# Patient Record
Sex: MALE | Race: WHITE | NOT HISPANIC OR LATINO | Employment: FULL TIME | URBAN - METROPOLITAN AREA
[De-identification: names, ages, dates, MRNs, and addresses within clinical notes are randomized per-mention and may not be internally consistent; named-entity substitution may affect disease eponyms.]

---

## 2018-04-03 ENCOUNTER — HOSPITAL ENCOUNTER (EMERGENCY)
Facility: CLINIC | Age: 63
Discharge: HOME | End: 2018-04-03
Attending: EMERGENCY MEDICINE | Admitting: EMERGENCY MEDICINE
Payer: COMMERCIAL

## 2018-04-03 VITALS
DIASTOLIC BLOOD PRESSURE: 87 MMHG | TEMPERATURE: 97.4 F | RESPIRATION RATE: 16 BRPM | HEART RATE: 78 BPM | OXYGEN SATURATION: 98 % | SYSTOLIC BLOOD PRESSURE: 136 MMHG

## 2018-04-03 DIAGNOSIS — R55 VASOVAGAL SYNCOPE: ICD-10-CM

## 2018-04-03 LAB
ANION GAP SERPL CALCULATED.3IONS-SCNC: 7 MMOL/L (ref 3–14)
BASOPHILS # BLD AUTO: 0 10E9/L (ref 0–0.2)
BASOPHILS NFR BLD AUTO: 0.5 %
BUN SERPL-MCNC: 20 MG/DL (ref 7–30)
CALCIUM SERPL-MCNC: 8.9 MG/DL (ref 8.5–10.1)
CHLORIDE SERPL-SCNC: 106 MMOL/L (ref 94–109)
CO2 SERPL-SCNC: 27 MMOL/L (ref 20–32)
CREAT SERPL-MCNC: 1.17 MG/DL (ref 0.66–1.25)
DIFFERENTIAL METHOD BLD: NORMAL
EOSINOPHIL # BLD AUTO: 0.2 10E9/L (ref 0–0.7)
EOSINOPHIL NFR BLD AUTO: 3.6 %
ERYTHROCYTE [DISTWIDTH] IN BLOOD BY AUTOMATED COUNT: 13 % (ref 10–15)
GFR SERPL CREATININE-BSD FRML MDRD: 63 ML/MIN/1.7M2
GLUCOSE SERPL-MCNC: 100 MG/DL (ref 70–99)
HCT VFR BLD AUTO: 44.2 % (ref 40–53)
HGB BLD-MCNC: 14.8 G/DL (ref 13.3–17.7)
IMM GRANULOCYTES # BLD: 0 10E9/L (ref 0–0.4)
IMM GRANULOCYTES NFR BLD: 0.2 %
LYMPHOCYTES # BLD AUTO: 1.2 10E9/L (ref 0.8–5.3)
LYMPHOCYTES NFR BLD AUTO: 29.4 %
MAGNESIUM SERPL-MCNC: 2.2 MG/DL (ref 1.6–2.3)
MCH RBC QN AUTO: 30.8 PG (ref 26.5–33)
MCHC RBC AUTO-ENTMCNC: 33.5 G/DL (ref 31.5–36.5)
MCV RBC AUTO: 92 FL (ref 78–100)
MONOCYTES # BLD AUTO: 0.4 10E9/L (ref 0–1.3)
MONOCYTES NFR BLD AUTO: 9.6 %
NEUTROPHILS # BLD AUTO: 2.4 10E9/L (ref 1.6–8.3)
NEUTROPHILS NFR BLD AUTO: 56.7 %
NRBC # BLD AUTO: 0 10*3/UL
NRBC BLD AUTO-RTO: 0 /100
PLATELET # BLD AUTO: 219 10E9/L (ref 150–450)
POTASSIUM SERPL-SCNC: 3.4 MMOL/L (ref 3.4–5.3)
RBC # BLD AUTO: 4.81 10E12/L (ref 4.4–5.9)
SODIUM SERPL-SCNC: 140 MMOL/L (ref 133–144)
WBC # BLD AUTO: 4.2 10E9/L (ref 4–11)

## 2018-04-03 PROCEDURE — 96361 HYDRATE IV INFUSION ADD-ON: CPT

## 2018-04-03 PROCEDURE — 80048 BASIC METABOLIC PNL TOTAL CA: CPT | Performed by: EMERGENCY MEDICINE

## 2018-04-03 PROCEDURE — 85025 COMPLETE CBC W/AUTO DIFF WBC: CPT | Performed by: EMERGENCY MEDICINE

## 2018-04-03 PROCEDURE — 83735 ASSAY OF MAGNESIUM: CPT | Performed by: EMERGENCY MEDICINE

## 2018-04-03 PROCEDURE — 99284 EMERGENCY DEPT VISIT MOD MDM: CPT | Mod: 25

## 2018-04-03 PROCEDURE — 96374 THER/PROPH/DIAG INJ IV PUSH: CPT

## 2018-04-03 PROCEDURE — 25000128 H RX IP 250 OP 636: Performed by: EMERGENCY MEDICINE

## 2018-04-03 RX ORDER — ONDANSETRON 2 MG/ML
4 INJECTION INTRAMUSCULAR; INTRAVENOUS ONCE
Status: COMPLETED | OUTPATIENT
Start: 2018-04-03 | End: 2018-04-03

## 2018-04-03 RX ORDER — LIDOCAINE 40 MG/G
CREAM TOPICAL
Status: DISCONTINUED | OUTPATIENT
Start: 2018-04-03 | End: 2018-04-03 | Stop reason: HOSPADM

## 2018-04-03 RX ADMIN — SODIUM CHLORIDE 1000 ML: 9 INJECTION, SOLUTION INTRAVENOUS at 15:55

## 2018-04-03 RX ADMIN — ONDANSETRON 4 MG: 2 INJECTION INTRAMUSCULAR; INTRAVENOUS at 15:56

## 2018-04-03 ASSESSMENT — ENCOUNTER SYMPTOMS
SEIZURES: 0
LIGHT-HEADEDNESS: 1
PALPITATIONS: 0
DYSURIA: 0
HEMATURIA: 0
SHORTNESS OF BREATH: 0
TREMORS: 0
ABDOMINAL PAIN: 0
FREQUENCY: 0

## 2018-04-03 NOTE — ED AVS SNAPSHOT
[facility]    [address]    [facility] [address]    Phone:  [phone]    Fax:  [phone]                                       Derik Hamm   MRN: [id number]    Department:  [facility]   Date of Visit:  4/3/2018           After Visit Summary Signature Page     I have received my discharge instructions, and my questions have been answered. I have discussed any challenges I see with this plan with the nurse or doctor.    ..........................................................................................................................................  Patient/Patient Representative Signature      ..........................................................................................................................................  Patient Representative Print Name and Relationship to Patient    ..................................................               ................................................  Date                                            Time    ..........................................................................................................................................  Reviewed by Signature/Title    ...................................................              ..............................................  Date                                                            Time

## 2018-04-03 NOTE — ED AVS SNAPSHOT
[facility]    [address]    [facility] [address]    Phone:  [phone]    Fax:  [phone]                                       Derik Hamm   MRN: [id number]    Department:  [facility]   Date of Visit:  4/3/2018           Patient Information     Date Of Birth          1955        Your diagnoses for this visit were:     Vasovagal syncope        You were seen by Rohini Chapa MD.      Follow-up Information     Follow up with Clinic, [facility].    Why:  within 3 days for reassessment as needed    Contact information:    [address]  [address]  [facility] [address] [phone]          Follow up with [facility].    Specialty:  EMERGENCY MEDICINE    Why:  As needed, If symptoms worsen    Contact information:    [address] [facility] [id number] [phone]        Discharge Instructions         Avoid energy drinks/supplements. Get plenty of sleep. Eat regular, healthy meals and stay hydrated.    Fainting: Vagal Reaction  Fainting (syncope) is a temporary loss of consciousness that is associated with a loss of postural tone. It s also called passing out. It occurs when blood flow to the brain is less than normal. Your healthcare provider believes that your fainting was because of a vagal reaction. This condition is not a sign of serious disease.  A vagal reaction is a response in your body that causes your pulse to slow down or the blood vessels to expand. This causes your blood pressure to fall. And this sends less blood to your brain if you are standing or sitting. That results in dizziness, near-fainting, or fainting. Lying down usually stops the reaction within 60 seconds.  This response can occur during sudden fear, severe pain, emotional stress, overexertion, overheating, hunger, nausea or vomiting, prolonged standing, or standing up  after sitting or lying for a long time.  Home care  Follow these guidelines when caring for yourself at home:    Rest today. Go back to your normal activities as soon as you are feeling back to normal.    Stay hydrated and avoid skipping meals.    If you feel lightheaded or dizzy, lie down right away. Or sit with your head lowered between your knees.  Follow-up care  Follow up with your healthcare provider, or as advised.  When to seek medical advice  Call your healthcare provider right away if any of these occur:    Another fainting spell that s not explained by the common causes listed above    Pain in your chest, arm, neck, jaw, back, or abdomen    Shortness of breath    Severe headache or seizure    Your heart beats very rapidly, very slowly, or irregularly (palpitations)  Date Last Reviewed: 12/1/2016 2000-2017 [employer]. [address] [place] [address]. All rights reserved. This information is not intended as a substitute for professional medical care. Always follow your healthcare professional's instructions.          24 Hour Appointment Hotline       To make an appointment at any [facility], call [phone] ([phone]). If you don't have a family doctor or clinic, we will help you find one. [place] clinics are conveniently located to serve the needs of you and your family.             Review of your medicines      Notice     You have not been prescribed any medications.            Procedures and tests performed during your visit     Basic metabolic panel    CBC with platelets differential    Cardiac Continuous Monitoring    EKG 12 lead    Magnesium    Peripheral IV catheter    Pulse oximetry nursing      Orders Needing Specimen Collection     None      Pending Results     Date and Time Order Name Status Description    4/3/2018 1523 EKG 12 lead Preliminary             Pending Culture Results     No orders found from 4/1/2018 to 4/4/2018.            Pending Results  Instructions     If you had any lab results that were not finalized at the time of your Discharge, you can call the ED Lab Result RN at [phone]. You will be contacted by this team for any positive Lab results or changes in treatment. The nurses are available 7 days a week from 10A to 6:30P.  You can leave a message 24 hours per day and they will return your call.        Test Results From Your Hospital Stay        4/3/2018  4:08 PM      Component Results     Component Value Ref Range & Units Status    WBC 4.2 4.0 - 11.0 10e9/L Final    RBC Count 4.81 4.4 - 5.9 10e12/L Final    Hemoglobin 14.8 13.3 - 17.7 g/dL Final    Hematocrit 44.2 40.0 - 53.0 % Final    MCV 92 78 - 100 fl Final    MCH 30.8 26.5 - 33.0 pg Final    MCHC 33.5 31.5 - 36.5 g/dL Final    RDW 13.0 10.0 - 15.0 % Final    Platelet Count 219 150 - 450 10e9/L Final    Diff Method Automated Method  Final    % Neutrophils 56.7 % Final    % Lymphocytes 29.4 % Final    % Monocytes 9.6 % Final    % Eosinophils 3.6 % Final    % Basophils 0.5 % Final    % Immature Granulocytes 0.2 % Final    Nucleated RBCs 0 0 /100 Final    Absolute Neutrophil 2.4 1.6 - 8.3 10e9/L Final    Absolute Lymphocytes 1.2 0.8 - 5.3 10e9/L Final    Absolute Monocytes 0.4 0.0 - 1.3 10e9/L Final    Absolute Eosinophils 0.2 0.0 - 0.7 10e9/L Final    Absolute Basophils 0.0 0.0 - 0.2 10e9/L Final    Abs Immature Granulocytes 0.0 0 - 0.4 10e9/L Final    Absolute Nucleated RBC 0.0  Final         4/3/2018  4:23 PM      Component Results     Component Value Ref Range & Units Status    Sodium 140 133 - 144 mmol/L Final    Potassium 3.4 3.4 - 5.3 mmol/L Final    Chloride 106 94 - 109 mmol/L Final    Carbon Dioxide 27 20 - 32 mmol/L Final    Anion Gap 7 3 - 14 mmol/L Final    Glucose 100 (H) 70 - 99 mg/dL Final    Urea Nitrogen 20 7 - 30 mg/dL Final    Creatinine 1.17 0.66 - 1.25 mg/dL Final    GFR Estimate 63 >60 mL/min/1.7m2 Final    Non  GFR Calc    GFR Estimate If Black 77 >60  mL/min/1.7m2 Final     GFR Calc    Calcium 8.9 8.5 - 10.1 mg/dL Final         4/3/2018  4:23 PM      Component Results     Component Value Ref Range & Units Status    Magnesium 2.2 1.6 - 2.3 mg/dL Final                Clinical Quality Measure: Blood Pressure Screening     Your blood pressure was checked while you were in the emergency department today. The last reading we obtained was  BP: 121/86 . Please read the guidelines below about what these numbers mean and what you should do about them.  If your systolic blood pressure (the top number) is less than 120 and your diastolic blood pressure (the bottom number) is less than 80, then your blood pressure is normal. There is nothing more that you need to do about it.  If your systolic blood pressure (the top number) is 120-139 or your diastolic blood pressure (the bottom number) is 80-89, your blood pressure may be higher than it should be. You should have your blood pressure rechecked within a year by a primary care provider.  If your systolic blood pressure (the top number) is 140 or greater or your diastolic blood pressure (the bottom number) is 90 or greater, you may have high blood pressure. High blood pressure is treatable, but if left untreated over time it can put you at risk for heart attack, stroke, or kidney failure. You should have your blood pressure rechecked by a primary care provider within the next 4 weeks.  If your provider in the emergency department today gave you specific instructions to follow-up with your doctor or provider even sooner than that, you should follow that instruction and not wait for up to 4 weeks for your follow-up visit.        Thank you for choosing [facility]       Thank you for choosing [facility] for your care. Our goal is always to provide you with excellent care. Hearing back from our patients is one way we can continue to improve our services. Please take a few minutes to complete the written survey that you  "may receive in the mail after you visit with us. Thank you!        [employer] Information     [employer] lets you send messages to your doctor, view your test results, renew your prescriptions, schedule appointments and more. To sign up, go to [email or web address] . Click on \"Log in\" on the left side of the screen, which will take you to the Welcome page. Then click on \"Sign up Now\" on the right side of the page.     You will be asked to enter the access code listed below, as well as some personal information. Please follow the directions to create your username and password.     Your access code is: [email or web address]  Expires: 2018  5:17 PM     Your access code will  in 90 days. If you need help or a new code, please call your [facility] clinic or [phone].        Care EveryWhere ID     This is your Care EveryWhere ID. This could be used by other organizations to access your [facility] medical records  [id number]        Equal Access to Services     HERVE [facility] : Hadii aad ku hadasho Soeleuterioali, waaxda luqadaha, qaybta kaalmada adeegyaever, gato bartlett . So [facility] [phone].    ATENCIÓN: Si habla español, tiene a pink disposición servicios gratuitos de asistencia lingüística. Llame al [phone].    We comply with applicable federal civil rights laws and [place] laws. We do not discriminate on the basis of race, color, national origin, age, disability, sex, sexual orientation, or gender identity.            After Visit Summary       This is your record. Keep this with you and show to your community pharmacist(s) and doctor(s) at your next visit.                  "

## 2018-04-03 NOTE — ED NOTES
Bed: ED12  Expected date: 4/3/18  Expected time: 3:13 PM  Means of arrival: Ambulance  Comments:  BV2. Syncope. 62M

## 2018-04-03 NOTE — ED NOTES
"Pt presents to ED via EMS following a syncopal episode. Pt took friend to [facility] today and pt had a witnessed syncopal episode. Pt states he was feeling \"queezy\" watching them take out his friend's stitches then he just passed out. Pt states he has not eaten much today and has had a 5 hour energy drink and a can of pepsi max. BG 96 per EMS. Pt orthostatic per EMS. /53 laying then 94/50 when standing. Pt was incontinent at the time but no seizure-like activity was noted. EMS gave about 200mL fluid PTA. ABCs intact. A&Ox3. VSS.     "

## 2018-04-03 NOTE — ED PROVIDER NOTES
History     Chief Complaint:  Loss of Consciousness    HPI   Derik Hamm is an otherwise healthy 62 year old male who presents with a syncopal episode. The patient reports that he was at the ortho clinic with his roommate who was getting stitches out. At that time he began feeling lightheaded and then had an episode of syncope with incontinence of urine. He simply slumped in a chair and notes no trauma or falling on the floor during this episode. He denies chest pain, shortness of breath, palpitations, abdominal pain, urinary symptoms, shaking, or seizure like activity. He endorses having eaten less than normal today along with 5 Hour Energy and Mountain Dew today, which is typical for him. He also states that he did not get much sleep last night.  Of note, the patient reports a history of this once when having a cast removed as a child.     Allergies:  No known drug allergies      Medications:    The patient is currently on no regular medications.       Past Medical History:    The patient does not have any past pertinent medical history.      Past Surgical History:    History reviewed. No pertinent surgical history.     Family History:    History review. No contributing family history.     Social History:  PCP: No primary care provider on file.   Patient presents alone.   Marital Status:  Single      Review of Systems   Respiratory: Negative for shortness of breath.    Cardiovascular: Negative for chest pain and palpitations.   Gastrointestinal: Negative for abdominal pain.   Genitourinary: Negative for dysuria, frequency, hematuria and urgency.        Positive for incontinence of urine   Neurological: Positive for syncope and light-headedness. Negative for tremors and seizures.   All other systems reviewed and are negative.    Physical Exam     Patient Vitals for the past 24 hrs:   BP Temp Temp src Pulse Heart Rate Resp SpO2   04/03/18 1545 121/86 - - - 64 - 95 %   04/03/18 1530 124/83 - - - 75 - 98 %    04/03/18 1526 127/76 97.4  F (36.3  C) Oral 78 78 16 95 %      Physical Exam  General: Adult male sitting upright in bed  Eyes: PERRL, Conjunctive within normal limits  ENT: Moist mucous membranes, oropharynx clear.   CV: Normal S1S2, no murmur, rub or gallop. Regular rate and rhythm  Resp: Clear to auscultation bilaterally, no wheezes, rales or rhonchi. Normal respiratory effort.  GI: Abdomen is soft, nontender and nondistended. No palpable masses. No rebound or guarding.  MSK: No edema. Nontender. Normal active range of motion.  Skin: Warm and dry. No rashes or lesions or ecchymoses on visible skin.  Neuro: Alert and oriented. Responds appropriately to all questions and commands. No focal findings appreciated. Normal muscle tone.  Psych: Normal mood and affect. Pleasant.     Emergency Department Course   ECG (15:23):  Rate 73 bpm. NY interval 182. QT/QTc 378/416.   Normal sinus rhythm. Normal ECG  Interpreted at 1524 by Rohini Chapa MD.        Laboratory:  CBC: WNL (WBC 4.2, HGB 14.8, )   BMP: Glucose 100 (H), o/w WNL (Creatinine 1.17)  Magnesium 2.2    Interventions:  1555: NS 1L IV Bolus   1556: Zofran 4mg IV     Emergency Department Course:  Past medical records, nursing notes, and vitals reviewed.  1527: I performed an exam of the patient and obtained history, as documented above.  IV inserted and blood drawn.  Patient reassessed. Is feeling comfortable. Taking po. Ambulatory with normal gait.   1710: I rechecked the patient. Findings and plan explained to the Patient. Patient discharged home with instructions regarding supportive care, medications, and reasons to return. The importance of close follow-up was reviewed.      Impression & Plan      Medical Decision Making:  This patient presents with a history and clinical exam consistent with syncope. The differential for syncope is concerning for cardiac arrythmia, ACS, aortic stenosis, HOCM, PE, orthostatic hypotension, drugs, situational,  "carotid hypersensitivity, seizure, TIA, stroke.  There is no family history of sudden death, no chest pain, no seizure-like activity or post-ictal period, no murmur, and no signs of orthostasis in the ED, no focal neurologic symptoms, and no complaints of concerning headache.  The patient did not have prolonged LOC, sleepiness, repeated emesis, poor orientation, or significant irritability.  By history this sounds most consistent with vasovagal syncope.  I have noted that \"red flags\" include: repeated LOC,  headaches that get worse, increased drowsiness, strange behavior, repetitive speech, seizures, repeated vomiting, growing confusion, increased irritability, slurred speech, weakness or numbness, and loss of responsiveness. Recommended to rest, avoid energy drink or supplements, return with recurrence.     Diagnosis:    ICD-10-CM    1. Vasovagal syncope R55        Disposition:  Discharged to home    Zina Hilario  4/3/2018   [facility] EMERGENCY DEPARTMENT  I, Zina Hilario, am serving as a scribe at 3:27 PM on 4/3/2018 to document services personally performed by Rohini Chapa MD based on my observations and the provider's statements to me.        Rohini Chapa MD  04/04/18 6743    "

## 2018-04-03 NOTE — ED NOTES
Patient ambulated very well. Very steady and no complaints of dizziness or feelings of being lightheaded.

## 2018-04-03 NOTE — DISCHARGE INSTRUCTIONS
Avoid energy drinks/supplements. Get plenty of sleep. Eat regular, healthy meals and stay hydrated.    Fainting: Vagal Reaction  Fainting (syncope) is a temporary loss of consciousness that is associated with a loss of postural tone. It s also called passing out. It occurs when blood flow to the brain is less than normal. Your healthcare provider believes that your fainting was because of a vagal reaction. This condition is not a sign of serious disease.  A vagal reaction is a response in your body that causes your pulse to slow down or the blood vessels to expand. This causes your blood pressure to fall. And this sends less blood to your brain if you are standing or sitting. That results in dizziness, near-fainting, or fainting. Lying down usually stops the reaction within 60 seconds.  This response can occur during sudden fear, severe pain, emotional stress, overexertion, overheating, hunger, nausea or vomiting, prolonged standing, or standing up after sitting or lying for a long time.  Home care  Follow these guidelines when caring for yourself at home:    Rest today. Go back to your normal activities as soon as you are feeling back to normal.    Stay hydrated and avoid skipping meals.    If you feel lightheaded or dizzy, lie down right away. Or sit with your head lowered between your knees.  Follow-up care  Follow up with your healthcare provider, or as advised.  When to seek medical advice  Call your healthcare provider right away if any of these occur:    Another fainting spell that s not explained by the common causes listed above    Pain in your chest, arm, neck, jaw, back, or abdomen    Shortness of breath    Severe headache or seizure    Your heart beats very rapidly, very slowly, or irregularly (palpitations)  Date Last Reviewed: 12/1/2016 2000-2017 [employer]. [address] [place] [address], [place] [address]. All rights reserved. This information is not intended as a substitute for  professional medical care. Always follow your healthcare professional's instructions.

## 2018-04-04 LAB — INTERPRETATION ECG - MUSE: NORMAL

## 2018-11-04 ENCOUNTER — ANESTHESIA (OUTPATIENT)
Dept: SURGERY | Facility: CLINIC | Age: 63
End: 2018-11-04
Payer: COMMERCIAL

## 2018-11-04 ENCOUNTER — ANESTHESIA EVENT (OUTPATIENT)
Dept: SURGERY | Facility: CLINIC | Age: 63
End: 2018-11-04
Payer: COMMERCIAL

## 2018-11-04 ENCOUNTER — HOSPITAL ENCOUNTER (INPATIENT)
Facility: CLINIC | Age: 63
LOS: 5 days | Discharge: HOME | End: 2018-11-10
Attending: NURSE PRACTITIONER | Admitting: SURGERY
Payer: COMMERCIAL

## 2018-11-04 ENCOUNTER — APPOINTMENT (OUTPATIENT)
Dept: CT IMAGING | Facility: CLINIC | Age: 63
End: 2018-11-04
Attending: NURSE PRACTITIONER
Payer: COMMERCIAL

## 2018-11-04 DIAGNOSIS — K35.32 APPENDICITIS WITH PERFORATION: Primary | ICD-10-CM

## 2018-11-04 DIAGNOSIS — R10.13 DYSPEPSIA: ICD-10-CM

## 2018-11-04 DIAGNOSIS — I48.0 PAF (PAROXYSMAL ATRIAL FIBRILLATION) (H): ICD-10-CM

## 2018-11-04 DIAGNOSIS — K35.80 ACUTE APPENDICITIS, UNSPECIFIED ACUTE APPENDICITIS TYPE: ICD-10-CM

## 2018-11-04 LAB
ALBUMIN SERPL-MCNC: 4.1 G/DL (ref 3.4–5)
ALP SERPL-CCNC: 68 U/L (ref 40–150)
ALT SERPL W P-5'-P-CCNC: 31 U/L (ref 0–70)
ANION GAP SERPL CALCULATED.3IONS-SCNC: 7 MMOL/L (ref 3–14)
AST SERPL W P-5'-P-CCNC: 16 U/L (ref 0–45)
BILIRUB SERPL-MCNC: 1.3 MG/DL (ref 0.2–1.3)
BUN SERPL-MCNC: 24 MG/DL (ref 7–30)
CALCIUM SERPL-MCNC: 9.2 MG/DL (ref 8.5–10.1)
CHLORIDE SERPL-SCNC: 103 MMOL/L (ref 94–109)
CO2 SERPL-SCNC: 27 MMOL/L (ref 20–32)
CREAT SERPL-MCNC: 1.05 MG/DL (ref 0.66–1.25)
ERYTHROCYTE [DISTWIDTH] IN BLOOD BY AUTOMATED COUNT: 13.2 % (ref 10–15)
GFR SERPL CREATININE-BSD FRML MDRD: 72 ML/MIN/1.7M2
GLUCOSE SERPL-MCNC: 149 MG/DL (ref 70–99)
HCT VFR BLD AUTO: 47.7 % (ref 40–53)
HGB BLD-MCNC: 16.1 G/DL (ref 13.3–17.7)
LIPASE SERPL-CCNC: 69 U/L (ref 73–393)
MCH RBC QN AUTO: 30.6 PG (ref 26.5–33)
MCHC RBC AUTO-ENTMCNC: 33.8 G/DL (ref 31.5–36.5)
MCV RBC AUTO: 91 FL (ref 78–100)
PLATELET # BLD AUTO: 212 10E9/L (ref 150–450)
POTASSIUM SERPL-SCNC: 3.5 MMOL/L (ref 3.4–5.3)
PROT SERPL-MCNC: 7.6 G/DL (ref 6.8–8.8)
RBC # BLD AUTO: 5.27 10E12/L (ref 4.4–5.9)
SODIUM SERPL-SCNC: 137 MMOL/L (ref 133–144)
WBC # BLD AUTO: 10.7 10E9/L (ref 4–11)

## 2018-11-04 PROCEDURE — 96365 THER/PROPH/DIAG IV INF INIT: CPT | Mod: 59

## 2018-11-04 PROCEDURE — 27210794 ZZH OR GENERAL SUPPLY STERILE: Performed by: SURGERY

## 2018-11-04 PROCEDURE — 85027 COMPLETE CBC AUTOMATED: CPT | Performed by: NURSE PRACTITIONER

## 2018-11-04 PROCEDURE — 36000056 ZZH SURGERY LEVEL 3 1ST 30 MIN: Performed by: SURGERY

## 2018-11-04 PROCEDURE — 88304 TISSUE EXAM BY PATHOLOGIST: CPT | Mod: 26 | Performed by: SURGERY

## 2018-11-04 PROCEDURE — 80053 COMPREHEN METABOLIC PANEL: CPT | Performed by: NURSE PRACTITIONER

## 2018-11-04 PROCEDURE — 37000008 ZZH ANESTHESIA TECHNICAL FEE, 1ST 30 MIN: Performed by: SURGERY

## 2018-11-04 PROCEDURE — 25000128 H RX IP 250 OP 636: Performed by: NURSE PRACTITIONER

## 2018-11-04 PROCEDURE — 96361 HYDRATE IV INFUSION ADD-ON: CPT

## 2018-11-04 PROCEDURE — 99222 1ST HOSP IP/OBS MODERATE 55: CPT | Mod: 57 | Performed by: SURGERY

## 2018-11-04 PROCEDURE — 88304 TISSUE EXAM BY PATHOLOGIST: CPT | Performed by: SURGERY

## 2018-11-04 PROCEDURE — 36000058 ZZH SURGERY LEVEL 3 EA 15 ADDTL MIN: Performed by: SURGERY

## 2018-11-04 PROCEDURE — 40000306 ZZH STATISTIC PRE PROC ASSESS II: Performed by: SURGERY

## 2018-11-04 PROCEDURE — 37000009 ZZH ANESTHESIA TECHNICAL FEE, EACH ADDTL 15 MIN: Performed by: SURGERY

## 2018-11-04 PROCEDURE — 99285 EMERGENCY DEPT VISIT HI MDM: CPT | Mod: 25

## 2018-11-04 PROCEDURE — 25000128 H RX IP 250 OP 636: Performed by: ANESTHESIOLOGY

## 2018-11-04 PROCEDURE — 71000012 ZZH RECOVERY PHASE 1 LEVEL 1 FIRST HR: Performed by: SURGERY

## 2018-11-04 PROCEDURE — 96375 TX/PRO/DX INJ NEW DRUG ADDON: CPT

## 2018-11-04 PROCEDURE — 44970 LAPAROSCOPY APPENDECTOMY: CPT | Performed by: SURGERY

## 2018-11-04 PROCEDURE — 25000125 ZZHC RX 250: Performed by: NURSE ANESTHETIST, CERTIFIED REGISTERED

## 2018-11-04 PROCEDURE — 25000128 H RX IP 250 OP 636: Performed by: NURSE ANESTHETIST, CERTIFIED REGISTERED

## 2018-11-04 PROCEDURE — 25000566 ZZH SEVOFLURANE, EA 15 MIN: Performed by: SURGERY

## 2018-11-04 PROCEDURE — 74177 CT ABD & PELVIS W/CONTRAST: CPT

## 2018-11-04 PROCEDURE — 81003 URINALYSIS AUTO W/O SCOPE: CPT | Performed by: NURSE PRACTITIONER

## 2018-11-04 PROCEDURE — 83690 ASSAY OF LIPASE: CPT | Performed by: NURSE PRACTITIONER

## 2018-11-04 RX ORDER — GLYCOPYRROLATE 0.2 MG/ML
INJECTION, SOLUTION INTRAMUSCULAR; INTRAVENOUS PRN
Status: DISCONTINUED | OUTPATIENT
Start: 2018-11-04 | End: 2018-11-05

## 2018-11-04 RX ORDER — ONDANSETRON 2 MG/ML
INJECTION INTRAMUSCULAR; INTRAVENOUS PRN
Status: DISCONTINUED | OUTPATIENT
Start: 2018-11-04 | End: 2018-11-05

## 2018-11-04 RX ORDER — ONDANSETRON 2 MG/ML
4 INJECTION INTRAMUSCULAR; INTRAVENOUS ONCE
Status: COMPLETED | OUTPATIENT
Start: 2018-11-04 | End: 2018-11-04

## 2018-11-04 RX ORDER — KETOROLAC TROMETHAMINE 30 MG/ML
INJECTION, SOLUTION INTRAMUSCULAR; INTRAVENOUS PRN
Status: DISCONTINUED | OUTPATIENT
Start: 2018-11-04 | End: 2018-11-05

## 2018-11-04 RX ORDER — LIDOCAINE 40 MG/G
CREAM TOPICAL
Status: DISCONTINUED | OUTPATIENT
Start: 2018-11-04 | End: 2018-11-05 | Stop reason: HOSPADM

## 2018-11-04 RX ORDER — DEXAMETHASONE SODIUM PHOSPHATE 4 MG/ML
INJECTION, SOLUTION INTRA-ARTICULAR; INTRALESIONAL; INTRAMUSCULAR; INTRAVENOUS; SOFT TISSUE PRN
Status: DISCONTINUED | OUTPATIENT
Start: 2018-11-04 | End: 2018-11-05

## 2018-11-04 RX ORDER — LIDOCAINE HYDROCHLORIDE 10 MG/ML
INJECTION, SOLUTION INFILTRATION; PERINEURAL PRN
Status: DISCONTINUED | OUTPATIENT
Start: 2018-11-04 | End: 2018-11-05

## 2018-11-04 RX ORDER — FENTANYL CITRATE 50 UG/ML
INJECTION, SOLUTION INTRAMUSCULAR; INTRAVENOUS PRN
Status: DISCONTINUED | OUTPATIENT
Start: 2018-11-04 | End: 2018-11-05

## 2018-11-04 RX ORDER — SODIUM CHLORIDE, SODIUM LACTATE, POTASSIUM CHLORIDE, CALCIUM CHLORIDE 600; 310; 30; 20 MG/100ML; MG/100ML; MG/100ML; MG/100ML
INJECTION, SOLUTION INTRAVENOUS CONTINUOUS
Status: DISCONTINUED | OUTPATIENT
Start: 2018-11-04 | End: 2018-11-05 | Stop reason: HOSPADM

## 2018-11-04 RX ORDER — PROPOFOL 10 MG/ML
INJECTION, EMULSION INTRAVENOUS PRN
Status: DISCONTINUED | OUTPATIENT
Start: 2018-11-04 | End: 2018-11-05

## 2018-11-04 RX ORDER — IOPAMIDOL 755 MG/ML
500 INJECTION, SOLUTION INTRAVASCULAR ONCE
Status: COMPLETED | OUTPATIENT
Start: 2018-11-04 | End: 2018-11-04

## 2018-11-04 RX ADMIN — PROPOFOL 200 MG: 10 INJECTION, EMULSION INTRAVENOUS at 23:31

## 2018-11-04 RX ADMIN — TAZOBACTAM SODIUM AND PIPERACILLIN SODIUM 4.5 G: 500; 4 INJECTION, SOLUTION INTRAVENOUS at 22:32

## 2018-11-04 RX ADMIN — KETOROLAC TROMETHAMINE 30 MG: 30 INJECTION, SOLUTION INTRAMUSCULAR at 23:31

## 2018-11-04 RX ADMIN — SODIUM CHLORIDE, POTASSIUM CHLORIDE, SODIUM LACTATE AND CALCIUM CHLORIDE: 600; 310; 30; 20 INJECTION, SOLUTION INTRAVENOUS at 23:22

## 2018-11-04 RX ADMIN — IOPAMIDOL 100 ML: 755 INJECTION, SOLUTION INTRAVENOUS at 22:01

## 2018-11-04 RX ADMIN — LIDOCAINE HYDROCHLORIDE 50 MG: 10 INJECTION, SOLUTION INFILTRATION; PERINEURAL at 23:31

## 2018-11-04 RX ADMIN — HYDROMORPHONE HYDROCHLORIDE 1 MG: 1 INJECTION, SOLUTION INTRAMUSCULAR; INTRAVENOUS; SUBCUTANEOUS at 22:31

## 2018-11-04 RX ADMIN — SODIUM CHLORIDE 66 ML: 9 INJECTION, SOLUTION INTRAVENOUS at 22:01

## 2018-11-04 RX ADMIN — SODIUM CHLORIDE 1000 ML: 9 INJECTION, SOLUTION INTRAVENOUS at 21:22

## 2018-11-04 RX ADMIN — MIDAZOLAM 2 MG: 1 INJECTION INTRAMUSCULAR; INTRAVENOUS at 23:22

## 2018-11-04 RX ADMIN — FENTANYL CITRATE 100 MCG: 50 INJECTION, SOLUTION INTRAMUSCULAR; INTRAVENOUS at 23:31

## 2018-11-04 RX ADMIN — ONDANSETRON 4 MG: 2 INJECTION INTRAMUSCULAR; INTRAVENOUS at 22:32

## 2018-11-04 RX ADMIN — ROCURONIUM BROMIDE 5 MG: 10 INJECTION INTRAVENOUS at 23:56

## 2018-11-04 RX ADMIN — ROCURONIUM BROMIDE 50 MG: 10 INJECTION INTRAVENOUS at 23:31

## 2018-11-04 RX ADMIN — ONDANSETRON 4 MG: 2 INJECTION INTRAMUSCULAR; INTRAVENOUS at 23:31

## 2018-11-04 RX ADMIN — DEXAMETHASONE SODIUM PHOSPHATE 8 MG: 4 INJECTION, SOLUTION INTRA-ARTICULAR; INTRALESIONAL; INTRAMUSCULAR; INTRAVENOUS; SOFT TISSUE at 23:31

## 2018-11-04 RX ADMIN — GLYCOPYRROLATE 0.2 MG: 0.2 INJECTION, SOLUTION INTRAMUSCULAR; INTRAVENOUS at 23:31

## 2018-11-04 ASSESSMENT — ENCOUNTER SYMPTOMS
DYSURIA: 0
DIARRHEA: 0
SHORTNESS OF BREATH: 0
FREQUENCY: 0
CHILLS: 0
FEVER: 0
VOMITING: 1
COUGH: 0
ABDOMINAL PAIN: 1
DIFFICULTY URINATING: 1
HEMATURIA: 0

## 2018-11-04 NOTE — IP AVS SNAPSHOT
MRN:[id number]                      After Visit Summary   11/4/2018    Derik Hamm    MRN: [id number]           Thank you!     Thank you for choosing [facility] for your care. Our goal is always to provide you with excellent care. Hearing back from our patients is one way we can continue to improve our services. Please take a few minutes to complete the written survey that you may receive in the mail after you visit. If you would like to speak to someone directly about your visit please contact Patient Relations at [phone]. Thank you!          Patient Information     Date Of Birth          1955        Designated Caregiver       Most Recent Value    Caregiver    Will someone help with your care after discharge? no      About your hospital stay     You were admitted on:  November 5, 2018 You last received care in the:  [place] [address] [facility]    You were discharged on:  November 10, 2018       Who to Call     For medical emergencies, please call 911.  For non-urgent questions about your medical care, please call your primary care provider or clinic, [phone]  For questions related to your surgery, please call your surgery clinic        Attending Provider     Provider Specialty    Antonio Weeks APRN [facility]    Mackenzie Dale MD Surgery       Primary Care Provider Office Phone # Fax #    [facility] [phone]      Follow-up Appointments     Follow-up and recommended labs and tests        [facility], within 7 days to evaluate medication change, for hospital follow- up and BP.  No follow up labs or test are needed.                  Further instructions from your care team       HOME CARE FOLLOWING APPENDECTOMY  MAURA Rehman N. Guttormson, D. Maurer, R. O Donnell, J. Shaheen    INCISIONAL CARE:  Replace the bandage over your incision (or incisions)  until all drainage stops, or if more comfortable to have in place.  If present, leave the steri-strips (white paper tapes) in place for 14 days after surgery.  If you have staples in your incision at the time of discharge, they will be removed at your follow-up appointment.  If Dermabond (a type of skin glue) is present, leave in place until it wears/flakes off.     BATHING:  Avoid baths for 1 week after surgery.  Showers are okay.  You may wash your hair at any time.  Gently pat your incision dry after bathing.    ACTIVITY:  Light Activity -- you may immediately be up and about as tolerated.  Driving -- you may drive when comfortable and off narcotic pain medications.  Light Work -- resume when comfortable off pain medications.  (If you can drive, you probably can work.)  Strenuous Work/Activity -- limit lifting to 20 pounds for 2 weeks.  Progressively increase with time.  Active Sports (running, biking, etc.) -- cautiously resume after 2 weeks.    DISCOMFORT:  Use pain medications as prescribed by your surgeon.  Take the pain medication with some food, when possible, to minimize side effects.  Intermittent use of ice packs at the incision sites may help during the first 48 hours.  Expect gradual improvement.    DIET:  No restrictions.  Drink plenty of fluids.  While taking pain medications, increase dietary fiber or add a fiber supplementation like Metamucil or Citrucel to help prevent constipation - a possible side effect of pain medications.    NAUSEA:  If nauseated from the anesthetic/pain meds; rest in bed, get up cautiously with assistance, and drink clear liquids (juice, tea, broth).    RETURN APPOINTMENT:  Schedule a follow-up visit 2-3 weeks post-op.  Office Phone:  [phone]     CONTACT US IF THE FOLLOWING DEVELOPS:   1. A fever that is above 101     2. If there is a large amount of drainage, bleeding, or swelling.   3. Severe pain that is not relieved by your prescription.   4. Drainage that is thick,  cloudy, yellow, green or white.   5. Any other questions not answered by  Frequently Asked Questions  sheet.      FREQUENTLY ASKED QUESTIONS:    Q:  How should my incision look?    A:  Normally your incision will appear slightly swollen with light redness directly along the incision itself as it heals.  It may feel like a bump or ridge as the healing/scarring happens, and over time (3-4 months) this bump or ridge feeling should slowly go away.  In general, clear or pink watery drainage can be normal at first as your incision heals, but should decrease over time.    Q:  How do I know if my incision is infected?  A:  Look at your incision for signs of infection, like redness around the incision spreading to surrounding skin, or drainage of cloudy or foul-smelling drainage.  If you feel warm, check your temperature to see if you are running a fever.    **If any of these things occur, please notify the nurse at our office.  We may need you to come into the office for an incision check.      Q:  How do I take care of my incision?  A:  If you have a dressing in place - Starting the day after surgery, replace the dressing 1-2 times a day until there is no further drainage from the incision.  At that time, a dressing is no longer needed.  Try to minimize tape on the skin if irritation is occurring at the tape sites.  If you have significant irritation from tape on the skin, please call the office to discuss other method of dressing your incision.    Small pieces of tape called  steri-strips  may be present directly overlying your incision; these may be removed 10 days after surgery unless otherwise specified by your surgeon.  If these tapes start to loosen at the ends, you may trim them back until they fall off or are removed.    A:  If you had  Dermabond  tissue glue used as a dressing (this causes your incision to look shiny with a clear covering over it) - This type of dressing wears off with time and does not require  more dressings over the top unless it is draining around the glue as it wears off.  Do not apply ointments or lotions over the incisions until the glue has completely worn off.    Q:  There is a piece of tape or a sticky  lead  still on my skin.  Can I remove this?  A:  Sometimes the sticky  leads  used for monitoring during surgery or for evaluation in the emergency department are not all removed while you are in the hospital.  These sometimes have a tab or metal dot on them.  You can easily remove these on your own, like taking off a band-aid.  If there is a gel substance under the  lead , simply wipe/clean it off with a washcloth or paper towel.      Q:  What can I do to minimize constipation (very hard stools, or lack of stools)?  A:  Stay well hydrated.  Increase your dietary fiber intake or take a fiber supplement -with plenty of water.  Walk around frequently.  You may consider an over-the-counter stool-softener.  Your Pharmacist can assist you with choosing one that is stocked at your pharmacy.  Constipation is also one of the most common side effects of pain medication.  If you are using pain medication, be pro-active and try to PREVENT problems with constipation by taking the steps above BEFORE constipation becomes a problem.    Q:  What do I do if I need more pain medications?  A:  Call the office to receive refills.  Be aware that certain pain meds cannot be called into a pharmacy and actually require a paper prescription.  A change may be made in your pain med as you progress thru your recovery period or if you have side effects to certain meds.    --Pain meds are NOT refilled after 5pm on weekdays, and NOT AT ALL on the weekends, so please look ahead to prevent problems.      Q:  Why am I having a hard time sleeping now that I am at home?  A:  Many medications you receive while you are in the hospital can impact your sleep for a number of days after your surgery/hospitalization.  Decreased level of  activity and naps during the day may also make sleeping at night difficult.  Try to minimize day-time naps, and get up frequently during the day to walk around your home during your recovery time.  Sleep aides may be of some help, but are not recommended for long-term use.      Q:  I am having some back discomfort.  What should I do?  A:  This may be related to certain positioning that was required for your surgery, extended periods of time in bed, or other changes in your overall activity level.  You may try ice, heat, acetaminophen, or ibuprofen to treat this temporarily.  Note that many pain medications have acetaminophen in them and would state this on the prescription bottle.  Be sure not to exceed the maximum of 4000mg per day of acetaminophen.     **If the pain you are having does not resolve, is severe, or is a flare of back pain you have had on other occasions prior to surgery, please contact your primary physician for further recommendations or for an appointment to be examined at their office.    Q:  Why am I having headaches?  A:  Headaches can be caused by many things:  caffeine withdrawal, use of pain meds, dehydration, high blood pressure, lack of sleep, over-activity/exhaustion, flare-up of usual migraine headaches.  If you feel this is related to muscle tension (a band-like feeling around the head, or a pressure at the low-back of the head) you may try ice or heat to this area.  You may need to drink more fluids (try electrolyte drink like Gatorade), rest, or take your usual migraine medications.   **If your headaches do not resolve, worsen, are accompanied by other symptoms, or if your blood pressure is high, please call your primary physician for recommendation and/or examination.    Q:  I am unable to urinate.  What do I do?  A:  A small percentage of people can have difficulty urinating initially after surgery.  This includes being able to urinate only a very small amount at a time and feeling  "discomfort or pressure in the very low abdomen.  This is called  urinary retention , and is actually an urgent situation.  Proceed to your nearest Emergency department for evaluation (not an Urgent Care Center).  Sometimes the bladder does not work correctly after certain medications you receive during surgery, or related to certain procedures.  You may need to have a catheter placed until your bladder recovers.  When planning to go to an Emergency department, it may help to call the ER to let them know you are coming in for this problem after a surgery.  This may help you get in quicker to be evaluated.  **If you have symptoms of a urinary tract infection, please contact your primary physician for the proper evaluation and treatment.          If you have other questions, please call the office Monday thru Friday between 8am and 5pm to discuss with the nurse or physician assistant.  [phone]    There is a surgeon ON CALL on weekday evenings and over the weekend in case of urgent need only, and may be contacted at the same number.    If you are having an emergency, call 911 or proceed to your nearest emergency department.            Pending Results     No orders found from 11/2/2018 to 11/5/2018.            Statement of Approval     Ordered          11/10/18 1044  I have reviewed and agree with all the recommendations and orders detailed in this document.  EFFECTIVE NOW     Approved and electronically signed by:  Lillian Alegria PA-C             Admission Information     Date & Time Provider Department Dept. Phone    11/4/2018 Mackenzie Dale MD [place] [address] [facility] [phone]      Your Vitals Were     Blood Pressure Pulse Temperature Respirations Height Weight    159/95 83 98.1  F (36.7  C) (Oral) 18 1.88 m (6' 2\") 102.4 kg (225 lb 12.8 oz)    Pulse Oximetry BMI (Body Mass Index)                96% 28.99 kg/m2          MyChart Information     [employer]ise lets you send messages to " "your doctor, view your test results, renew your prescriptions, schedule appointments and more. To sign up, go to [email or web address] . Click on \"Log in\" on the left side of the screen, which will take you to the Welcome page. Then click on \"Sign up Now\" on the right side of the page.     You will be asked to enter the access code listed below, as well as some personal information. Please follow the directions to create your username and password.     Your access code is: [email or web address]  Expires: 2019  1:33 PM     Your access code will  in 90 days. If you need help or a new code, please call your [place] clinic or [phone].        Care EveryWhere ID     This is your Care EveryWhere ID. This could be used by other organizations to access your [place] medical records  [id number]        Equal Access to Services     BYRON HYMAN : Hadii tracey Zeng, wacecily lusurinder, qaybta kaalmada debi, gato bartlett . So [place] [phone].    ATENCIÓN: Si habla español, tiene a pink disposición servicios gratuitos de asistencia lingüística. Guillermo al [phone].    We comply with applicable federal civil rights laws and [place] laws. We do not discriminate on the basis of race, color, national origin, age, disability, sex, sexual orientation, or gender identity.               Review of your medicines      START taking        Dose / Directions    amoxicillin-clavulanate 875-125 MG per tablet   Commonly known as:  AUGMENTIN        Dose:  1 tablet   Take 1 tablet by mouth 2 times daily for 7 days   Quantity:  14 tablet   Refills:  0       ciprofloxacin 500 MG tablet   Commonly known as:  CIPRO        Dose:  500 mg   Take 1 tablet (500 mg) by mouth 2 times daily for 7 days   Quantity:  14 tablet   Refills:  0       famotidine 20 MG tablet   Commonly known as:  PEPCID   Used for:  Dyspepsia        Dose:  20 mg   Take 1 tablet (20 mg) by mouth 2 times daily   Quantity:  60 " tablet   Refills:  0       metoprolol tartrate 50 MG tablet   Commonly known as:  LOPRESSOR   Used for:  PAF (paroxysmal atrial fibrillation) (H)        Dose:  50 mg   Take 1 tablet (50 mg) by mouth 2 times daily   Quantity:  60 tablet   Refills:  0       oxyCODONE IR 5 MG tablet   Commonly known as:  ROXICODONE        Dose:  5-10 mg   Take 1-2 tablets (5-10 mg) by mouth every 3 hours as needed for moderate to severe pain   Quantity:  6 tablet   Refills:  0         CONTINUE these medicines which have NOT CHANGED        Dose / Directions    acetaminophen 325 MG tablet   Commonly known as:  TYLENOL        Dose:  325-650 mg   Take 325-650 mg by mouth daily as needed for mild pain   Refills:  0       ALEVE 220 MG tablet   Generic drug:  naproxen sodium        Dose:  220 mg   Take 220 mg by mouth daily as needed for moderate pain   Refills:  0       multivitamin, therapeutic Tabs tablet        Dose:  1 tablet   Take 1 tablet by mouth daily   Refills:  0         STOP taking     5-HYDROXYTRYPTOPHAN PO           CLA PO           ibuprofen 200 MG tablet   Commonly known as:  ADVIL/MOTRIN           TURMERIC PO           UNABLE TO FIND                Where to get your medicines      These medications were sent to [facility] [address] [address] [address]     Phone:  [phone]     amoxicillin-clavulanate 875-125 MG per tablet    ciprofloxacin 500 MG tablet    famotidine 20 MG tablet    metoprolol tartrate 50 MG tablet         Some of these will need a paper prescription and others can be bought over the counter. Ask your nurse if you have questions.     Bring a paper prescription for each of these medications     oxyCODONE IR 5 MG tablet                Protect others around you: Learn how to safely use, store and throw away your medicines at www.disposemymeds.org.        ANTIBIOTIC INSTRUCTION     You've Been Prescribed an Antibiotic - Now What?  Your  healthcare team thinks that you or your loved one might have an infection. Some infections can be treated with antibiotics, which are powerful, life-saving drugs. Like all medications, antibiotics have side effects and should only be used when necessary. There are some important things you should know about your antibiotic treatment.      Your healthcare team may run tests before you start taking an antibiotic.    Your team may take samples (e.g., from your blood, urine or other areas) to run tests to look for bacteria. These test can be important to determine if you need an antibiotic at all and, if you do, which antibiotic will work best.      Within a few days, your healthcare team might change or even stop your antibiotic.    Your team may start you on an antibiotic while they are working to find out what is making you sick.    Your team might change your antibiotic because test results show that a different antibiotic would be better to treat your infection.    In some cases, once your team has more information, they learn that you do not need an antibiotic at all. They may find out that you don't have an infection, or that the antibiotic you're taking won't work against your infection. For example, an infection caused by a virus can't be treated with antibiotics. Staying on an antibiotic when you don't need it is more likely to be harmful than helpful.      You may experience side effects from your antibiotic.    Like all medications, antibiotics have side effects. Some of these can be serious.    Let you healthcare team know if you have any known allergies when you are admitted to the hospital.    One significant side effect of nearly all antibiotics is the risk of severe and sometimes deadly diarrhea caused by Clostridium difficile (C. Difficile). This occurs when a person takes antibiotics because some good germs are destroyed. Antibiotic use allows C. diificile to take over, putting patients at high risk  for this serious infection.    As a patient or caregiver, it is important to understand your or your loved one's antibiotic treatment. It is especially important for caregivers to speak up when patients can't speak for themselves. Here are some important questions to ask your healthcare team.    What infection is this antibiotic treating and how do you know I have that infection?    What side effects might occur from this antibiotic?    How long will I need to take this antibiotic?    Is it safe to take this antibiotic with other medications or supplements (e.g., vitamins) that I am taking?     Are there any special directions I need to know about taking this antibiotic? For example, should I take it with food?    How will I be monitored to know whether my infection is responding to the antibiotic?    What tests may help to make sure the right antibiotic is prescribed for me?      Information provided by:  www.cdc.gov/getsmart  U.S. Department of Health and Human Services  Centers for disease Control and Prevention  National Center for Emerging and Zoonotic Infectious Diseases  Division of Healthcare Quality Promotion        Information about OPIOIDS     PRESCRIPTION OPIOIDS: WHAT YOU NEED TO KNOW   We gave you an opioid (narcotic) pain medicine. It is important to manage your pain, but opioids are not always the best choice. You should first try all the other options your care team gave you. Take this medicine for as short a time (and as few doses) as possible.    Some activities can increase your pain, such as bandage changes or therapy sessions. It may help to take your pain medicine 30 to 60 minutes before these activities. Reduce your stress by getting enough sleep, working on hobbies you enjoy and practicing relaxation or meditation. Talk to your care team about ways to manage your pain beyond prescription opioids.    These medicines have risks:    DO NOT drive when on new or higher doses of pain medicine.  These medicines can affect your alertness and reaction times, and you could be arrested for driving under the influence (DUI). If you need to use opioids long-term, talk to your care team about driving.    DO NOT operate heavy machinery    DO NOT do any other dangerous activities while taking these medicines.    DO NOT drink any alcohol while taking these medicines.     If the opioid prescribed includes acetaminophen, DO NOT take with any other medicines that contain acetaminophen. Read all labels carefully. Look for the word  acetaminophen  or  Tylenol.  Ask your pharmacist if you have questions or are unsure.    You can get addicted to pain medicines, especially if you have a history of addiction (chemical, alcohol or substance dependence). Talk to your care team about ways to reduce this risk.    All opioids tend to cause constipation. Drink plenty of water and eat foods that have a lot of fiber, such as fruits, vegetables, prune juice, apple juice and high-fiber cereal. Take a laxative (Miralax, milk of magnesia, Colace, Senna) if you don t move your bowels at least every other day. Other side effects include upset stomach, sleepiness, dizziness, throwing up, tolerance (needing more of the medicine to have the same effect), physical dependence and slowed breathing.    Store your pills in a secure place, locked if possible. We will not replace any lost or stolen medicine. If you don t finish your medicine, please throw away (dispose) as directed by your pharmacist. The [place] Pollution Control Agency has more information about safe disposal: https://www.pca.[facility].mn.us/living-green/managing-unwanted-medications             Medication List: This is a list of all your medications and when to take them. Check marks below indicate your daily home schedule. Keep this list as a reference.      Medications           Morning Afternoon Evening Bedtime As Needed    acetaminophen 325 MG tablet   Commonly known as:   TYLENOL   Take 325-650 mg by mouth daily as needed for mild pain   Last time this was given:  650 mg on 11/10/2018 12:54 PM                                   ALEVE 220 MG tablet   Take 220 mg by mouth daily as needed for moderate pain   Generic drug:  naproxen sodium                                   amoxicillin-clavulanate 875-125 MG per tablet   Commonly known as:  AUGMENTIN   Take 1 tablet by mouth 2 times daily for 7 days                                      ciprofloxacin 500 MG tablet   Commonly known as:  CIPRO   Take 1 tablet (500 mg) by mouth 2 times daily for 7 days                                      famotidine 20 MG tablet   Commonly known as:  PEPCID   Take 1 tablet (20 mg) by mouth 2 times daily   Last time this was given:  20 mg on 11/10/2018 10:58 AM                                      metoprolol tartrate 50 MG tablet   Commonly known as:  LOPRESSOR   Take 1 tablet (50 mg) by mouth 2 times daily   Last time this was given:  50 mg on 11/10/2018 10:58 AM                                      multivitamin, therapeutic Tabs tablet   Take 1 tablet by mouth daily                                   oxyCODONE IR 5 MG tablet   Commonly known as:  ROXICODONE   Take 1-2 tablets (5-10 mg) by mouth every 3 hours as needed for moderate to severe pain

## 2018-11-04 NOTE — IP AVS SNAPSHOT
[place] [address] [facility]    [address]    [facility] [address]    Phone:  [phone]    Fax:  [phone]                                       After Visit Summary   11/4/2018    Derik Hamm    MRN: [id number]           After Visit Summary Signature Page     I have received my discharge instructions, and my questions have been answered. I have discussed any challenges I see with this plan with the nurse or doctor.    ..........................................................................................................................................  Patient/Patient Representative Signature      ..........................................................................................................................................  Patient Representative Print Name and Relationship to Patient    ..................................................               ................................................  Date                                   Time    ..........................................................................................................................................  Reviewed by Signature/Title    ...................................................              ..............................................  Date                                               Time          22EPIC Rev 08/18

## 2018-11-04 NOTE — LETTER
November 10, 2018    RE: Derik Hamm  :  1955      This is to certify that Derik Hamm has been under my care for surgery.        Patient is able to return to work without restrictions as of 2018.  Comments: patient may work half-time starting 11/15/2018, with 20 pound lift limit until 2018.        Sincerely,              Lillian Alegria PA-C

## 2018-11-05 PROBLEM — K35.32 APPENDICITIS WITH PERFORATION: Status: ACTIVE | Noted: 2018-11-05

## 2018-11-05 LAB
ANION GAP SERPL CALCULATED.3IONS-SCNC: 7 MMOL/L (ref 3–14)
BUN SERPL-MCNC: 21 MG/DL (ref 7–30)
CALCIUM SERPL-MCNC: 8.1 MG/DL (ref 8.5–10.1)
CHLORIDE SERPL-SCNC: 105 MMOL/L (ref 94–109)
CO2 SERPL-SCNC: 27 MMOL/L (ref 20–32)
CREAT SERPL-MCNC: 1.24 MG/DL (ref 0.66–1.25)
GFR SERPL CREATININE-BSD FRML MDRD: 59 ML/MIN/1.7M2
GLUCOSE SERPL-MCNC: 94 MG/DL (ref 70–99)
GRAM STN SPEC: ABNORMAL
HGB BLD-MCNC: 14 G/DL (ref 13.3–17.7)
MAGNESIUM SERPL-MCNC: 1.9 MG/DL (ref 1.6–2.3)
POTASSIUM SERPL-SCNC: 3.8 MMOL/L (ref 3.4–5.3)
SODIUM SERPL-SCNC: 139 MMOL/L (ref 133–144)
SPECIMEN SOURCE: ABNORMAL
TSH SERPL DL<=0.005 MIU/L-ACNC: 1.1 MU/L (ref 0.4–4)

## 2018-11-05 PROCEDURE — 87205 SMEAR GRAM STAIN: CPT | Performed by: SURGERY

## 2018-11-05 PROCEDURE — 87077 CULTURE AEROBIC IDENTIFY: CPT | Performed by: SURGERY

## 2018-11-05 PROCEDURE — 25000132 ZZH RX MED GY IP 250 OP 250 PS 637: Performed by: PHYSICIAN ASSISTANT

## 2018-11-05 PROCEDURE — 83735 ASSAY OF MAGNESIUM: CPT | Performed by: PHYSICIAN ASSISTANT

## 2018-11-05 PROCEDURE — 87186 SC STD MICRODIL/AGAR DIL: CPT | Performed by: SURGERY

## 2018-11-05 PROCEDURE — 12000007 ZZH R&B INTERMEDIATE

## 2018-11-05 PROCEDURE — 36415 COLL VENOUS BLD VENIPUNCTURE: CPT | Performed by: PHYSICIAN ASSISTANT

## 2018-11-05 PROCEDURE — 0D9W4ZZ DRAINAGE OF PERITONEUM, PERCUTANEOUS ENDOSCOPIC APPROACH: ICD-10-PCS | Performed by: SURGERY

## 2018-11-05 PROCEDURE — 87070 CULTURE OTHR SPECIMN AEROBIC: CPT | Performed by: SURGERY

## 2018-11-05 PROCEDURE — 84443 ASSAY THYROID STIM HORMONE: CPT | Performed by: PHYSICIAN ASSISTANT

## 2018-11-05 PROCEDURE — 12000013 ZZH R&B PEDS

## 2018-11-05 PROCEDURE — 87076 CULTURE ANAEROBE IDENT EACH: CPT | Performed by: SURGERY

## 2018-11-05 PROCEDURE — 40000275 ZZH STATISTIC RCP TIME EA 10 MIN

## 2018-11-05 PROCEDURE — 25800025 ZZH RX 258: Performed by: SURGERY

## 2018-11-05 PROCEDURE — 99207 ZZC CONSULT E&M CHANGED TO INITIAL LEVEL: CPT | Performed by: INTERNAL MEDICINE

## 2018-11-05 PROCEDURE — 87015 SPECIMEN INFECT AGNT CONCNTJ: CPT | Performed by: SURGERY

## 2018-11-05 PROCEDURE — 25000132 ZZH RX MED GY IP 250 OP 250 PS 637: Performed by: SURGERY

## 2018-11-05 PROCEDURE — 84443 ASSAY THYROID STIM HORMONE: CPT | Performed by: INTERNAL MEDICINE

## 2018-11-05 PROCEDURE — 25000128 H RX IP 250 OP 636: Performed by: NURSE ANESTHETIST, CERTIFIED REGISTERED

## 2018-11-05 PROCEDURE — 80048 BASIC METABOLIC PNL TOTAL CA: CPT | Performed by: PHYSICIAN ASSISTANT

## 2018-11-05 PROCEDURE — 0DTJ4ZZ RESECTION OF APPENDIX, PERCUTANEOUS ENDOSCOPIC APPROACH: ICD-10-PCS | Performed by: SURGERY

## 2018-11-05 PROCEDURE — 85018 HEMOGLOBIN: CPT | Performed by: PHYSICIAN ASSISTANT

## 2018-11-05 PROCEDURE — 25000125 ZZHC RX 250: Performed by: NURSE ANESTHETIST, CERTIFIED REGISTERED

## 2018-11-05 PROCEDURE — 99223 1ST HOSP IP/OBS HIGH 75: CPT | Performed by: INTERNAL MEDICINE

## 2018-11-05 PROCEDURE — 25000128 H RX IP 250 OP 636: Performed by: SURGERY

## 2018-11-05 PROCEDURE — 93005 ELECTROCARDIOGRAM TRACING: CPT

## 2018-11-05 PROCEDURE — 87075 CULTR BACTERIA EXCEPT BLOOD: CPT | Performed by: SURGERY

## 2018-11-05 PROCEDURE — 25000128 H RX IP 250 OP 636: Performed by: PHYSICIAN ASSISTANT

## 2018-11-05 RX ORDER — FENTANYL CITRATE 50 UG/ML
25-50 INJECTION, SOLUTION INTRAMUSCULAR; INTRAVENOUS
Status: DISCONTINUED | OUTPATIENT
Start: 2018-11-05 | End: 2018-11-05 | Stop reason: HOSPADM

## 2018-11-05 RX ORDER — ONDANSETRON 2 MG/ML
4 INJECTION INTRAMUSCULAR; INTRAVENOUS EVERY 6 HOURS PRN
Status: DISCONTINUED | OUTPATIENT
Start: 2018-11-05 | End: 2018-11-10 | Stop reason: HOSPADM

## 2018-11-05 RX ORDER — ACETAMINOPHEN 325 MG/1
650 TABLET ORAL EVERY 4 HOURS PRN
Status: DISCONTINUED | OUTPATIENT
Start: 2018-11-08 | End: 2018-11-10 | Stop reason: HOSPADM

## 2018-11-05 RX ORDER — HYDROMORPHONE HYDROCHLORIDE 1 MG/ML
.3-.5 INJECTION, SOLUTION INTRAMUSCULAR; INTRAVENOUS; SUBCUTANEOUS
Status: DISCONTINUED | OUTPATIENT
Start: 2018-11-05 | End: 2018-11-10 | Stop reason: HOSPADM

## 2018-11-05 RX ORDER — DIPHENHYDRAMINE HYDROCHLORIDE 50 MG/ML
25 INJECTION INTRAMUSCULAR; INTRAVENOUS EVERY 6 HOURS PRN
Status: DISCONTINUED | OUTPATIENT
Start: 2018-11-05 | End: 2018-11-10 | Stop reason: HOSPADM

## 2018-11-05 RX ORDER — NALOXONE HYDROCHLORIDE 0.4 MG/ML
.1-.4 INJECTION, SOLUTION INTRAMUSCULAR; INTRAVENOUS; SUBCUTANEOUS
Status: DISCONTINUED | OUTPATIENT
Start: 2018-11-05 | End: 2018-11-10 | Stop reason: HOSPADM

## 2018-11-05 RX ORDER — MEPERIDINE HYDROCHLORIDE 50 MG/ML
12.5 INJECTION INTRAMUSCULAR; INTRAVENOUS; SUBCUTANEOUS EVERY 5 MIN PRN
Status: DISCONTINUED | OUTPATIENT
Start: 2018-11-05 | End: 2018-11-05 | Stop reason: HOSPADM

## 2018-11-05 RX ORDER — KETOROLAC TROMETHAMINE 30 MG/ML
30 INJECTION, SOLUTION INTRAMUSCULAR; INTRAVENOUS EVERY 6 HOURS
Status: DISPENSED | OUTPATIENT
Start: 2018-11-05 | End: 2018-11-05

## 2018-11-05 RX ORDER — ACETAMINOPHEN 325 MG/1
975 TABLET ORAL EVERY 8 HOURS
Status: DISPENSED | OUTPATIENT
Start: 2018-11-05 | End: 2018-11-08

## 2018-11-05 RX ORDER — DIAZEPAM 10 MG/2ML
2.5 INJECTION, SOLUTION INTRAMUSCULAR; INTRAVENOUS
Status: DISCONTINUED | OUTPATIENT
Start: 2018-11-05 | End: 2018-11-05 | Stop reason: HOSPADM

## 2018-11-05 RX ORDER — BUPIVACAINE HYDROCHLORIDE 2.5 MG/ML
INJECTION, SOLUTION EPIDURAL; INFILTRATION; INTRACAUDAL PRN
Status: DISCONTINUED | OUTPATIENT
Start: 2018-11-05 | End: 2018-11-05 | Stop reason: HOSPADM

## 2018-11-05 RX ORDER — PROCHLORPERAZINE MALEATE 5 MG
10 TABLET ORAL EVERY 6 HOURS PRN
Status: DISCONTINUED | OUTPATIENT
Start: 2018-11-05 | End: 2018-11-10 | Stop reason: HOSPADM

## 2018-11-05 RX ORDER — ONDANSETRON 4 MG/1
4 TABLET, ORALLY DISINTEGRATING ORAL EVERY 30 MIN PRN
Status: DISCONTINUED | OUTPATIENT
Start: 2018-11-05 | End: 2018-11-05 | Stop reason: HOSPADM

## 2018-11-05 RX ORDER — SODIUM CHLORIDE, SODIUM LACTATE, POTASSIUM CHLORIDE, CALCIUM CHLORIDE 600; 310; 30; 20 MG/100ML; MG/100ML; MG/100ML; MG/100ML
INJECTION, SOLUTION INTRAVENOUS CONTINUOUS
Status: DISCONTINUED | OUTPATIENT
Start: 2018-11-05 | End: 2018-11-05 | Stop reason: HOSPADM

## 2018-11-05 RX ORDER — ALUMINA, MAGNESIA, AND SIMETHICONE 2400; 2400; 240 MG/30ML; MG/30ML; MG/30ML
30 SUSPENSION ORAL EVERY 4 HOURS PRN
Status: DISCONTINUED | OUTPATIENT
Start: 2018-11-05 | End: 2018-11-10 | Stop reason: HOSPADM

## 2018-11-05 RX ORDER — DIMENHYDRINATE 50 MG/ML
25 INJECTION, SOLUTION INTRAMUSCULAR; INTRAVENOUS
Status: DISCONTINUED | OUTPATIENT
Start: 2018-11-05 | End: 2018-11-05 | Stop reason: HOSPADM

## 2018-11-05 RX ORDER — LABETALOL HYDROCHLORIDE 5 MG/ML
10 INJECTION, SOLUTION INTRAVENOUS
Status: DISCONTINUED | OUTPATIENT
Start: 2018-11-05 | End: 2018-11-05 | Stop reason: HOSPADM

## 2018-11-05 RX ORDER — SODIUM CHLORIDE 9 MG/ML
INJECTION, SOLUTION INTRAVENOUS CONTINUOUS
Status: DISCONTINUED | OUTPATIENT
Start: 2018-11-05 | End: 2018-11-10 | Stop reason: HOSPADM

## 2018-11-05 RX ORDER — NALOXONE HYDROCHLORIDE 0.4 MG/ML
.1-.4 INJECTION, SOLUTION INTRAMUSCULAR; INTRAVENOUS; SUBCUTANEOUS
Status: ACTIVE | OUTPATIENT
Start: 2018-11-05 | End: 2018-11-06

## 2018-11-05 RX ORDER — ESMOLOL HYDROCHLORIDE 10 MG/ML
INJECTION INTRAVENOUS PRN
Status: DISCONTINUED | OUTPATIENT
Start: 2018-11-05 | End: 2018-11-05

## 2018-11-05 RX ORDER — IBUPROFEN 200 MG
200-400 TABLET ORAL DAILY PRN
Status: ON HOLD | COMMUNITY
End: 2018-11-10

## 2018-11-05 RX ORDER — ALBUTEROL SULFATE 0.83 MG/ML
2.5 SOLUTION RESPIRATORY (INHALATION) EVERY 4 HOURS PRN
Status: DISCONTINUED | OUTPATIENT
Start: 2018-11-05 | End: 2018-11-05 | Stop reason: HOSPADM

## 2018-11-05 RX ORDER — MULTIVITAMIN,THERAPEUTIC
1 TABLET ORAL
COMMUNITY

## 2018-11-05 RX ORDER — METOPROLOL TARTRATE 25 MG/1
25 TABLET, FILM COATED ORAL 2 TIMES DAILY
Status: DISCONTINUED | OUTPATIENT
Start: 2018-11-06 | End: 2018-11-07

## 2018-11-05 RX ORDER — DIPHENHYDRAMINE HCL 25 MG
25 CAPSULE ORAL EVERY 6 HOURS PRN
Status: DISCONTINUED | OUTPATIENT
Start: 2018-11-05 | End: 2018-11-10 | Stop reason: HOSPADM

## 2018-11-05 RX ORDER — ACETAMINOPHEN 325 MG/1
325-650 TABLET ORAL DAILY PRN
COMMUNITY
End: 2021-11-08

## 2018-11-05 RX ORDER — ONDANSETRON 2 MG/ML
4 INJECTION INTRAMUSCULAR; INTRAVENOUS EVERY 30 MIN PRN
Status: DISCONTINUED | OUTPATIENT
Start: 2018-11-05 | End: 2018-11-05 | Stop reason: HOSPADM

## 2018-11-05 RX ORDER — HYDROMORPHONE HYDROCHLORIDE 1 MG/ML
.3-.5 INJECTION, SOLUTION INTRAMUSCULAR; INTRAVENOUS; SUBCUTANEOUS EVERY 5 MIN PRN
Status: DISCONTINUED | OUTPATIENT
Start: 2018-11-05 | End: 2018-11-05 | Stop reason: HOSPADM

## 2018-11-05 RX ORDER — NEOSTIGMINE METHYLSULFATE 1 MG/ML
VIAL (ML) INJECTION PRN
Status: DISCONTINUED | OUTPATIENT
Start: 2018-11-05 | End: 2018-11-05

## 2018-11-05 RX ORDER — LIDOCAINE 40 MG/G
CREAM TOPICAL
Status: DISCONTINUED | OUTPATIENT
Start: 2018-11-05 | End: 2018-11-10 | Stop reason: HOSPADM

## 2018-11-05 RX ORDER — ONDANSETRON 4 MG/1
4 TABLET, ORALLY DISINTEGRATING ORAL EVERY 6 HOURS PRN
Status: DISCONTINUED | OUTPATIENT
Start: 2018-11-05 | End: 2018-11-10 | Stop reason: HOSPADM

## 2018-11-05 RX ORDER — OXYCODONE HYDROCHLORIDE 5 MG/1
5-10 TABLET ORAL
Status: DISCONTINUED | OUTPATIENT
Start: 2018-11-05 | End: 2018-11-10 | Stop reason: HOSPADM

## 2018-11-05 RX ORDER — NAPROXEN SODIUM 220 MG
220-440 TABLET ORAL DAILY PRN
Status: ON HOLD | COMMUNITY
End: 2021-10-01

## 2018-11-05 RX ADMIN — GLYCOPYRROLATE 0.6 MG: 0.2 INJECTION, SOLUTION INTRAMUSCULAR; INTRAVENOUS at 00:24

## 2018-11-05 RX ADMIN — KETOROLAC TROMETHAMINE 30 MG: 30 INJECTION, SOLUTION INTRAMUSCULAR at 05:06

## 2018-11-05 RX ADMIN — TAZOBACTAM SODIUM AND PIPERACILLIN SODIUM 3.38 G: 375; 3 INJECTION, SOLUTION INTRAVENOUS at 22:40

## 2018-11-05 RX ADMIN — SODIUM CHLORIDE: 9 INJECTION, SOLUTION INTRAVENOUS at 12:10

## 2018-11-05 RX ADMIN — HYDROMORPHONE HYDROCHLORIDE 1 MG: 1 INJECTION, SOLUTION INTRAMUSCULAR; INTRAVENOUS; SUBCUTANEOUS at 00:00

## 2018-11-05 RX ADMIN — ESMOLOL HYDROCHLORIDE 10 MG: 10 INJECTION, SOLUTION INTRAVENOUS at 00:07

## 2018-11-05 RX ADMIN — ACETAMINOPHEN 975 MG: 325 TABLET, FILM COATED ORAL at 18:49

## 2018-11-05 RX ADMIN — TAZOBACTAM SODIUM AND PIPERACILLIN SODIUM 3.38 G: 375; 3 INJECTION, SOLUTION INTRAVENOUS at 05:06

## 2018-11-05 RX ADMIN — TAZOBACTAM SODIUM AND PIPERACILLIN SODIUM 3.38 G: 375; 3 INJECTION, SOLUTION INTRAVENOUS at 16:58

## 2018-11-05 RX ADMIN — TAZOBACTAM SODIUM AND PIPERACILLIN SODIUM 3.38 G: 375; 3 INJECTION, SOLUTION INTRAVENOUS at 10:33

## 2018-11-05 RX ADMIN — KETOROLAC TROMETHAMINE 30 MG: 30 INJECTION, SOLUTION INTRAMUSCULAR at 10:33

## 2018-11-05 RX ADMIN — SODIUM CHLORIDE: 9 INJECTION, SOLUTION INTRAVENOUS at 02:00

## 2018-11-05 RX ADMIN — Medication 5 MG: at 00:24

## 2018-11-05 RX ADMIN — ACETAMINOPHEN 975 MG: 325 TABLET, FILM COATED ORAL at 10:33

## 2018-11-05 RX ADMIN — ESMOLOL HYDROCHLORIDE 20 MG: 10 INJECTION, SOLUTION INTRAVENOUS at 00:14

## 2018-11-05 RX ADMIN — KETOROLAC TROMETHAMINE 30 MG: 30 INJECTION, SOLUTION INTRAMUSCULAR at 16:55

## 2018-11-05 RX ADMIN — SODIUM CHLORIDE 1000 ML: 9 INJECTION, SOLUTION INTRAVENOUS at 21:40

## 2018-11-05 RX ADMIN — SODIUM CHLORIDE: 9 INJECTION, SOLUTION INTRAVENOUS at 22:40

## 2018-11-05 RX ADMIN — Medication 12.5 MG: at 21:50

## 2018-11-05 NOTE — PHARMACY-ADMISSION MEDICATION HISTORY
Admission medication history interview status for this patient is complete. See [facility] admission navigator for allergy information, prior to admission medications and immunization status.     Medication history interview source(s):Patient  Medication history resources (including written lists, pill bottles, clinic record):None  Primary pharmacy:None    Changes made to PTA medication list:  Added: all  Deleted: none  Changed: none    Actions taken by pharmacist (provider contacted, etc):None     Additional medication history information:None    Medication reconciliation/reorder completed by provider prior to medication history? No    Do you take OTC medications (eg tylenol, ibuprofen, fish oil, eye/ear drops, etc)? Y(Y/N)    For patients on insulin therapy: N (Y/N)    Time spent in this activity: 5 min    Prior to Admission medications    Medication Sig Last Dose Taking? Auth Provider   5-HYDROXYTRYPTOPHAN PO Take 1 tablet by mouth daily 11/4/2018 at Unknown time Yes Unknown, Entered By History   acetaminophen (TYLENOL) 325 MG tablet Take 325-650 mg by mouth daily as needed for mild pain  at prn Yes Unknown, Entered By History   ibuprofen (ADVIL/MOTRIN) 200 MG tablet Take 200-400 mg by mouth daily as needed for mild pain  at prn Yes Unknown, Entered By History   Linoleic Acid-Sunflower Oil (CLA PO) 1 daily 11/4/2018 at Unknown time Yes Unknown, Entered By History   multivitamin, therapeutic (THERA-VIT) TABS tablet Take 1 tablet by mouth daily 11/4/2018 at Unknown time Yes Unknown, Entered By History   naproxen sodium (ALEVE) 220 MG tablet Take 220 mg by mouth daily as needed for moderate pain  at prn Yes Unknown, Entered By History   TURMERIC PO Take 1 tablet by mouth daily 11/4/2018 at Unknown time Yes Unknown, Entered By History   UNABLE TO FIND MEDICATION NAME: Thyroid Energy supplement one tablet daily 11/4/2018 at Unknown time Yes Unknown, Entered By History

## 2018-11-05 NOTE — ANESTHESIA PREPROCEDURE EVALUATION
PAC NOTE:       ANESTHESIA PRE EVALUATION:  Anesthesia Evaluation     . Pt has had prior anesthetic. Type: General    No history of anesthetic complications          ROS/MED HX    ENT/Pulmonary:  - neg pulmonary ROS     Neurologic:  - neg neurologic ROS     Cardiovascular:  - neg cardiovascular ROS       METS/Exercise Tolerance:     Hematologic:  - neg hematologic  ROS       Musculoskeletal:  - neg musculoskeletal ROS       GI/Hepatic:     (+) appendicitis,       Renal/Genitourinary:  - ROS Renal section negative       Endo:  - neg endo ROS       Psychiatric:  - neg psychiatric ROS       Infectious Disease:  - neg infectious disease ROS       Malignancy:      - no malignancy   Other:    - neg other ROS                 Physical Exam  Normal systems: cardiovascular, pulmonary and dental    Airway   Mallampati: II  TM distance: >3 FB  Neck ROM: full    Dental     Cardiovascular   Rhythm and rate: regular and normal      Pulmonary    breath sounds clear to auscultation    Other findings: Lab Test        11/04/18 04/03/18                       2116          1550          WBC          10.7         4.2           HGB          16.1         14.8          MCV          91           92            PLT          212          219            Lab Test        11/04/18 04/03/18                       2116          1550          NA           137          140           POTASSIUM    3.5          3.4           CHLORIDE     103          106           CO2          27           27            BUN          24           20            CR           1.05         1.17          ANIONGAP     7            7             HENRY          9.2          8.9           GLC          149*         100*                   ECG 4/18  Sinus rhythm  Normal ECG  No previous ECGs available         Anesthesia Plan      History & Physical Review  History and physical reviewed and following examination; no interval change.    ASA Status:  2 emergent.    NPO Status:  > 4  hours    Plan for General and ETT with Intravenous induction. Maintenance will be Balanced.    PONV prophylaxis:  Ondansetron (or other 5HT-3) and Dexamethasone or Solumedrol       Postoperative Care  Postoperative pain management:  IV analgesics, Oral pain medications and Multi-modal analgesia.      Consents  Anesthetic plan, risks, benefits and alternatives discussed with:  Patient.  Use of blood products discussed: No .   .                            .

## 2018-11-05 NOTE — ED TRIAGE NOTES
"Patient presents to ED due to lower abd pain and urinary retention. States onset of pain developed last night, reported to have frequent visits to bathroom and having \"dribbles of urine\"    Denies burning sensations    Now c/o nausea   "

## 2018-11-05 NOTE — ANESTHESIA POSTPROCEDURE EVALUATION
Patient: Derik Hamm    Procedure(s):  LAPAROSCOPIC APPENDECTOMY    Diagnosis:appendicitis  Diagnosis Additional Information: Acute appendicitis with rupture  Dr. Dale    Anesthesia Type:  General    Note:  Anesthesia Post Evaluation    Patient location during evaluation: PACU  Patient participation: Able to fully participate in evaluation  Level of consciousness: awake  Pain management: adequate  Airway patency: patent  Cardiovascular status: acceptable  Respiratory status: acceptable  Hydration status: acceptable  PONV: none             Last vitals:  Vitals:    11/04/18 2230 11/04/18 2237 11/04/18 2306   BP: 117/79  144/90   Resp:   18   Temp:  98  F (36.7  C) 98.6  F (37  C)   SpO2: 99%  99%         Electronically Signed By: Thor Shaw MD  November 5, 2018  12:55 AM

## 2018-11-05 NOTE — OP NOTE
General Surgery Operative Note      Pre-operative diagnosis: Acute appendicitis   Post-operative diagnosis: Acute appendicitis with perforation and diffuse peritoneal feculent contamination   Procedure: Laparoscopic appendectomy,  Abdominal washout,  Peritoneal drain placement.   Surgeon: Mackenzie Dale MD   Assistant(s): NONE   Anesthesia: General    Estimated blood loss:  Drain: 5 cc  15 fr round GARCIA     Specimens:   ID Type Source Tests Collected by Time Destination   1 :  Fluid Peritoneum ANAEROBIC BACTERIAL CULTURE Mackenzie Dale MD 11/5/2018 12:06 AM    2 :  Fluid Peritoneum FLUID CULTURE AEROBIC BACTERIAL Mackenzie Dale MD 11/5/2018 12:08 AM    3 :  Fluid Peritoneum FLUID CULTURE AEROBIC BACTERIAL Mackenzie Dale MD 11/5/2018 12:09 AM    A :  Tissue Appendix SURGICAL PATHOLOGY EXAM Mackenzie Dale MD 11/4/2018 11:47 PM         Mackenzie Dale MD    INDICATIONS: Two days of lower abdominal pain, nausea, vomiting, fever and chills.  CT evidence of acute appendicitis with fecaliths and possible rupture.    DESCRIPTION OF PROCEDURE:  The patient was placed on the table in supine position.  General anesthetic was administered.  The abdomen was prepped and draped in standard sterile fashion.  A transverse incision was made above the umbilicus with a blade.  The incision was carried down to the fascia.  The fascia was incised in the midline with a blade.  The peritoneum was entered with a [place] clamp.  Interrupted 0 Vicryl sutures were placed at the extremes of this fascial incision.  Two 5 mm trocars were placed in the infraumbilical midline, 1 just above the pubic symphysis and 1 [facility] between the pubic symphysis and the umbilicus.  The patient was placed in Trendelenburg and right side up.  Feculent fluid was noted diffusely in the abdomen.  A sample of the fluid was aspirated and sent for culture.  The cecum was identified in the right lower quadrant.  The appendix was  completely freed up from these structures with gentle blunt dissection using atraumatic graspers.  A [place] dissector was passed behind the base of the appendix.  We used the Endo-GIOVANNA stapler to ligate and divide the base of the appendix.  A reload was used to ligate and divide the mesoappendix.  The appendix was passed into an Endocatch bag as well as several fecaliths noted in the right lower quadrant abdominal cavity.  These were removed through the supra umbilical trocar site.  We irrigated the abdomen with a total of 3 liters of sterile saline and aspirated the effluent which was clear.  A 15 [place] round GARCIA was placed in the right lower quadrant.  It was brought out through the inferior most trocar incision site.  This was secured in place with a 2-0 nylon suture.  Once we were satisfied with our hemostasis, we removed the trocars and evacuated the abdomen of CO2.  Two additional interrupted 0 Vicryl suture was placed in the umbilical trocar site fascia.  Each of the 4 interrupted 0 Vicryl sutures was cinched down and tied.  The skin of all incisions was anesthetized with local anesthetic and closed with interrupted 4-0 Vicryl subcuticular sutures and Steri-Strips.  The patient tolerated the procedure well.  Sponge and instrument counts were correct.         ROBBI DAIGLE MD

## 2018-11-05 NOTE — CONSULTS
"[facility]  General Surgery Consult    Derik Hamm MRN# [id number]   Age: 63 year old YOB: 1955     HPI:  The patient has been experiencing acute RLQ and LLQ abdominal pain for the past 2 days associated with fever, chills, nausea, vomiting and anorexia.      Similar pain in the past:    No  Diarrhea, now or in the past:   No  Colonoscopy:   Yes    History is obtained from the patient.    Review Of Systems:  The 10 point review of systems is negative other than noted in the HPI.    PMH:  History reviewed. No pertinent past medical history.    PSH:  Past Surgical History:   Procedure Laterality Date     ORTHOPEDIC SURGERY         Allergies:  No Known Allergies    Home Medications:  No current outpatient prescriptions on file.       Social History:  Social History   Substance Use Topics     Smoking status: Never Smoker     Smokeless tobacco: Not on file     Alcohol use No       Family History:  No family history chronic diarrhea, inflammatory bowel disease or colon cancer.  No bleeding disorders, clotting disorders, or problems with anesthesia.    Physical Exam:  /85  Temp 99  F (37.2  C)  Resp 18  Ht 1.88 m (6' 2\")  Wt 95.3 kg (210 lb)  SpO2 99%  BMI 26.96 kg/m2  General appearance: Resting Comfortably in bed, no apparent distress  Neck: Supple without thyromegaly, lymphadenopathy, masses  Lungs: Clear to auscultation bilaterally  Heart: regular rate and rhythm, no murmurs, rubs, or gallops  Abdomen: rounded, normal bowel sounds    Tenderness: present: generalized marked, positive involuntary guarding and rebound tenderness   Masses: none   Organomegaly: none  Extremities: Without clubbing, cyanosis, edema  Neurologic: Grossly intact times four extremities, alert and oriented times three  Psychiatric: Mood and affect are appropriate  Skin: Without lesions or rashes      Labs Reviewed:  Lab Results   Component Value Date    WBC 10.7 11/04/2018     Lab Results   Component " Value Date    HGB 16.1 11/04/2018     Lab Results   Component Value Date     11/04/2018     Last Basic Metabolic Panel:  Lab Results   Component Value Date     11/04/2018      Lab Results   Component Value Date    POTASSIUM 3.5 11/04/2018     Lab Results   Component Value Date    CHLORIDE 103 11/04/2018     Lab Results   Component Value Date    HENRY 9.2 11/04/2018     Lab Results   Component Value Date    CO2 27 11/04/2018     Lab Results   Component Value Date    BUN 24 11/04/2018     Lab Results   Component Value Date    CR 1.05 11/04/2018     Lab Results   Component Value Date     11/04/2018       Radiology:  All imaging studies reviewed by me.    Results for orders placed or performed during the hospital encounter of 11/04/18   CT Abdomen Pelvis w Contrast    Narrative    CT ABDOMEN AND PELVIS WITH CONTRAST  11/4/2018 10:10 PM    HISTORY: Pain.     TECHNIQUE: Scans obtained from the diaphragm through the pelvis with  IV contrast, 100 mL Isovue-370.   Radiation dose for this scan was reduced using automated exposure  control, adjustment of the mA and/or kV according to patient size, or  iterative reconstruction technique.    COMPARISON:  None.    FINDINGS: Probable small hiatal hernia with some gas in the distal  esophagus indicating reflux. Mild dependent atelectasis is noted in  bilateral lungs. Visualized portions of the lung bases and mediastinal  contents are otherwise grossly unremarkable. There are degenerative  changes in the spine. There are no aggressive osseous lesions.      Small amount of fluid is seen adjacent to the gallbladder was also  noted in the right paracolic gutter and adjacent liver. Liver,  gallbladder, pancreas, spleen, bilateral adrenal glands and bilateral  kidneys otherwise enhance normally. No hydronephrosis,  nephrolithiasis, hydroureter or ureteral calculus is identified.  Urinary bladder is grossly unremarkable.    Minimal nonaneurysmal atherosclerotic  calcifications are seen in the  aorta which is otherwise normal in appearance. No adenopathy or free  air is seen in the peritoneal cavity. Prostate gland is grossly  unremarkable.    The colon is grossly of normal caliber without pericolonic  inflammatory change to suggest acute diverticulitis. Extensive  inflammatory changes are seen in the pericecal region adjacent to the  appendix. Appendix is abnormally enlarged and there is an  appendicolith at the base the appendix measuring 1.0 cm in diameter.  The appendix itself measures up to 1.5 cm in diameter.    Inflammatory stranding around the appendix could represent a rupture  although no periappendiceal abscess is seen and there is no free air.  The small amount of free fluid adjacent to the gallbladder and  inferior edge of the liver is likely secondary to the appendiceal  findings. Small bowel is grossly of normal caliber. The stomach  contains fluid and air but is otherwise unremarkable.      Impression    IMPRESSION:  1. Findings are most consistent with acute appendicitis with  appendicoliths. The largest appendicolith in the base measuring up to  1.0 cm. I cannot exclude perforation of the appendix especially given  the inflammatory change around the appendix and the small amount of  free fluid along the inferior aspect of the liver and adjacent to the  gallbladder. No free air is identified.  2. Mild colonic diverticulosis without evidence for acute  diverticulitis.    I called the findings of acute appendicitis with appendicolith and  possible rupture to Antonio Weeks NP on 11/4/2018 at 10:18 PM.         ASSESSMENT/PLAN:  The patient's history, physical exam, laboratory and imaging studies are suspicious for acute appendicitis.  I have offered the patient a laparoscopic appendectomy.      We have had a detailed discussion of nature of appendicitis, the procedure, its risks, benefits, alternatives, recovery, postop limitations, anesthesia, bleeding, blood  transfusion,  DVT, PE, postoperative infections, injury to adjacent organs and structures, open conversion, bowel resection, prolonged convalescence in the event of gangrene or perforation of the appendix, abdominal wall hernia, intraabdominal adhesions which can lead to bowel obstruction.  We have discussed interventions and treatment for these complications.  The patient understands the possibility of a diagnosis other than appendicitis.  All questions have been answered to the best of my ability.      This case was discussed with ED physician, Dr. Weeks.    He elects to proceed.      Pre-operative antibiotics have been given.     This note was created using voice recognition software. Undetected word substitutions or other errors may have occurred.     Mackenzie Dale MD    Time spent with the patient, reviewing the EMR, reviewing laboratory and imaging studies, more than 50% of which was counseling and coordinating care:  25 minutes.

## 2018-11-05 NOTE — CONSULTS
[facility]    Infectious Disease Consultation     Date of Admission:  11/4/2018  Date of Consult (When I saw the patient): 11/05/18    Assessment & Plan   Derik Hamm is a 63 year old male who was admitted on 11/4/2018.     Impression:  1. 63 y.o male with perforated appendicitis.   2. S/p laparoscopic appendectomy and a washout with drain placement, fecal contamination noted in the OR.   3. Currently on zosyn.     Recommendations:   Agree with zosyn.   Will follow up on the pending cultures.       Cindy Lowe MD    Reason for Consult   Reason for consult: I was asked by Dr. Dale to evaluate this patient for ruptured appendix.    Primary Care Physician   [facility]    Chief Complaint   Abdominal pain     History is obtained from the patient and medical records    History of Present Illness   Derik Hamm is a 63 year old male  Who is cominig in with Two days of lower abdominal pain, nausea, vomiting, fever and chills.  CT evidence of acute appendicitis with fecaliths and possible rupture, s/p laproscopic appendectomy, abdominal washout and peritoneal drain placement.           Past Medical History   I have reviewed this patient's medical history and updated it with pertinent information if needed.   History reviewed. No pertinent past medical history.    Past Surgical History   I have reviewed this patient's surgical history and updated it with pertinent information if needed.  Past Surgical History:   Procedure Laterality Date     LAPAROSCOPIC APPENDECTOMY N/A 11/4/2018    Procedure: LAPAROSCOPIC APPENDECTOMY;  Surgeon: Mackenzie Dale MD;  Location: [facility] OR     ORTHOPEDIC SURGERY         Prior to Admission Medications   Prior to Admission Medications   Prescriptions Last Dose Informant Patient Reported? Taking?   5-HYDROXYTRYPTOPHAN PO 11/4/2018 at Unknown time  Yes Yes   Sig: Take 1 tablet by mouth daily   Linoleic Acid-Sunflower Oil (CLA PO) 11/4/2018 at Unknown time   Yes Yes   Si daily   TURMERIC PO 2018 at Unknown time  Yes Yes   Sig: Take 1 tablet by mouth daily   UNABLE TO FIND 2018 at Unknown time  Yes Yes   Sig: MEDICATION NAME: Thyroid Energy supplement one tablet daily   acetaminophen (TYLENOL) 325 MG tablet  at prn  Yes Yes   Sig: Take 325-650 mg by mouth daily as needed for mild pain   ibuprofen (ADVIL/MOTRIN) 200 MG tablet  at prn  Yes Yes   Sig: Take 200-400 mg by mouth daily as needed for mild pain   multivitamin, therapeutic (THERA-VIT) TABS tablet 2018 at Unknown time  Yes Yes   Sig: Take 1 tablet by mouth daily   naproxen sodium (ALEVE) 220 MG tablet  at prn  Yes Yes   Sig: Take 220 mg by mouth daily as needed for moderate pain      Facility-Administered Medications: None     Allergies   No Known Allergies    Immunization History     There is no immunization history on file for this patient.    Social History   I have reviewed this patient's social history and updated it with pertinent information if needed. Derik CULP Hansel  reports that he has never smoked. He has never used smokeless tobacco. He reports that he does not drink alcohol or use illicit drugs.    Family History   I have reviewed this patient's family history and updated it with pertinent information if needed.   History reviewed. No pertinent family history.    Review of Systems   The 10 point Review of Systems is negative other than noted in the HPI or here.     Physical Exam   Temp: 99.1  F (37.3  C) Temp src: Oral BP: 118/71   Heart Rate: 111 Resp: 18 SpO2: 96 % O2 Device: None (Room air) Oxygen Delivery: 10 LPM  Vital Signs with Ranges  Temp:  [97.6  F (36.4  C)-99.3  F (37.4  C)] 99.1  F (37.3  C)  Heart Rate:  [108-115] 111  Resp:  [11-20] 18  BP: (117-144)/(71-90) 118/71  FiO2 (%):  [100 %] 100 %  SpO2:  [93 %-100 %] 96 %  210 lbs 0 oz  Body mass index is 26.96 kg/(m^2).    GENERAL APPEARANCE:  alert and no distress  EYES: Eyes grossly normal to inspection, PERRL and  conjunctivae and sclerae normal  HENT: ear canals and TM's normal and nose and mouth without ulcers or lesions  NECK: no adenopathy, no asymmetry, masses, or scars and thyroid normal to palpation  RESP: lungs clear to auscultation - no rales, rhonchi or wheezes  CV: regular rates and rhythm, normal S1 S2, no S3 or S4 and no murmur, click or rub  LYMPHATICS: normal ant/post cervical and supraclavicular nodes  ABDOMEN: drain in place   MS: extremities normal- no gross deformities noted  SKIN: no suspicious lesions or rashes      Data   Lab Results   Component Value Date    WBC 10.7 11/04/2018    HGB 16.1 11/04/2018    HCT 47.7 11/04/2018     11/04/2018     11/04/2018    POTASSIUM 3.5 11/04/2018    CHLORIDE 103 11/04/2018    CO2 27 11/04/2018    BUN 24 11/04/2018    CR 1.05 11/04/2018     (H) 11/04/2018    AST 16 11/04/2018    ALT 31 11/04/2018    ALKPHOS 68 11/04/2018    BILITOTAL 1.3 11/04/2018       Recent Labs  Lab 11/05/18 0008   CULT PENDING  PENDING     Recent Labs   Lab Test  11/05/18 0008   CULT  PENDING  PENDING

## 2018-11-05 NOTE — ED PROVIDER NOTES
"  History     Chief Complaint:  Abdominal Pain    HPI   Derik Hamm is a 63 year old male who presents to the emergency department today for evaluation of abdominal pain and urinary retention. The patient reports That over the past few days he has tried drinking water, but can't keep it down. He states then that tonight he felt like he had to urinate, but has been unable to. The patient denies diarrhea.    Allergies:  No Known Allergies     Medications:    No current outpatient prescriptions.    Past Medical History:    Anal warts  Zoster  Hammer toe  Benign prostatic hyperplasia  Testicular torsion    Past Surgical History:    Orthopedic surgery   surgery  Tonsillectomy and adenoidectomy     Family History:    History reviewed. No pertinent family history.      Social History:  The patient was accompanied to the ED by his wife and son.  Smoking Status: Never Smoker  Smokeless Tobacco: Never Used  Alcohol Use: Negative   Marital Status:       Review of Systems   Constitutional: Negative for chills and fever.   Respiratory: Negative for cough and shortness of breath.    Cardiovascular: Negative for chest pain.   Gastrointestinal: Positive for abdominal pain and vomiting. Negative for diarrhea.   Genitourinary: Positive for decreased urine volume and difficulty urinating. Negative for dysuria, frequency and hematuria.   All other systems reviewed and are negative.    Physical Exam     Patient Vitals for the past 24 hrs:   BP Temp Temp src Heart Rate Resp SpO2 Height Weight   11/04/18 2306 144/90 98.6  F (37  C) Temporal - 18 99 % - -   11/04/18 2237 - 98  F (36.7  C) Oral - - - - -   11/04/18 2230 117/79 - - - - 99 % - -   11/04/18 2145 137/90 - - - - 98 % - -   11/04/18 2130 140/88 - - - - 97 % - -   11/04/18 2046 129/85 99  F (37.2  C) - 112 18 99 % 1.88 m (6' 2\") 95.3 kg (210 lb)      Physical Exam  General: Alert, Moderate discomfort, well kept  Eyes: PERRL, conjunctivae pink no scleral icterus or " conjunctival injection  ENT:   Moist mucus membranes, posterior oropharynx clear without erythema or exudates, No lymphadenopathy, Normal voice  Resp:  Lungs clear to auscultation bilaterally, no crackles/rubs/wheezes. Good air movement  CV:  Normal rate and rhythm, no murmurs/rubs/gallops  GI:  Abdomen soft and non-distended.  Normoactive BS.  No guarding or rebound, No masses.   Lower abdominal tenderness     Skin:  Warm, dry.  No rashes or petechiae  Musculoskeletal: No peripheral edema or calf tenderness, Normal gross ROM   Neuro: Alert and oriented to person/place/time, normal sensation  Psychiatric: Normal affect, cooperative, good eye contact    Emergency Department Course     Imaging:  Radiology findings were communicated with the patient who voiced understanding of the findings.    CT Abdomen Pelvis w Contrast  1. Findings are most consistent with acute appendicitis with  appendicoliths. The largest appendicolith in the base measuring up to  1.0 cm. I cannot exclude perforation of the appendix especially given  the inflammatory change around the appendix and the small amount of  free fluid along the inferior aspect of the liver and adjacent to the  gallbladder. No free air is identified.  2. Mild colonic diverticulosis without evidence for acute  diverticulitis.  I called the findings of acute appendicitis with appendicolith and  possible rupture to Antonio Weeks NP on 11/4/2018 at 10:18 PM.  CHRISTIAN TOLENTINO MD  Reading per radiology    Laboratory:  Laboratory findings were communicated with the patient who voiced understanding of the findings.    CBC: WBC 10.7, HGB 16.1,   CMP: glucose 149 (H) o/w WNL (Creatinine 1.05)  Lipase: 69 (L)    UA with micro: pending    Interventions:  2122 NS 1000 mL IV  2231 Dilaudid 1 mg IV  2232 Zofran 4 mg IV  2232 Zosyn 4.5 g IV    Emergency Department Course:    2100 Nursing notes and vitals reviewed.    2107 I performed an exam of the patient as documented above.      2116 IV was inserted and blood was drawn for laboratory testing, results above.     2201 The patient was sent for a CT abdomen pelvis while in the emergency department, results above.      2232 I spoke with Dr. Dale of the General surgery service regarding patient's presentation, findings, and plan of care.     2250 I personally reviewed the laboratory and imaging results with the patient and answered all related questions prior to admission.    Impression & Plan      Medical Decision Making:  Derik Hamm presented with right lower quadrant abdominal pain and the CT scan confirms appendicitis.  There is no evidence of rupture or abscess at this time. Pain has been controlled with interventions in the Emergency Department.  Parenteral antibiotics have been administered in the Emergency Department, in anticipation of surgery. The case was discussed with the on call surgeon and he will be going to the operating room for appendectomy.  An inpatient bed has been arranged after surgery.    Diagnosis:    ICD-10-CM    1. Acute appendicitis, unspecified acute appendicitis type K35.80      Disposition:   The patient is admitted into the care of Dr. Chito Israel Disclosure:  I, Judi Najera, am serving as a scribe at 9:07 PM on 11/4/2018 to document services personally performed by Antonio Weeks APRN based on my observations and the provider's statements to me.      [facility] EMERGENCY DEPARTMENT       Antonio Weeks APRN CNP  11/04/18 3522

## 2018-11-05 NOTE — PROGRESS NOTES
"[facility]   General Surgery Progress Note          Assessment and Plan:   Assessment:   POD#1 s/p laparoscopic appendectomy and washout for perorated appendicitis with feculant peritonitis  Post-operative ileus as expected      Plan:   -Continue NPO, ice chips ok  -Continue IV ABX: Zosyn  -Increase activity as tolerated  -Pain Mgtm: IV dilaudid, Toradol and acetaminophen  -VTE PPX: PCDs, ambulation  -Await return of bowel function         Interval History:   Resting in bed. States he is doing great. He feels much better after the surgery. His pain is controled. He has been up walking in the room. Voiding independently. No flatus. No appetite.         Physical Exam:   Blood pressure 118/71, temperature 98.1  F (36.7  C), temperature source Oral, resp. rate 18, height 1.88 m (6' 2\"), weight 95.3 kg (210 lb), SpO2 96 %.    I/O last 3 completed shifts:  In: 1131.67 [I.V.:1131.67]  Out: 65 [Drains:60; Blood:5]    Abdomen: soft, ND, mild RLQ tenderness, absent BS  Inc(s) - bandages clean, dry and intact  GARCIA - cloudy serosang          Data:     Recent Labs  Lab 11/04/18 2116   WBC 10.7   HGB 16.1   HCT 47.7   MCV 91             Abel Desai PA-C    The patient has been seen and examined by me.  I agree with the above assessment and plan.  Mackenzie Dale MD      "

## 2018-11-05 NOTE — ANESTHESIA CARE TRANSFER NOTE
Patient: Derik Hamm    Procedure(s):  LAPAROSCOPIC APPENDECTOMY    Diagnosis: appendicitis  Diagnosis Additional Information: Acute appendicitis  Dr. Dale    Anesthesia Type:   General     Note:  Airway :Face Mask  Patient transferred to:PACU  Comments: VSS.  Spontaneously breathing O2 per open face mask.  Report given to RN.Handoff Report: Identifed the Patient, Identified the Reponsible Provider, Reviewed the pertinent medical history, Discussed the surgical course, Reviewed Intra-OP anesthesia mangement and issues during anesthesia, Set expectations for post-procedure period and Allowed opportunity for questions and acknowledgement of understanding      Vitals: (Last set prior to Anesthesia Care Transfer)    CRNA VITALS  11/5/2018 0012 - 11/5/2018 0048      11/5/2018             Pulse: 123    SpO2: 100 %                Electronically Signed By: DEREK Boucher CRNA  November 5, 2018  12:48 AM

## 2018-11-05 NOTE — PLAN OF CARE
Problem: Patient Care Overview  Goal: Plan of Care/Patient Progress Review  Remains tachycardic. Temperature max 99.3 oral. Minimal discomfort rated 3 -4. Ice to incision sites. Dressing is CD&I.GARCIA drained 20 ml's serosanguinous drainage. No bowel sounds heard. NPO. Tolerated ice chips. IV infusing at 100 ml's. ID to consult.

## 2018-11-05 NOTE — PLAN OF CARE
Problem: Appendectomy (Adult)  Goal: Signs and Symptoms of Listed Potential Problems Will be Absent, Minimized or Managed (Appendectomy)  Signs and symptoms of listed potential problems will be absent, minimized or managed by discharge/transition of care (reference Appendectomy (Adult) CPG).   Outcome: Improving  Vital Signs: VSS. Afebrile.  Pain/Comfort: Minimal pain, well managed with tylenol and toradol  Assessment: No active bowel sounds. No gas. Incision c/d/i. GARCIA draining moderate amount of cloudy serosanguinous drainage.   Diet: NPO except ice chips  Output: Voiding well  Activity/Ambulation: Ambulated to bathroom x2, pt did not want to ambulate this shift, but has agreed to walk this evening.

## 2018-11-06 ENCOUNTER — APPOINTMENT (OUTPATIENT)
Dept: CARDIOLOGY | Facility: CLINIC | Age: 63
End: 2018-11-06
Attending: PHYSICIAN ASSISTANT
Payer: COMMERCIAL

## 2018-11-06 LAB
ANION GAP SERPL CALCULATED.3IONS-SCNC: 11 MMOL/L (ref 3–14)
ANION GAP SERPL CALCULATED.3IONS-SCNC: 8 MMOL/L (ref 3–14)
BASOPHILS # BLD AUTO: 0 10E9/L (ref 0–0.2)
BASOPHILS NFR BLD AUTO: 0 %
BUN SERPL-MCNC: 18 MG/DL (ref 7–30)
BUN SERPL-MCNC: 22 MG/DL (ref 7–30)
CALCIUM SERPL-MCNC: 7.7 MG/DL (ref 8.5–10.1)
CALCIUM SERPL-MCNC: 8 MG/DL (ref 8.5–10.1)
CHLORIDE SERPL-SCNC: 108 MMOL/L (ref 94–109)
CHLORIDE SERPL-SCNC: 109 MMOL/L (ref 94–109)
CO2 SERPL-SCNC: 20 MMOL/L (ref 20–32)
CO2 SERPL-SCNC: 23 MMOL/L (ref 20–32)
COPATH REPORT: NORMAL
CREAT SERPL-MCNC: 0.92 MG/DL (ref 0.66–1.25)
CREAT SERPL-MCNC: 1.1 MG/DL (ref 0.66–1.25)
DIFFERENTIAL METHOD BLD: ABNORMAL
EOSINOPHIL # BLD AUTO: 0 10E9/L (ref 0–0.7)
EOSINOPHIL NFR BLD AUTO: 0.1 %
ERYTHROCYTE [DISTWIDTH] IN BLOOD BY AUTOMATED COUNT: 13.4 % (ref 10–15)
GFR SERPL CREATININE-BSD FRML MDRD: 68 ML/MIN/1.7M2
GFR SERPL CREATININE-BSD FRML MDRD: 83 ML/MIN/1.7M2
GLUCOSE SERPL-MCNC: 101 MG/DL (ref 70–99)
GLUCOSE SERPL-MCNC: 98 MG/DL (ref 70–99)
HCT VFR BLD AUTO: 41.8 % (ref 40–53)
HGB BLD-MCNC: 13.3 G/DL (ref 13.3–17.7)
IMM GRANULOCYTES # BLD: 0.1 10E9/L (ref 0–0.4)
IMM GRANULOCYTES NFR BLD: 0.7 %
LACTATE BLD-SCNC: 1.1 MMOL/L (ref 0.7–2)
LYMPHOCYTES # BLD AUTO: 0.5 10E9/L (ref 0.8–5.3)
LYMPHOCYTES NFR BLD AUTO: 6.2 %
MAGNESIUM SERPL-MCNC: 1.8 MG/DL (ref 1.6–2.3)
MCH RBC QN AUTO: 30.1 PG (ref 26.5–33)
MCHC RBC AUTO-ENTMCNC: 31.8 G/DL (ref 31.5–36.5)
MCV RBC AUTO: 95 FL (ref 78–100)
MONOCYTES # BLD AUTO: 0.4 10E9/L (ref 0–1.3)
MONOCYTES NFR BLD AUTO: 5.8 %
NEUTROPHILS # BLD AUTO: 6.3 10E9/L (ref 1.6–8.3)
NEUTROPHILS NFR BLD AUTO: 87.2 %
NRBC # BLD AUTO: 0 10*3/UL
NRBC BLD AUTO-RTO: 0 /100
PLATELET # BLD AUTO: 158 10E9/L (ref 150–450)
POTASSIUM SERPL-SCNC: 3.5 MMOL/L (ref 3.4–5.3)
POTASSIUM SERPL-SCNC: 3.5 MMOL/L (ref 3.4–5.3)
RBC # BLD AUTO: 4.42 10E12/L (ref 4.4–5.9)
SODIUM SERPL-SCNC: 139 MMOL/L (ref 133–144)
SODIUM SERPL-SCNC: 140 MMOL/L (ref 133–144)
WBC # BLD AUTO: 7.2 10E9/L (ref 4–11)

## 2018-11-06 PROCEDURE — 99207 ZZC APP CREDIT; MD BILLING SHARED VISIT: CPT | Performed by: INTERNAL MEDICINE

## 2018-11-06 PROCEDURE — 36415 COLL VENOUS BLD VENIPUNCTURE: CPT | Performed by: INTERNAL MEDICINE

## 2018-11-06 PROCEDURE — 25500064 ZZH RX 255 OP 636: Performed by: SURGERY

## 2018-11-06 PROCEDURE — 93306 TTE W/DOPPLER COMPLETE: CPT | Mod: 26 | Performed by: INTERNAL MEDICINE

## 2018-11-06 PROCEDURE — 25000128 H RX IP 250 OP 636: Performed by: SURGERY

## 2018-11-06 PROCEDURE — 83605 ASSAY OF LACTIC ACID: CPT | Performed by: INTERNAL MEDICINE

## 2018-11-06 PROCEDURE — 80048 BASIC METABOLIC PNL TOTAL CA: CPT | Performed by: INTERNAL MEDICINE

## 2018-11-06 PROCEDURE — 40000275 ZZH STATISTIC RCP TIME EA 10 MIN

## 2018-11-06 PROCEDURE — 25000125 ZZHC RX 250: Performed by: INTERNAL MEDICINE

## 2018-11-06 PROCEDURE — 85025 COMPLETE CBC W/AUTO DIFF WBC: CPT | Performed by: INTERNAL MEDICINE

## 2018-11-06 PROCEDURE — 83735 ASSAY OF MAGNESIUM: CPT | Performed by: INTERNAL MEDICINE

## 2018-11-06 PROCEDURE — 25000132 ZZH RX MED GY IP 250 OP 250 PS 637: Performed by: PHYSICIAN ASSISTANT

## 2018-11-06 PROCEDURE — 25000132 ZZH RX MED GY IP 250 OP 250 PS 637: Performed by: SURGERY

## 2018-11-06 PROCEDURE — 25000128 H RX IP 250 OP 636: Performed by: INTERNAL MEDICINE

## 2018-11-06 PROCEDURE — 93005 ELECTROCARDIOGRAM TRACING: CPT

## 2018-11-06 PROCEDURE — 99233 SBSQ HOSP IP/OBS HIGH 50: CPT | Performed by: INTERNAL MEDICINE

## 2018-11-06 PROCEDURE — 12000007 ZZH R&B INTERMEDIATE

## 2018-11-06 PROCEDURE — 93010 ELECTROCARDIOGRAM REPORT: CPT | Performed by: INTERNAL MEDICINE

## 2018-11-06 RX ORDER — MAGNESIUM SULFATE HEPTAHYDRATE 40 MG/ML
4 INJECTION, SOLUTION INTRAVENOUS EVERY 4 HOURS PRN
Status: DISCONTINUED | OUTPATIENT
Start: 2018-11-06 | End: 2018-11-10 | Stop reason: HOSPADM

## 2018-11-06 RX ORDER — POTASSIUM CHLORIDE 29.8 MG/ML
20 INJECTION INTRAVENOUS
Status: DISCONTINUED | OUTPATIENT
Start: 2018-11-06 | End: 2018-11-10 | Stop reason: HOSPADM

## 2018-11-06 RX ORDER — METOPROLOL TARTRATE 1 MG/ML
5 INJECTION, SOLUTION INTRAVENOUS EVERY 4 HOURS PRN
Status: DISCONTINUED | OUTPATIENT
Start: 2018-11-06 | End: 2018-11-06

## 2018-11-06 RX ORDER — METOPROLOL TARTRATE 1 MG/ML
5 INJECTION, SOLUTION INTRAVENOUS ONCE
Status: COMPLETED | OUTPATIENT
Start: 2018-11-06 | End: 2018-11-06

## 2018-11-06 RX ORDER — METOPROLOL TARTRATE 1 MG/ML
5 INJECTION, SOLUTION INTRAVENOUS EVERY 6 HOURS PRN
Status: DISCONTINUED | OUTPATIENT
Start: 2018-11-06 | End: 2018-11-10 | Stop reason: HOSPADM

## 2018-11-06 RX ORDER — POTASSIUM CHLORIDE 1.5 G/1.58G
20-40 POWDER, FOR SOLUTION ORAL
Status: DISCONTINUED | OUTPATIENT
Start: 2018-11-06 | End: 2018-11-10 | Stop reason: HOSPADM

## 2018-11-06 RX ORDER — POTASSIUM CHLORIDE 7.45 MG/ML
10 INJECTION INTRAVENOUS
Status: DISCONTINUED | OUTPATIENT
Start: 2018-11-06 | End: 2018-11-10 | Stop reason: HOSPADM

## 2018-11-06 RX ORDER — LIDOCAINE 40 MG/G
CREAM TOPICAL
Status: DISCONTINUED | OUTPATIENT
Start: 2018-11-06 | End: 2018-11-07 | Stop reason: CLARIF

## 2018-11-06 RX ORDER — POTASSIUM CHLORIDE 1500 MG/1
20-40 TABLET, EXTENDED RELEASE ORAL
Status: DISCONTINUED | OUTPATIENT
Start: 2018-11-06 | End: 2018-11-10 | Stop reason: HOSPADM

## 2018-11-06 RX ORDER — NITROGLYCERIN 0.4 MG/1
0.4 TABLET SUBLINGUAL EVERY 5 MIN PRN
Status: DISCONTINUED | OUTPATIENT
Start: 2018-11-06 | End: 2018-11-07 | Stop reason: CLARIF

## 2018-11-06 RX ORDER — POTASSIUM CL/LIDO/0.9 % NACL 10MEQ/0.1L
10 INTRAVENOUS SOLUTION, PIGGYBACK (ML) INTRAVENOUS
Status: DISCONTINUED | OUTPATIENT
Start: 2018-11-06 | End: 2018-11-10 | Stop reason: HOSPADM

## 2018-11-06 RX ADMIN — SODIUM CHLORIDE 500 ML: 9 INJECTION, SOLUTION INTRAVENOUS at 03:14

## 2018-11-06 RX ADMIN — TAZOBACTAM SODIUM AND PIPERACILLIN SODIUM 3.38 G: 375; 3 INJECTION, SOLUTION INTRAVENOUS at 23:02

## 2018-11-06 RX ADMIN — SODIUM CHLORIDE 500 ML: 9 INJECTION, SOLUTION INTRAVENOUS at 21:24

## 2018-11-06 RX ADMIN — ACETAMINOPHEN 975 MG: 325 TABLET, FILM COATED ORAL at 19:23

## 2018-11-06 RX ADMIN — ACETAMINOPHEN 975 MG: 325 TABLET, FILM COATED ORAL at 02:39

## 2018-11-06 RX ADMIN — TAZOBACTAM SODIUM AND PIPERACILLIN SODIUM 3.38 G: 375; 3 INJECTION, SOLUTION INTRAVENOUS at 16:50

## 2018-11-06 RX ADMIN — TAZOBACTAM SODIUM AND PIPERACILLIN SODIUM 3.38 G: 375; 3 INJECTION, SOLUTION INTRAVENOUS at 12:16

## 2018-11-06 RX ADMIN — METOPROLOL TARTRATE 5 MG: 5 INJECTION, SOLUTION INTRAVENOUS at 20:44

## 2018-11-06 RX ADMIN — METOPROLOL TARTRATE 25 MG: 25 TABLET ORAL at 08:36

## 2018-11-06 RX ADMIN — ACETAMINOPHEN 975 MG: 325 TABLET, FILM COATED ORAL at 12:15

## 2018-11-06 RX ADMIN — METOPROLOL TARTRATE 5 MG: 5 INJECTION, SOLUTION INTRAVENOUS at 02:08

## 2018-11-06 RX ADMIN — SODIUM CHLORIDE: 9 INJECTION, SOLUTION INTRAVENOUS at 12:20

## 2018-11-06 RX ADMIN — TAZOBACTAM SODIUM AND PIPERACILLIN SODIUM 3.38 G: 375; 3 INJECTION, SOLUTION INTRAVENOUS at 04:56

## 2018-11-06 RX ADMIN — METOPROLOL TARTRATE 5 MG: 1 INJECTION, SOLUTION INTRAVENOUS at 23:09

## 2018-11-06 RX ADMIN — METOPROLOL TARTRATE 25 MG: 25 TABLET ORAL at 19:22

## 2018-11-06 RX ADMIN — METOPROLOL TARTRATE 5 MG: 5 INJECTION, SOLUTION INTRAVENOUS at 02:50

## 2018-11-06 RX ADMIN — SODIUM CHLORIDE: 9 INJECTION, SOLUTION INTRAVENOUS at 03:22

## 2018-11-06 RX ADMIN — SODIUM CHLORIDE 500 ML: 9 INJECTION, SOLUTION INTRAVENOUS at 02:16

## 2018-11-06 RX ADMIN — ONDANSETRON 4 MG: 2 INJECTION INTRAMUSCULAR; INTRAVENOUS at 23:02

## 2018-11-06 RX ADMIN — SODIUM CHLORIDE 500 ML: 9 INJECTION, SOLUTION INTRAVENOUS at 22:52

## 2018-11-06 RX ADMIN — HUMAN ALBUMIN MICROSPHERES AND PERFLUTREN 3 ML: 10; .22 INJECTION, SOLUTION INTRAVENOUS at 11:45

## 2018-11-06 RX ADMIN — SODIUM CHLORIDE 1000 ML: 9 INJECTION, SOLUTION INTRAVENOUS at 01:07

## 2018-11-06 ASSESSMENT — ACTIVITIES OF DAILY LIVING (ADL)
ADLS_ACUITY_SCORE: 14
ADLS_ACUITY_SCORE: 12
ADLS_ACUITY_SCORE: 12
ADLS_ACUITY_SCORE: 15
ADLS_ACUITY_SCORE: 15
ADLS_ACUITY_SCORE: 12

## 2018-11-06 ASSESSMENT — PAIN DESCRIPTION - DESCRIPTORS: DESCRIPTORS: SORE;TENDER

## 2018-11-06 NOTE — PROGRESS NOTES
"[facility]  Infectious Disease Progress Note          Assessment and Plan:   Impression:  1. 63 y.o male with perforated appendicitis.   2. S/p laparoscopic appendectomy and a washout with drain placement, fecal contamination noted in the OR.   3. Postop ileus      Recommendations:   Continue zosyn.   Will follow up on the pending cultures.         Interval History:   no new complaints and doing well; no cp, sob, n/v/d, or abd pain ileus, no fever , feels OK, GNR and strep in cx.              Medications:       acetaminophen  975 mg Oral Q8H     metoprolol tartrate  25 mg Oral BID     piperacillin-tazobactam  3.375 g Intravenous Q6H     sodium chloride (PF)  3 mL Intracatheter Q8H                  Physical Exam:   Blood pressure 165/85, pulse 82, temperature 98.7  F (37.1  C), temperature source Oral, resp. rate 18, height 1.88 m (6' 2\"), weight 102.4 kg (225 lb 12.8 oz), SpO2 96 %.  Wt Readings from Last 2 Encounters:   11/06/18 102.4 kg (225 lb 12.8 oz)     Vital Signs with Ranges  Temp:  [97.7  F (36.5  C)-99.9  F (37.7  C)] 98.7  F (37.1  C)  Pulse:  [] 82  Heart Rate:  [] 109  Resp:  [16-18] 18  BP: ()/() 165/85  SpO2:  [94 %-96 %] 96 %    Constitutional: Awake, alert, cooperative, no apparent distress   Lungs: Clear to auscultation bilaterally, no crackles or wheezing   Cardiovascular: Regular rate and rhythm, normal S1 and S2, and no murmur noted   Abdomen: Normal bowel sounds, soft, non-distended, non-tender   Skin: No rashes, no cyanosis, no edema   Other:           Data:   All microbiology laboratory data reviewed.  Recent Labs   Lab Test  11/06/18   0209  11/05/18   2156  11/04/18   2116  04/03/18   1550   WBC  7.2   --   10.7  4.2   HGB  13.3  14.0  16.1  14.8   HCT  41.8   --   47.7  44.2   MCV  95   --   91  92   PLT  158   --   212  219     Recent Labs   Lab Test  11/06/18   0209  11/05/18   2156  11/04/18   2116   CR  1.10  1.24  1.05     No lab results " found.  Recent Labs   Lab Test  11/05/18   0009  11/05/18   0008   CULT  Heavy growth  Probable  Escherichia coli  *  Moderate growth  Gram negative rods  *  Moderate growth  Streptococcus constellatus  *  Critical Value/Significant Value, preliminary result only, called to and read back by  Natalie Tovar at 8049 11.6.18 by [id number]    Culture in progress  Culture negative monitoring continues  Moderate growth  Probable  Escherichia coli  *  Light growth  Streptococcus constellatus  *  Previous critical documented  Culture in progress

## 2018-11-06 NOTE — PLAN OF CARE
Problem: Patient Care Overview  Goal: Plan of Care/Patient Progress Review  Outcome: No Change  SR/ST with PAC's. IVF infusing. NPO except ice chips. Hypoactive BS, no flatus, some belching present. Pain 1/10-continues on scheduled Tylenol. Up with SBA to bathroom. GARCIA to mid abd. Dressing C/D/I to abd. IV Zosyn Q6hrs. Echo performed today-see results review.

## 2018-11-06 NOTE — PROGRESS NOTES
"[facility]   General Surgery Progress Note          Assessment and Plan:   Assessment:   POD#2 s/p laparoscopic appendectomy and washout for perorated appendicitis with feculant peritonitis  Post-operative ileus as expected  Afebrile      Plan:   -Diet: continue NPO for now, await return of bowel function.   -Continue IV ABX: Zosyn  -Increase activity as tolerated, up walking halls as able  -Pain Mgtm: IV dilaudid, Toradol and acetaminophen  -VTE PPX: PCDs, ambulation         Interval History:   Comfortable in bed. Reports minimal pain, aggravated with activity. +bloating, denies nausea/vomiting. Denies flatus, -BM. Voiding independently.          Physical Exam:   Blood pressure 129/68, pulse 82, temperature 98.8  F (37.1  C), temperature source Oral, resp. rate 18, height 1.88 m (6' 2\"), weight 102.4 kg (225 lb 12.8 oz), SpO2 96 %.    I/O last 3 completed shifts:  In: 1620 [I.V.:1620]  Out: 150 [Drains:150]    Abdomen: soft, +distention, mild incisional tenderness, absent BS  Inc(s) - c/d/steristrips intact. Early bruising at umbilical incision.   GARCIA - cloudy serosang, +debris          Data:       Recent Labs  Lab 11/06/18  0209 11/05/18  2156 11/04/18  2116   WBC 7.2  --  10.7   HGB 13.3 14.0 16.1   HCT 41.8  --  47.7   MCV 95  --  91     --  212       Carito Wiggins PA-C         The patient has been seen and examined by me.  I agree with the above assessment and plan.  Mackenzie Dale MD      "

## 2018-11-06 NOTE — PROVIDER NOTIFICATION
MD paged - Unable to start dilt gtt, BP's soft, HR remains in the 150's, Poor output recorded, bolus ordered, monitor BP's

## 2018-11-06 NOTE — CONSULTS
[facility]    Hospitalist Consultation    Date of Admission:  11/4/2018  Date of Consult (When I saw the patient): 11/05/18    Assessment & Plan   Derik Hamm is a 63 year old male who was admitted on 11/4/2018. I was asked to see the patient for tachycardia.    New onset atrial fibrillation with RVR  --completely asymptomatic.  Most likely cause is post-operative state, possibly slight dehydration post-op.  Rates to 160s, BP 90s.  No s/s of sepsis, no hypoxia to suggest PE, and no clinical history of EtOH abuse to suggest withdrawal as cause of new onset atrial fibrillation.     PLAN:  1. IVF bolus 1L now  2. Lopressor 12.5 mg now, then 12.5 mg BID  3. Transfer to telemetry floor, may need further IV rate control.  Diltiazem gtt ordered for following transfer  4. Check electrolytes including magnesium  5. TTE for 11/6    Perforated appendicitis with feculant peritonitis s/p lap appendectomy 11/4/2018  --management per primary team.  Continues on zosyn.  Currently NPO.     DVT Prophylaxis: deferred to primary team  Code Status: Full Code    Disposition: deferred to primary team  Syeda Pinto PA-C    Reason for Consult   Reason for consult: I was asked by Dr. Keane to evaluate this patient for tachycardia.    Primary Care Physician   [facility]    Chief Complaint   tachycardia    History is obtained from the patient    History of Present Illness   Derik Hamm is a 63 year old male who is hospitalized for management of ruptured appendicitis with feculant peritonitis s/p lap appendectomy.  He is POD#1.  Medicine is consulted urgently for evaluation of tachycardia noted on pulse oximetry monitoring.  Upon evaluation, Mr. Hamm denies chest pain or dyspnea.  Deep breaths are painful in setting of recent abdominal surgery.  Denies fevers.  No vomiting.  Remains NPO while awaiting return of bowel function.     Pain is minimal at this time.  Belching but not passing  flatus.  Denies history of atrial fibrillation.  Denies daily alcohol use or history of alcohol withdrawal.  Feels mildly light-headed.      Past Medical History    No significant past medical history    Past Surgical History   Remote surgical repair right knee, unclear details  Laparoscopic appendectomy 2018    Prior to Admission Medications   Prior to Admission Medications   Prescriptions Last Dose Informant Patient Reported? Taking?   5-HYDROXYTRYPTOPHAN PO 2018 at Unknown time  Yes Yes   Sig: Take 1 tablet by mouth daily   Linoleic Acid-Sunflower Oil (CLA PO) 2018 at Unknown time  Yes Yes   Si daily   TURMERIC PO 2018 at Unknown time  Yes Yes   Sig: Take 1 tablet by mouth daily   UNABLE TO FIND 2018 at Unknown time  Yes Yes   Sig: MEDICATION NAME: Thyroid Energy supplement one tablet daily   acetaminophen (TYLENOL) 325 MG tablet  at prn  Yes Yes   Sig: Take 325-650 mg by mouth daily as needed for mild pain   ibuprofen (ADVIL/MOTRIN) 200 MG tablet  at prn  Yes Yes   Sig: Take 200-400 mg by mouth daily as needed for mild pain   multivitamin, therapeutic (THERA-VIT) TABS tablet 2018 at Unknown time  Yes Yes   Sig: Take 1 tablet by mouth daily   naproxen sodium (ALEVE) 220 MG tablet  at prn  Yes Yes   Sig: Take 220 mg by mouth daily as needed for moderate pain      Facility-Administered Medications: None     Allergies   No Known Allergies    Social History    Lives with two roommates in [place], [place].  Denies daily alcohol use.  No drug use or tobacco use.  Works in retail.    Family History    Father and grandfather had heart disease.      Review of Systems   The 10 point Review of Systems is negative other than noted in the HPI or here.     Physical Exam   Temp: 98.2  F (36.8  C) Temp src: Oral BP: 93/67 Pulse: 140 Heart Rate: 96 Resp: 16 SpO2: 95 % O2 Device: None (Room air) Oxygen Delivery: 10 LPM  Vital Signs with Ranges  Temp:  [97.6  F (36.4  C)-99.3  F (37.4  C)] 98.2  F (36.8   C)  Pulse:  [140] 140  Heart Rate:  [] 96  Resp:  [11-20] 16  BP: ()/(67-90) 93/67  FiO2 (%):  [100 %] 100 %  SpO2:  [93 %-100 %] 95 %  210 lbs 0 oz    Constitutional: nontoxic appearing man sitting up in bed.   Eyes: no icterus, pupils equal and reactive  HEENT: mucous membranes moist  Respiratory: clear bilaterally  Cardiovascular: irregularly irregular, rate 170s at time of evaluation.   GI: normoactive bowel sounds, appropriatlely tender.   Lymph/Hematologic: no bruising is noted  Genitourinary: no catheter  Skin: warm and dry  Musculoskeletal: normal muscle bulk and tone  Neurologic: grossly nonfocal  Psychiatric: alert, oriented, appropriate    Data   -Data reviewed today: All pertinent laboratory and imaging results from this encounter were reviewed. I personally reviewed the EKG tracing showing atrial fibrillation with RVR, rate 170, ST depression V3-V6.    Recent Labs  Lab 11/04/18  2116   WBC 10.7   HGB 16.1   MCV 91         POTASSIUM 3.5   CHLORIDE 103   CO2 27   BUN 24   CR 1.05   ANIONGAP 7   HENRY 9.2   *   ALBUMIN 4.1   PROTTOTAL 7.6   BILITOTAL 1.3   ALKPHOS 68   ALT 31   AST 16   LIPASE 69*       Imaging:  Recent Results (from the past 24 hour(s))   CT Abdomen Pelvis w Contrast    Narrative    CT ABDOMEN AND PELVIS WITH CONTRAST  11/4/2018 10:10 PM    HISTORY: Pain.     TECHNIQUE: Scans obtained from the diaphragm through the pelvis with  IV contrast, 100 mL Isovue-370.   Radiation dose for this scan was reduced using automated exposure  control, adjustment of the mA and/or kV according to patient size, or  iterative reconstruction technique.    COMPARISON:  None.    FINDINGS: Probable small hiatal hernia with some gas in the distal  esophagus indicating reflux. Mild dependent atelectasis is noted in  bilateral lungs. Visualized portions of the lung bases and mediastinal  contents are otherwise grossly unremarkable. There are degenerative  changes in the spine. There  are no aggressive osseous lesions.      Small amount of fluid is seen adjacent to the gallbladder was also  noted in the right paracolic gutter and adjacent liver. Liver,  gallbladder, pancreas, spleen, bilateral adrenal glands and bilateral  kidneys otherwise enhance normally. No hydronephrosis,  nephrolithiasis, hydroureter or ureteral calculus is identified.  Urinary bladder is grossly unremarkable.    Minimal nonaneurysmal atherosclerotic calcifications are seen in the  aorta which is otherwise normal in appearance. No adenopathy or free  air is seen in the peritoneal cavity. Prostate gland is grossly  unremarkable.    The colon is grossly of normal caliber without pericolonic  inflammatory change to suggest acute diverticulitis. Extensive  inflammatory changes are seen in the pericecal region adjacent to the  appendix. Appendix is abnormally enlarged and there is an  appendicolith at the base the appendix measuring 1.0 cm in diameter.  The appendix itself measures up to 1.5 cm in diameter.    Inflammatory stranding around the appendix could represent a rupture  although no periappendiceal abscess is seen and there is no free air.  The small amount of free fluid adjacent to the gallbladder and  inferior edge of the liver is likely secondary to the appendiceal  findings. Small bowel is grossly of normal caliber. The stomach  contains fluid and air but is otherwise unremarkable.      Impression    IMPRESSION:  1. Findings are most consistent with acute appendicitis with  appendicoliths. The largest appendicolith in the base measuring up to  1.0 cm. I cannot exclude perforation of the appendix especially given  the inflammatory change around the appendix and the small amount of  free fluid along the inferior aspect of the liver and adjacent to the  gallbladder. No free air is identified.  2. Mild colonic diverticulosis without evidence for acute  diverticulitis.    I called the findings of acute appendicitis with  appendicolith and  possible rupture to Antonio Weeks NP on 11/4/2018 at 10:18 PM.    CHRISTIAN TOLENTINO MD

## 2018-11-06 NOTE — PROGRESS NOTES
[facility]    Hospitalist Progress Note    Date of Service (when I saw the patient): 11/06/2018    Assessment & Plan     Derik Hamm is a 63 year old male who was admitted on 11/4/2018. I was asked to see the patient for tachycardia.     New onset atrial fibrillation with RAPID VENTRICULAR RESPONSE and transient hypotension:  --completely asymptomatic.  Most likely cause is post-operative state, possibly slight dehydration post-op.  Rates to 160s, BP 90s.  No s/s of sepsis, no hypoxia to suggest PE, and no clinical history of EtOH abuse to suggest withdrawal as cause of new onset atrial fibrillation. Converted spontaneously around 4 am.  TSH is normal.   1. IVF  2. Lopressor 25 mg BID, and prn.  Will determine need at time of discharge.  3. Tele for now  5. TTE pending  6. [id number] score is zero.  Recommend no anticoagulation at this time.       Perforated appendicitis with feculant peritonitis s/p lap appendectomy 11/4/2018  --management per primary team.    --Continues on zosyn.    --diet per surgery team       DVT Prophylaxis: Pneumatic Compression Devices  Code Status: Full Code    Disposition Plan   Expected discharge in 1-2 days to prior living arrangement once bowel function returns and atrial fibrillation stable.     Entered: Mateo Connors 11/06/2018, 9:41 AM         Mateo Connors MD  Text Page    Interval History   Patient denies/do not palpitations.  He is without shortness of breath or chest pressure or pain.  Denies any dizziness or lightheadedness.  Has no known prior history of atrial fibrillation or arrhythmia.      -Data reviewed today: I reviewed all new labs and imaging results over the last 24 hours. I personally reviewed no images or EKG's today.    Physical Exam   Temp: 98.8  F (37.1  C) Temp src: Oral BP: 129/68 Pulse: 82 Heart Rate: 107 Resp: 18 SpO2: 96 % O2 Device: None (Room air)    Vitals:    11/04/18 2046 11/06/18 0010   Weight: 95.3 kg (210 lb) 102.4 kg (225  lb 12.8 oz)     Vital Signs with Ranges  Temp:  [97.7  F (36.5  C)-99.1  F (37.3  C)] 98.8  F (37.1  C)  Pulse:  [] 82  Heart Rate:  [] 107  Resp:  [16-18] 18  BP: ()/() 129/68  SpO2:  [94 %-97 %] 96 %  I/O last 3 completed shifts:  In: 1620 [I.V.:1620]  Out: 150 [Drains:150]    Constitutional: Awake, alert, cooperative, no apparent distress,    Respiratory: Clear to auscultation bilaterally, no crackles or wheezing   Cardiovascular: Regular rate and rhythm, normal S1 and S2, and no murmur noted   Abdomen: quiet, soft, non-distended, non-tender   Skin: No rashes, no cyanosis, dry to touch   Neuro: Alert and oriented x3,    Extremities: No edema, normal range of motion   Other(s):        All other systems: Negative       Medications     diltiazem (CARDIZEM) infusion ADULT Stopped (11/06/18 0023)     sodium chloride 125 mL/hr at 11/06/18 0800       acetaminophen  975 mg Oral Q8H     metoprolol tartrate  25 mg Oral BID     piperacillin-tazobactam  3.375 g Intravenous Q6H     sodium chloride (PF)  3 mL Intracatheter Q8H       Data     Recent Labs  Lab 11/06/18  0209 11/05/18  2156 11/04/18  2116   WBC 7.2  --  10.7   HGB 13.3 14.0 16.1   MCV 95  --  91     --  212    139 137   POTASSIUM 3.5 3.8 3.5   CHLORIDE 109 105 103   CO2 23 27 27   BUN 22 21 24   CR 1.10 1.24 1.05   ANIONGAP 8 7 7   HENRY 7.7* 8.1* 9.2   GLC 98 94 149*   ALBUMIN  --   --  4.1   PROTTOTAL  --   --  7.6   BILITOTAL  --   --  1.3   ALKPHOS  --   --  68   ALT  --   --  31   AST  --   --  16   LIPASE  --   --  69*       Imaging:  No results found for this or any previous visit (from the past 24 hour(s)).

## 2018-11-06 NOTE — PLAN OF CARE
Problem: Patient Care Overview  Goal: Plan of Care/Patient Progress Review  Outcome: Improving  Orientation: Alert and oriented x4. Awake, alert, cooperative, no apparent distress.    VS. 94% on RA. Afebrile. BP's soft 90's/ 60's, HR initially in the 150's, now 100's  IVF: NS infusing in PIV, 2000 L Bolus given to bring pressures up and tolerate IV metoprolol  Tele: ST\SR. HR 100s. Pt converted from Afib to SR/ST  LS: clear and equal bilaterally.  No wheezing  GI: No BM. Denies N/V.  distended, tender, Abd dressing CDI, GARCIA drain output Serosanguinous, ice pac used  : No urine output this shift   Skin: bruising noted, Skin otherwise intact. No rashes, no cyanosis, dry to touch  Activity: SBA to assist of 1. Pt slept comfortably throughout shift.   Pain: 1/10 abd pain. No PRN interventions utilized. Pt sleeping. C/o of sharp RLQ abd pain comes and goes, ice pack used and scheduled tylenol  DC Plan: Continue with current cares. Pt tx to unit from Peds for Afib RVR, Unable to start dilt gtt due to Soft BP's, NS bolus x2 given aong w/IV metoprolol x2 5mg doses, Pt converted to ST/SR, BP's remain soft

## 2018-11-06 NOTE — PROVIDER NOTIFICATION
"PA paged: \"FYI-peritoneal fluid cx showing heavy growth gram negative rods\"    Addendum: MD paged with results.  "

## 2018-11-06 NOTE — PLAN OF CARE
Problem: Patient Care Overview  Goal: Plan of Care/Patient Progress Review  Outcome: No Change  See provider notification note re: tachy.  HR remains irregular and tachy with -190 post metoprolol and NS bolus.  BS hypoactive.  GARCIA producing milky serosang drainage.  Incision remains covered and c/d/i.  No void this shift; stated will void when get up to transfer.  Ambulated x 1.  Afebrile.  Pain controlled with toradol and tyl.  Transfer to [facility] when bed assigned.

## 2018-11-06 NOTE — PROGRESS NOTES
Cross cover    RE:  afib with RVR, Hypotensive, decreased UO  Admitted for perf appendicitis.    --Diltiazem drip was ordered by admitting provider but could not be started given lower blood pressures    S: Postop patient denied any lightheadedness dizziness palpitations chest pain pressure heaviness or tightness.  Vitals on initial evaluation heart rate in 170s.  Systolic blood pressure in 80s.  Sats above 95%, afebrile.  Decreased urinary output.   Awake alert oriented x3.  No focal neurologic weakness.  Cardiac exam.  No JVD.  Tachycardic irregularly irregular  Lungs clear to auscultation  Extremities no edema  Abdomen distended.  Postop      Ass/ plan:  New A. fib with RVR likely secondary to sepsis status post perforated appendicitis  --On IV antibiotic  --We will give IV fluids.  Bolus fluid 2.5 L  --IV metoprolol 5 mg x2 when systolic blood pressure was more than 100 after fluids  --Heart rate did slow down to 130s.  He did convert into sinus briefly but was back into A. Fib  --We will continue hydration.  Will monitor on telemetry  --prn metoprolol as needed  --Cardiac echo in a.m.  --Additional IV metoprolol 5 mg x1 now    Addendum  --Patient's heart rate low 100s.  On telemetry appears to be sinus  --We will get EKG  --Continue to monitor  --Continue fluids at 125 cc an hour    Care plan discussed with nursing team

## 2018-11-06 NOTE — PLAN OF CARE
"Patient arrived on unit around 0010 from Peds. Vitals upon arrival to unit: BP (!) 89/62 (BP Location: Right arm)  Pulse 82  Temp 97.7  F (36.5  C) (Oral)  Resp 18  Ht 1.88 m (6' 2\")  Wt 102.4 kg (225 lb 12.8 oz)  SpO2 94%  BMI 28.99 kg/m2. Oxygen saturation 96% on RA. Alert and oriented X4. Oriented to room and call light. Patient verbalized understanding. Orders received. Continue to monitor and notify MD with changes and concerns.      "

## 2018-11-06 NOTE — PROVIDER NOTIFICATION
Spoke with Dr Keane regarding capnography alarming high heart rate of 160.  Auscultated HR of 140.  Observed capnography HR for several min -167.  Per md hospitalist consult and EKG ordered.

## 2018-11-06 NOTE — PROVIDER NOTIFICATION
FYI - Pt converted to SR, no 3rd dose of metoprolol given HR low 100's, BP's soft 105/65.  Would you like a EKG?

## 2018-11-07 ENCOUNTER — APPOINTMENT (OUTPATIENT)
Dept: GENERAL RADIOLOGY | Facility: CLINIC | Age: 63
End: 2018-11-07
Attending: INTERNAL MEDICINE
Payer: COMMERCIAL

## 2018-11-07 LAB
ANION GAP SERPL CALCULATED.3IONS-SCNC: 10 MMOL/L (ref 3–14)
BUN SERPL-MCNC: 19 MG/DL (ref 7–30)
CALCIUM SERPL-MCNC: 7.9 MG/DL (ref 8.5–10.1)
CHLORIDE SERPL-SCNC: 108 MMOL/L (ref 94–109)
CO2 SERPL-SCNC: 21 MMOL/L (ref 20–32)
CREAT SERPL-MCNC: 0.91 MG/DL (ref 0.66–1.25)
GFR SERPL CREATININE-BSD FRML MDRD: 85 ML/MIN/1.7M2
GLUCOSE SERPL-MCNC: 125 MG/DL (ref 70–99)
INTERPRETATION ECG - MUSE: NORMAL
LACTATE BLD-SCNC: 0.7 MMOL/L (ref 0.7–2)
MAGNESIUM SERPL-MCNC: 2.1 MG/DL (ref 1.6–2.3)
POTASSIUM SERPL-SCNC: 3.7 MMOL/L (ref 3.4–5.3)
SODIUM SERPL-SCNC: 139 MMOL/L (ref 133–144)

## 2018-11-07 PROCEDURE — 83605 ASSAY OF LACTIC ACID: CPT | Performed by: INTERNAL MEDICINE

## 2018-11-07 PROCEDURE — 80048 BASIC METABOLIC PNL TOTAL CA: CPT | Performed by: INTERNAL MEDICINE

## 2018-11-07 PROCEDURE — 25000128 H RX IP 250 OP 636: Performed by: INTERNAL MEDICINE

## 2018-11-07 PROCEDURE — 25000128 H RX IP 250 OP 636: Performed by: SURGERY

## 2018-11-07 PROCEDURE — 36415 COLL VENOUS BLD VENIPUNCTURE: CPT | Performed by: INTERNAL MEDICINE

## 2018-11-07 PROCEDURE — 25000132 ZZH RX MED GY IP 250 OP 250 PS 637: Performed by: INTERNAL MEDICINE

## 2018-11-07 PROCEDURE — 12000007 ZZH R&B INTERMEDIATE

## 2018-11-07 PROCEDURE — 25000125 ZZHC RX 250: Performed by: INTERNAL MEDICINE

## 2018-11-07 PROCEDURE — 99207 ZZC CDG-MDM COMPONENT: MEETS LOW - DOWN CODED: CPT | Performed by: INTERNAL MEDICINE

## 2018-11-07 PROCEDURE — 25000125 ZZHC RX 250: Performed by: PHYSICIAN ASSISTANT

## 2018-11-07 PROCEDURE — 25000132 ZZH RX MED GY IP 250 OP 250 PS 637: Performed by: SURGERY

## 2018-11-07 PROCEDURE — 25000132 ZZH RX MED GY IP 250 OP 250 PS 637: Performed by: PHYSICIAN ASSISTANT

## 2018-11-07 PROCEDURE — 83735 ASSAY OF MAGNESIUM: CPT | Performed by: INTERNAL MEDICINE

## 2018-11-07 PROCEDURE — 74019 RADEX ABDOMEN 2 VIEWS: CPT

## 2018-11-07 PROCEDURE — 99232 SBSQ HOSP IP/OBS MODERATE 35: CPT | Performed by: INTERNAL MEDICINE

## 2018-11-07 RX ORDER — ONDANSETRON 2 MG/ML
4 INJECTION INTRAMUSCULAR; INTRAVENOUS EVERY 6 HOURS PRN
Status: DISCONTINUED | OUTPATIENT
Start: 2018-11-07 | End: 2018-11-10 | Stop reason: HOSPADM

## 2018-11-07 RX ORDER — METOPROLOL TARTRATE 25 MG/1
25 TABLET, FILM COATED ORAL ONCE
Status: COMPLETED | OUTPATIENT
Start: 2018-11-07 | End: 2018-11-07

## 2018-11-07 RX ORDER — METOPROLOL TARTRATE 1 MG/ML
2.5 INJECTION, SOLUTION INTRAVENOUS ONCE
Status: COMPLETED | OUTPATIENT
Start: 2018-11-07 | End: 2018-11-07

## 2018-11-07 RX ORDER — ONDANSETRON 4 MG/1
4 TABLET, ORALLY DISINTEGRATING ORAL EVERY 6 HOURS PRN
Status: DISCONTINUED | OUTPATIENT
Start: 2018-11-07 | End: 2018-11-10 | Stop reason: HOSPADM

## 2018-11-07 RX ORDER — METOPROLOL TARTRATE 50 MG
50 TABLET ORAL 2 TIMES DAILY
Status: DISCONTINUED | OUTPATIENT
Start: 2018-11-07 | End: 2018-11-08

## 2018-11-07 RX ADMIN — POTASSIUM CHLORIDE 20 MEQ: 1500 TABLET, EXTENDED RELEASE ORAL at 10:02

## 2018-11-07 RX ADMIN — Medication 10 MEQ: at 01:10

## 2018-11-07 RX ADMIN — TAZOBACTAM SODIUM AND PIPERACILLIN SODIUM 3.38 G: 375; 3 INJECTION, SOLUTION INTRAVENOUS at 22:47

## 2018-11-07 RX ADMIN — METOPROLOL TARTRATE 25 MG: 25 TABLET ORAL at 08:42

## 2018-11-07 RX ADMIN — SODIUM CHLORIDE: 9 INJECTION, SOLUTION INTRAVENOUS at 22:52

## 2018-11-07 RX ADMIN — Medication 2 G: at 03:10

## 2018-11-07 RX ADMIN — ONDANSETRON 4 MG: 2 INJECTION INTRAMUSCULAR; INTRAVENOUS at 12:15

## 2018-11-07 RX ADMIN — ACETAMINOPHEN 975 MG: 325 TABLET, FILM COATED ORAL at 20:27

## 2018-11-07 RX ADMIN — TAZOBACTAM SODIUM AND PIPERACILLIN SODIUM 3.38 G: 375; 3 INJECTION, SOLUTION INTRAVENOUS at 16:58

## 2018-11-07 RX ADMIN — Medication 10 MEQ: at 00:01

## 2018-11-07 RX ADMIN — SODIUM CHLORIDE: 9 INJECTION, SOLUTION INTRAVENOUS at 02:51

## 2018-11-07 RX ADMIN — DIPHENHYDRAMINE HYDROCHLORIDE 25 MG: 50 INJECTION, SOLUTION INTRAMUSCULAR; INTRAVENOUS at 03:16

## 2018-11-07 RX ADMIN — METOPROLOL TARTRATE 2.5 MG: 1 INJECTION, SOLUTION INTRAVENOUS at 04:48

## 2018-11-07 RX ADMIN — SODIUM CHLORIDE: 9 INJECTION, SOLUTION INTRAVENOUS at 11:33

## 2018-11-07 RX ADMIN — FAMOTIDINE 20 MG: 10 INJECTION, SOLUTION INTRAVENOUS at 22:47

## 2018-11-07 RX ADMIN — METOPROLOL TARTRATE 50 MG: 50 TABLET, FILM COATED ORAL at 20:27

## 2018-11-07 RX ADMIN — TAZOBACTAM SODIUM AND PIPERACILLIN SODIUM 3.38 G: 375; 3 INJECTION, SOLUTION INTRAVENOUS at 11:34

## 2018-11-07 RX ADMIN — METOPROLOL TARTRATE 25 MG: 25 TABLET ORAL at 10:02

## 2018-11-07 RX ADMIN — FAMOTIDINE 20 MG: 10 INJECTION, SOLUTION INTRAVENOUS at 11:31

## 2018-11-07 RX ADMIN — TAZOBACTAM SODIUM AND PIPERACILLIN SODIUM 3.38 G: 375; 3 INJECTION, SOLUTION INTRAVENOUS at 04:51

## 2018-11-07 ASSESSMENT — ACTIVITIES OF DAILY LIVING (ADL)
ADLS_ACUITY_SCORE: 14

## 2018-11-07 NOTE — PROGRESS NOTES
"Text page MD \"Pt sustaining HR of 150's. Gave PRN metoprolol 5mg, still sustaining 150's. other VSS. Tele a-fib to flutter. No other PRN meds for HR. \"  Start dilt drip now, 500 ml bolus over 1 hr. Continue to monitor.     Report given to FRANCIA Kenny to continue plan of care.  "

## 2018-11-07 NOTE — PLAN OF CARE
Problem: Patient Care Overview  Goal: Plan of Care/Patient Progress Review  Outcome: No Change  BP elevated, on metoprolol, ambulating to bathroom, no BM or passing gas, belching, NPO with ice, GARCIA in place, small amount of serosanguinous output, scheduled Tylenol. Denies pain, steri strips in place.  ml/hr. IV Zosyn, Tele ST (HR in 1 teens.) Continue to monitor.     2145: 's, jumped to 167. IV metoprolol given, sustaining 150's for HR. Other VSS. MD paged.    ml bolus, starting dilt drip, IMC orders placed.

## 2018-11-07 NOTE — PLAN OF CARE
Problem: Patient Care Overview  Goal: Plan of Care/Patient Progress Review  Outcome: No Change  Blood pressure and heart rate elevated. Heart rates have decreased into the low 100's to 110's. Blood pressure elevated at 186/105 and 179/102 and received one time dose of metoprolol 2.5mg IV. Recheck of blood pressure was 168/100 and 162/91. No complaints of pain. Diminished LS. Hypoactive BS, patient is belching. Tele is sinus tach. Slept on and off throughout the night.

## 2018-11-07 NOTE — PROGRESS NOTES
[facility]    Hospitalist Progress Note    Date of Service (when I saw the patient): 11/07/2018    Assessment & Plan     Derik Hamm is a 63 year old male who was admitted on 11/4/2018. I was asked to see the patient for tachycardia.     New onset atrial fibrillation with RAPID VENTRICULAR RESPONSE and transient hypotension:  Currently sinus tachycardia 100s.  Noted episode of tachcardia last night, ? PAT  --completely asymptomatic.  Most likely cause is post-operative state, possibly slight dehydration post-op.  Rates to 160s, BP 90s.  No s/s of sepsis, no hypoxia to suggest PE, and no clinical history of EtOH abuse to suggest withdrawal as cause of new onset atrial fibrillation. Converted spontaneously around 4 am.  TSH is normal.   1. IVF, decrease rate to 100  2. Lopressor 25 mg BID, increase to 50 mg bid.  3. Tele for now  5. TTE is normal with ejection fraction normal.   6. [id number] score is zero.  Recommend no anticoagulation at this time.    7. If tachy again tonight would repeat ekg      Perforated appendicitis with feculant peritonitis s/p lap appendectomy 11/4/2018  --management per primary team.    --Continues on zosyn.    --diet per surgery team   -- awaiting return of bowel function      DVT Prophylaxis: Pneumatic Compression Devices  Code Status: Full Code    Disposition Plan   Expected discharge in 2 days to prior living arrangement once bowel function returns and atrial fibrillation stable.     Entered: Mateo Connors 11/07/2018, 11:39 AM         Mateo Connors MD  Text Page    Interval History   Patient denies/do not palpitations.  He is without shortness of breath or chest pressure or pain.  Denies any dizziness or lightheadedness.  Has no known prior history of atrial fibrillation or arrhythmia.  No symptoms last night with tachycardia.      -Data reviewed today: I reviewed all new labs and imaging results over the last 24 hours. I personally reviewed no images or  EKG's today.    Physical Exam   Temp: 96  F (35.6  C) Temp src: Oral BP: (!) 159/92   Heart Rate: 111 Resp: 20 SpO2: 94 % O2 Device: None (Room air)    Vitals:    11/04/18 2046 11/06/18 0010   Weight: 95.3 kg (210 lb) 102.4 kg (225 lb 12.8 oz)     Vital Signs with Ranges  Temp:  [96  F (35.6  C)-99.9  F (37.7  C)] 96  F (35.6  C)  Heart Rate:  [106-155] 111  Resp:  [18-24] 20  BP: (121-186)/() 159/92  SpO2:  [93 %-96 %] 94 %  I/O last 3 completed shifts:  In: 721 [I.V.:721]  Out: 900 [Urine:760; Drains:140]    Constitutional: Awake, alert, cooperative, no apparent distress,    Respiratory: Clear to auscultation bilaterally, no crackles or wheezing   Cardiovascular: Regular rate and rhythm 100s, normal S1 and S2   Abdomen: quiet, soft, non-distended, mildly tender   Skin: No rashes, no cyanosis, dry to touch   Neuro: Alert and oriented x3,    Extremities: No edema, normal range of motion   Other(s):        All other systems: Negative       Medications     sodium chloride 125 mL/hr at 11/07/18 1133       acetaminophen  975 mg Oral Q8H     famotidine  20 mg Intravenous Q12H     metoprolol tartrate  50 mg Oral BID     piperacillin-tazobactam  3.375 g Intravenous Q6H     sodium chloride (PF)  3 mL Intracatheter Q8H     sodium chloride (PF)  3 mL Intracatheter Q8H       Data     Recent Labs  Lab 11/07/18  0833 11/06/18  2220 11/06/18  0209 11/05/18  2156 11/04/18  2116   WBC  --   --  7.2  --  10.7   HGB  --   --  13.3 14.0 16.1   MCV  --   --  95  --  91   PLT  --   --  158  --  212    139 140 139 137   POTASSIUM 3.7 3.5 3.5 3.8 3.5   CHLORIDE 108 108 109 105 103   CO2 21 20 23 27 27   BUN 19 18 22 21 24   CR 0.91 0.92 1.10 1.24 1.05   ANIONGAP 10 11 8 7 7   HENRY 7.9* 8.0* 7.7* 8.1* 9.2   * 101* 98 94 149*   ALBUMIN  --   --   --   --  4.1   PROTTOTAL  --   --   --   --  7.6   BILITOTAL  --   --   --   --  1.3   ALKPHOS  --   --   --   --  68   ALT  --   --   --   --  31   AST  --   --   --   --  16    LIPASE  --   --   --   --  69*       Imaging:  No results found for this or any previous visit (from the past 24 hour(s)).

## 2018-11-07 NOTE — PROGRESS NOTES
"[facility]   General Surgery Progress Note          Assessment and Plan:   Assessment:   -Acute appendicitis with perforation and feculant peritonitis/contamination  -POD#3 s/p laparoscopic appendectomy, lavage, intraperitoneal drain placement  -Intraperitoneal cultures +multiple organisms  -Post-operative ileus as expected  -Afebrile      Plan:   -Diet: continue NPO for now, possible start of diet later today if continued flatus and nausea resolved  -Continue IV ABX: Zosyn  -Increase activity as tolerated, up walking halls as able  -Pain Mgtm: acetaminophen, IV dilaudid, Toradol available  -VTE PPX: PCDs, ambulation         Interval History:   Issues with burping, acidy taste, nausea/emesis this morning.  No appetite.  Passing flatus, so seems this is more related to reflux.  IV pepcid order placed.  If this improves, may consider starting clears later today.  Reviewed IS use and trying to increase ambulation (once nausea better) as has just been getting up to BR.           Physical Exam:   Blood pressure 159/89, pulse 82, temperature 96  F (35.6  C), temperature source Oral, resp. rate 20, height 1.88 m (6' 2\"), weight 102.4 kg (225 lb 12.8 oz), SpO2 95 %.    I/O last 3 completed shifts:  In: 721 [I.V.:721]  Out: 900 [Urine:760; Drains:140]    Abdomen: soft, non-distended, incisional tenderness and LUQ/RLQ tenderness  Incs - c/d/steristrips intact.  No erythema.   GARCIA - minimally cloudy serosang with fibrin debris          Data:     Recent Labs  Lab 11/06/18  0209 11/05/18 2156 11/04/18 2116   WBC 7.2  --  10.7   HGB 13.3 14.0 16.1   HCT 41.8  --  47.7   MCV 95  --  91     --  212       Recent Labs  Lab 11/07/18  0833 11/06/18  2220 11/06/18 0209 11/05/18 2156 11/04/18 2116    139 140 139 137   POTASSIUM 3.7 3.5 3.5 3.8 3.5   CHLORIDE 108 108 109 105 103   CO2 21 20 23 27 27   ANIONGAP 10 11 8 7 7   * 101* 98 94 149*   BUN 19 18 22 21 24   CR 0.91 0.92 1.10 1.24 1.05 "   GFRESTIMATED 85 83 68 59* 72   GFRESTBLACK >90 >90 82 72 87   HENRY 7.9* 8.0* 7.7* 8.1* 9.2   MAG 2.1 1.8  --  1.9  --    PROTTOTAL  --   --   --   --  7.6   ALBUMIN  --   --   --   --  4.1   BILITOTAL  --   --   --   --  1.3   ALKPHOS  --   --   --   --  68   AST  --   --   --   --  16   ALT  --   --   --   --  31     Nathalia Workman PA-C         The patient has been seen and examined by me.  I agree with the above assessment and plan.  Mackenzie Dale MD

## 2018-11-07 NOTE — PROGRESS NOTES
Cross cover:    Notified that patient converted back to a-fib w/ RVR, asymptomatic.     Patient seen and examined.  Appears calm, comfortable, lying in bed.  Denies chest pain, dyspnea, or abd pain.     VSS otherwise.  , bp 139/84.  O2 sat 93% RA.    Heart tachycardic, regular  Lungs CTA, diminished at the bases  Ext warm  Alert, calm.    A/P:   Tachycardia.  Unclear what rhythm.  --will check EKG to confirm rhythm  -- ml bolus now  --Diltiazem drip if a-fib  --IMC orders also placed.    --check and replace K, mag  --Continue to monitor.    Update: EKG shows likely low atrial tachycardia.  After IVF bolus HR improved to low 100s which I suspect was mild sinus tachycardia.  Will continue to monitor, check/replace electrolytes.  OK for PO metoprolol.  ?adenosine if symptomatic or persistent.    Kota Townsend MD

## 2018-11-07 NOTE — PLAN OF CARE
Problem: Patient Care Overview  Goal: Plan of Care/Patient Progress Review  Outcome: Improving  Pt awake, A/O x4 cooperative. SBA ambulated to bathroom. VSS. Tele ST 's PRN metoprolol given. Bolus IVF given. IVF infusing @ 125 mL/hr. On IV Abx Zosyn. NPO. Bowel sound hypoactive, LBM 11/3/18. No c/o chest pain or SOB. Will monitor and continue POC.

## 2018-11-07 NOTE — PROGRESS NOTES
"[facility]  Infectious Disease Progress Note          Assessment and Plan:   Impression:  1. 63 y.o male with perforated appendicitis.   2. S/p laparoscopic appendectomy and a washout with drain placement, fecal contamination noted in the OR.   3. Postop ileus      Recommendations:   Continue zosyn.   Will follow up on the pending cultures. Multiple orgs as expected        Interval History:   no new complaints and doing well; no cp, sob, n/v/d, or abd pain ileus, no fever , feels OK, GNR and strep in cx.              Medications:       acetaminophen  975 mg Oral Q8H     famotidine  20 mg Intravenous Q12H     metoprolol tartrate  50 mg Oral BID     piperacillin-tazobactam  3.375 g Intravenous Q6H     sodium chloride (PF)  3 mL Intracatheter Q8H                  Physical Exam:   Blood pressure 153/69, pulse 82, temperature 98.5  F (36.9  C), temperature source Axillary, resp. rate 20, height 1.88 m (6' 2\"), weight 102.4 kg (225 lb 12.8 oz), SpO2 94 %.  Wt Readings from Last 2 Encounters:   11/06/18 102.4 kg (225 lb 12.8 oz)     Vital Signs with Ranges  Temp:  [96  F (35.6  C)-99.2  F (37.3  C)] 98.5  F (36.9  C)  Heart Rate:  [107-155] 113  Resp:  [18-24] 20  BP: (121-186)/() 153/69  SpO2:  [93 %-96 %] 94 %    Constitutional: Awake, alert, cooperative, no apparent distress   Lungs: Clear to auscultation bilaterally, no crackles or wheezing   Cardiovascular: Regular rate and rhythm, normal S1 and S2, and no murmur noted   Abdomen: Normal bowel sounds, soft, non-distended, non-tender   Skin: No rashes, no cyanosis, no edema   Other:           Data:   All microbiology laboratory data reviewed.  Recent Labs   Lab Test  11/06/18   0209  11/05/18   2156  11/04/18   2116  04/03/18   1550   WBC  7.2   --   10.7  4.2   HGB  13.3  14.0  16.1  14.8   HCT  41.8   --   47.7  44.2   MCV  95   --   91  92   PLT  158   --   212  219     Recent Labs   Lab Test  11/07/18   0833  11/06/18   2220  11/06/18   " 0209   CR  0.91  0.92  1.10     No lab results found.  Recent Labs   Lab Test  11/05/18   0009  11/05/18   0008   CULT  Heavy growth  Escherichia coli  Susceptibility testing in progress  *  Moderate growth  Gram negative rods  Susceptibility testing in progress  *  Moderate growth  Non lactose fermenting gram negative rods  Susceptibility testing in progress  *  Moderate growth  Streptococcus constellatus  *  Critical Value/Significant Value, preliminary result only, called to and read back by  Natalie Tovar at 8049 11.6.18 by [id number]    Culture in progress  Light growth  Bacteroides fragilis  *  Light growth  Parabacteroides distasonis  Identification obtained by MALDI-TOF mass spectrometry research use only database. Test   characteristics determined and verified by the [facility]   ([facility]) [place], [place].  *  Susceptibility testing not routinely done  Culture in progress  Moderate growth  Escherichia coli  *  Light growth  Streptococcus constellatus  *  Previous critical documented  Culture in progress

## 2018-11-07 NOTE — PLAN OF CARE
Problem: Patient Care Overview  Goal: Plan of Care/Patient Progress Review  Outcome: Therapy, progress towards functional goals is fair  Hypertension and tachycardia-Metoprolol dose increased to 50mg BID. Tele: ST with PAC's, PAC couplets, PAC triplets. K+ 3.7-replaced per protocol-recheck ordered for tomorrow AM. Mag 2.1-no replacement needed per protocol. IVF dose decreased to 100cc/hr. IV Zosyn Q6hrs. Intermittent low grade temps-ID following peritoneal fluid cultures. GARCIA with 110cc out this shift. C/O belching and hiccups followed by nausea-IV Pepcid ordered and administered. IV Zofran administered x1 for nausea-decrease in nausea for a small amount of time. Emesis coffee ground brown-2 view abd xray ordered-has not been performed yet. Small amount of flatus today.

## 2018-11-07 NOTE — PROVIDER NOTIFICATION
347 GV blood pressure elevated at 186/105 and 179/102 with heart rate of 111. Would you like him to have anything? Thank you    One time order received for metoprolol 2.5mg  IV

## 2018-11-08 LAB — POTASSIUM SERPL-SCNC: 3.6 MMOL/L (ref 3.4–5.3)

## 2018-11-08 PROCEDURE — 25000128 H RX IP 250 OP 636: Performed by: SURGERY

## 2018-11-08 PROCEDURE — 12000007 ZZH R&B INTERMEDIATE

## 2018-11-08 PROCEDURE — 25000125 ZZHC RX 250: Performed by: INTERNAL MEDICINE

## 2018-11-08 PROCEDURE — 99231 SBSQ HOSP IP/OBS SF/LOW 25: CPT | Performed by: INTERNAL MEDICINE

## 2018-11-08 PROCEDURE — 84132 ASSAY OF SERUM POTASSIUM: CPT | Performed by: INTERNAL MEDICINE

## 2018-11-08 PROCEDURE — 36415 COLL VENOUS BLD VENIPUNCTURE: CPT | Performed by: INTERNAL MEDICINE

## 2018-11-08 PROCEDURE — 25000125 ZZHC RX 250: Performed by: PHYSICIAN ASSISTANT

## 2018-11-08 PROCEDURE — 25000128 H RX IP 250 OP 636: Performed by: INTERNAL MEDICINE

## 2018-11-08 PROCEDURE — 25000132 ZZH RX MED GY IP 250 OP 250 PS 637: Performed by: SURGERY

## 2018-11-08 RX ORDER — METOPROLOL TARTRATE 1 MG/ML
5 INJECTION, SOLUTION INTRAVENOUS EVERY 6 HOURS
Status: DISCONTINUED | OUTPATIENT
Start: 2018-11-08 | End: 2018-11-10

## 2018-11-08 RX ADMIN — TAZOBACTAM SODIUM AND PIPERACILLIN SODIUM 3.38 G: 375; 3 INJECTION, SOLUTION INTRAVENOUS at 12:11

## 2018-11-08 RX ADMIN — PROCHLORPERAZINE EDISYLATE 10 MG: 5 INJECTION INTRAMUSCULAR; INTRAVENOUS at 17:24

## 2018-11-08 RX ADMIN — METOPROLOL TARTRATE 5 MG: 1 INJECTION, SOLUTION INTRAVENOUS at 22:29

## 2018-11-08 RX ADMIN — FAMOTIDINE 20 MG: 10 INJECTION, SOLUTION INTRAVENOUS at 22:23

## 2018-11-08 RX ADMIN — FAMOTIDINE 20 MG: 10 INJECTION, SOLUTION INTRAVENOUS at 10:19

## 2018-11-08 RX ADMIN — SODIUM CHLORIDE: 9 INJECTION, SOLUTION INTRAVENOUS at 21:56

## 2018-11-08 RX ADMIN — METOPROLOL TARTRATE 5 MG: 1 INJECTION, SOLUTION INTRAVENOUS at 10:18

## 2018-11-08 RX ADMIN — METOPROLOL TARTRATE 5 MG: 1 INJECTION, SOLUTION INTRAVENOUS at 16:07

## 2018-11-08 RX ADMIN — PROCHLORPERAZINE EDISYLATE 10 MG: 5 INJECTION INTRAMUSCULAR; INTRAVENOUS at 10:30

## 2018-11-08 RX ADMIN — ACETAMINOPHEN 650 MG: 325 TABLET, FILM COATED ORAL at 14:09

## 2018-11-08 RX ADMIN — Medication 10 MEQ: at 10:27

## 2018-11-08 RX ADMIN — Medication 10 MEQ: at 13:14

## 2018-11-08 RX ADMIN — ONDANSETRON 4 MG: 2 INJECTION INTRAMUSCULAR; INTRAVENOUS at 01:27

## 2018-11-08 RX ADMIN — TAZOBACTAM SODIUM AND PIPERACILLIN SODIUM 3.38 G: 375; 3 INJECTION, SOLUTION INTRAVENOUS at 17:14

## 2018-11-08 RX ADMIN — SODIUM CHLORIDE: 9 INJECTION, SOLUTION INTRAVENOUS at 09:42

## 2018-11-08 RX ADMIN — TAZOBACTAM SODIUM AND PIPERACILLIN SODIUM 3.38 G: 375; 3 INJECTION, SOLUTION INTRAVENOUS at 05:44

## 2018-11-08 ASSESSMENT — ACTIVITIES OF DAILY LIVING (ADL)
ADLS_ACUITY_SCORE: 14
ADLS_ACUITY_SCORE: 13

## 2018-11-08 ASSESSMENT — PAIN DESCRIPTION - DESCRIPTORS: DESCRIPTORS: ACHING;TIGHTNESS

## 2018-11-08 NOTE — PROVIDER NOTIFICATION
This writer spoke with Elizabeth from Microbiology, pt is growing clostridium perfingens (anaerobic) from peritoneal fluid drawn on the 5th. This writer informed Primary RN Natalie EUGENE  and web paged  Dr. Renee.     15:35 This writer spoke with Dr. Lowe from Infectious Disease, explained the above results. No new orders pt is already on Zozyn and will discharge home on cipro and augmentin.

## 2018-11-08 NOTE — PLAN OF CARE
"Problem: Patient Care Overview  Goal: Plan of Care/Patient Progress Review  [place]: A&O, CMS intact  VS: /90 (BP Location: Right arm)  Pulse 82  Temp 96.8  F (36  C) (Oral)  Resp 16  Ht 1.88 m (6' 2\")  Wt 102.4 kg (225 lb 12.8 oz)  SpO2 96%  BMI 28.99 kg/m2  Tele: SR  LS: dim on RA  GI: hypoactive, abdomen distended, reports nausea- zofran given x1, +flatus,   : BR  Skin: abdominal dressing CDI  Activity: SBA   Diet: NPO, ice chips   Pain: denies  Lab: GARCIA, stripped, output 95  Plan: IVF, zosyn                "

## 2018-11-08 NOTE — PLAN OF CARE
"Problem: Patient Care Overview  Goal: Plan of Care/Patient Progress Review  [place]: A&O, CMS intact  VS: /90 (BP Location: Right arm)  Pulse 82  Temp 96.8  F (36  C) (Oral)  Resp 16  Ht 1.88 m (6' 2\")  Wt 102.4 kg (225 lb 12.8 oz)  SpO2 96%  BMI 28.99 kg/m2  Tele: ST  LS: dim on RA, denies SOB  GI: hypoactive, last BM 11/3, denies nausea  : BR  Skin: abd dressing CDI  Activity: SBA, refused ambulation   Diet: NPO, ice chips   Pain: denies  Lab: GARCIA output 195, stripped. Xray complete this shift, per MD ok. Please see results  Plan: IVF, zosyn               "

## 2018-11-08 NOTE — PLAN OF CARE
"Problem: Patient Care Overview  Goal: Plan of Care/Patient Progress Review  Outcome: Therapy, progress toward functional goals is gradual  /81 (BP Location: Left arm)  Pulse 82  Temp 98.5  F (36.9  C) (Oral)  Resp 18  Ht 1.88 m (6' 2\")  Wt 102.4 kg (225 lb 12.8 oz)  SpO2 95%  BMI 28.99 kg/m2     Scheduled PO meds changed to IV to prevent nausea with emesis. Tele: SR/ST with PAC's, slight ST depression. K+ 3.6-replace per protocol with IV K+-recheck ordered for tomorrow AM. IVF infusing. NPO except ice chips. Tylenol given x1 for lower back pain. Ambulated in [place] x1. IS encouraged. Ambulation and sitting up in chair encouraged. IV Compazine given x1 for nausea-decrease/relief from nausea. GARCIA in place with 60cc out this shift. Surgery and ID following. Continues on IV Zosyn Q6hrs.       "

## 2018-11-08 NOTE — PROVIDER NOTIFICATION
"MD paged: \"Continued nausea-is it possible to change PO meds to IV for now? thank you.\"  Addendum: PO meds changed to IV.  "

## 2018-11-08 NOTE — PLAN OF CARE
"Problem: Patient Care Overview  Goal: Plan of Care/Patient Progress Review  [place]: A&O, CMS intact  VS: /90 (BP Location: Right arm)  Pulse 82  Temp 96.8  F (36  C) (Oral)  Resp 16  Ht 1.88 m (6' 2\")  Wt 102.4 kg (225 lb 12.8 oz)  SpO2 96%  BMI 28.99 kg/m2  Tele: ST  LS: dim on RA, denies SOB  GI: active, last BM 11/3, denies nausea  : BR  Skin: abd dressing CDI  Activity: SBA   Diet: NPO, ice chips   Pain: denies  Lab: GARCIA output 195, stripped  Plan: IVF, zosyn               "

## 2018-11-08 NOTE — PROGRESS NOTES
"[facility]   General Surgery Progress Note          Assessment and Plan:   Assessment:   -Acute appendicitis with perforation and feculant peritonitis/contamination  -POD#4 s/p laparoscopic appendectomy, lavage, intraperitoneal drain placement  -Intraperitoneal cultures +multiple organisms  -Post-operative ileus as expected  -Afebrile      Plan:   -Diet: continue NPO/ice chips for now, possible start of diet later today if continued flatus and nausea/bloating resolved  -Continue IV ABX: Zosyn; ID involved  -Increase activity as tolerated, up walking halls encouraged   -Pain Mgtm: acetaminophen; IV dilaudid, Toradol available  -VTE PPX: PCDs, pepcid         Interval History:   Continued nausea feeling but better, no emesis.  Feels bloated.  Passing a bit of flatus.  Notes up to BR and chair, not walking into halls -encouraged this.  Voiding well.          Physical Exam:   Blood pressure 153/90, pulse 82, temperature 96.8  F (36  C), temperature source Oral, resp. rate 16, height 1.88 m (6' 2\"), weight 102.4 kg (225 lb 12.8 oz), SpO2 96 %.    I/O last 3 completed shifts:  In: 3 [I.V.:3]  Out: 800 [Emesis/NG output:400; Drains:400]    Abdomen: soft, moderately distended, mild diffuse tenderness, absent bowel sounds.  Incs - c/d/steristrips intact.  Possible early bruising at umbilical site.  GARCIA - cloudy serosang, less debris          Data:     Recent Labs  Lab 11/06/18  0209 11/05/18 2156 11/04/18 2116   WBC 7.2  --  10.7   HGB 13.3 14.0 16.1   HCT 41.8  --  47.7   MCV 95  --  91     --  212       Recent Labs  Lab 11/08/18  0739 11/07/18  0833 11/06/18  2220 11/06/18  0209 11/05/18 2156 11/04/18 2116   NA  --  139 139 140 139 137   POTASSIUM 3.6 3.7 3.5 3.5 3.8 3.5   CHLORIDE  --  108 108 109 105 103   CO2  --  21 20 23 27 27   ANIONGAP  --  10 11 8 7 7   GLC  --  125* 101* 98 94 149*   BUN  --  19 18 22 21 24   CR  --  0.91 0.92 1.10 1.24 1.05   GFRESTIMATED  --  85 83 68 59* 72   GFRESTBLRORY  " --  >90 >90 82 72 87   HENRY  --  7.9* 8.0* 7.7* 8.1* 9.2   MAG  --  2.1 1.8  --  1.9  --    PROTTOTAL  --   --   --   --   --  7.6   ALBUMIN  --   --   --   --   --  4.1   BILITOTAL  --   --   --   --   --  1.3   ALKPHOS  --   --   --   --   --  68   AST  --   --   --   --   --  16   ALT  --   --   --   --   --  31     Nathalia Workman PA-C     Seen and agree,    Bari Bah MD  [facility]

## 2018-11-08 NOTE — PROGRESS NOTES
[facility]  Hospitalist Progress Note for 11/8/2018:          Assessment and Plan:   Mr Derik Hamm is a 63 year old male who was admitted on 11/4/2018. I was asked to see the patient for tachycardia.      New onset Paroxysmal atrial fibrillation with RAPID VENTRICULAR RESPONSE and transient hypotension, postop:  - completely asymptomatic.  Most likely cause is post-operative state, possibly slight dehydration post-op.  Rates to 160s, BP 90s.    - TTE is normal with ejection fraction normal.  [id number]-[id number] score is zero.  Recommend no anticoagulation at this time.   - Converted spontaneously & remains in SR.  TSH & electrolytes normal.   - due to nausea change to IV metoprolol 5 mg Q 6 hrs inplace of Lopressor 50 mg bid.  - cont IVF, Tele for now       Perforated appendicitis with feculant peritonitis s/p lap appendectomy 11/4/2018  --management per primary team.    - on zosyn,ID following.    - diet per surgery team   -- awaiting return of bowel function       DVT Prophylaxis: Pneumatic Compression Devices  Code Status: Full Code  Disposition:per primary team.    Autumn Renee MD.  Hospitalist [phone] (7am -6 pm)               Interval History:   Cont nausea,RN requested po meds be changed to IV.              Medications:       famotidine  20 mg Intravenous Q12H     metoprolol  5 mg Intravenous Q6H     piperacillin-tazobactam  3.375 g Intravenous Q6H     sodium chloride (PF)  3 mL Intracatheter Q8H     acetaminophen, alum & mag hydroxide-simethicone, sore throat lozenge, diphenhydrAMINE **OR** diphenhydrAMINE, HYDROmorphone, lidocaine 4%, lidocaine (buffered or not buffered), magnesium sulfate, magnesium sulfate, metoprolol, naloxone, ondansetron **OR** ondansetron, ondansetron **OR** ondansetron, oxyCODONE IR, potassium chloride, potassium chloride with lidocaine, potassium chloride, potassium chloride, potassium chloride, prochlorperazine **OR** prochlorperazine, sodium chloride  "(PF)               Physical Exam:   Blood pressure 153/81, pulse 82, temperature 98.5  F (36.9  C), temperature source Oral, resp. rate 18, height 1.88 m (6' 2\"), weight 102.4 kg (225 lb 12.8 oz), SpO2 95 %.  Wt Readings from Last 4 Encounters:   18 102.4 kg (225 lb 12.8 oz)         Vital Sign Ranges  Temperature Temp  Av.1  F (36.7  C)  Min: 96.8  F (36  C)  Max: 99.4  F (37.4  C)   Blood pressure Systolic (24hrs), Av , Min:153 , Max:160        Diastolic (24hrs), Av, Min:69, Max:98      Pulse No Data Recorded   Respirations Resp  Av  Min: 16  Max: 20   Pulse oximetry SpO2  Av.5 %  Min: 94 %  Max: 97 %         Intake/Output Summary (Last 24 hours) at 18 1122  Last data filed at 18 0946   Gross per 24 hour   Intake             1299 ml   Output              660 ml   Net              639 ml       Constitutional: Awake, alert, cooperative,in some  Distress due to nausea   Lungs: Clear to auscultation bilaterally, no crackles or wheezing   Cardiovascular: Regular rate and rhythm, normal S1 and S2, and no murmur noted   Abdomen: Soft.   Skin: No rashes, no cyanosis, no edema          Data:   All laboratory data reviewed    "

## 2018-11-08 NOTE — PROGRESS NOTES
"[facility]  Infectious Disease Progress Note          Assessment and Plan:   Impression:  1. 63 y.o male with perforated appendicitis.   2. S/p laparoscopic appendectomy and a washout with drain placement, fecal contamination noted in the OR.   3. Postop ileus      Recommendations:   Continue zosyn.    cultures. Multiple orgs as expected, po options available likely augmentin/cipro        Interval History:   no new complaints and doing well; no cp, sob, n/v/d, or abd pain ileus, no fever , feels OK, anaerobes, pseudo, E coli, and strep in cx.              Medications:       famotidine  20 mg Intravenous Q12H     metoprolol  5 mg Intravenous Q6H     piperacillin-tazobactam  3.375 g Intravenous Q6H     sodium chloride (PF)  3 mL Intracatheter Q8H                  Physical Exam:   Blood pressure 153/81, pulse 82, temperature 98.5  F (36.9  C), temperature source Oral, resp. rate 18, height 1.88 m (6' 2\"), weight 102.4 kg (225 lb 12.8 oz), SpO2 95 %.  Wt Readings from Last 2 Encounters:   11/06/18 102.4 kg (225 lb 12.8 oz)     Vital Signs with Ranges  Temp:  [96.8  F (36  C)-99.4  F (37.4  C)] 98.5  F (36.9  C)  Heart Rate:  [] 93  Resp:  [16-20] 18  BP: (153-160)/(69-98) 153/81  SpO2:  [94 %-97 %] 95 %    Constitutional: Awake, alert, cooperative, no apparent distress   Lungs: Clear to auscultation bilaterally, no crackles or wheezing   Cardiovascular: Regular rate and rhythm, normal S1 and S2, and no murmur noted   Abdomen: Normal bowel sounds, soft, non-distended, non-tender   Skin: No rashes, no cyanosis, no edema   Other:           Data:   All microbiology laboratory data reviewed.  Recent Labs   Lab Test  11/06/18   0209  11/05/18   2156  11/04/18   2116  04/03/18   1550   WBC  7.2   --   10.7  4.2   HGB  13.3  14.0  16.1  14.8   HCT  41.8   --   47.7  44.2   MCV  95   --   91  92   PLT  158   --   212  219     Recent Labs   Lab Test  11/07/18   0833  11/06/18   2220  11/06/18   0209 "   CR  0.91  0.92  1.10     No lab results found.  Recent Labs   Lab Test  11/05/18   0009  11/05/18   0008   CULT  Heavy growth  Escherichia coli  *  Moderate growth  Strain 2  Escherichia coli  *  Moderate growth  Non lactose fermenting gram negative rods  *  Moderate growth  Streptococcus constellatus  Susceptibility testing not routinely done  *  Moderate growth  Strain 2  Non lactose fermenting gram negative rods  Susceptibility testing in progress  *  Heavy growth  Strain 2  Streptococcus constellatus  Susceptibility testing not routinely done  *  Critical Value/Significant Value, preliminary result only, called to and read back by  Natalie Tovar at 8049 11.6.18 by [id number]    Moderate growth  Escherichia coli  Susceptibility testing done on previous specimen  *  Moderate growth  Strain 2  Escherichia coli  Susceptibility testing done on previous specimen  *  Moderate growth  Non lactose fermenting gram negative rods  Susceptibility testing done on previous specimen  *  Light growth  Streptococcus constellatus  Susceptibility testing not routinely done  *  Moderate growth  Pseudomonas aeruginosa  Susceptibility testing done on previous specimen  *  Light growth  Strain 2  Streptococcus constellatus  Susceptibility testing not routinely done  *  Previous critical documented  Light growth  Bacteroides fragilis  *  Light growth  Parabacteroides distasonis  Identification obtained by MALDI-TOF mass spectrometry research use only database. Test   characteristics determined and verified by the Infectious Diseases Diagnostic Laboratory   ([facility]) [place], [place].  *  Susceptibility testing not routinely done  Culture in progress

## 2018-11-09 LAB
BACTERIA SPEC CULT: ABNORMAL
Lab: ABNORMAL
Lab: ABNORMAL
POTASSIUM SERPL-SCNC: 3.4 MMOL/L (ref 3.4–5.3)
SPECIMEN SOURCE: ABNORMAL

## 2018-11-09 PROCEDURE — 25000132 ZZH RX MED GY IP 250 OP 250 PS 637: Performed by: SURGERY

## 2018-11-09 PROCEDURE — 25000125 ZZHC RX 250: Performed by: INTERNAL MEDICINE

## 2018-11-09 PROCEDURE — 99231 SBSQ HOSP IP/OBS SF/LOW 25: CPT | Performed by: INTERNAL MEDICINE

## 2018-11-09 PROCEDURE — 84132 ASSAY OF SERUM POTASSIUM: CPT | Performed by: INTERNAL MEDICINE

## 2018-11-09 PROCEDURE — 25000128 H RX IP 250 OP 636: Performed by: INTERNAL MEDICINE

## 2018-11-09 PROCEDURE — 25000128 H RX IP 250 OP 636: Performed by: SURGERY

## 2018-11-09 PROCEDURE — 36415 COLL VENOUS BLD VENIPUNCTURE: CPT | Performed by: INTERNAL MEDICINE

## 2018-11-09 PROCEDURE — 25000125 ZZHC RX 250: Performed by: PHYSICIAN ASSISTANT

## 2018-11-09 PROCEDURE — 12000007 ZZH R&B INTERMEDIATE

## 2018-11-09 RX ADMIN — TAZOBACTAM SODIUM AND PIPERACILLIN SODIUM 3.38 G: 375; 3 INJECTION, SOLUTION INTRAVENOUS at 17:46

## 2018-11-09 RX ADMIN — METOPROLOL TARTRATE 5 MG: 1 INJECTION, SOLUTION INTRAVENOUS at 17:34

## 2018-11-09 RX ADMIN — SODIUM CHLORIDE: 9 INJECTION, SOLUTION INTRAVENOUS at 10:50

## 2018-11-09 RX ADMIN — FAMOTIDINE 20 MG: 10 INJECTION, SOLUTION INTRAVENOUS at 10:42

## 2018-11-09 RX ADMIN — Medication 10 MEQ: at 12:51

## 2018-11-09 RX ADMIN — METOPROLOL TARTRATE 5 MG: 1 INJECTION, SOLUTION INTRAVENOUS at 10:41

## 2018-11-09 RX ADMIN — ACETAMINOPHEN 650 MG: 325 TABLET, FILM COATED ORAL at 10:48

## 2018-11-09 RX ADMIN — METOPROLOL TARTRATE 5 MG: 1 INJECTION, SOLUTION INTRAVENOUS at 05:10

## 2018-11-09 RX ADMIN — PROCHLORPERAZINE EDISYLATE 10 MG: 5 INJECTION INTRAMUSCULAR; INTRAVENOUS at 00:11

## 2018-11-09 RX ADMIN — METOPROLOL TARTRATE 5 MG: 1 INJECTION, SOLUTION INTRAVENOUS at 22:32

## 2018-11-09 RX ADMIN — TAZOBACTAM SODIUM AND PIPERACILLIN SODIUM 3.38 G: 375; 3 INJECTION, SOLUTION INTRAVENOUS at 05:09

## 2018-11-09 RX ADMIN — TAZOBACTAM SODIUM AND PIPERACILLIN SODIUM 3.38 G: 375; 3 INJECTION, SOLUTION INTRAVENOUS at 10:49

## 2018-11-09 RX ADMIN — Medication 10 MEQ: at 14:37

## 2018-11-09 RX ADMIN — TAZOBACTAM SODIUM AND PIPERACILLIN SODIUM 3.38 G: 375; 3 INJECTION, SOLUTION INTRAVENOUS at 00:02

## 2018-11-09 RX ADMIN — PROCHLORPERAZINE EDISYLATE 10 MG: 5 INJECTION INTRAMUSCULAR; INTRAVENOUS at 08:44

## 2018-11-09 RX ADMIN — ALUMINUM HYDROXIDE, MAGNESIUM HYDROXIDE, AND DIMETHICONE 30 ML: 400; 400; 40 SUSPENSION ORAL at 00:11

## 2018-11-09 RX ADMIN — FAMOTIDINE 20 MG: 10 INJECTION, SOLUTION INTRAVENOUS at 22:32

## 2018-11-09 ASSESSMENT — ACTIVITIES OF DAILY LIVING (ADL)
ADLS_ACUITY_SCORE: 14
ADLS_ACUITY_SCORE: 15
ADLS_ACUITY_SCORE: 14

## 2018-11-09 NOTE — PROGRESS NOTES
"[facility]   General Surgery Progress Note          Assessment and Plan:   Assessment:   -Acute appendicitis with perforation and feculant peritonitis/contamination  -POD#5 s/p laparoscopic appendectomy, lavage, intraperitoneal drain placement  -Intraperitoneal cultures +multiple organisms  -Post-operative ileus as expected  -Afebrile      Plan:   -Diet: continue NPO/ice chips for now, possible start of diet later today if continued flatus and nausea/bloating resolved  -Continue IV ABX: Zosyn; ID involved  -Increase activity as tolerated, up walking halls encouraged   -Pain Mgtm: acetaminophen; IV dilaudid, Toradol available  -VTE PPX: PCDs, pepcid  -Ok to shower, cover drain site with Tegaderm         Interval History:   Resting in bed. Feeling better this morning. Continues to have nausea, worse after up walking. Continues to pass a some flatus. Was able to walk the hallways yesterday. Wants to walk more today if not nauseated. Would like to shower.         Physical Exam:   Blood pressure (!) 162/92, pulse 82, temperature 98.3  F (36.8  C), temperature source Oral, resp. rate 16, height 1.88 m (6' 2\"), weight 102.4 kg (225 lb 12.8 oz), SpO2 96 %.    I/O last 3 completed shifts:  In: 2797 [I.V.:2797]  Out: 110 [Drains:110]    Abdomen: soft, moderately distended, mild diffuse tenderness, +BS.  Incs - c/d/steristrips intact.    GARCIA - cloudy serosang          Data:     Recent Labs  Lab 11/06/18  0209 11/05/18 2156 11/04/18 2116   WBC 7.2  --  10.7   HGB 13.3 14.0 16.1   HCT 41.8  --  47.7   MCV 95  --  91     --  212       Recent Labs  Lab 11/09/18  0709 11/08/18  0739 11/07/18  0833 11/06/18  2220 11/06/18  0209 11/05/18 2156 11/04/18 2116   NA  --   --  139 139 140 139 137   POTASSIUM 3.4 3.6 3.7 3.5 3.5 3.8 3.5   CHLORIDE  --   --  108 108 109 105 103   CO2  --   --  21 20 23 27 27   ANIONGAP  --   --  10 11 8 7 7   GLC  --   --  125* 101* 98 94 149*   BUN  --   --  19 18 22 21 24   CR  --   " --  0.91 0.92 1.10 1.24 1.05   GFRESTIMATED  --   --  85 83 68 59* 72   GFRESTBLACK  --   --  >90 >90 82 72 87   HENRY  --   --  7.9* 8.0* 7.7* 8.1* 9.2   MAG  --   --  2.1 1.8  --  1.9  --    PROTTOTAL  --   --   --   --   --   --  7.6   ALBUMIN  --   --   --   --   --   --  4.1   BILITOTAL  --   --   --   --   --   --  1.3   ALKPHOS  --   --   --   --   --   --  68   AST  --   --   --   --   --   --  16   ALT  --   --   --   --   --   --  31       Abel Desai PA-C       Pt seen agree with above  Had 2 stools this morning  Having mild nausea no vomiting  Will order clear liquids and advance as tolerated now that having bowel function  Likely home in 1-2 days once tolerating regular diet.   Has been afebrile, could likely switch to PO abx once raul diet if ok with ID.    Elaina Olivo MD

## 2018-11-09 NOTE — PLAN OF CARE
Problem: Patient Care Overview  Goal: Plan of Care/Patient Progress Review  Outcome: Improving  BM x2 today-first BM since before surgery. IVF rate decreased. Up in the hallway x1. GARCIA with 20cc out. Compazine given x1 for nausea. Started on clear liquid diet this afternoon.

## 2018-11-09 NOTE — PROGRESS NOTES
[facility]  Hospitalist Progress Note for 11/9/2018:          Assessment and Plan:   Mr Derik Hamm is a 63 year old male who was admitted on 11/4/2018. I was asked to see the patient for tachycardia.      New onset Paroxysmal atrial fibrillation with RVR and transient hypotension, postop:  - completely asymptomatic.  Most likely cause is post-operative state, possibly slight dehydration post-op.  Rates to 160s, BP 90s.    - TTE is normal with ejection fraction normal.  [id number]-GHXe score is zero.  Recommend no anticoagulation at this time.   - Converted spontaneously & remains in SR.  TSH & electrolytes normal.   - cont IV metoprolol 5 mg Q 6 hrs for now & resume po Lopressor 50 mg bid when able to tolerate po diet .  - cont IVF, Tele for now       Perforated appendicitis with feculant peritonitis s/p lap appendectomy 11/4/2018  --management per primary team.    - on zosyn,ID following.    - diet per surgery team, to continue NPO/ice chips , possibly start diet later today   -- awaiting return of bowel function     Elevated BP/ possible fluid overload:  No prior hx of HTN  - has been on IVF since admission  - BP acceptable for now  - decrease IVF from 100-> 75 & discharge when tolerating po diet   - cont IV Metoprolol for now     DVT Prophylaxis: Pneumatic Compression Devices  Code Status: Full Code  Disposition:per primary team.    Autumn Renee MD.  Hospitalist [phone] (7am -6 pm)               Interval History:   Cont nausea,RN requested po meds be changed to IV.              Medications:       famotidine  20 mg Intravenous Q12H     influenza vaccine adult (product based on age)  0.5 mL Intramuscular Prior to discharge     metoprolol  5 mg Intravenous Q6H     piperacillin-tazobactam  3.375 g Intravenous Q6H     sodium chloride (PF)  3 mL Intracatheter Q8H     acetaminophen, alum & mag hydroxide-simethicone, sore throat lozenge, diphenhydrAMINE **OR** diphenhydrAMINE,  "HYDROmorphone, lidocaine 4%, lidocaine (buffered or not buffered), magnesium sulfate, magnesium sulfate, metoprolol, naloxone, ondansetron **OR** ondansetron, ondansetron **OR** ondansetron, oxyCODONE IR, potassium chloride, potassium chloride with lidocaine, potassium chloride, potassium chloride, potassium chloride, prochlorperazine **OR** prochlorperazine, sodium chloride (PF)               Physical Exam:   Blood pressure (!) 162/92, pulse 82, temperature 98.3  F (36.8  C), temperature source Oral, resp. rate 16, height 1.88 m (6' 2\"), weight 102.4 kg (225 lb 12.8 oz), SpO2 96 %.  Wt Readings from Last 4 Encounters:   18 102.4 kg (225 lb 12.8 oz)         Vital Sign Ranges  Temperature Temp  Av.1  F (36.7  C)  Min: 96.8  F (36  C)  Max: 99.4  F (37.4  C)   Blood pressure Systolic (24hrs), Av , Min:153 , Max:160        Diastolic (24hrs), Av, Min:69, Max:98      Pulse No Data Recorded   Respirations Resp  Av  Min: 16  Max: 20   Pulse oximetry SpO2  Av.5 %  Min: 94 %  Max: 97 %         Intake/Output Summary (Last 24 hours) at 18 1122  Last data filed at 18 0946   Gross per 24 hour   Intake             1299 ml   Output              660 ml   Net              639 ml       Constitutional: Awake, alert, cooperative. Sounds dyspneic but denies being SOB.   Lungs: Bibasilar crackles, no wheezing   Cardiovascular: Regular rate and rhythm, normal S1 and S2, and no murmur noted   Abdomen: Soft.+ BS R<L quad   Skin: No rashes, no cyanosis, no edema          Data:   All laboratory data reviewed    "

## 2018-11-09 NOTE — PROGRESS NOTES
"[facility]  Infectious Disease Progress Note          Assessment and Plan:   Impression:  1. 63 y.o male with perforated appendicitis.   2. S/p laparoscopic appendectomy and a washout with drain placement, fecal contamination noted in the OR.   3. Postop ileus      Recommendations:   Continue zosyn.    cultures. Multiple orgs as expected, po options available likely augmentin/cipro        Interval History:   no new complaints and doing well; no cp, sob, n/v/d, or abd pain ileus, no fever , feels OK, anaerobes, pseudo, E coli, and strep in cx.              Medications:       famotidine  20 mg Intravenous Q12H     influenza vaccine adult (product based on age)  0.5 mL Intramuscular Prior to discharge     metoprolol  5 mg Intravenous Q6H     piperacillin-tazobactam  3.375 g Intravenous Q6H     sodium chloride (PF)  3 mL Intracatheter Q8H                  Physical Exam:   Blood pressure (!) 165/92, pulse 82, temperature 99.4  F (37.4  C), temperature source Oral, resp. rate 18, height 1.88 m (6' 2\"), weight 102.4 kg (225 lb 12.8 oz), SpO2 96 %.  Wt Readings from Last 2 Encounters:   11/06/18 102.4 kg (225 lb 12.8 oz)     Vital Signs with Ranges  Temp:  [97.5  F (36.4  C)-99.4  F (37.4  C)] 99.4  F (37.4  C)  Heart Rate:  [81-91] 87  Resp:  [16-18] 18  BP: (138-165)/(61-96) 165/92  SpO2:  [96 %] 96 %    Constitutional: Awake, alert, cooperative, no apparent distress   Lungs: Clear to auscultation bilaterally, no crackles or wheezing   Cardiovascular: Regular rate and rhythm, normal S1 and S2, and no murmur noted   Abdomen: Normal bowel sounds, soft, non-distended, non-tender   Skin: No rashes, no cyanosis, no edema   Other:           Data:   All microbiology laboratory data reviewed.  Recent Labs   Lab Test  11/06/18   0209  11/05/18   2156  11/04/18   2116  04/03/18   1550   WBC  7.2   --   10.7  4.2   HGB  13.3  14.0  16.1  14.8   HCT  41.8   --   47.7  44.2   MCV  95   --   91  92   PLT  158   " --   212  219     Recent Labs   Lab Test  11/07/18   0833  11/06/18   2220  11/06/18   0209   CR  0.91  0.92  1.10     No lab results found.  Recent Labs   Lab Test  11/05/18   0009  11/05/18   0008   CULT  Heavy growth  Escherichia coli  *  Moderate growth  Strain 2  Escherichia coli  *  Moderate growth  Pseudomonas oleovorans (pseudoalcaligenes)  *  Moderate growth  Streptococcus constellatus  *  Moderate growth  Pseudomonas aeruginosa  *  Heavy growth  Strain 2  Streptococcus constellatus  *  Critical Value/Significant Value, preliminary result only, called to and read back by  Natalie Tovar at 8049 11.6.18 by [id number]    Moderate growth  Escherichia coli  *  Moderate growth  Strain 2  Escherichia coli  *  Moderate growth  Pseudomonas oleovorans (pseudoalcaligenes)  *  Light growth  Streptococcus constellatus  *  Moderate growth  Pseudomonas aeruginosa  *  Light growth  Strain 2  Streptococcus constellatus  *  Susceptibility testing done on previous specimen  Previous critical documented  Light growth  Bacteroides fragilis  *  Light growth  Parabacteroides distasonis  Identification obtained by MALDI-TOF mass spectrometry research use only database. Test   characteristics determined and verified by the Infectious Diseases Diagnostic Laboratory   ([facility]) [place], [place].  *  Light growth  Clostridium perfringens  *  Critical Value/Significant Value, preliminary result only, called to and read back by  Heather Matthews RN. 11/8/18 @ 1432 ,JR    Plus  Moderate growth  Mixed aerobic and anaerobic sammy  *

## 2018-11-09 NOTE — PLAN OF CARE
Problem: Patient Care Overview  Goal: Plan of Care/Patient Progress Review  Outcome: Improving  Pt awake, A/O x4 pleasant and cooperative. SBA ambulated in hallway and in room. VSS. LS clear bilaterally equal. Tele SR. NPO with some ice chips. IVF infusing. C/o nausea Compazine given x1. On IV abx Zosyn. Bowel sound hypoactive, pt reported started passing flatus. Will monitor and continue POC.

## 2018-11-09 NOTE — PLAN OF CARE
Problem: Patient Care Overview  Goal: Plan of Care/Patient Progress Review  Outcome: Improving  A&O x 4, stand by assist, NPO except ice chips, GARCIA drain in place,  mL/hr, tele SR, IV zosyn given twice during shift, IV compazine given for nausea & mylanta for indigestion, will continue with POC.

## 2018-11-10 VITALS
RESPIRATION RATE: 18 BRPM | TEMPERATURE: 98.1 F | SYSTOLIC BLOOD PRESSURE: 159 MMHG | OXYGEN SATURATION: 96 % | WEIGHT: 225.8 LBS | BODY MASS INDEX: 28.98 KG/M2 | HEART RATE: 83 BPM | DIASTOLIC BLOOD PRESSURE: 95 MMHG | HEIGHT: 74 IN

## 2018-11-10 LAB — POTASSIUM SERPL-SCNC: 3.9 MMOL/L (ref 3.4–5.3)

## 2018-11-10 PROCEDURE — 25000132 ZZH RX MED GY IP 250 OP 250 PS 637: Performed by: INTERNAL MEDICINE

## 2018-11-10 PROCEDURE — 25000128 H RX IP 250 OP 636: Performed by: INTERNAL MEDICINE

## 2018-11-10 PROCEDURE — 25000132 ZZH RX MED GY IP 250 OP 250 PS 637: Performed by: PHYSICIAN ASSISTANT

## 2018-11-10 PROCEDURE — 36415 COLL VENOUS BLD VENIPUNCTURE: CPT | Performed by: INTERNAL MEDICINE

## 2018-11-10 PROCEDURE — 25000128 H RX IP 250 OP 636: Performed by: SURGERY

## 2018-11-10 PROCEDURE — 99231 SBSQ HOSP IP/OBS SF/LOW 25: CPT | Performed by: INTERNAL MEDICINE

## 2018-11-10 PROCEDURE — 25000131 ZZH RX MED GY IP 250 OP 636 PS 637: Performed by: INTERNAL MEDICINE

## 2018-11-10 PROCEDURE — 90682 RIV4 VACC RECOMBINANT DNA IM: CPT | Performed by: INTERNAL MEDICINE

## 2018-11-10 PROCEDURE — 25000132 ZZH RX MED GY IP 250 OP 250 PS 637: Performed by: SURGERY

## 2018-11-10 PROCEDURE — 25000125 ZZHC RX 250: Performed by: INTERNAL MEDICINE

## 2018-11-10 PROCEDURE — 84132 ASSAY OF SERUM POTASSIUM: CPT | Performed by: INTERNAL MEDICINE

## 2018-11-10 RX ORDER — OXYCODONE HYDROCHLORIDE 5 MG/1
5-10 TABLET ORAL
Qty: 6 TABLET | Refills: 0 | Status: SHIPPED | OUTPATIENT
Start: 2018-11-10 | End: 2018-11-15

## 2018-11-10 RX ORDER — METOPROLOL TARTRATE 50 MG
50 TABLET ORAL 2 TIMES DAILY
Status: DISCONTINUED | OUTPATIENT
Start: 2018-11-10 | End: 2018-11-10 | Stop reason: HOSPADM

## 2018-11-10 RX ORDER — FAMOTIDINE 20 MG/1
20 TABLET, FILM COATED ORAL 2 TIMES DAILY
Qty: 60 TABLET | Refills: 0 | Status: SHIPPED | OUTPATIENT
Start: 2018-11-10 | End: 2019-03-14

## 2018-11-10 RX ORDER — CIPROFLOXACIN 500 MG/1
500 TABLET, FILM COATED ORAL 2 TIMES DAILY
Qty: 14 TABLET | Refills: 0 | Status: SHIPPED | OUTPATIENT
Start: 2018-11-10 | End: 2019-03-14

## 2018-11-10 RX ORDER — METOPROLOL TARTRATE 50 MG
50 TABLET ORAL 2 TIMES DAILY
Qty: 60 TABLET | Refills: 0 | Status: SHIPPED | OUTPATIENT
Start: 2018-11-10 | End: 2019-03-14

## 2018-11-10 RX ORDER — FAMOTIDINE 20 MG/1
20 TABLET, FILM COATED ORAL DAILY
Status: DISCONTINUED | OUTPATIENT
Start: 2018-11-10 | End: 2018-11-10 | Stop reason: HOSPADM

## 2018-11-10 RX ADMIN — TAZOBACTAM SODIUM AND PIPERACILLIN SODIUM 3.38 G: 375; 3 INJECTION, SOLUTION INTRAVENOUS at 06:15

## 2018-11-10 RX ADMIN — FAMOTIDINE 20 MG: 20 TABLET ORAL at 10:58

## 2018-11-10 RX ADMIN — METOPROLOL TARTRATE 50 MG: 50 TABLET, FILM COATED ORAL at 10:58

## 2018-11-10 RX ADMIN — ONDANSETRON 4 MG: 4 TABLET, ORALLY DISINTEGRATING ORAL at 00:18

## 2018-11-10 RX ADMIN — INFLUENZA A VIRUS A/MICHIGAN/45/2015 (H1N1) RECOMBINANT HEMAGGLUTININ ANTIGEN, INFLUENZA A VIRUS A/SINGAPORE/INFIMH-16-0019/2016 (H3N2) RECOMBINANT HEMAGGLUTININ ANTIGEN, INFLUENZA B VIRUS B/MARYLAND/15/2016 RECOMBINANT HEMAGGLUTININ ANTIGEN, AND INFLUENZA B VIRUS B/PHUKET/3073/2013 RECOMBINANT HEMAGGLUTININ ANTIGEN 0.5 ML: 45; 45; 45; 45 INJECTION INTRAMUSCULAR at 10:33

## 2018-11-10 RX ADMIN — METOPROLOL TARTRATE 5 MG: 1 INJECTION, SOLUTION INTRAVENOUS at 04:05

## 2018-11-10 RX ADMIN — TAZOBACTAM SODIUM AND PIPERACILLIN SODIUM 3.38 G: 375; 3 INJECTION, SOLUTION INTRAVENOUS at 00:11

## 2018-11-10 RX ADMIN — ACETAMINOPHEN 650 MG: 325 TABLET, FILM COATED ORAL at 12:54

## 2018-11-10 RX ADMIN — Medication 0.3 MG: at 10:20

## 2018-11-10 RX ADMIN — TAZOBACTAM SODIUM AND PIPERACILLIN SODIUM 3.38 G: 375; 3 INJECTION, SOLUTION INTRAVENOUS at 11:26

## 2018-11-10 RX ADMIN — SODIUM CHLORIDE: 9 INJECTION, SOLUTION INTRAVENOUS at 02:08

## 2018-11-10 RX ADMIN — ACETAMINOPHEN 650 MG: 325 TABLET, FILM COATED ORAL at 00:18

## 2018-11-10 RX ADMIN — ONDANSETRON 4 MG: 4 TABLET, ORALLY DISINTEGRATING ORAL at 10:32

## 2018-11-10 ASSESSMENT — ACTIVITIES OF DAILY LIVING (ADL)
ADLS_ACUITY_SCORE: 14

## 2018-11-10 NOTE — PLAN OF CARE
Problem: Patient Care Overview  Goal: Plan of Care/Patient Progress Review  Outcome: Improving  RN - Slightly hypertensive - otherwise vitally stable. Tele SR. Pt denying pain aside from headache. Abdominal incisions/dressing CDI. Drain removed per surgery. Pt up ad gissell in room. Diet advanced to regular and tolerating. PIV removed. Pt receiving discharge orders and medications. Awaiting ride to finalize discharge.    Patient's After Visit Summary was reviewed with patient.   Patient verbalized understanding of After Visit Summary, recommended follow up and was given an opportunity to ask questions.   Discharge medications sent home with patient/family: YES   Discharged with other:roommate

## 2018-11-10 NOTE — PLAN OF CARE
Problem: Patient Care Overview  Goal: Plan of Care/Patient Progress Review  Outcome: Improving  A/Ox4, SBA for transfers, VS - Tmax 99.1, Tele NSR, IV metoprolol, GARCIA drain patent, NS @ 75ml/hr, IV zosyn, tolerating clear liquid diet, PRN zofran given for mild nausea with relief, PRN Tylenol given for back discomfort with relief, denies abdominal/surgical pain, 2 PIV's, passing gas, ID following, continue with plan of care.

## 2018-11-10 NOTE — PROGRESS NOTES
"[facility]   General Surgery Progress Note          Assessment and Plan:   Assessment:   -Acute appendicitis with perforation and feculant peritonitis/contamination  -POD#6 s/p laparoscopic appendectomy, lavage, intraperitoneal drain placement  -Intraperitoneal cultures +multiple organisms  -Post-operative ileus as expected  -Afebrile      Plan:   -ADAT  -Discharge later today if tolerates diet advancement.  Rx Oxycodone done.  -await discharge antibiotic recs from ID  -appreciate hospitalist assistance  -work note done         Interval History:   Feels well, essentially back to normal.  Denies bloating.  Reports occasional nausea/burping but does not feel this is associated with PO intake.  Not taking narcotics.  Tolerating clear liquid diet, hungry for solids.  + passing flatus, last BM was this am.  Up walking ok, voiding independently.  Feels ready for home.         Physical Exam:   Blood pressure 158/88, pulse 83, temperature 98.1  F (36.7  C), temperature source Oral, resp. rate 18, height 1.88 m (6' 2\"), weight 102.4 kg (225 lb 12.8 oz), SpO2 96 %.    I/O last 3 completed shifts:  In: 629 [I.V.:629]  Out: 375 [Urine:300; Drains:75]    Abdomen: soft, mildly distended, non-tender, +BS.  Incs - c/d/steristrips intact.    GARCIA - cloudy serosang, removed after IV Dilaudid, gauze/tegaderm dressing placed.          Data:       Recent Labs  Lab 11/06/18  0209 11/05/18 2156 11/04/18 2116   WBC 7.2  --  10.7   HGB 13.3 14.0 16.1   HCT 41.8  --  47.7   MCV 95  --  91     --  212       Recent Labs  Lab 11/10/18  0711 11/09/18  0709 11/08/18  0739 11/07/18  0833 11/06/18  2220 11/06/18  0209 11/05/18 2156 11/04/18 2116   NA  --   --   --  139 139 140 139 137   POTASSIUM 3.9 3.4 3.6 3.7 3.5 3.5 3.8 3.5   CHLORIDE  --   --   --  108 108 109 105 103   CO2  --   --   --  21 20 23 27 27   ANIONGAP  --   --   --  10 11 8 7 7   GLC  --   --   --  125* 101* 98 94 149*   BUN  --   --   --  19 18 22 21 24 "   CR  --   --   --  0.91 0.92 1.10 1.24 1.05   GFRESTIMATED  --   --   --  85 83 68 59* 72   GFRESTBLACK  --   --   --  >90 >90 82 72 87   HENRY  --   --   --  7.9* 8.0* 7.7* 8.1* 9.2   MAG  --   --   --  2.1 1.8  --  1.9  --    PROTTOTAL  --   --   --   --   --   --   --  7.6   ALBUMIN  --   --   --   --   --   --   --  4.1   BILITOTAL  --   --   --   --   --   --   --  1.3   ALKPHOS  --   --   --   --   --   --   --  68   AST  --   --   --   --   --   --   --  16   ALT  --   --   --   --   --   --   --  31       Lillian Alegria PA-C

## 2018-11-10 NOTE — PLAN OF CARE
Problem: Patient Care Overview  Goal: Plan of Care/Patient Progress Review  Outcome: Improving  Pt awake, A/O x4 pleasant and cooperative. SBA ambulated in hallway. VSS. LS clear bilaterally equal. Tele SR. On clear liquid diet tolerating well. IVF infusing. No c/o nausea. No c/o abdominal pain, no CP or SOB. GARCIA drain intact, with small serous clear drainage. Had bowel movement and passing gases. On IV abx Zosyn. Will monitor and continue POC.

## 2018-11-10 NOTE — DISCHARGE INSTRUCTIONS
HOME CARE FOLLOWING APPENDECTOMY  MAURA Rehman, PANFILO Hamm R. O Donnell, J. Shaheen    INCISIONAL CARE:  Replace the bandage over your incision (or incisions) until all drainage stops, or if more comfortable to have in place.  If present, leave the steri-strips (white paper tapes) in place for 14 days after surgery.  If you have staples in your incision at the time of discharge, they will be removed at your follow-up appointment.  If Dermabond (a type of skin glue) is present, leave in place until it wears/flakes off.     BATHING:  Avoid baths for 1 week after surgery.  Showers are okay.  You may wash your hair at any time.  Gently pat your incision dry after bathing.    ACTIVITY:  Light Activity -- you may immediately be up and about as tolerated.  Driving -- you may drive when comfortable and off narcotic pain medications.  Light Work -- resume when comfortable off pain medications.  (If you can drive, you probably can work.)  Strenuous Work/Activity -- limit lifting to 20 pounds for 2 weeks.  Progressively increase with time.  Active Sports (running, biking, etc.) -- cautiously resume after 2 weeks.    DISCOMFORT:  Use pain medications as prescribed by your surgeon.  Take the pain medication with some food, when possible, to minimize side effects.  Intermittent use of ice packs at the incision sites may help during the first 48 hours.  Expect gradual improvement.    DIET:  No restrictions.  Drink plenty of fluids.  While taking pain medications, increase dietary fiber or add a fiber supplementation like Metamucil or Citrucel to help prevent constipation - a possible side effect of pain medications.    NAUSEA:  If nauseated from the anesthetic/pain meds; rest in bed, get up cautiously with assistance, and drink clear liquids (juice, tea, broth).    RETURN APPOINTMENT:  Schedule a follow-up visit 2-3 weeks post-op.  Office Phone:  [phone]     CONTACT US IF THE FOLLOWING  DEVELOPS:   1. A fever that is above 101     2. If there is a large amount of drainage, bleeding, or swelling.   3. Severe pain that is not relieved by your prescription.   4. Drainage that is thick, cloudy, yellow, green or white.   5. Any other questions not answered by  Frequently Asked Questions  sheet.      FREQUENTLY ASKED QUESTIONS:    Q:  How should my incision look?    A:  Normally your incision will appear slightly swollen with light redness directly along the incision itself as it heals.  It may feel like a bump or ridge as the healing/scarring happens, and over time (3-4 months) this bump or ridge feeling should slowly go away.  In general, clear or pink watery drainage can be normal at first as your incision heals, but should decrease over time.    Q:  How do I know if my incision is infected?  A:  Look at your incision for signs of infection, like redness around the incision spreading to surrounding skin, or drainage of cloudy or foul-smelling drainage.  If you feel warm, check your temperature to see if you are running a fever.    **If any of these things occur, please notify the nurse at our office.  We may need you to come into the office for an incision check.      Q:  How do I take care of my incision?  A:  If you have a dressing in place - Starting the day after surgery, replace the dressing 1-2 times a day until there is no further drainage from the incision.  At that time, a dressing is no longer needed.  Try to minimize tape on the skin if irritation is occurring at the tape sites.  If you have significant irritation from tape on the skin, please call the office to discuss other method of dressing your incision.    Small pieces of tape called  steri-strips  may be present directly overlying your incision; these may be removed 10 days after surgery unless otherwise specified by your surgeon.  If these tapes start to loosen at the ends, you may trim them back until they fall off or are removed.     A:  If you had  Dermabond  tissue glue used as a dressing (this causes your incision to look shiny with a clear covering over it) - This type of dressing wears off with time and does not require more dressings over the top unless it is draining around the glue as it wears off.  Do not apply ointments or lotions over the incisions until the glue has completely worn off.    Q:  There is a piece of tape or a sticky  lead  still on my skin.  Can I remove this?  A:  Sometimes the sticky  leads  used for monitoring during surgery or for evaluation in the emergency department are not all removed while you are in the hospital.  These sometimes have a tab or metal dot on them.  You can easily remove these on your own, like taking off a band-aid.  If there is a gel substance under the  lead , simply wipe/clean it off with a washcloth or paper towel.      Q:  What can I do to minimize constipation (very hard stools, or lack of stools)?  A:  Stay well hydrated.  Increase your dietary fiber intake or take a fiber supplement -with plenty of water.  Walk around frequently.  You may consider an over-the-counter stool-softener.  Your Pharmacist can assist you with choosing one that is stocked at your pharmacy.  Constipation is also one of the most common side effects of pain medication.  If you are using pain medication, be pro-active and try to PREVENT problems with constipation by taking the steps above BEFORE constipation becomes a problem.    Q:  What do I do if I need more pain medications?  A:  Call the office to receive refills.  Be aware that certain pain meds cannot be called into a pharmacy and actually require a paper prescription.  A change may be made in your pain med as you progress thru your recovery period or if you have side effects to certain meds.    --Pain meds are NOT refilled after 5pm on weekdays, and NOT AT ALL on the weekends, so please look ahead to prevent problems.      Q:  Why am I having a hard time  sleeping now that I am at home?  A:  Many medications you receive while you are in the hospital can impact your sleep for a number of days after your surgery/hospitalization.  Decreased level of activity and naps during the day may also make sleeping at night difficult.  Try to minimize day-time naps, and get up frequently during the day to walk around your home during your recovery time.  Sleep aides may be of some help, but are not recommended for long-term use.      Q:  I am having some back discomfort.  What should I do?  A:  This may be related to certain positioning that was required for your surgery, extended periods of time in bed, or other changes in your overall activity level.  You may try ice, heat, acetaminophen, or ibuprofen to treat this temporarily.  Note that many pain medications have acetaminophen in them and would state this on the prescription bottle.  Be sure not to exceed the maximum of 4000mg per day of acetaminophen.     **If the pain you are having does not resolve, is severe, or is a flare of back pain you have had on other occasions prior to surgery, please contact your primary physician for further recommendations or for an appointment to be examined at their office.    Q:  Why am I having headaches?  A:  Headaches can be caused by many things:  caffeine withdrawal, use of pain meds, dehydration, high blood pressure, lack of sleep, over-activity/exhaustion, flare-up of usual migraine headaches.  If you feel this is related to muscle tension (a band-like feeling around the head, or a pressure at the low-back of the head) you may try ice or heat to this area.  You may need to drink more fluids (try electrolyte drink like Gatorade), rest, or take your usual migraine medications.   **If your headaches do not resolve, worsen, are accompanied by other symptoms, or if your blood pressure is high, please call your primary physician for recommendation and/or examination.    Q:  I am unable to  urinate.  What do I do?  A:  A small percentage of people can have difficulty urinating initially after surgery.  This includes being able to urinate only a very small amount at a time and feeling discomfort or pressure in the very low abdomen.  This is called  urinary retention , and is actually an urgent situation.  Proceed to your nearest Emergency department for evaluation (not an Urgent Care Center).  Sometimes the bladder does not work correctly after certain medications you receive during surgery, or related to certain procedures.  You may need to have a catheter placed until your bladder recovers.  When planning to go to an Emergency department, it may help to call the ER to let them know you are coming in for this problem after a surgery.  This may help you get in quicker to be evaluated.  **If you have symptoms of a urinary tract infection, please contact your primary physician for the proper evaluation and treatment.          If you have other questions, please call the office Monday thru Friday between 8am and 5pm to discuss with the nurse or physician assistant.  [phone]    There is a surgeon ON CALL on weekday evenings and over the weekend in case of urgent need only, and may be contacted at the same number.    If you are having an emergency, call 911 or proceed to your nearest emergency department.

## 2018-11-10 NOTE — PROGRESS NOTES
[facility]  Hospitalist Progress Note for 11/10/2018:          Assessment and Plan:   Mr Derik Hamm is a 63 year old male who was admitted on 11/4/2018. I was asked to see the patient for tachycardia.      New onset Paroxysmal atrial fibrillation with RVR and transient hypotension, postop:  - completely asymptomatic.  Most likely cause is post-operative state, possibly slight dehydration post-op.  Rates to 160s, BP 90s.    - TTE is normal with ejection fraction normal.  [id number] score is zero.  Recommend no anticoagulation at this time.   - Converted spontaneously & remains in SR.  TSH & electrolytes normal.   - at discharge continue po Lopressor 50 mg bid & f/u with PCP      Perforated appendicitis with feculant peritonitis s/p lap appendectomy 11/4/2018  --management per primary team.    - on zosyn,ID following, AB for discharge per ID.    - d/c plans per surgical  Team     Elevated BP/ possible fluid overload:  No prior hx of HTN  - has been on IVF since admission  - BP acceptable for now  - cont Metoprolol change to po 50 mg bid & f/u with PCP.     DVT Prophylaxis: Pneumatic Compression Devices  Code Status: Full Code  Disposition: per primary(surgical) team.    Autumn Renee MD.  Hospitalist [phone] (7am -6 pm)               Interval History:   Cont nausea,RN requested po meds be changed to IV.              Medications:       famotidine  20 mg Intravenous Q12H     influenza vaccine adult (product based on age)  0.5 mL Intramuscular Prior to discharge     metoprolol  5 mg Intravenous Q6H     piperacillin-tazobactam  3.375 g Intravenous Q6H     sodium chloride (PF)  3 mL Intracatheter Q8H     acetaminophen, alum & mag hydroxide-simethicone, sore throat lozenge, diphenhydrAMINE **OR** diphenhydrAMINE, HYDROmorphone, lidocaine 4%, lidocaine (buffered or not buffered), magnesium sulfate, magnesium sulfate, metoprolol, naloxone, ondansetron **OR** ondansetron, ondansetron **OR**  "ondansetron, oxyCODONE IR, potassium chloride, potassium chloride with lidocaine, potassium chloride, potassium chloride, potassium chloride, prochlorperazine **OR** prochlorperazine, sodium chloride (PF)               Physical Exam:   Blood pressure 158/88, pulse 83, temperature 98.1  F (36.7  C), temperature source Oral, resp. rate 18, height 1.88 m (6' 2\"), weight 102.4 kg (225 lb 12.8 oz), SpO2 96 %.  Wt Readings from Last 4 Encounters:   18 102.4 kg (225 lb 12.8 oz)         Vital Sign Ranges  Temperature Temp  Av.1  F (36.7  C)  Min: 96.8  F (36  C)  Max: 99.4  F (37.4  C)   Blood pressure Systolic (24hrs), Av , Min:153 , Max:160        Diastolic (24hrs), Av, Min:69, Max:98      Pulse No Data Recorded   Respirations Resp  Av  Min: 16  Max: 20   Pulse oximetry SpO2  Av.5 %  Min: 94 %  Max: 97 %         Intake/Output Summary (Last 24 hours) at 18 1122  Last data filed at 18 0946   Gross per 24 hour   Intake             1299 ml   Output              660 ml   Net              639 ml       Constitutional: Awake, alert, cooperative. Sounds dyspneic but denies being SOB.   Lungs: clear   Cardiovascular: Regular rate and rhythm, normal S1 and S2, and no murmur noted   Abdomen: Soft.+ BS    Skin: No rashes, no cyanosis, no edema          Data:   All laboratory data reviewed    "

## 2018-11-15 ENCOUNTER — OFFICE VISIT (OUTPATIENT)
Dept: PEDIATRICS | Facility: CLINIC | Age: 63
End: 2018-11-15
Payer: COMMERCIAL

## 2018-11-15 VITALS
WEIGHT: 211 LBS | HEART RATE: 73 BPM | OXYGEN SATURATION: 97 % | TEMPERATURE: 97.7 F | BODY MASS INDEX: 27.09 KG/M2 | SYSTOLIC BLOOD PRESSURE: 114 MMHG | DIASTOLIC BLOOD PRESSURE: 70 MMHG

## 2018-11-15 DIAGNOSIS — L57.0 AK (ACTINIC KERATOSIS): ICD-10-CM

## 2018-11-15 DIAGNOSIS — K35.32 RUPTURED APPENDICITIS: Primary | ICD-10-CM

## 2018-11-15 PROCEDURE — 99214 OFFICE O/P EST MOD 30 MIN: CPT | Mod: GC | Performed by: STUDENT IN AN ORGANIZED HEALTH CARE EDUCATION/TRAINING PROGRAM

## 2018-11-15 NOTE — LETTER
[facility]  [address]  [address]  [place] [address]  Phone: [phone]  Fax: [phone]    November 15, 2018        Derik Hamm  [address] [place] [address]          To whom it may concern:    RE: Derik Hamm    Patient was seen and treated today at our clinic.  He can return to work for half day as tolerated.  Please limit any weight to no more than 25 lb at a single time.  Please contact me for questions or concerns.      Sincerely,        Murali De León MD

## 2018-11-15 NOTE — PROGRESS NOTES
SUBJECTIVE:   Derik Hamm is a 63 year old male who presents to clinic today for the following health issues:    Hospital Follow-up Visit:    Hospital/Nursing Home/IP Rehab Facility: [facility]  Date of Admission: 11/4/18  Date of Discharge: 11/10/18  Reason(s) for Admission: appendectomy            Problems taking medications regularly:  None       Medication changes since discharge: None       Problems adhering to non-medication therapy:  None    Summary of hospitalization:  CareEverywhere information obtained and reviewed  Diagnostic Tests/Treatments reviewed.  Follow up needed: None  Other Healthcare Providers Involved in Patient s Care:         Specialist appointment - General surgery, TBD (~2-3 weeks post hospitalization)  Update since discharge: improved.    Post Discharge Medication Reconciliation: discharge medications reconciled, continue medications without change.  Plan of care communicated with patient     Coding guidelines for this visit:  Type of Medical   Decision Making Face-to-Face Visit       within 7 Days of discharge Face-to-Face Visit        within 14 days of discharge   Moderate Complexity [id number]   High Complexity [id number]          He had acute appendicitis with perforation and diffuse peritoneal feculent contamination requiring laparoscopic appendectomy, abdominal washout, and peritoneal drain placement.  Drain has been pulled and wound site wrapped.  Tolerating PO intake normally.  Pain well controlled.  He is currently taking Augmentin and ciprofloxacin for a 7 day course.  He is currently on pepcid 20 mg BID; no active symptoms.  He is also metoprolol 50 mg BID for new onset atrial fibrillation with RVR asymptomatic; attributed to increased stress from acute illness.   Normal bowel movements, no melena or hematochezia.      Problem list and histories reviewed & adjusted, as indicated.  Additional history: as documented    Patient Active Problem List    Diagnosis     Appendicitis with perforation     Past Surgical History:   Procedure Laterality Date     LAPAROSCOPIC APPENDECTOMY N/A 11/4/2018    Procedure: LAPAROSCOPIC APPENDECTOMY;  Surgeon: Mackenzie Dale MD;  Location: [facility] OR     ORTHOPEDIC SURGERY         Social History   Substance Use Topics     Smoking status: Never Smoker     Smokeless tobacco: Never Used     Alcohol use No     No family history on file.      Current Outpatient Prescriptions   Medication Sig Dispense Refill     acetaminophen (TYLENOL) 325 MG tablet Take 325-650 mg by mouth daily as needed for mild pain       amoxicillin-clavulanate (AUGMENTIN) 875-125 MG per tablet Take 1 tablet by mouth 2 times daily for 7 days 14 tablet 0     ciprofloxacin (CIPRO) 500 MG tablet Take 1 tablet (500 mg) by mouth 2 times daily for 7 days 14 tablet 0     famotidine (PEPCID) 20 MG tablet Take 1 tablet (20 mg) by mouth 2 times daily 60 tablet 0     metoprolol tartrate (LOPRESSOR) 50 MG tablet Take 1 tablet (50 mg) by mouth 2 times daily 60 tablet 0     multivitamin, therapeutic (THERA-VIT) TABS tablet Take 1 tablet by mouth daily       naproxen sodium (ALEVE) 220 MG tablet Take 220 mg by mouth daily as needed for moderate pain       No Known Allergies  BP Readings from Last 3 Encounters:   11/15/18 114/70   11/10/18 (!) 159/95   04/03/18 136/87    Wt Readings from Last 3 Encounters:   11/15/18 211 lb (95.7 kg)   11/06/18 225 lb 12.8 oz (102.4 kg)           Reviewed and updated as needed this visit by clinical staff  Tobacco  Allergies  Meds       Reviewed and updated as needed this visit by Provider         ROS:  A focused ROS was obtained and documented for notable findings in the HPI as stated above.      OBJECTIVE:     /70 (Cuff Size: Adult Regular)  Pulse 73  Temp 97.7  F (36.5  C) (Oral)  Wt 211 lb (95.7 kg)  SpO2 97%  BMI 27.09 kg/m2  Body mass index is 27.09 kg/(m^2).  GENERAL: healthy, alert and no distress  NECK: no adenopathy,  no asymmetry, masses, or scars and thyroid normal to palpation  RESP: lungs clear to auscultation - no rales, rhonchi or wheezes  CV: regular rate and rhythm, normal S1 S2, no S3 or S4, no murmur, click or rub, no peripheral edema and peripheral pulses strong  ABDOMEN: soft, nontender, no hepatosplenomegaly, no masses and bowel sounds normal  MS: no gross musculoskeletal defects noted, no edema  SKIN: Raised plaque on center forehead, skin colored, rough to touch consistent with actinic keratoses.  3 healing incision sites midline abdomen, lowest below umbilicus approximately 1.5 cm in length draining serous fluid not closed without signs of infection.  no additional suspicious lesions or rashes  NEURO: Normal strength and tone, mentation intact and speech normal    Diagnostic Test Results:  none     ASSESSMENT/PLAN:   1. History of recent hospitalization  2. Ruptured appendicitis  No complications s/p lap appendectomy and peritoneal washout.  Normal bowel pattern and PO intake.  No systemic or localized signs of infection.    - complete 7 day course of augmentin and ciprofloxacin; no need for repeat labs s/p (could consider CRP and WBC)  - continue bowel regimen  - follow up with general surgery  - discussed wound cares; refer to surgery for more detailed cares if needed beyond standard/basic    3. AK (actinic keratosis)  Discussed at follow up visit freezing site for removal.    - avoid excessive sunlight, itching, and apply thin layer of vaseline to site    See Patient Instructions    The patient was seen and discussed with Dr. Razo, the attending, who agrees with the plan as stated above.      Murali De León MD  [facility] [place]    ===========  STAFF NOTE:  Patient seen with resident physician today.  I was physically present during key portions of the visit and participated in the evaluation and management of the patient today.     Problem list, PMHx, PSHx, Social Hx, Family Hx  reviewed    ASSESSMENT:      ICD-10-CM    1. Ruptured appendicitis K35.32 cx's reviewed, polymicrobial growth  Completing course of ciprofloxacin and augmentin.     Post-op Afib with RVR vs atrial tachycardia attributed to peritonitis/sepsis.   ECHO: normal EF.  Has remained in sinus rhythm, continues metoprolol since discharge.  Plan to taper off metoprolol in the next few weeks     2. AK (actinic keratosis) L57.0 As above     Andrea Razo MD

## 2018-11-15 NOTE — MR AVS SNAPSHOT
"              After Visit Summary   11/15/2018    Derik Hamm    MRN: [id number]           Patient Information     Date Of Birth          1955        Visit Information        Provider Department      11/15/2018 11:00 AM Murali De León MD [facility]        Care Instructions    As we discussed, please continue your antibiotic, follow up with surgery, and pursue activity as tolerated.  You can return to work with the restrictions as laid out in the work letter.  Please follow up as needed in the coming weeks for your additional medical concerns.    Murali De León MD          Follow-ups after your visit        Follow-up notes from your care team     Return in about 4 weeks (around 12/13/2018), or if symptoms worsen or fail to improve.      Who to contact     If you have questions or need follow up information about today's clinic visit or your schedule please contact [facility] [place] [facility] directly at [phone].  Normal or non-critical lab and imaging results will be communicated to you by MyChart, letter or phone within 4 business days after the clinic has received the results. If you do not hear from us within 7 days, please contact the clinic through MyChart or phone. If you have a critical or abnormal lab result, we will notify you by phone as soon as possible.  Submit refill requests through [employer] or call your pharmacy and they will forward the refill request to us. Please allow 3 business days for your refill to be completed.          Additional Information About Your Visit        MyChart Information     [employer] lets you send messages to your doctor, view your test results, renew your prescriptions, schedule appointments and more. To sign up, go to [email or web address] . Click on \"Log in\" on the left side of the screen, which will take you to the Welcome page. Then click on \"Sign up Now\" on the right side of the page.     You will be asked to enter the access code " listed below, as well as some personal information. Please follow the directions to create your username and password.     Your access code is: [email or web address]  Expires: 2019  1:33 PM     Your access code will  in 90 days. If you need help or a new code, please call your [place] clinic or [phone].        Care EveryWhere ID     This is your Care EveryWhere ID. This could be used by other organizations to access your [place] medical records  [id number]        Your Vitals Were     Pulse Temperature Pulse Oximetry BMI (Body Mass Index)          73 97.7  F (36.5  C) (Oral) 97% 27.09 kg/m2         Blood Pressure from Last 3 Encounters:   11/15/18 114/70   11/10/18 (!) 159/95   18 136/87    Weight from Last 3 Encounters:   11/15/18 211 lb (95.7 kg)   18 225 lb 12.8 oz (102.4 kg)              Today, you had the following     No orders found for display         Today's Medication Changes          These changes are accurate as of 11/15/18 11:47 AM.  If you have any questions, ask your nurse or doctor.               Stop taking these medicines if you haven't already. Please contact your care team if you have questions.     oxyCODONE IR 5 MG tablet   Commonly known as:  ROXICODONE   Stopped by:  Murali De León MD                    Primary Care Provider Office Phone # Fax #    [facility] [phone]       [address] [place] [address]        Equal Access to Services     HERVE HYMAN : Johnie Zeng, watomda luqadaha, qaybta kaalgato scherer. So [facility] [phone].    ATENCIÓN: Si habla español, tiene a pink disposición servicios gratuitos de asistencia lingüística. Llame al [phone].    We comply with applicable federal civil rights laws and [place] laws. We do not discriminate on the basis of race, color, national origin, age, disability, sex, sexual orientation, or gender  identity.            Thank you!     Thank you for choosing [facility]  for your care. Our goal is always to provide you with excellent care. Hearing back from our patients is one way we can continue to improve our services. Please take a few minutes to complete the written survey that you may receive in the mail after your visit with us. Thank you!             Your Updated Medication List - Protect others around you: Learn how to safely use, store and throw away your medicines at www.disposemymeds.org.          This list is accurate as of 11/15/18 11:47 AM.  Always use your most recent med list.                   Brand Name Dispense Instructions for use Diagnosis    acetaminophen 325 MG tablet    TYLENOL     Take 325-650 mg by mouth daily as needed for mild pain        ALEVE 220 MG tablet   Generic drug:  naproxen sodium      Take 220 mg by mouth daily as needed for moderate pain        amoxicillin-clavulanate 875-125 MG per tablet    AUGMENTIN    14 tablet    Take 1 tablet by mouth 2 times daily for 7 days    Appendicitis with perforation       ciprofloxacin 500 MG tablet    CIPRO    14 tablet    Take 1 tablet (500 mg) by mouth 2 times daily for 7 days    Appendicitis with perforation       famotidine 20 MG tablet    PEPCID    60 tablet    Take 1 tablet (20 mg) by mouth 2 times daily    Dyspepsia       metoprolol tartrate 50 MG tablet    LOPRESSOR    60 tablet    Take 1 tablet (50 mg) by mouth 2 times daily    PAF (paroxysmal atrial fibrillation) (H)       multivitamin, therapeutic Tabs tablet      Take 1 tablet by mouth daily

## 2018-11-15 NOTE — PATIENT INSTRUCTIONS
As we discussed, please continue your antibiotic, follow up with surgery, and pursue activity as tolerated.  You can return to work with the restrictions as laid out in the work letter.  Please follow up as needed in the coming weeks for your additional medical concerns.    Murali De León MD

## 2018-11-16 NOTE — DISCHARGE SUMMARY
[facility]    Discharge Summary  Surgery    Date of Admission:  11/4/2018  Date of Discharge:  11/10/2018  3:13 PM  Discharging Provider: Lillian Alegria PA-C  Discharge Summary Note completed by: Abel Desai PA-C on 11/16/2018  Date of Service: The patient was personally seen by Discharging Providers on the day of discharge.    Discharge Diagnoses   S/p laparoscopic appendectomy for acute perforated appendicitis    Procedure/Surgery Information   Procedure(s):  LAPAROSCOPIC APPENDECTOMY   Surgeon(s) and Role:     * Mackenzie Dale MD - Primary     Specimens:   ID Type Source Tests Collected by Time Destination   1 :  Fluid Peritoneum ANAEROBIC BACTERIAL CULTURE Mackenzie Dale MD 11/5/2018 12:06 AM    2 :  Fluid Peritoneum FLUID CULTURE AEROBIC BACTERIAL Mackenzie Dale MD 11/5/2018 12:08 AM    3 :  Fluid Peritoneum FLUID CULTURE AEROBIC BACTERIAL Mackenzie Dale MD 11/5/2018 12:09 AM    A :  Tissue Appendix SURGICAL PATHOLOGY EXAM Mackenzie Dale MD 11/4/2018 11:47 PM       Non-operative procedures: None performed       History of Present Illness   Derik Hamm is a 63 year old male who presents to the emergency department today with RLQ and LLQ abdominal pain for the past 2 days associated with fever, chills, nausea, vomiting and anorexia. The patient denies diarrhea. CT scan was consistent with acute appendicitis with appendicoliths. The patient's history, physical exam, laboratory and imaging studies are suspicious for acute appendicitis.  I have offered the patient a laparoscopic appendectomy. He consented and agreed to proceed.    Hospital Course   Derik Hamm was admitted on 11/4/2018.  The following problems were addressed during his hospitalization:  Patient Active Problem List   Diagnosis     Appendicitis with perforation       Post-operative antibiotic therapy included: Zosyn.  Post-operative pain control: was via IV until able to  tolerate PO intake and transitioned to PO pain meds.    Remarkable hospital course events: He recovered as anticipated. He had a significant post-operative ileus as expected. His bowel began functioning and was started on a diet. He was also being followed by Infectious Disease for antibiotic assitance and by the Hospitalist service for medical management.  Please see their notes for further details if needed. Derik met all criteria for release on 11/10/2018  3:13 PM.  He was afebrile, tolerating diet, pain controlled on PO meds, ambulating well, and had return of bowel function.    Medications discontinued or adjusted during this hospitalization: see discharge med list below.    Antibiotics prescribed at discharge: Augmentin and Cipro for 7 days    Imaging study follow up needs:   -No studies require specific follow-up    Discharge Instructions and Follow-Up:  Discharge diet: Regular   Discharge activity: Activity as tolerated   Discharge follow-up: Follow up with me in 1-3 weeks   Wound/Incision care: May get incision wet in shower but do not soak or scrub       Abel Desai PA-C      Discharge Disposition   Discharged to home   Condition at discharge: Stable    Pending Results   Final pathology results: Acute perforated appendicitis    Unresulted Labs Ordered in the Past 30 Days of this Admission     No orders found from 9/5/2018 to 11/5/2018.          Primary Care Physician   [facility]    Consultations This Hospital Stay   INFECTIOUS DISEASES IP CONSULT  HOSPITALIST IP CONSULT    Discharge Orders     Follow-up and recommended labs and tests    Follow up with primary care provider, Beni Espinosajohn Pinon, within 7 days to evaluate medication change, for hospital follow- up and BP.  No follow up labs or test are needed.       Discharge Medications   Discharge Medication List as of 11/10/2018  1:34 PM      START taking these medications    Details   amoxicillin-clavulanate (AUGMENTIN) 875-125 MG per  tablet Take 1 tablet by mouth 2 times daily for 7 days, Disp-14 tablet, R-0, E-Prescribe      ciprofloxacin (CIPRO) 500 MG tablet Take 1 tablet (500 mg) by mouth 2 times daily for 7 days, Disp-14 tablet, R-0, E-Prescribe      famotidine (PEPCID) 20 MG tablet Take 1 tablet (20 mg) by mouth 2 times daily, Disp-60 tablet, R-0, E-Prescribe      metoprolol tartrate (LOPRESSOR) 50 MG tablet Take 1 tablet (50 mg) by mouth 2 times daily, Disp-60 tablet, R-0, E-Prescribe      oxyCODONE IR (ROXICODONE) 5 MG tablet Take 1-2 tablets (5-10 mg) by mouth every 3 hours as needed for moderate to severe pain, Disp-6 tablet, R-0, Local Print         CONTINUE these medications which have NOT CHANGED    Details   acetaminophen (TYLENOL) 325 MG tablet Take 325-650 mg by mouth daily as needed for mild pain, Historical      multivitamin, therapeutic (THERA-VIT) TABS tablet Take 1 tablet by mouth daily, Historical      naproxen sodium (ALEVE) 220 MG tablet Take 220 mg by mouth daily as needed for moderate pain, Historical         STOP taking these medications       5-HYDROXYTRYPTOPHAN PO Comments:   Reason for Stopping:         ibuprofen (ADVIL/MOTRIN) 200 MG tablet Comments:   Reason for Stopping:         Linoleic Acid-Sunflower Oil (CLA PO) Comments:   Reason for Stopping:         TURMERIC PO Comments:   Reason for Stopping:         UNABLE TO FIND Comments:   Reason for Stopping:             Allergies   No Known Allergies  Data   Most Recent 3 CBC's:  Recent Labs   Lab Test  11/06/18   0209  11/05/18   2156  11/04/18   2116  04/03/18   1550   WBC  7.2   --   10.7  4.2   HGB  13.3  14.0  16.1  14.8   MCV  95   --   91  92   PLT  158   --   212  219      Most Recent 3 BMP's:  Recent Labs   Lab Test  11/10/18   0711  11/09/18   0709  11/08/18   0739  11/07/18   0833  11/06/18   2220  11/06/18   0209   NA   --    --    --   139  139  140   POTASSIUM  3.9  3.4  3.6  3.7  3.5  3.5   CHLORIDE   --    --    --   108  108  109   CO2   --    --     --   21  20  23   BUN   --    --    --   19  18  22   CR   --    --    --   0.91  0.92  1.10   ANIONGAP   --    --    --   10  11  8   HENRY   --    --    --   7.9*  8.0*  7.7*   GLC   --    --    --   125*  101*  98     Most Recent 2 LFT's:  Recent Labs   Lab Test  11/04/18   2116   AST  16   ALT  31   ALKPHOS  68   BILITOTAL  1.3     Most Recent INR's and Anticoagulation Dosing History:  Anticoagulation Dose History     There is no flowsheet data to display.        Most Recent 3 Troponin's:No lab results found.  Most Recent Cholesterol Panel:No lab results found.  Most Recent 6 Bacteria Isolates From Any Culture (See EPIC Reports for Culture Details):  Recent Labs   Lab Test  11/05/18   0009  11/05/18   0008   CULT  Heavy growth  Escherichia coli  *  Moderate growth  Strain 2  Escherichia coli  *  Moderate growth  Pseudomonas oleovorans (pseudoalcaligenes)  *  Moderate growth  Streptococcus constellatus  *  Moderate growth  Pseudomonas aeruginosa  *  Heavy growth  Strain 2  Streptococcus constellatus  *  Critical Value/Significant Value, preliminary result only, called to and read back by  Natalie Tovar at 8049 11.6.18 by [id number]    Moderate growth  Escherichia coli  *  Moderate growth  Strain 2  Escherichia coli  *  Moderate growth  Pseudomonas oleovorans (pseudoalcaligenes)  *  Light growth  Streptococcus constellatus  *  Moderate growth  Pseudomonas aeruginosa  *  Light growth  Strain 2  Streptococcus constellatus  *  Susceptibility testing done on previous specimen  Previous critical documented  Light growth  Bacteroides fragilis  *  Light growth  Parabacteroides distasonis  Identification obtained by MALDI-TOF mass spectrometry research use only database. Test   characteristics determined and verified by the Infectious [facility] Diagnostic Laboratory   ([facility]) [place], [place].  *  Light growth  Clostridium perfringens  *  Critical Value/Significant Value, preliminary result only, called to and read  back by  Heather Matthews RN. 11/8/18 @ 1432 ,JR    Plus  Moderate growth  Mixed aerobic and anaerobic sammy  *     Most Recent TSH, T4 and A1c Labs:  Recent Labs   Lab Test  11/05/18   2156   TSH  1.10     Results for orders placed or performed during the hospital encounter of 11/04/18   CT Abdomen Pelvis w Contrast    Narrative    CT ABDOMEN AND PELVIS WITH CONTRAST  11/4/2018 10:10 PM    HISTORY: Pain.     TECHNIQUE: Scans obtained from the diaphragm through the pelvis with  IV contrast, 100 mL Isovue-370.   Radiation dose for this scan was reduced using automated exposure  control, adjustment of the mA and/or kV according to patient size, or  iterative reconstruction technique.    COMPARISON:  None.    FINDINGS: Probable small hiatal hernia with some gas in the distal  esophagus indicating reflux. Mild dependent atelectasis is noted in  bilateral lungs. Visualized portions of the lung bases and mediastinal  contents are otherwise grossly unremarkable. There are degenerative  changes in the spine. There are no aggressive osseous lesions.      Small amount of fluid is seen adjacent to the gallbladder was also  noted in the right paracolic gutter and adjacent liver. Liver,  gallbladder, pancreas, spleen, bilateral adrenal glands and bilateral  kidneys otherwise enhance normally. No hydronephrosis,  nephrolithiasis, hydroureter or ureteral calculus is identified.  Urinary bladder is grossly unremarkable.    Minimal nonaneurysmal atherosclerotic calcifications are seen in the  aorta which is otherwise normal in appearance. No adenopathy or free  air is seen in the peritoneal cavity. Prostate gland is grossly  unremarkable.    The colon is grossly of normal caliber without pericolonic  inflammatory change to suggest acute diverticulitis. Extensive  inflammatory changes are seen in the pericecal region adjacent to the  appendix. Appendix is abnormally enlarged and there is an  appendicolith at the base the appendix  measuring 1.0 cm in diameter.  The appendix itself measures up to 1.5 cm in diameter.    Inflammatory stranding around the appendix could represent a rupture  although no periappendiceal abscess is seen and there is no free air.  The small amount of free fluid adjacent to the gallbladder and  inferior edge of the liver is likely secondary to the appendiceal  findings. Small bowel is grossly of normal caliber. The stomach  contains fluid and air but is otherwise unremarkable.      Impression    IMPRESSION:  1. Findings are most consistent with acute appendicitis with  appendicoliths. The largest appendicolith in the base measuring up to  1.0 cm. I cannot exclude perforation of the appendix especially given  the inflammatory change around the appendix and the small amount of  free fluid along the inferior aspect of the liver and adjacent to the  gallbladder. No free air is identified.  2. Mild colonic diverticulosis without evidence for acute  diverticulitis.    I called the findings of acute appendicitis with appendicolith and  possible rupture to Antonio Weeks NP on 11/4/2018 at 10:18 PM.    CHRISTIAN TOLENTINO MD   XR Abdomen 2 Views    Narrative    ABDOMEN TWO VIEWS 11/7/2018 5:37 PM     HISTORY: Increased nausea and vomiting, probable postop ileus.     COMPARISON: CT scan 11/4/2018      Impression    IMPRESSION: Air-fluid levels in small bowel loops in the left upper  quadrant that are mildly dilated. Surgical drain in the pelvis. Small  amount of free air in the peritoneum superior to the stomach  consistent with postoperative change. Normal colonic gas pattern.    TAYLOR LEGGETT MD

## 2018-11-18 ENCOUNTER — TELEPHONE (OUTPATIENT)
Dept: PEDIATRICS | Facility: CLINIC | Age: 63
End: 2018-11-18

## 2018-11-19 NOTE — TELEPHONE ENCOUNTER
Please call pt.  Follow-up to recent visit with Dr De León.  Started on metoprolol in setting of abnormal rhythm due to acute infection.       Now recovering from acute appendicitis, appendectomy 2 weeks ago.  Ok to taper off metoprolol.    In one week, please have patient taper off metoprolol as follows:      Reduce metoprolol to 50mg once daily for 1 week, then one half tablet (or 25mg) daily for 1 week, then discontinue.

## 2018-11-19 NOTE — TELEPHONE ENCOUNTER
Called and spoke with patient.  Gave patient directions for tapering metoprolol.  Had patient write down directions.  Patient voiced understanding.  Patient asked questions about post op care - patient has appointment with surgeon on Wednesday. Let patient know post op questions are appropriate to ask at office visit with surgeon on Wednesday.  Laura Aguilera, CMA

## 2018-11-21 ENCOUNTER — OFFICE VISIT (OUTPATIENT)
Dept: SURGERY | Facility: CLINIC | Age: 63
End: 2018-11-21
Payer: COMMERCIAL

## 2018-11-21 VITALS — RESPIRATION RATE: 16 BRPM | BODY MASS INDEX: 27.08 KG/M2 | HEIGHT: 74 IN | WEIGHT: 211 LBS

## 2018-11-21 DIAGNOSIS — Z09 SURGICAL FOLLOWUP VISIT: Primary | ICD-10-CM

## 2018-11-21 PROCEDURE — 99024 POSTOP FOLLOW-UP VISIT: CPT | Performed by: PHYSICIAN ASSISTANT

## 2018-11-21 NOTE — PROGRESS NOTES
[facility] Note   Subjective:  Derik Hamm is here for postoperative visit.  He underwent laparoscopic appendectomy by Dr. Dale.  He was noted to have perforation and abscess (confirmed on pathology) and remained inpt for 6 days postop.  He had transient aFib with his illness and has followed up with his PCP; now weaning off metoprolol.  He was also started on pepcid and sent home with this; taking until he runs out.  He is now over 2 weeks postop, and feels his recovery has been progressing nicely tho still fairly low energy.  Rx/OTC pain medication was used appropriately postop and he has finished his course of Augmentin and Cipro given at [facility].  Postop recovery complications: None.    Today he has minimal discomfort at the umbilical site with activities, tolerating a regular diet, and having regular bowel activity tho notes this is more frequent than pre-op.  Low energy is main concern.  He has returned to work at half days only for now.  Current pain management: none.  Following restrictions outlined by surgeon.    Additional findings on CT in ED:  Mild colonic diverticulosis, probable small hiatal hernia.  Pt made aware of these findings.  No additional imaging needed at this time.  He admits to having some reflux symptoms pre-op and will monitor for symptoms after discontinuance of pepcid to see if these return.  We also discussed consideration of increased fiber in diet due to diverticulosis finding.      Objective:  Abd - soft, non-tender, non-distended  Laparoscopic incisions - healing well, no erythema/bruising, +normal healing ridges/density, no seroma/hematoma noted, no hernia noted    Assessment:  S/p laparoscopic appendectomy. Pathology showed acute appendicitis with perforation and abscess.  CT findings of: Probably small hiatal hernia, mild colonic diverticulosis    Plan:  Derik may continue to slowly advance his activities at this time.  Adding protein supplement may be  helpful to boost energy during his recovery.  General recommendation is to remain at a 30 lbs weight restriction until 3 weeks after surgery.  After that time, he may increase activity as tolerated.  He may continue to utilize OTC pain management options as well as use of ice/heat to sites for comfort.  He should expect progressive resolution of the healing ridge along the incisional sites over the following 2-3 months.  DANNY More is recommended to contact the office if worsening pain, onset of fever/redness at any inc site, or new drainage from the area.  Pt also recommended to call office at any time if ongoing questions/concerns during recovery, but otherwise may follow-up on a prn basis.  Pt is in agreement with this plan.    Nathalia Workman PA-C      Please route or send letter to:  Primary Care Provider (PCP)

## 2018-11-21 NOTE — MR AVS SNAPSHOT
"              After Visit Summary   2018    Derik Hamm    MRN: [id number]           Patient Information     Date Of Birth          1955        Visit Information        Provider Department      2018 3:30 PM Nathalia Workman PA-C [facility] General Surgery      Today's Diagnoses     Surgical followup visit    -  1       Follow-ups after your visit        Follow-up notes from your care team     Return if symptoms worsen or fail to improve.      Who to contact     If you have questions or need follow up information about today's clinic visit or your schedule please contact [facility] directly at [phone].  Normal or non-critical lab and imaging results will be communicated to you by MyChart, letter or phone within 4 business days after the clinic has received the results. If you do not hear from us within 7 days, please contact the clinic through [employer] or phone. If you have a critical or abnormal lab result, we will notify you by phone as soon as possible.  Submit refill requests through [employer] or call your pharmacy and they will forward the refill request to us. Please allow 3 business days for your refill to be completed.          Additional Information About Your Visit        MyChart Information     Klique lets you send messages to your doctor, view your test results, renew your prescriptions, schedule appointments and more. To sign up, go to [email or web address] . Click on \"Log in\" on the left side of the screen, which will take you to the Welcome page. Then click on \"Sign up Now\" on the right side of the page.     You will be asked to enter the access code listed below, as well as some personal information. Please follow the directions to create your username and password.     Your access code is: [email or web address]  Expires: 2019  1:33 PM     Your access code will  in 90 days. If you need help or a " "new code, please call your [facility] clinic or [phone].        Care EveryWhere ID     This is your Care EveryWhere ID. This could be used by other organizations to access your [facility] medical records  [id number]        Your Vitals Were     Respirations Height BMI (Body Mass Index)             16 6' 2\" (1.88 m) 27.09 kg/m2          Blood Pressure from Last 3 Encounters:   11/21/18 (P) 120/80   11/15/18 114/70   11/10/18 (!) 159/95    Weight from Last 3 Encounters:   11/21/18 211 lb (95.7 kg)   11/15/18 211 lb (95.7 kg)   11/06/18 225 lb 12.8 oz (102.4 kg)              Today, you had the following     No orders found for display       Primary Care Provider Office Phone # Fax #    [facility] [phone]       [address] [place] [address]        Equal Access to Services     BYRON HYMAN : Hadii aad ku hadasho Soomaali, waaxda luqadaha, qaybta kaalmada adeegyada, waxay idiin haytawanan laverne hearnaramaury bartlett . So [facility] [phone].    ATENCIÓN: Si habla español, tiene a pink disposición servicios gratuitos de asistencia lingüística. Llame al [phone].    We comply with applicable federal civil rights laws and [place] laws. We do not discriminate on the basis of race, color, national origin, age, disability, sex, sexual orientation, or gender identity.            Thank you!     Thank you for choosing [facility]  for your care. Our goal is always to provide you with excellent care. Hearing back from our patients is one way we can continue to improve our services. Please take a few minutes to complete the written survey that you may receive in the mail after your visit with us. Thank you!             Your Updated Medication List - Protect others around you: Learn how to safely use, store and throw away your medicines at www.disposemymeds.org.          This list is accurate as of 11/21/18  4:09 PM.  Always use your most recent med list.             "       Brand Name Dispense Instructions for use Diagnosis    acetaminophen 325 MG tablet    TYLENOL     Take 325-650 mg by mouth daily as needed for mild pain        ALEVE 220 MG tablet   Generic drug:  naproxen sodium      Take 220 mg by mouth daily as needed for moderate pain        famotidine 20 MG tablet    PEPCID    60 tablet    Take 1 tablet (20 mg) by mouth 2 times daily    Dyspepsia       metoprolol tartrate 50 MG tablet    LOPRESSOR    60 tablet    Take 1 tablet (50 mg) by mouth 2 times daily    PAF (paroxysmal atrial fibrillation) (H)       multivitamin, therapeutic Tabs tablet      Take 1 tablet by mouth daily

## 2018-11-27 ENCOUNTER — TELEPHONE (OUTPATIENT)
Dept: PEDIATRICS | Facility: CLINIC | Age: 63
End: 2018-11-27

## 2018-11-27 NOTE — TELEPHONE ENCOUNTER
Reason for Call:  Form, our goal is to have forms completed with 72 hours, however, some forms may require a visit or additional information.    Type of letter, form or note:  FMLA/Disability    Who is the form from?: Patient    Where did the form come from: Patient or family brought in       What clinic location was the form placed at?: [place]    Where the form was placed: 's Box    What number is listed as a contact on the form?: [phone]       Additional comments: Once completed patient will  forms.    Call taken on 11/27/2018 at 11:46 AM by Holli Guardado

## 2018-12-03 NOTE — TELEPHONE ENCOUNTER
Patient calling to check on status of this form please call him as soon as you can. He needs this completed so that he can get paid. Routing to station B since Dr Razo was the attending provider at last office visit. Dr De León is not in until 12/20.

## 2018-12-04 NOTE — TELEPHONE ENCOUNTER
Patient notified forms completed. He will be picking them up. Copy kept at station [facility]

## 2019-02-04 ENCOUNTER — TELEPHONE (OUTPATIENT)
Dept: PEDIATRICS | Facility: CLINIC | Age: 64
End: 2019-02-04

## 2019-02-04 DIAGNOSIS — Z12.11 SPECIAL SCREENING FOR MALIGNANT NEOPLASMS, COLON: Primary | ICD-10-CM

## 2019-02-04 NOTE — TELEPHONE ENCOUNTER
Reason for Call: Request for an order or referral:    Order or referral being requested: A Colonoscopy     Date needed: at your convenience    Has the patient been seen by the PCP for this problem? NO    Additional comments: The pt is losing his insurance at the end of March and he would like an order to get his colonoscopy a year early if possible.     Phone number Patient can be reached at:  Home number on file [phone] (home)    Best Time:  Anytime    Can we leave a detailed message on this number?  YES    Call taken on 2/4/2019 at 10:15 AM by Mary Molina

## 2019-02-04 NOTE — TELEPHONE ENCOUNTER
Called patient, no answer, no voicemail.     When he calls back, please ask the following and route back to the Triage Pool - When was his last colonoscopy? When is his next due? Insurance doesn't cover an early colonoscopy.

## 2019-02-07 NOTE — TELEPHONE ENCOUNTER
Dr. Razo please review CT scan of November.  Would the finding on the CT scan warrant doing an earlier Colonoscopy?  Order pended for Colonoscopy-Patient will call back after speaking with his insurance to check on coverage.    I spoke with patient and he states that after last Colonoscopy, they told him to repeat this in 10 yrs.  However, had CT scan in November and surgeon had noted on CT scan-The colon is grossly of normal caliber without pericolonic  inflammatory change to suggest acute diverticulitis  Patient understood this would be a reason to do a Colonoscopy.  Patient states that his insurance told him he was due for a colonoscopy in 5 yrs.    I advised patient that he may want to verify with insurance, that they will cover this exam, and he will call them.  He asked to hold on placing the order until he calls us back, unless Dr. Razo thinks this is warranted now.  PANFILO Hair RN

## 2019-02-07 NOTE — TELEPHONE ENCOUNTER
Reason for Call:  Other call back    Detailed comments: Patient called the clinic, following up on his request for referral. Per patient his last colonoscopy was in 2010, and his next is due in 2020. Was still wondering if he could have one early.    Phone Number Patient can be reached at: Per patient we have been calling the wrong number, please call [phone].    Best Time: any    Can we leave a detailed message on this number? YES    Call taken on 2/7/2019 at 2:19 PM by Holli Guardado

## 2019-02-10 NOTE — TELEPHONE ENCOUNTER
Reviewed.  Agree with RN notes.  CT showed findings c/w acute appendicitis, CT findings do not warrant follow-up colonoscopy.   If he would like to proceed with screening colonoscopy before 2020, he will need to check his coverage regarding what his out-of pocket will be.   We can order it if he decides to proceed

## 2019-02-14 NOTE — TELEPHONE ENCOUNTER
The pt is aware and scheduled for his upcoming physical but he is wanting to move forward with the colonoscopy. Please place the order if necessary and call the pt when it's ready. Thank you.   Mary Molina on 2/14/2019 at 1:00 PM

## 2019-02-15 NOTE — TELEPHONE ENCOUNTER
Patient requesting orders for colonoscopy    Order pended for provider approval.      Will call patient once signed and notify of orders placed.  Glo WHITTAKER RN - Triage  [facility]

## 2019-03-14 ENCOUNTER — OFFICE VISIT (OUTPATIENT)
Dept: PEDIATRICS | Facility: CLINIC | Age: 64
End: 2019-03-14
Payer: COMMERCIAL

## 2019-03-14 VITALS
TEMPERATURE: 98.1 F | BODY MASS INDEX: 28.88 KG/M2 | HEIGHT: 74 IN | HEART RATE: 68 BPM | SYSTOLIC BLOOD PRESSURE: 118 MMHG | WEIGHT: 225 LBS | DIASTOLIC BLOOD PRESSURE: 80 MMHG

## 2019-03-14 DIAGNOSIS — L57.0 ACTINIC KERATOSIS: ICD-10-CM

## 2019-03-14 DIAGNOSIS — Z12.12 SCREENING FOR COLORECTAL CANCER: ICD-10-CM

## 2019-03-14 DIAGNOSIS — G89.29 CHRONIC PAIN OF RIGHT KNEE: ICD-10-CM

## 2019-03-14 DIAGNOSIS — M25.561 CHRONIC PAIN OF RIGHT KNEE: ICD-10-CM

## 2019-03-14 DIAGNOSIS — Z00.00 ROUTINE HISTORY AND PHYSICAL EXAMINATION OF ADULT: Primary | ICD-10-CM

## 2019-03-14 DIAGNOSIS — Z13.1 ENCOUNTER FOR SCREENING EXAMINATION FOR IMPAIRED GLUCOSE REGULATION AND DIABETES MELLITUS: ICD-10-CM

## 2019-03-14 DIAGNOSIS — T14.8XXA BRUISING: ICD-10-CM

## 2019-03-14 DIAGNOSIS — Z12.11 SCREENING FOR COLORECTAL CANCER: ICD-10-CM

## 2019-03-14 DIAGNOSIS — Z23 NEED FOR TDAP VACCINATION: ICD-10-CM

## 2019-03-14 DIAGNOSIS — Z13.220 LIPID SCREENING: ICD-10-CM

## 2019-03-14 PROCEDURE — 90715 TDAP VACCINE 7 YRS/> IM: CPT | Performed by: STUDENT IN AN ORGANIZED HEALTH CARE EDUCATION/TRAINING PROGRAM

## 2019-03-14 PROCEDURE — 90471 IMMUNIZATION ADMIN: CPT | Performed by: STUDENT IN AN ORGANIZED HEALTH CARE EDUCATION/TRAINING PROGRAM

## 2019-03-14 PROCEDURE — 99396 PREV VISIT EST AGE 40-64: CPT | Performed by: STUDENT IN AN ORGANIZED HEALTH CARE EDUCATION/TRAINING PROGRAM

## 2019-03-14 ASSESSMENT — ENCOUNTER SYMPTOMS
DIZZINESS: 0
CONSTIPATION: 0
HEADACHES: 0
ABDOMINAL PAIN: 0
HEMATOCHEZIA: 0
WEAKNESS: 0
DYSURIA: 0
HEMATURIA: 0
SORE THROAT: 0
EYE PAIN: 0
PARESTHESIAS: 0
SHORTNESS OF BREATH: 0
CHILLS: 0
NAUSEA: 0
PALPITATIONS: 0
HEARTBURN: 0
MYALGIAS: 0
DIARRHEA: 0
FEVER: 0
FREQUENCY: 0
ARTHRALGIAS: 1
COUGH: 0
NERVOUS/ANXIOUS: 0

## 2019-03-14 ASSESSMENT — MIFFLIN-ST. JEOR: SCORE: 1895.34

## 2019-03-14 NOTE — NURSING NOTE
Screening Questionnaire for Adult Immunization    Are you sick today?   No   Do you have allergies to medications, food, a vaccine component or latex?   No   Have you ever had a serious reaction after receiving a vaccination?   No   Do you have a long-term health problem with heart disease, lung disease, asthma, kidney disease, metabolic disease (e.g. diabetes), anemia, or other blood disorder?   No   Do you have cancer, leukemia, HIV/AIDS, or any other immune system problem?   No   In the past 3 months, have you taken medications that affect  your immune system, such as prednisone, other steroids, or anticancer drugs; drugs for the treatment of rheumatoid arthritis, Crohn s disease, or psoriasis; or have you had radiation treatments?   No   Have you had a seizure, or a brain or other nervous system problem?   No   During the past year, have you received a transfusion of blood or blood     products, or been given immune (gamma) globulin or antiviral drug?   No   For women: Are you pregnant or is there a chance you could become        pregnant during the next month?   No   Have you received any vaccinations in the past 4 weeks?   No     Immunization questionnaire answers were all negative.             Screening performed by Khloe Conrad on 3/14/2019 at 11:12 AM.

## 2019-03-14 NOTE — PATIENT INSTRUCTIONS
Preventive Health Recommendations  Male Ages 50 - 64    Yearly exam:             See your health care provider every year in order to  o   Review health changes.   o   Discuss preventive care.    o   Review your medicines if your doctor has prescribed any.     Have a cholesterol test every 5 years, or more frequently if you are at risk for high cholesterol/heart disease.     Have a diabetes test (fasting glucose) every three years. If you are at risk for diabetes, you should have this test more often.     Have a colonoscopy at age 50, or have a yearly FIT test (stool test). These exams will check for colon cancer.      Talk with your health care provider about whether or not a prostate cancer screening test (PSA) is right for you.    You should be tested each year for STDs (sexually transmitted diseases), if you re at risk.     Shots: Get a flu shot each year. Get a tetanus shot every 10 years.     Nutrition:    Eat at least 5 servings of fruits and vegetables daily.     Eat whole-grain bread, whole-wheat pasta and brown rice instead of white grains and rice.     Get adequate Calcium and Vitamin D.     Lifestyle    Exercise for at least 150 minutes a week (30 minutes a day, 5 days a week). This will help you control your weight and prevent disease.     Limit alcohol to one drink per day.     No smoking.     Wear sunscreen to prevent skin cancer.     See your dentist every six months for an exam and cleaning.     See your eye doctor every 1 to 2 years.  As we discussed, please come back for fasting labs.  Please get the colonoscopy screening done at your convenience.  Also please get STI screening at [facility] at your convenience.  Please call with questions or concerns!      Murali De León MD

## 2019-03-14 NOTE — PROGRESS NOTES
SUBJECTIVE:   CC: Derik Hamm is an 63 year old male who presents for preventative health visit.     Physical   Annual:     Getting at least 3 servings of Calcium per day:  Yes    Bi-annual eye exam:  Yes    Dental care twice a year:  Yes    Sleep apnea or symptoms of sleep apnea:  None    Diet:  Regular (no restrictions)    Frequency of exercise:  6-7 days/week    Duration of exercise:  45-60 minutes    Taking medications regularly:  Yes    Medication side effects:  Not applicable    Additional concerns today:  Yes    PHQ-2 Total Score: 0    Frequently has bruising after trauma.  Not on blood thinners however on alleve daily 220 mg BID.  No bleeding disorders in the family.  No alcohol use.  No smoking history.  Previous colonoscopy in 2010 normal at [facility] per patient.  No desire to go back on PrEP.      Today's PHQ-2 Score:   PHQ-2 ( 1999 Pfizer) 3/14/2019   Q1: Little interest or pleasure in doing things 0   Q2: Feeling down, depressed or hopeless 0   PHQ-2 Score 0   Q1: Little interest or pleasure in doing things Not at all   Q2: Feeling down, depressed or hopeless Not at all   PHQ-2 Score 0       Abuse: Current or Past(Physical, Sexual or Emotional)- No  Do you feel safe in your environment? Yes    Social History     Tobacco Use     Smoking status: Never Smoker     Smokeless tobacco: Never Used   Substance Use Topics     Alcohol use: No     Alcohol Use 3/14/2019   If you drink alcohol do you typically have greater than 3 drinks per day OR greater than 7 drinks per week? No       Last PSA: No results found for: PSA    Reviewed orders with patient. Reviewed health maintenance and updated orders accordingly - Yes      Reviewed and updated as needed this visit by clinical staff  Tobacco  Allergies  Meds         Reviewed and updated as needed this visit by Provider         Review of Systems   Constitutional: Negative for chills and fever.   HENT: Negative for congestion, ear pain, hearing loss and sore  "throat.    Eyes: Negative for pain and visual disturbance.   Respiratory: Negative for cough and shortness of breath.    Cardiovascular: Negative for chest pain and palpitations.   Gastrointestinal: Negative for abdominal pain, constipation, diarrhea, heartburn, hematochezia and nausea.   Genitourinary: Positive for urgency. Negative for discharge, dysuria, frequency, genital sores, hematuria and impotence.   Musculoskeletal: Positive for arthralgias. Negative for myalgias.   Skin: Negative for rash.   Neurological: Negative for dizziness, weakness, headaches and paresthesias.   Psychiatric/Behavioral: Negative for mood changes. The patient is not nervous/anxious.        OBJECTIVE:   /80 (Cuff Size: Adult Large)   Pulse 68   Temp 98.1  F (36.7  C) (Oral)   Ht 1.88 m (6' 2\")   Wt 102.1 kg (225 lb)   BMI 28.89 kg/m      Physical Exam  GENERAL: healthy, alert and no distress.  Making needs known.    EYES: Eyes grossly normal to inspection, PERRL and conjunctivae and sclerae normal  HENT: ear canals and TM's normal, nose and mouth without ulcers or lesions  NECK: no adenopathy, no asymmetry, masses, or scars and thyroid normal to palpation  RESP: lungs clear to auscultation - no rales, rhonchi or wheezes.  Normal rate and effort.    CV: regular rate and rhythm, normal S1 S2, no murmur, click or rub, no peripheral edema and peripheral pulses strong  ABDOMEN: soft, nontender, no hepatosplenomegaly, no masses and bowel sounds normal   (male): normal male genitalia without lesions or urethral discharge.  No urtheral stricture appreciated at orifice.    MS: no gross musculoskeletal defects noted, no edema  SKIN: no suspicious lesions or rashes  NEURO: Normal strength and tone, mentation intact and speech normal  PSYCH: mentation appears normal, affect normal/bright    Diagnostic Test Results:  Lipid panel - pending  BMP - pending  CBC - pending  INR - pending    ASSESSMENT/PLAN:   1. Routine history and physical " "examination of adult  - continued healthy eating, exercising, sleep hygiene and stress reduction      2. Chronic pain of right knee  - RICE  - supportive shoes and PT exercises at home    3. Need for Tdap vaccination  - TDAP, IM (10 - 64 YRS) - Adacel    4. Lipid screening  - Lipid panel reflex to direct LDL Fasting; Future    5. Screening for colorectal cancer  - GASTROENTEROLOGY ADULT REF PROCEDURE ONLY [facility]y [phone]    6. Bruising  Unclear etiology.  No blood thinners, daily NSAIDs.  No known liver disease.  No family history.  No unexpected weight loss.     - **CBC with platelets FUTURE 2mo; Future  - INR    7. Actinic keratosis  Right forehead.    - DERMATOLOGY REFERRAL    8. Encounter for screening examination for impaired glucose regulation and diabetes mellitus  - **Basic metabolic panel FUTURE 2mo; Future    Patient will go to [facility] for STI screening.  Shingrix discussed however cost an issue.      COUNSELING:   Reviewed preventive health counseling, as reflected in patient instructions    BP Readings from Last 1 Encounters:   03/14/19 118/80     Estimated body mass index is 28.89 kg/m  as calculated from the following:    Height as of this encounter: 1.88 m (6' 2\").    Weight as of this encounter: 102.1 kg (225 lb).     reports that  has never smoked. he has never used smokeless tobacco.      Counseling Resources:  ATP IV Guidelines  Pooled Cohorts Equation Calculator  FRAX Risk Assessment  ICSI Preventive Guidelines  Dietary Guidelines for Americans, 2010  USDA's MyPlate  ASA Prophylaxis  Lung CA Screening    The patient was seen and discussed with Dr. Arzola, the attending, who agrees with the plan as stated above.      Murali De León MD  [facility] [place]    Attestation:    I have seen patient and reviewed the documentation from Dr. De León and discussed the findings with Dr. De León. I agree with the documentation of Dr. De León.    Khloe Arzola MD  Internal " Medicine/Pediatrics  [facility]

## 2019-03-15 ENCOUNTER — HOSPITAL ENCOUNTER (OUTPATIENT)
Facility: CLINIC | Age: 64
End: 2019-03-15
Attending: INTERNAL MEDICINE | Admitting: INTERNAL MEDICINE
Payer: COMMERCIAL

## 2019-03-15 DIAGNOSIS — Z13.1 ENCOUNTER FOR SCREENING EXAMINATION FOR IMPAIRED GLUCOSE REGULATION AND DIABETES MELLITUS: ICD-10-CM

## 2019-03-15 DIAGNOSIS — Z13.220 LIPID SCREENING: ICD-10-CM

## 2019-03-15 DIAGNOSIS — T14.8XXA BRUISING: ICD-10-CM

## 2019-03-15 LAB
ANION GAP SERPL CALCULATED.3IONS-SCNC: 6 MMOL/L (ref 3–14)
BUN SERPL-MCNC: 20 MG/DL (ref 7–30)
CALCIUM SERPL-MCNC: 8.8 MG/DL (ref 8.5–10.1)
CHLORIDE SERPL-SCNC: 106 MMOL/L (ref 94–109)
CHOLEST SERPL-MCNC: 110 MG/DL
CO2 SERPL-SCNC: 26 MMOL/L (ref 20–32)
CREAT SERPL-MCNC: 1.1 MG/DL (ref 0.66–1.25)
ERYTHROCYTE [DISTWIDTH] IN BLOOD BY AUTOMATED COUNT: 14.4 % (ref 10–15)
GFR SERPL CREATININE-BSD FRML MDRD: 72 ML/MIN/{1.73_M2}
GLUCOSE SERPL-MCNC: 95 MG/DL (ref 70–99)
HCT VFR BLD AUTO: 51.6 % (ref 40–53)
HDLC SERPL-MCNC: 30 MG/DL
HGB BLD-MCNC: 17.4 G/DL (ref 13.3–17.7)
INR PPP: 1.02 (ref 0.86–1.14)
LDLC SERPL CALC-MCNC: 68 MG/DL
MCH RBC QN AUTO: 30.5 PG (ref 26.5–33)
MCHC RBC AUTO-ENTMCNC: 33.7 G/DL (ref 31.5–36.5)
MCV RBC AUTO: 91 FL (ref 78–100)
NONHDLC SERPL-MCNC: 80 MG/DL
PLATELET # BLD AUTO: 233 10E9/L (ref 150–450)
POTASSIUM SERPL-SCNC: 4.4 MMOL/L (ref 3.4–5.3)
RBC # BLD AUTO: 5.7 10E12/L (ref 4.4–5.9)
SODIUM SERPL-SCNC: 138 MMOL/L (ref 133–144)
TRIGL SERPL-MCNC: 61 MG/DL
WBC # BLD AUTO: 5.2 10E9/L (ref 4–11)

## 2019-03-15 PROCEDURE — 80061 LIPID PANEL: CPT | Performed by: STUDENT IN AN ORGANIZED HEALTH CARE EDUCATION/TRAINING PROGRAM

## 2019-03-15 PROCEDURE — 80048 BASIC METABOLIC PNL TOTAL CA: CPT | Performed by: STUDENT IN AN ORGANIZED HEALTH CARE EDUCATION/TRAINING PROGRAM

## 2019-03-15 PROCEDURE — 36415 COLL VENOUS BLD VENIPUNCTURE: CPT | Performed by: STUDENT IN AN ORGANIZED HEALTH CARE EDUCATION/TRAINING PROGRAM

## 2019-03-15 PROCEDURE — 85027 COMPLETE CBC AUTOMATED: CPT | Performed by: STUDENT IN AN ORGANIZED HEALTH CARE EDUCATION/TRAINING PROGRAM

## 2019-03-15 PROCEDURE — 85610 PROTHROMBIN TIME: CPT | Performed by: STUDENT IN AN ORGANIZED HEALTH CARE EDUCATION/TRAINING PROGRAM

## 2019-04-05 ENCOUNTER — OFFICE VISIT (OUTPATIENT)
Dept: DERMATOLOGY | Facility: CLINIC | Age: 64
End: 2019-04-05
Payer: COMMERCIAL

## 2019-04-05 VITALS — HEART RATE: 83 BPM | DIASTOLIC BLOOD PRESSURE: 76 MMHG | SYSTOLIC BLOOD PRESSURE: 132 MMHG | OXYGEN SATURATION: 97 %

## 2019-04-05 DIAGNOSIS — L57.0 AK (ACTINIC KERATOSIS): Primary | ICD-10-CM

## 2019-04-05 DIAGNOSIS — L82.1 SEBORRHEIC KERATOSIS: ICD-10-CM

## 2019-04-05 DIAGNOSIS — D18.00 ANGIOMA: ICD-10-CM

## 2019-04-05 DIAGNOSIS — L81.4 LENTIGO: ICD-10-CM

## 2019-04-05 DIAGNOSIS — D22.9 NEVUS: ICD-10-CM

## 2019-04-05 PROCEDURE — 17003 DESTRUCT PREMALG LES 2-14: CPT | Performed by: PHYSICIAN ASSISTANT

## 2019-04-05 PROCEDURE — 17000 DESTRUCT PREMALG LESION: CPT | Performed by: PHYSICIAN ASSISTANT

## 2019-04-05 PROCEDURE — 99203 OFFICE O/P NEW LOW 30 MIN: CPT | Mod: 25 | Performed by: PHYSICIAN ASSISTANT

## 2019-04-05 NOTE — LETTER
4/5/2019         RE: Derik Hamm  [address] [place] [address]        Dear Colleague,    Thank you for referring your patient, Derik Hamm, to the [facility]. Please see a copy of my visit note below.    HPI:   Derik Hamm is a 63 year old male who presents for Above waist skin cancer screening.  chief complaint  Last Skin Exam: n/a      1st Baseline: YES  Personal HX of Skin Cancer: none   Personal HX of Malignant Melanoma: none   Family HX of Skin Cancer / Malignant Melanoma: none  Personal HX of Atypical Moles: none  Risk factors: frequent sun exposure  New / Changing lesions: yes, spots on chest and scalp  Social History:  On review of systems, there are no further skin complaints, patient is feeling otherwise well.  See patient intake sheet.  ROS of the following were done and are negative: Constitutional, Eyes, Ears, Nose,   Mouth, Throat, Cardiovascular, Respiratory, GI, Genitourinary, Musculoskeletal,   Psychiatric, Endocrine, Allergic/Immunologic.    This document serves as a record of the services and decisions personally performed and made by Kristi Lyons, MS, PA-C. It was created on her behalf by Nereida Glass, a trained medical scribe. The creation of this document is based on the provider's statements to the medical scribe.  Nereida Glass 1:59 PM April 5, 2019    PHYSICAL EXAM:   /76   Pulse 83   SpO2 97%   Skin exam performed as follows: Type 2 skin. Mood appropriate  Alert and Oriented X 3. Well developed, well nourished in no distress.  General appearance: Normal  Head including face: Normal  Eyes: conjunctiva and lids: Normal  Mouth: Lips, teeth, gums: Normal  Neck: Normal  Chest-breast/axillae: Normal  Back: Normal  Spleen and liver: Normal  Cardiovascular: Exam of peripheral vascular system by observation for swelling, varicosities, edema: Normal  Genitalia: groin, buttocks: Normal  Extremities:  digits/nails (clubbing): Normal  Eccrine and Apocrine glands: Normal  Right upper extremity: Normal  Left upper extremity: Normal  Right lower extremity: Normal  Left lower extremity: Normal  Skin: Scalp and body hair: See below    Pt deferred exam of breasts, groin, buttocks: No    Other physical findings:  1. Multiple pigmented macules on extremities and trunk  2. Multiple pigmented macules on face, trunk and extremities  3. Multiple vascular papules on trunk, arms and legs  4. Multiple scattered keratotic plaques  5. Pink scaly papules located on central chest x 6 and mid forehead x 1    6. Smooth subcutaneous nodule located on left 3rd digit       Except as noted above, no other signs of skin cancer or melanoma.     ASSESSMENT/PLAN:   Benign Above Waist skin cancer screening today.      Patient with history of none  Advised on monthly self exams and 1 year  Patient Education: Appropriate brochures given.    Multiple benign appearing nevi on arms, legs and trunk. Discussed ABCDEs of melanoma and sunscreen.   Multiple lentigos on arms, legs and trunk. Advised benign, no treatment needed.  Multiple scattered angiomas. Advised benign, no treatment needed.   Seborrheic keratosis on arms, legs and trunk. Advised benign, no treatment needed.  Actinic keratosis on central chest x 6 and mid forehead x 1. As precancerous, cryosurgery performed. Advised on blistering and post-op care. Advised if not resolved in 1-2 months to return for evaluation  Digital mucous cyst on the left 3rd digit- advised benign, no treatment needed.       Follow-up: yearly FSE/PRN sooner    1.) Patient was asked about new and changing moles. YES  2.) Patient received a complete physical skin examination: YES  3.) Patient was counseled to perform a monthly self skin examination: YES  Scribed By: Nereida Glass, Medical Scribe    The information in this document, created by the medical scribe for me, accurately reflects the services I  personally performed and the decisions made by me. I have reviewed and approved this document for accuracy prior to leaving the patient care area.  April 5, 2019 2:06 PM    Kristi Lyons MS, PAISAEL        Again, thank you for allowing me to participate in the care of your patient.        Sincerely,        Kristi Lyons PA-C

## 2019-04-05 NOTE — PROGRESS NOTES
HPI:   Derik Hamm is a 63 year old male who presents for Above waist skin cancer screening.  chief complaint  Last Skin Exam: n/a      1st Baseline: YES  Personal HX of Skin Cancer: none   Personal HX of Malignant Melanoma: none   Family HX of Skin Cancer / Malignant Melanoma: none  Personal HX of Atypical Moles: none  Risk factors: frequent sun exposure  New / Changing lesions: yes, spots on chest and scalp  Social History:  On review of systems, there are no further skin complaints, patient is feeling otherwise well.  See patient intake sheet.  ROS of the following were done and are negative: Constitutional, Eyes, Ears, Nose,   Mouth, Throat, Cardiovascular, Respiratory, GI, Genitourinary, Musculoskeletal,   Psychiatric, Endocrine, Allergic/Immunologic.    This document serves as a record of the services and decisions personally performed and made by Kristi Lyons, MS, PA-C. It was created on her behalf by Nereida Glass, a trained medical scribe. The creation of this document is based on the provider's statements to the medical scribe.  Nereida Glass 1:59 PM April 5, 2019    PHYSICAL EXAM:   /76   Pulse 83   SpO2 97%   Skin exam performed as follows: Type 2 skin. Mood appropriate  Alert and Oriented X 3. Well developed, well nourished in no distress.  General appearance: Normal  Head including face: Normal  Eyes: conjunctiva and lids: Normal  Mouth: Lips, teeth, gums: Normal  Neck: Normal  Chest-breast/axillae: Normal  Back: Normal  Spleen and liver: Normal  Cardiovascular: Exam of peripheral vascular system by observation for swelling, varicosities, edema: Normal  Genitalia: groin, buttocks: Normal  Extremities: digits/nails (clubbing): Normal  Eccrine and Apocrine glands: Normal  Right upper extremity: Normal  Left upper extremity: Normal  Right lower extremity: Normal  Left lower extremity: Normal  Skin: Scalp and body hair: See below    Pt deferred exam of breasts,  groin, buttocks: No    Other physical findings:  1. Multiple pigmented macules on extremities and trunk  2. Multiple pigmented macules on face, trunk and extremities  3. Multiple vascular papules on trunk, arms and legs  4. Multiple scattered keratotic plaques  5. Pink scaly papules located on central chest x 6 and mid forehead x 1    6. Smooth subcutaneous nodule located on left 3rd digit       Except as noted above, no other signs of skin cancer or melanoma.     ASSESSMENT/PLAN:   Benign Above Waist skin cancer screening today.      Patient with history of none  Advised on monthly self exams and 1 year  Patient Education: Appropriate brochures given.    Multiple benign appearing nevi on arms, legs and trunk. Discussed ABCDEs of melanoma and sunscreen.   Multiple lentigos on arms, legs and trunk. Advised benign, no treatment needed.  Multiple scattered angiomas. Advised benign, no treatment needed.   Seborrheic keratosis on arms, legs and trunk. Advised benign, no treatment needed.  Actinic keratosis on central chest x 6 and mid forehead x 1. As precancerous, cryosurgery performed. Advised on blistering and post-op care. Advised if not resolved in 1-2 months to return for evaluation  Digital mucous cyst on the left 3rd digit- advised benign, no treatment needed.       Follow-up: yearly FSE/PRN sooner    1.) Patient was asked about new and changing moles. YES  2.) Patient received a complete physical skin examination: YES  3.) Patient was counseled to perform a monthly self skin examination: YES  Scribed By: Nereida Glass, Medical Scribe    The information in this document, created by the medical scribe for me, accurately reflects the services I personally performed and the decisions made by me. I have reviewed and approved this document for accuracy prior to leaving the patient care area.  April 5, 2019 2:06 PM    Kristi Lyons MS, PAKarenC

## 2019-10-24 ENCOUNTER — APPOINTMENT (OUTPATIENT)
Dept: OPTOMETRY | Facility: CLINIC | Age: 64
End: 2019-10-24
Payer: COMMERCIAL

## 2019-10-24 PROCEDURE — 92341 FIT SPECTACLES BIFOCAL: CPT | Performed by: OPHTHALMOLOGY

## 2020-11-02 ENCOUNTER — TRANSFERRED RECORDS (OUTPATIENT)
Dept: HEALTH INFORMATION MANAGEMENT | Facility: CLINIC | Age: 65
End: 2020-11-02

## 2020-11-04 ENCOUNTER — TRANSFERRED RECORDS (OUTPATIENT)
Dept: HEALTH INFORMATION MANAGEMENT | Facility: CLINIC | Age: 65
End: 2020-11-04

## 2020-11-05 ENCOUNTER — OFFICE VISIT (OUTPATIENT)
Dept: OPHTHALMOLOGY | Facility: CLINIC | Age: 65
End: 2020-11-05
Attending: OPHTHALMOLOGY
Payer: COMMERCIAL

## 2020-11-05 DIAGNOSIS — H16.011 CENTRAL CORNEAL ULCER OF RIGHT EYE: Primary | ICD-10-CM

## 2020-11-05 PROCEDURE — 87186 SC STD MICRODIL/AGAR DIL: CPT | Performed by: STUDENT IN AN ORGANIZED HEALTH CARE EDUCATION/TRAINING PROGRAM

## 2020-11-05 PROCEDURE — G0463 HOSPITAL OUTPT CLINIC VISIT: HCPCS

## 2020-11-05 PROCEDURE — 87529 HSV DNA AMP PROBE: CPT | Performed by: STUDENT IN AN ORGANIZED HEALTH CARE EDUCATION/TRAINING PROGRAM

## 2020-11-05 PROCEDURE — 87015 SPECIMEN INFECT AGNT CONCNTJ: CPT | Performed by: STUDENT IN AN ORGANIZED HEALTH CARE EDUCATION/TRAINING PROGRAM

## 2020-11-05 PROCEDURE — 99203 OFFICE O/P NEW LOW 30 MIN: CPT | Mod: 25 | Performed by: OPHTHALMOLOGY

## 2020-11-05 PROCEDURE — 87081 CULTURE SCREEN ONLY: CPT | Performed by: STUDENT IN AN ORGANIZED HEALTH CARE EDUCATION/TRAINING PROGRAM

## 2020-11-05 PROCEDURE — 92132 CPTRZD OPH DX IMG ANT SGM: CPT | Performed by: OPHTHALMOLOGY

## 2020-11-05 PROCEDURE — 87070 CULTURE OTHR SPECIMN AEROBIC: CPT | Performed by: STUDENT IN AN ORGANIZED HEALTH CARE EDUCATION/TRAINING PROGRAM

## 2020-11-05 PROCEDURE — 87102 FUNGUS ISOLATION CULTURE: CPT | Performed by: STUDENT IN AN ORGANIZED HEALTH CARE EDUCATION/TRAINING PROGRAM

## 2020-11-05 PROCEDURE — 87207 SMEAR SPECIAL STAIN: CPT | Mod: TC | Performed by: STUDENT IN AN ORGANIZED HEALTH CARE EDUCATION/TRAINING PROGRAM

## 2020-11-05 PROCEDURE — 65430 CORNEAL SMEAR: CPT | Performed by: OPHTHALMOLOGY

## 2020-11-05 PROCEDURE — 87077 CULTURE AEROBIC IDENTIFY: CPT | Performed by: STUDENT IN AN ORGANIZED HEALTH CARE EDUCATION/TRAINING PROGRAM

## 2020-11-05 PROCEDURE — 87075 CULTR BACTERIA EXCEPT BLOOD: CPT | Performed by: STUDENT IN AN ORGANIZED HEALTH CARE EDUCATION/TRAINING PROGRAM

## 2020-11-05 PROCEDURE — 92285 EXTERNAL OCULAR PHOTOGRAPHY: CPT | Performed by: OPHTHALMOLOGY

## 2020-11-05 RX ORDER — VALACYCLOVIR HYDROCHLORIDE 500 MG/1
TABLET, FILM COATED ORAL
COMMUNITY
Start: 2020-11-02 | End: 2020-11-24

## 2020-11-05 RX ORDER — MOXIFLOXACIN 5 MG/ML
1 SOLUTION/ DROPS OPHTHALMIC 4 TIMES DAILY
Qty: 3 ML | Refills: 1 | Status: SHIPPED | OUTPATIENT
Start: 2020-11-05 | End: 2020-11-24

## 2020-11-05 ASSESSMENT — VISUAL ACUITY
METHOD: SNELLEN - LINEAR
OD_CC: 20/200
CORRECTION_TYPE: GLASSES
OS_CC: 20/30

## 2020-11-05 ASSESSMENT — EXTERNAL EXAM - RIGHT EYE: OD_EXAM: NORMAL; NO RASH

## 2020-11-05 ASSESSMENT — CONF VISUAL FIELD
OS_NORMAL: 1
OD_NORMAL: 1
METHOD: COUNTING FINGERS

## 2020-11-05 ASSESSMENT — EXTERNAL EXAM - LEFT EYE: OS_EXAM: NORMAL

## 2020-11-05 ASSESSMENT — TONOMETRY
OD_IOP_MMHG: 11
OS_IOP_MMHG: 14
IOP_METHOD: ICARE

## 2020-11-05 ASSESSMENT — SLIT LAMP EXAM - LIDS: COMMENTS: MGD

## 2020-11-05 NOTE — PATIENT INSTRUCTIONS
1. Start PHMB 0.2 mg/mL every hour, including overnight, in the RIGHT eye. Tomorrow night, you can use every 2 hours but still use every hour during the day until follow up.    [address], [place], [place]    2. Continue Vigamox (tan cap) 1 drop 4x/day in the RIGHT eye    3. Continue Valtrex 500 mg by mouth 3x/day    4. STOP Prednisolone (white or pink cap)

## 2020-11-05 NOTE — NURSING NOTE
Chief Complaints and History of Present Illnesses   Patient presents with     Corneal Evaluation     Chief Complaint(s) and History of Present Illness(es)     Corneal Evaluation     Laterality: right eye    Onset: months ago    Quality: va is cloudy or of film over the eye      Associated symptoms: redness, tearing and eye pain (sore around the lids) (+swollen lids  )    Treatments tried: eye drops    Pain scale: 0/10              Comments     Here for Keratoconjuctivitis in the RE that started about 4 weeks ago.  Juliana Duvall COT 12:46 PM November 5, 2020

## 2020-11-05 NOTE — LETTER
11/5/2020      RE: Derik Hamm  [address] [place] [address]       CC: Keratoconjunctivitis, OD    Referring provider: Dr. Rosie Han    HPI:  Derik Hamm is a(n) 65 year old male who presents for evaluation of persistent keratoconjunctivitis, OD, since 10/5/2020. Initially seen by Dr. Ngo at [facility] and felt it was related to severe dryness vs bacterial keratitis, so he was started on PFATs and Vigamox Q2H WA. Patient was seen frequently for follow up; he was started on Valtrex 500 mg TID on 10/9/20 and has been on that since then. Despite this therapy, pt failed to improve so he was started prednisolone. He has had a waxing and waning course, but more recently has gradually declined. Stopped PF on 11/2 after running out. He was seen by Dr. Ngo on 11/2 who noted worsening VA and worsening exam, so the patient was referred to Dr. Han, whom he saw 11/4/20. Dr. Han referred to our clinic due to concerns for acanthamoeba keratitis.     POHx:  PVD OS  Hyperopia OU  Presbyopia OU    Glasses: Yes  CTL wearer: yes - none x1.5 months; wears while showering or in the sauna; he sleeps in them once every 3-4 weeks; never wears them in a lake or pool.     Family hx of eye disease: negative for glaucoma/macular degeneration    Social Hx: (-) smoking, (-) EtOH, (-) illicit drugs    Meds:  Vigamox Q2H WA (started 10/5/2020)   - Reduced to QID 10/9/20   - Reduced to BID 10/13/20   - Increased to QID 10/16/20  PFATS Q2H (started 10/5/2020)  Erythromycin vanessa (started 10/6/2020)  Valtrex 500g TID (started 10/9/20)  Prednisolone acetate 1% QID (started 10/13/20)   - Ran out 11/2/20 - has not refilled  Valtrex 500g TID (started 10/9/20)      Surgical Hx:  No eye surgery    A/P:  # Infectious keratitis, OD   - Symptoms started 10/5/2020   - DDx: acanthamoeba (most likely); herpetic, less likely bacterial    - Acanthamoeba risks: sleeps in CTL, wears in shower   - Cultures obtained 11/05/20, including  acanthamoeba   - Confocal: many PMNs, no definite acanthamoeba (but could be masked by deeper infection, no fungal elements    - Due to high suspicion for acanthamoeba based on appearance of infiltrate, will start PHMB.    - Start PHMB 0.2 mg/mL Q1H around the clock x24 hours, then Q1H WA, Q2H overnight - Disc. Non-fda approved medication.   - Discussed non-FDA use of PHMB.   - STOP Prednisolone acetate   - Continue Vigamox QID OD   - Continue Valtrex to 500 mg PO TID    - CTL holiday    - Bring CTL case in next visit for culture      Follow up:   -- Monday, 11/9/20, for VT, possible SLP - call if sx worsen    Andrea Jules MD  Ophthalmology Resident - PGY3    Attending Physician Attestation:  Complete documentation of historical and exam elements from today's encounter can be found in the full encounter summary report (not reduplicated in this progress note).  I personally obtained the chief complaint(s) and history of present illness.  I confirmed and edited as necessary the review of systems, past medical/surgical history, family history, social history, and examination findings as documented by others; and I examined the patient myself.  I personally reviewed the relevant tests, images, and reports as documented above.  I formulated and edited as necessary the assessment and plan and discussed the findings and management plan with the patient and family. - MD Anson Mendoza MD

## 2020-11-05 NOTE — PROGRESS NOTES
CC: Keratoconjunctivitis, OD    Referring provider: Dr. Rosie Han    HPI:  Derik Hamm is a(n) 65 year old male who presents for evaluation of persistent keratoconjunctivitis, OD, since 10/5/2020. Initially seen by Dr. Ngo at [facility] and felt it was related to severe dryness vs bacterial keratitis, so he was started on PFATs and Vigamox Q2H WA. Patient was seen frequently for follow up; he was started on Valtrex 500 mg TID on 10/9/20 and has been on that since then. Despite this therapy, pt failed to improve so he was started prednisolone. He has had a waxing and waning course, but more recently has gradually declined. Stopped PF on 11/2 after running out. He was seen by Dr. Ngo on 11/2 who noted worsening VA and worsening exam, so the patient was referred to Dr. Han, whom he saw 11/4/20. Dr. Han referred to our clinic due to concerns for acanthamoeba keratitis.     POHx:  PVD OS  Hyperopia OU  Presbyopia OU    Glasses: Yes  CTL wearer: yes - none x1.5 months; wears while showering or in the sauna; he sleeps in them once every 3-4 weeks; never wears them in a lake or pool.     Family hx of eye disease: negative for glaucoma/macular degeneration    Social Hx: (-) smoking, (-) EtOH, (-) illicit drugs    Meds:  Vigamox Q2H WA (started 10/5/2020)   - Reduced to QID 10/9/20   - Reduced to BID 10/13/20   - Increased to QID 10/16/20  PFATS Q2H (started 10/5/2020)  Erythromycin vanessa (started 10/6/2020)  Valtrex 500g TID (started 10/9/20)  Prednisolone acetate 1% QID (started 10/13/20)   - Ran out 11/2/20 - has not refilled  Valtrex 500g TID (started 10/9/20)      Surgical Hx:  No eye surgery    A/P:  # Infectious keratitis, OD   - Symptoms started 10/5/2020   - DDx: acanthamoeba (most likely); herpetic, less likely bacterial    - Acanthamoeba risks: sleeps in CTL, wears in shower   - Cultures obtained 11/05/20, including acanthamoeba   - Confocal: many PMNs, no definite acanthamoeba (but could be  masked by deeper infection, no fungal elements    - Due to high suspicion for acanthamoeba based on appearance of infiltrate, will start PHMB.    - Start PHMB 0.2 mg/mL Q1H around the clock x24 hours, then Q1H WA, Q2H overnight - Disc. Non-fda approved medication.   - Discussed non-FDA use of PHMB.   - STOP Prednisolone acetate   - Continue Vigamox QID OD   - Continue Valtrex to 500 mg PO TID    - CTL holiday    - Bring CTL case in next visit for culture      Follow up:   -- Monday, 11/9/20, for VT, possible SLP - call if sx worsen    Andrea Jules MD  Ophthalmology Resident - PGY3    Attending Physician Attestation:  Complete documentation of historical and exam elements from today's encounter can be found in the full encounter summary report (not reduplicated in this progress note).  I personally obtained the chief complaint(s) and history of present illness.  I confirmed and edited as necessary the review of systems, past medical/surgical history, family history, social history, and examination findings as documented by others; and I examined the patient myself.  I personally reviewed the relevant tests, images, and reports as documented above.  I formulated and edited as necessary the assessment and plan and discussed the findings and management plan with the patient and family. - Anson Cheng MD     Attending Physician Attestation: I was present for the key portions of the procedure and immediately  available for the remainder. - Anson Cheng MD    I personally reviewed the ophthalmic test(s) associated with this encounter, agree with the interpretation(s) as documented by the resident/fellow, and have edited the corresponding report(s) as necessary. Anson Cheng MD

## 2020-11-06 LAB
BACTERIA SPEC CULT: ABNORMAL
BACTERIA SPEC CULT: ABNORMAL
PARASITE SPEC INSPECT: NORMAL
PARASITE SPEC INSPECT: NORMAL
SPECIMEN SOURCE: ABNORMAL
SPECIMEN SOURCE: NORMAL

## 2020-11-09 ENCOUNTER — OFFICE VISIT (OUTPATIENT)
Dept: OPHTHALMOLOGY | Facility: CLINIC | Age: 65
End: 2020-11-09
Attending: STUDENT IN AN ORGANIZED HEALTH CARE EDUCATION/TRAINING PROGRAM
Payer: COMMERCIAL

## 2020-11-09 DIAGNOSIS — H04.121 DRY EYE SYNDROME OF RIGHT EYE: ICD-10-CM

## 2020-11-09 DIAGNOSIS — H16.011 CENTRAL CORNEAL ULCER OF RIGHT EYE: ICD-10-CM

## 2020-11-09 DIAGNOSIS — B60.13 ACANTHAMOEBA KERATITIS: Primary | ICD-10-CM

## 2020-11-09 PROCEDURE — G0463 HOSPITAL OUTPT CLINIC VISIT: HCPCS

## 2020-11-09 PROCEDURE — 92285 EXTERNAL OCULAR PHOTOGRAPHY: CPT | Performed by: STUDENT IN AN ORGANIZED HEALTH CARE EDUCATION/TRAINING PROGRAM

## 2020-11-09 PROCEDURE — 99213 OFFICE O/P EST LOW 20 MIN: CPT | Performed by: STUDENT IN AN ORGANIZED HEALTH CARE EDUCATION/TRAINING PROGRAM

## 2020-11-09 ASSESSMENT — VISUAL ACUITY
OS_CC: 20/30
OS_PH_CC: 20/30
CORRECTION_TYPE: GLASSES
OD_CC: 20/500
METHOD: SNELLEN - LINEAR
OS_CC+: -2

## 2020-11-09 ASSESSMENT — TONOMETRY
OS_IOP_MMHG: 21
IOP_METHOD: ICARE
OD_IOP_MMHG: 15

## 2020-11-09 ASSESSMENT — EXTERNAL EXAM - LEFT EYE: OS_EXAM: NORMAL

## 2020-11-09 ASSESSMENT — EXTERNAL EXAM - RIGHT EYE: OD_EXAM: NORMAL; NO RASH

## 2020-11-09 ASSESSMENT — CONF VISUAL FIELD
OD_INFERIOR_NASAL_RESTRICTION: 3
OD_SUPERIOR_NASAL_RESTRICTION: 1
OS_SUPERIOR_NASAL_RESTRICTION: 3
OD_SUPERIOR_TEMPORAL_RESTRICTION: 1

## 2020-11-09 ASSESSMENT — SLIT LAMP EXAM - LIDS: COMMENTS: MGD

## 2020-11-09 NOTE — LETTER
11/9/2020      RE: Derik Hamm  [address] [place] [address]       CC: Keratoconjunctivitis, OD    Referring provider: Dr. Rosie Han    HPI:  Derik Hamm is a(n) 65 year old male who presents for evaluation of persistent keratoconjunctivitis, OD, since 10/5/2020. Initially seen by Dr. Ngo at [facility] and felt it was related to severe dryness vs bacterial keratitis, so he was started on PFATs and Vigamox Q2H WA. Patient was seen frequently for follow up; he was started on Valtrex 500 mg TID on 10/9/20 and has been on that since then. Despite this therapy, pt failed to improve so he was started prednisolone. He has had a waxing and waning course, but more recently has gradually declined. Stopped PF on 11/2 after running out. He was seen by Dr. Ngo on 11/2 who noted worsening VA and worsening exam, so the patient was referred to Dr. Han, whom he saw 11/4/20. Dr. Han referred to our clinic due to concerns for acanthamoeba keratitis.     POHx:  PVD OS  Hyperopia OU  Presbyopia OU    Glasses: Yes  CTL wearer: yes - none x1.5 months; wears while showering or in the sauna; he sleeps in them once every 3-4 weeks; never wears them in a lake or pool.     Family hx of eye disease: negative for glaucoma/macular degeneration    Social Hx: (-) smoking, (-) EtOH, (-) illicit drugs    Meds:  Vigamox Q2H WA (started 10/5/2020)   - Reduced to QID 10/9/20   - Reduced to BID 10/13/20   - Increased to QID 10/16/20  PFATS Q2H (started 10/5/2020)  Erythromycin vanessa (started 10/6/2020)  Valtrex 500g TID (started 10/9/20)  Prednisolone acetate 1% QID (started 10/13/20)   - Ran out 11/2/20 - has not refilled  Valtrex 500g TID (started 10/9/20)      Surgical Hx:  No eye surgery    A/P:  # Acathamoeba keratitis, OD   - Symptoms started 10/5/2020   - DDx: acanthamoeba (most likely); herpetic, less likely bacterial    - Acanthamoeba risks: sleeps in CTL, wears in shower   - Cultures obtained 11/05/20, including  acanthamoeba. Culture positive for Acanthamoeba.   - Confocal: many PMNs, no definite acanthamoeba (but could be masked by deeper infection, no fungal elements   Plan:   - Cont PHMB 0.2 mg/mL Q1H during the day, and every 3 hours overnight   - Continue Vigamox QID right eye   - Begin Emycin vanessa once at nighttime   - STOP Valtrex    - STOP Prednisolone acetate      Follow up:  Friday 11/13    Jordyn Holguin MD  Cornea & External Disease Fellow    Attending Physician Attestation:  Complete documentation of historical and exam elements from today's encounter can be found in the full encounter summary report (not reduplicated in this progress note).  I personally obtained the chief complaint(s) and history of present illness.  I confirmed and edited as necessary the review of systems, past medical/surgical history, family history, social history, and examination findings as documented by others; and I examined the patient myself.  I personally reviewed the relevant ophthalmic tests, images, and reports as documented above (if applicable). I agree with the interpretation(s) as documented by the resident and have edited the corresponding report(s) as necessary. I formulated and edited as necessary the assessment and plan and discussed the findings and management plan with the patient and family.     MD Jordyn Jama MD

## 2020-11-09 NOTE — PROGRESS NOTES
CC: Keratoconjunctivitis, OD    Referring provider: Dr. Rosie Han    HPI:  Derik Hamm is a(n) 65 year old male who presents for evaluation of persistent keratoconjunctivitis, OD, since 10/5/2020. Initially seen by Dr. Ngo at [facility] and felt it was related to severe dryness vs bacterial keratitis, so he was started on PFATs and Vigamox Q2H WA. Patient was seen frequently for follow up; he was started on Valtrex 500 mg TID on 10/9/20 and has been on that since then. Despite this therapy, pt failed to improve so he was started prednisolone. He has had a waxing and waning course, but more recently has gradually declined. Stopped PF on 11/2 after running out. He was seen by Dr. Ngo on 11/2 who noted worsening VA and worsening exam, so the patient was referred to Dr. Han, whom he saw 11/4/20. Dr. Han referred to our clinic due to concerns for acanthamoeba keratitis.     POHx:  PVD OS  Hyperopia OU  Presbyopia OU    Glasses: Yes  CTL wearer: yes - none x1.5 months; wears while showering or in the sauna; he sleeps in them once every 3-4 weeks; never wears them in a lake or pool.     Family hx of eye disease: negative for glaucoma/macular degeneration    Social Hx: (-) smoking, (-) EtOH, (-) illicit drugs    Meds:  Vigamox Q2H WA (started 10/5/2020)   - Reduced to QID 10/9/20   - Reduced to BID 10/13/20   - Increased to QID 10/16/20  PFATS Q2H (started 10/5/2020)  Erythromycin vanessa (started 10/6/2020)  Valtrex 500g TID (started 10/9/20)  Prednisolone acetate 1% QID (started 10/13/20)   - Ran out 11/2/20 - has not refilled  Valtrex 500g TID (started 10/9/20)      Surgical Hx:  No eye surgery    A/P:  # Acathamoeba keratitis, OD   - Symptoms started 10/5/2020   - DDx: acanthamoeba (most likely); herpetic, less likely bacterial    - Acanthamoeba risks: sleeps in CTL, wears in shower   - Cultures obtained 11/05/20, including acanthamoeba. Culture positive for Acanthamoeba.   - Confocal: many PMNs, no  definite acanthamoeba (but could be masked by deeper infection, no fungal elements   Plan:   - Cont PHMB 0.2 mg/mL Q1H during the day, and every 3 hours overnight   - Continue Vigamox QID right eye   - Begin Emycin vanessa once at nighttime   - STOP Valtrex    - STOP Prednisolone acetate      Follow up:  Friday 11/13    Jordyn Holguin MD  Cornea & External Disease Fellow    Attending Physician Attestation:  Complete documentation of historical and exam elements from today's encounter can be found in the full encounter summary report (not reduplicated in this progress note).  I personally obtained the chief complaint(s) and history of present illness.  I confirmed and edited as necessary the review of systems, past medical/surgical history, family history, social history, and examination findings as documented by others; and I examined the patient myself.  I personally reviewed the relevant ophthalmic tests, images, and reports as documented above (if applicable). I agree with the interpretation(s) as documented by the resident and have edited the corresponding report(s) as necessary. I formulated and edited as necessary the assessment and plan and discussed the findings and management plan with the patient and family.     Jordyn Holguin MD

## 2020-11-09 NOTE — NURSING NOTE
Chief Complaints and History of Present Illnesses   Patient presents with     Follow Up     Acanthamoeba  right eye     Chief Complaint(s) and History of Present Illness(es)     Follow Up     Laterality: right eye    Onset: 6 weeks ago    Quality: blurred vision    Course: stable    Associated symptoms: eye pain, photophobia, tearing and redness    Treatments tried: eye drops    Pain scale: 4/10    Comments: Acanthamoeba  right eye              Comments     He states that his vision has seemed stable in his right eye.  He is only able to see shadows with his right eye.  He is taking two eye drops-an antibiotic and the polyhexamethylene biguanide as directed.    Lisa Moe, COT 7:39 AM  November 9, 2020

## 2020-11-09 NOTE — PATIENT INSTRUCTIONS
1. Continue PHMB 0.2 mg/mL every hour while awake, and every 3 hours overnight.    [address], [place] for refills    2. Continue Vigamox (tan cap) 1 drop 4x/day in the RIGHT eye    3. Begin erythromycin ointment at bedtime (one dose)      STOP Valtrex pills    STOP Prednisolone (white or pink cap)

## 2020-11-10 ENCOUNTER — TELEPHONE (OUTPATIENT)
Dept: OPHTHALMOLOGY | Facility: CLINIC | Age: 65
End: 2020-11-10

## 2020-11-10 NOTE — TELEPHONE ENCOUNTER
Tried calling back but incorrect number was given. 11/13/20 at 1:00 PM. Patient with new dx of Acanthamoeba, so viral lab testing less pertinent at this time.    Jordyn Holguin MD  Cornea & External Disease Fellow          [facility]    Phone Message    May a detailed message be left on voicemail: yes     Reason for Call: Other: Nettie from the infectious disease lab called to inform that two labs ordered for Pt from 11/05 visit could not be completed, apparently due to not recieving the samples. This is for the HSV and Varicella Zoster tests. Please call back the lab with any questions, [phone]. Thanks!     Action Taken: Message routed to:  Other: [facility]    Travel Screening: Not Applicable

## 2020-11-11 LAB
BACTERIA SPEC CULT: ABNORMAL
BACTERIA SPEC CULT: ABNORMAL
SPECIMEN SOURCE: ABNORMAL

## 2020-11-12 LAB
BACTERIA SPEC CULT: NORMAL
Lab: NORMAL
SPECIMEN SOURCE: NORMAL

## 2020-11-13 ENCOUNTER — OFFICE VISIT (OUTPATIENT)
Dept: OPHTHALMOLOGY | Facility: CLINIC | Age: 65
End: 2020-11-13
Attending: STUDENT IN AN ORGANIZED HEALTH CARE EDUCATION/TRAINING PROGRAM
Payer: COMMERCIAL

## 2020-11-13 DIAGNOSIS — B60.13 ACANTHAMOEBA KERATITIS: ICD-10-CM

## 2020-11-13 DIAGNOSIS — H04.121 DRY EYE SYNDROME OF RIGHT EYE: ICD-10-CM

## 2020-11-13 DIAGNOSIS — H16.011 CENTRAL CORNEAL ULCER OF RIGHT EYE: Primary | ICD-10-CM

## 2020-11-13 DIAGNOSIS — B60.13 ACANTHAMOEBA KERATITIS: Primary | ICD-10-CM

## 2020-11-13 DIAGNOSIS — H16.011 CENTRAL CORNEAL ULCER OF RIGHT EYE: ICD-10-CM

## 2020-11-13 PROCEDURE — 99213 OFFICE O/P EST LOW 20 MIN: CPT | Performed by: STUDENT IN AN ORGANIZED HEALTH CARE EDUCATION/TRAINING PROGRAM

## 2020-11-13 PROCEDURE — G0463 HOSPITAL OUTPT CLINIC VISIT: HCPCS

## 2020-11-13 PROCEDURE — 92285 EXTERNAL OCULAR PHOTOGRAPHY: CPT | Performed by: STUDENT IN AN ORGANIZED HEALTH CARE EDUCATION/TRAINING PROGRAM

## 2020-11-13 ASSESSMENT — VISUAL ACUITY
OD_CC: 20/200
METHOD: SNELLEN - LINEAR
OS_CC: 20/20
CORRECTION_TYPE: GLASSES
OS_CC+: -2

## 2020-11-13 ASSESSMENT — SLIT LAMP EXAM - LIDS
COMMENTS: PROTECTIVE PTOSIS, MGD
COMMENTS: MGD

## 2020-11-13 ASSESSMENT — CONF VISUAL FIELD
OS_NORMAL: 1
OD_NORMAL: 1

## 2020-11-13 ASSESSMENT — EXTERNAL EXAM - RIGHT EYE: OD_EXAM: NORMAL; NO RASH

## 2020-11-13 ASSESSMENT — EXTERNAL EXAM - LEFT EYE: OS_EXAM: NORMAL

## 2020-11-13 ASSESSMENT — TONOMETRY
IOP_METHOD: ICARE
OS_IOP_MMHG: 20
OD_IOP_MMHG: 15

## 2020-11-13 NOTE — PROGRESS NOTES
CC: Keratoconjunctivitis, OD    Referring provider: Dr. Rosie Han    HPI:  Derik Hamm is a(n) 65 year old male who presents for evaluation of persistent keratoconjunctivitis, OD, since 10/5/2020. Initially seen by Dr. Ngo at [facility] and felt it was related to severe dryness vs bacterial keratitis, so he was started on PFATs and Vigamox Q2H WA. Patient was seen frequently for follow up; he was started on Valtrex 500 mg TID on 10/9/20 and has been on that since then. Despite this therapy, pt failed to improve so he was started prednisolone. He has had a waxing and waning course, but more recently has gradually declined. Stopped PF on 11/2 after running out. He was seen by Dr. Ngo on 11/2 who noted worsening VA and worsening exam, so the patient was referred to Dr. Han, whom he saw 11/4/20. Dr. Han referred to our clinic due to concerns for acanthamoeba keratitis.     Interval:  Over the interval, no significant changes, vision stable.  No pain, still with redness, tearing, light sensitivity.        POHx:  PVD OS  Hyperopia OU  Presbyopia OU    Glasses: Yes  CTL wearer: yes - none x1.5 months; wears while showering or in the sauna; he sleeps in them once every 3-4 weeks; never wears them in a lake or pool.     Family hx of eye disease: negative for glaucoma/macular degeneration    Social Hx: (-) smoking, (-) EtOH, (-) illicit drugs    Meds:  Vigamox Q2H WA (started 10/5/2020)   - Reduced to QID 10/9/20   - Reduced to BID 10/13/20   - Increased to QID 10/16/20  PFATS Q2H (started 10/5/2020)  Erythromycin vanessa (started 10/6/2020)      PHMB 0.2 mg/mL q1h during the day and q3h overnight.     Surgical Hx:  No eye surgery    A/P:  # Acathamoeba keratitis, OD   - Symptoms started 10/5/2020   - DDx: acanthamoeba (most likely); herpetic, less likely bacterial    - Acanthamoeba risks: sleeps in CTL, wears in shower   - Cultures obtained 11/05/20, including acanthamoeba. Culture positive for  Acanthamoeba.   - Confocal: many PMNs, no definite acanthamoeba (but could be masked by deeper infection, no fungal elements    - 11/13: infiltrate mostly resolved, still with stromal haze and dense PEE    Plan:   - Cont PHMB 0.2 mg/mL Q1H during the day, and every 3 hours overnight   - Continue Vigamox QID right eye   - continue Emycin vanessa once at nighttime      Follow up:  [place] 11/19, eulalioer prn     Attending Physician Attestation:  Complete documentation of historical and exam elements from today's encounter can be found in the full encounter summary report (not reduplicated in this progress note).  I personally obtained the chief complaint(s) and history of present illness.  I confirmed and edited as necessary the review of systems, past medical/surgical history, family history, social history, and examination findings as documented by others; and I examined the patient myself.  I personally reviewed the relevant tests, images, and reports as documented above.  I formulated and edited as necessary the assessment and plan and discussed the findings and management plan with the patient and family. - Jason S. Goldberg, MD

## 2020-11-13 NOTE — NURSING NOTE
Chief Complaints and History of Present Illnesses   Patient presents with     Follow Up     Chief Complaint(s) and History of Present Illness(es)     Follow Up     Laterality: right eye    Associated symptoms: photophobia.  Negative for pain with eye movement, eye pain, flashes and floaters    Treatments tried: eye drops    Response to treatment: mild improvement    Pain scale: 0/10              Comments     4 day follow up Acanthamoeba keratitis - Right Eye   Slight improvement  PHMB 0.2 mg/mL Q1H during the day, and every 3 hours overnight   Vigamox tid right eye   Emycin vanessa once at nighttime  Ember ZIMMERMAN 12:23 PM November 13, 2020

## 2020-11-18 ENCOUNTER — TELEPHONE (OUTPATIENT)
Dept: OPHTHALMOLOGY | Facility: CLINIC | Age: 65
End: 2020-11-18

## 2020-11-18 NOTE — TELEPHONE ENCOUNTER
Spoke with patient and reminded them about their upcoming appointment on 11/19/20 as well as informed them about the updated COVID-19 visitor policy.

## 2020-11-19 ENCOUNTER — OFFICE VISIT (OUTPATIENT)
Dept: OPHTHALMOLOGY | Facility: CLINIC | Age: 65
End: 2020-11-19
Attending: OPHTHALMOLOGY
Payer: COMMERCIAL

## 2020-11-19 DIAGNOSIS — H16.011 CENTRAL CORNEAL ULCER OF RIGHT EYE: ICD-10-CM

## 2020-11-19 PROCEDURE — G0463 HOSPITAL OUTPT CLINIC VISIT: HCPCS

## 2020-11-19 PROCEDURE — 92285 EXTERNAL OCULAR PHOTOGRAPHY: CPT | Performed by: OPHTHALMOLOGY

## 2020-11-19 PROCEDURE — 99213 OFFICE O/P EST LOW 20 MIN: CPT | Performed by: OPHTHALMOLOGY

## 2020-11-19 ASSESSMENT — VISUAL ACUITY
OS_CC+: -2
OD_CC: 20/125
OS_CC: 20/25
METHOD: SNELLEN - LINEAR

## 2020-11-19 ASSESSMENT — REFRACTION_WEARINGRX
OD_ADD: +2.00
OD_CYLINDER: +0.50
OS_AXIS: 162
OS_CYLINDER: +0.25
OD_SPHERE: +1.00
OS_ADD: +2.00
SPECS_TYPE: PAL
OS_SPHERE: +1.25
OD_AXIS: 134

## 2020-11-19 ASSESSMENT — SLIT LAMP EXAM - LIDS
COMMENTS: MGD
COMMENTS: PROTECTIVE PTOSIS, MGD

## 2020-11-19 ASSESSMENT — CONF VISUAL FIELD
OD_NORMAL: 1
METHOD: COUNTING FINGERS
OS_NORMAL: 1

## 2020-11-19 ASSESSMENT — TONOMETRY
OD_IOP_MMHG: 10
OS_IOP_MMHG: 16
IOP_METHOD: ICARE

## 2020-11-19 ASSESSMENT — EXTERNAL EXAM - RIGHT EYE: OD_EXAM: NORMAL; NO RASH

## 2020-11-19 ASSESSMENT — EXTERNAL EXAM - LEFT EYE: OS_EXAM: NORMAL

## 2020-11-19 NOTE — PROGRESS NOTES
CC: Keratoconjunctivitis, OD     Referring provider: Dr. Rosie Han     HPI:  Derik Hamm is a(n) 65 year old male who presents for evaluation of persistent keratoconjunctivitis, OD, since 10/5/2020. Initially seen by Dr. Ngo at [facility] and felt it was related to severe dryness vs bacterial keratitis, so he was started on PFATs and Vigamox Q2H WA. Patient was seen frequently for follow up; he was started on Valtrex 500 mg TID on 10/9/20 and has been on that since then. Despite this therapy, pt failed to improve so he was started prednisolone. He has had a waxing and waning course, but more recently has gradually declined. Stopped PF on 11/2 after running out. He was seen by Dr. Ngo on 11/2 who noted worsening VA and worsening exam, so the patient was referred to Dr. Han, whom he saw 11/4/20. Dr. Han referred to our clinic due to concerns for acanthamoeba keratitis.      POHx:  PVD OS  Hyperopia OU  Presbyopia OU     Glasses: Yes  CTL wearer: yes - none x1.5 months; wears while showering or in the sauna; he sleeps in them once every 3-4 weeks; never wears them in a lake or pool.      Family hx of eye disease: negative for glaucoma/macular degeneration     Social Hx: (-) smoking, (-) EtOH, (-) illicit drugs     Meds:  Vigamox Q2H WA (started 10/5/2020)              - Reduced to QID 10/9/20              - Reduced to BID 10/13/20              - Increased to QID 10/16/20  PFATS Q2H (started 10/5/2020)  Erythromycin vanessa (started 10/6/2020)  Valtrex 500g TID (started 10/9/20)  Prednisolone acetate 1% QID (started 10/13/20)              - Ran out 11/2/20 - has not refilled  Valtrex 500g TID (started 10/9/20)        Surgical Hx:  No eye surgery     A/P:  # Infectious keratitis, OD              - Symptoms started 10/5/2020              - DDx: acanthamoeba (most likely); herpetic, less likely bacterial               - Acanthamoeba risks: sleeps in CTL, wears in shower              - Cultures obtained  11/05/20, including acanthamoeba              - Confocal: many PMNs, no definite acanthamoeba (but could be masked by deeper infection, no fungal elements               - Due to high suspicion for acanthamoeba based on appearance of infiltrate, will start PHMB 0.02% (0.2mg/ml).              - Start PHMB 0.2 mg/mL Q1H around the clock x24 hours, then Q1H WA, Q2H overnight - Disc. Non-fda approved medication.              - Discussed non-FDA use of PHMB   - PHMB to q 2 hours during day and q 3 hours at night.               - NO  Prednisolone acetate              - Continue Vigamox QID OD              - CTL holiday               - Bring CTL case in next visit for culture        Follow up:   -- Tuesday-  possible SLP - call if sx worsen    Anson betancourt MD    Attending Physician Attestation:  Complete documentation of historical and exam elements from today's encounter can be found in the full encounter summary report (not reduplicated in this progress note).  I personally obtained the chief complaint(s) and history of present illness.  I confirmed and edited as necessary the review of systems, past medical/surgical history, family history, social history, and examination findings as documented by others; and I examined the patient myself.  I personally reviewed the relevant tests, images, and reports as documented above.  I formulated and edited as necessary the assessment and plan and discussed the findings and management plan with the patient and family. - Anson Betancourt MD

## 2020-11-19 NOTE — NURSING NOTE
Chief Complaints and History of Present Illnesses   Patient presents with     Follow Up     Acanthamoeba keratitis - Right Eye      Chief Complaint(s) and History of Present Illness(es)     Follow Up     Laterality: right eye    Onset: gradual    Onset: 1 month ago    Course: gradually improving    Associated symptoms: photophobia, tearing and redness.  Negative for eye pain, dryness, foreign body sensation, discharge and burning    Treatments tried: eye drops and ointment    Response to treatment: mild improvement    Pain scale: 0/10    Comments: Acanthamoeba keratitis - Right Eye               Comments     Pt states vision is a little better since last visit.  Pt is compliant with drops, every hour during the day and 3 hours at night.  Pt sets an alarm.    TANNER Corral November 19, 2020 12:16 PM

## 2020-11-24 ENCOUNTER — OFFICE VISIT (OUTPATIENT)
Dept: OPHTHALMOLOGY | Facility: CLINIC | Age: 65
End: 2020-11-24
Attending: OPHTHALMOLOGY
Payer: COMMERCIAL

## 2020-11-24 DIAGNOSIS — H16.011 CENTRAL CORNEAL ULCER OF RIGHT EYE: Primary | ICD-10-CM

## 2020-11-24 DIAGNOSIS — B60.13 ACANTHAMOEBA KERATITIS: ICD-10-CM

## 2020-11-24 DIAGNOSIS — H04.121 DRY EYE SYNDROME OF RIGHT EYE: ICD-10-CM

## 2020-11-24 PROCEDURE — G0463 HOSPITAL OUTPT CLINIC VISIT: HCPCS

## 2020-11-24 PROCEDURE — 99213 OFFICE O/P EST LOW 20 MIN: CPT | Mod: GC | Performed by: OPHTHALMOLOGY

## 2020-11-24 ASSESSMENT — TONOMETRY
OD_IOP_MMHG: 17
OS_IOP_MMHG: 16
IOP_METHOD: ICARE

## 2020-11-24 ASSESSMENT — CONF VISUAL FIELD
OD_NORMAL: 1
OS_NORMAL: 1
METHOD: COUNTING FINGERS

## 2020-11-24 ASSESSMENT — VISUAL ACUITY
CORRECTION_TYPE: GLASSES
OS_CC: 20/20
OS_CC+: -2
OD_CC+: +1
OD_PH_CC: 20/100
METHOD: SNELLEN - LINEAR
OD_CC: 20/150

## 2020-11-24 ASSESSMENT — SLIT LAMP EXAM - LIDS
COMMENTS: PROTECTIVE PTOSIS, MGD
COMMENTS: MGD

## 2020-11-24 ASSESSMENT — EXTERNAL EXAM - RIGHT EYE: OD_EXAM: NORMAL; NO RASH

## 2020-11-24 ASSESSMENT — EXTERNAL EXAM - LEFT EYE: OS_EXAM: NORMAL

## 2020-11-24 NOTE — PROGRESS NOTES
CC: Keratoconjunctivitis, OD     Referring provider: Dr. Rosie Han     HPI:  Derik Hamm is a(n) 65 year old male who presents for evaluation of persistent keratoconjunctivitis, OD, since 10/5/2020. Initially seen by Dr. Ngo at [facility] and felt it was related to severe dryness vs bacterial keratitis, so he was started on PFATs and Vigamox Q2H WA. Patient was seen frequently for follow up; he was started on Valtrex 500 mg TID on 10/9/20 and has been on that since then. Despite this therapy, pt failed to improve so he was started prednisolone. He has had a waxing and waning course, but more recently has gradually declined. Stopped PF on 11/2 after running out. He was seen by Dr. Ngo on 11/2 who noted worsening VA and worsening exam, so the patient was referred to Dr. Han, whom he saw 11/4/20. Dr. Han referred to our clinic due to concerns for acanthamoeba keratitis.    Interval History:  Vision perhaps slightly better in the right eye.  Stable photophobia and redness.  Frequent tearing.            POHx:  PVD OS  Hyperopia OU  Presbyopia OU     Glasses: Yes  CTL wearer: yes - none x1.5 months; wears while showering or in the sauna; he sleeps in them once every 3-4 weeks; never wears them in a lake or pool.      Family hx of eye disease: negative for glaucoma/macular degeneration     Social Hx: (-) smoking, (-) EtOH, (-) illicit drugs     Meds:  Vigamox Q2H WA (started 10/5/2020)              - Reduced to QID 10/9/20              - Reduced to BID 10/13/20              - Increased to QID 10/16/20  PHMB 0.2 mg/mL Q2H during day and every 3 hours overnight (started 11/5/2020)  Erythromycin vanessa at bedtime (started 10/6/2020)  PFATS Q2H (started 10/5/2020) - holding           Surgical Hx:  No eye surgery     A/P:  # Infectious keratitis, OD              - Symptoms started 10/5/2020              - DDx: acanthamoeba (most likely); herpetic, less likely bacterial               - Acanthamoeba risks:  sleeps in CTL, wears in shower              - Cultures obtained 11/05/20, including acanthamoeba              - Confocal: many PMNs, no definite acanthamoeba (but could be masked by deeper infection, no fungal elements      - 11/24:  Resolving infiltrate, improved injection, diffuse KP but no hypopyon - KP seen on 11/13/20 phtotos   - Culture positive for acanthamoeba              - Continue PHMB 0.2 mg/mL Q2H during day and twice overnight               - Discussed non-FDA use of PHMB               - NO Prednisolone acetate              - Continue Vigamox QID OD              - CTL holiday        Follow up:   -- 12/1-  possible SLP - call if sx worsen    Torres Zepeda, PGY3  Ophthalmology Resident    Attending Physician Attestation:  Complete documentation of historical and exam elements from today's encounter can be found in the full encounter summary report (not reduplicated in this progress note).  I personally obtained the chief complaint(s) and history of present illness.  I confirmed and edited as necessary the review of systems, past medical/surgical history, family history, social history, and examination findings as documented by others; and I examined the patient myself.  I personally reviewed the relevant tests, images, and reports as documented above.  I formulated and edited as necessary the assessment and plan and discussed the findings and management plan with the patient and family. - Anson Cheng MD

## 2020-11-24 NOTE — PATIENT INSTRUCTIONS
1. Continue PHMB 0.2 mg/mL every 2 hours while awake, and twice overnight:  [address], [place] for refills    2. Continue Vigamox (tan cap) 1 drop 4x/day in the RIGHT eye    3. Continue erythromycin ointment at bedtime (one dose)        NO Prednisolone (white or pink cap)

## 2020-12-01 ENCOUNTER — OFFICE VISIT (OUTPATIENT)
Dept: OPHTHALMOLOGY | Facility: CLINIC | Age: 65
End: 2020-12-01
Attending: OPHTHALMOLOGY
Payer: COMMERCIAL

## 2020-12-01 DIAGNOSIS — B60.13 ACANTHAMOEBA KERATITIS: ICD-10-CM

## 2020-12-01 DIAGNOSIS — H16.011 CENTRAL CORNEAL ULCER OF RIGHT EYE: ICD-10-CM

## 2020-12-01 DIAGNOSIS — B60.13 ACANTHAMOEBA KERATITIS: Primary | ICD-10-CM

## 2020-12-01 DIAGNOSIS — H04.121 DRY EYE SYNDROME OF RIGHT EYE: ICD-10-CM

## 2020-12-01 PROCEDURE — 99213 OFFICE O/P EST LOW 20 MIN: CPT | Mod: GC | Performed by: OPHTHALMOLOGY

## 2020-12-01 PROCEDURE — G0463 HOSPITAL OUTPT CLINIC VISIT: HCPCS

## 2020-12-01 PROCEDURE — 92285 EXTERNAL OCULAR PHOTOGRAPHY: CPT | Performed by: OPHTHALMOLOGY

## 2020-12-01 RX ORDER — ERYTHROMYCIN 5 MG/G
OINTMENT OPHTHALMIC
COMMUNITY
Start: 2020-10-06 | End: 2020-12-31

## 2020-12-01 RX ORDER — MOXIFLOXACIN 5 MG/ML
SOLUTION/ DROPS OPHTHALMIC
COMMUNITY
Start: 2020-11-05 | End: 2020-12-15

## 2020-12-01 ASSESSMENT — TONOMETRY
IOP_METHOD: ICARE
OS_IOP_MMHG: 11
OD_IOP_MMHG: 10

## 2020-12-01 ASSESSMENT — VISUAL ACUITY
METHOD: SNELLEN - LINEAR
OS_CC+: -3
OS_CC: 20/20
OD_CC+: +1
OD_PH_CC+: -2
CORRECTION_TYPE: GLASSES
OD_PH_CC: 20/60
OD_CC: 20/100

## 2020-12-01 ASSESSMENT — REFRACTION_WEARINGRX
OS_CYLINDER: +0.25
OD_SPHERE: +1.00
OD_CYLINDER: +0.50
OS_AXIS: 162
OD_ADD: +2.00
OS_ADD: +2.00
OS_SPHERE: +1.25
SPECS_TYPE: PAL
OD_AXIS: 134

## 2020-12-01 ASSESSMENT — SLIT LAMP EXAM - LIDS
COMMENTS: PROTECTIVE PTOSIS, MGD
COMMENTS: MGD

## 2020-12-01 ASSESSMENT — CONF VISUAL FIELD
METHOD: COUNTING FINGERS
OS_NORMAL: 1
OD_SUPERIOR_NASAL_RESTRICTION: 3
OD_INFERIOR_TEMPORAL_RESTRICTION: 3
OD_SUPERIOR_TEMPORAL_RESTRICTION: 3

## 2020-12-01 ASSESSMENT — EXTERNAL EXAM - RIGHT EYE: OD_EXAM: NORMAL; NO RASH

## 2020-12-01 ASSESSMENT — EXTERNAL EXAM - LEFT EYE: OS_EXAM: NORMAL

## 2020-12-01 NOTE — NURSING NOTE
Chief Complaints and History of Present Illnesses   Patient presents with     Corneal Ulcer Follow Up     Chief Complaint(s) and History of Present Illness(es)     Corneal Ulcer Follow Up     Laterality: right eye    Course: gradually improving    Associated symptoms: photophobia, tearing and eye pain (mild).  Negative for dryness    Treatments tried: eye drops and ointment    Pain scale: 2/10              Comments     He states that his right eye has been feeling better.  The pain is very mild at this point.    In his right eye he has been using:  Vigamox 3 times a day  PHMB 0.2 mg/mL Q2H during day and once in the night  Erythromycin vanessa at bedtime     TANNER Gomez 2:10 PM  December 1, 2020

## 2020-12-01 NOTE — PROGRESS NOTES
CC: Keratoconjunctivitis, OD     Referring provider: Dr. Rosie Han     HPI:  Derik Hamm is a(n) 65 year old male who presents for evaluation of persistent keratoconjunctivitis, OD, since 10/5/2020. Initially seen by Dr. Ngo at [facility] and felt it was related to severe dryness vs bacterial keratitis, so he was started on PFATs and Vigamox Q2H WA. Patient was seen frequently for follow up; he was started on Valtrex 500 mg TID on 10/9/20 and has been on that since then. Despite this therapy, pt failed to improve so he was started prednisolone. He has had a waxing and waning course, but more recently has gradually declined. Stopped PF on 11/2 after running out. He was seen by Dr. Ngo on 11/2 who noted worsening VA and worsening exam, so the patient was referred to Dr. Han, whom he saw 11/4/20. Dr. Han referred to our clinic due to concerns for acanthamoeba keratitis.    Interval History:  Vision continues to improve. Pain is nearly resolved - much better. No new flashes, floaters, or diplopia. No pain, redness, or tearing.     POHx:  PVD OS  Hyperopia OU  Presbyopia OU     Glasses: Yes  CTL wearer: yes - none x1.5 months; wears while showering or in the sauna; he sleeps in them once every 3-4 weeks; never wears them in a lake or pool.      Family hx of eye disease: negative for glaucoma/macular degeneration     Social Hx: (-) smoking, (-) EtOH, (-) illicit drugs     Meds:  Vigamox QID  PHMB 0.2 mg/mL Q2H during day and 2x overnight -- getting about 10x/day (started 11/5/2020)  Erythromycin vanessa at bedtime (started 10/6/2020)  PFATS Q2H (started 10/5/2020) - holding             Surgical Hx:  No eye surgery     A/P:  # Acanthamoeba keratitis, OD              - Symptoms started 10/5/2020              - Acanthamoeba risks: sleeps in CTL, wears in shower              - Cultures obtained 11/05/20,  Culture positive for acanthamoeba              - Confocal: many PMNs, no definite acanthamoeba (but  could be masked by deeper infection, no fungal elements    - Discussed non-FDA use of PHMB     - Continues to slowly improve.               - Decrease PHMB 0.2 mg/mL to Q3H during day only                     - NO Prednisolone acetate              - Continue Vigamox QID OD              - CTL holiday      Follow up: 1-2 weeks    Jordyn Holguin MD  Cornea & External Disease Fellow    Attending Physician Attestation:  Complete documentation of historical and exam elements from today's encounter can be found in the full encounter summary report (not reduplicated in this progress note).  I personally obtained the chief complaint(s) and history of present illness.  I confirmed and edited as necessary the review of systems, past medical/surgical history, family history, social history, and examination findings as documented by others; and I examined the patient myself.  I personally reviewed the relevant tests, images, and reports as documented above.  I formulated and edited as necessary the assessment and plan and discussed the findings and management plan with the patient and family. - Anson Cheng MD

## 2020-12-03 ENCOUNTER — TELEPHONE (OUTPATIENT)
Dept: OPHTHALMOLOGY | Facility: CLINIC | Age: 65
End: 2020-12-03

## 2020-12-03 LAB
FUNGUS SPEC CULT: NORMAL
SPECIMEN SOURCE: NORMAL

## 2020-12-03 NOTE — TELEPHONE ENCOUNTER
Left message at 0945    Dr. Cheng in clinic Thursday only next week    Pt being treated for Acanthamoeba    Encouraged pt to keep appt if able next Thursday    Reviewed if not able to, may call direct triage line to review scheduling for following week    Angel Bell RN 9:47 AM 12/04/20            [facility]    Phone Message    May a detailed message be left on voicemail: yes     Reason for Call: Other: Pt would like to reschedule his 12/10 appt for a different time that day/week if possible. Nothing available at time of call. Pt requests to speak with Bethany regarding if he can reschedule or if it would be OK to skip this appt. Please advise    Action Taken: Message routed to:  [facility] E[facility]    Travel Screening: Not Applicable

## 2020-12-10 ENCOUNTER — OFFICE VISIT (OUTPATIENT)
Dept: OPHTHALMOLOGY | Facility: CLINIC | Age: 65
End: 2020-12-10
Attending: OPHTHALMOLOGY
Payer: COMMERCIAL

## 2020-12-10 DIAGNOSIS — B60.13 ACANTHAMOEBA KERATITIS: ICD-10-CM

## 2020-12-10 DIAGNOSIS — H16.011 CENTRAL CORNEAL ULCER OF RIGHT EYE: ICD-10-CM

## 2020-12-10 DIAGNOSIS — H16.011 CENTRAL CORNEAL ULCER OF RIGHT EYE: Primary | ICD-10-CM

## 2020-12-10 PROCEDURE — 99213 OFFICE O/P EST LOW 20 MIN: CPT | Mod: GC | Performed by: OPHTHALMOLOGY

## 2020-12-10 PROCEDURE — G0463 HOSPITAL OUTPT CLINIC VISIT: HCPCS

## 2020-12-10 ASSESSMENT — CONF VISUAL FIELD
OS_NORMAL: 1
OD_SUPERIOR_TEMPORAL_RESTRICTION: 3
METHOD: COUNTING FINGERS
OD_INFERIOR_NASAL_RESTRICTION: 3
OD_SUPERIOR_NASAL_RESTRICTION: 3
OD_INFERIOR_TEMPORAL_RESTRICTION: 3

## 2020-12-10 ASSESSMENT — VISUAL ACUITY
OD_CC: 20/400
CORRECTION_TYPE: GLASSES
OS_CC+: -1
OS_CC: 20/30
METHOD: SNELLEN - LINEAR

## 2020-12-10 ASSESSMENT — PACHYMETRY
OS_CT(UM): 508
OD_CT(UM): 658

## 2020-12-10 ASSESSMENT — SLIT LAMP EXAM - LIDS
COMMENTS: MGD
COMMENTS: PROTECTIVE PTOSIS, MGD

## 2020-12-10 ASSESSMENT — TONOMETRY
IOP_METHOD: ICARE
OS_IOP_MMHG: 16
OD_IOP_MMHG: 15

## 2020-12-10 ASSESSMENT — EXTERNAL EXAM - RIGHT EYE: OD_EXAM: NORMAL; NO RASH

## 2020-12-10 ASSESSMENT — EXTERNAL EXAM - LEFT EYE: OS_EXAM: NORMAL

## 2020-12-10 NOTE — NURSING NOTE
Chief Complaint(s) and History of Present Illness(es)     Follow Up     In right eye.  Associated symptoms include redness and tearing.  Negative for eye pain and dryness.  Pain was noted as 0/10.              Comments     1 week f/u for Acanthamoeba keratitis, right eye. RE vision is about the same hard to tell. LE is fine no changes. Pt notes really light sensitve with the RE so hard to open.     Ocular meds:  Vigamox QID RE  PHMB 0.2mg/ml Q3H during day RE    AUSTYN Savage 9:49 AM December 10, 2020

## 2020-12-10 NOTE — LETTER
12/10/2020       RE: Derik Hamm  [address] [place] [address]     Dear Colleague,    Thank you for referring your patient, Derik Hamm, to the [facility] at [facility]. Please see a copy of my visit note below.    CC: Keratoconjunctivitis, OD     Referring provider: Dr. Rosie Han     HPI:  Derik Hamm is a(n) 65 year old male who presents for evaluation of persistent keratoconjunctivitis, OD, since 10/5/2020. Initially seen by Dr. Ngo at [facility] and felt it was related to severe dryness vs bacterial keratitis, so he was started on PFATs and Vigamox Q2H WA. Patient was seen frequently for follow up; he was started on Valtrex 500 mg TID on 10/9/20 and has been on that since then. Despite this therapy, pt failed to improve so he was started prednisolone. He has had a waxing and waning course, but more recently has gradually declined. Stopped PF on 11/2 after running out. He was seen by Dr. Ngo on 11/2 who noted worsening VA and worsening exam, so the patient was referred to Dr. Han, whom he saw 11/4/20. Dr. Han referred to our clinic due to concerns for acanthamoeba keratitis.    Interval History:  Over the interval, vision cloudier than previous visit.  Overall stable.  No new flashes, floaters, or diplopia.  No pain, redness, or tearing.     POHx:  PVD OS  Hyperopia OU  Presbyopia OU     Glasses: Yes  CTL wearer: yes - none x1.5 months; wears while showering or in the sauna; he sleeps in them once every 3-4 weeks; never wears them in a lake or pool.      Family hx of eye disease: negative for glaucoma/macular degeneration     Social Hx: (-) smoking, (-) EtOH, (-) illicit drugs     Meds:  Vigamox QID  PHMB 0.2 mg/mL Q3H during day (started 11/5/2020)  Erythromycin vanessa at bedtime (started 10/6/2020)  PFATS Q2H (started 10/5/2020) - holding             Surgical Hx:  No eye surgery     A/P:  # Acanthamoeba keratitis, OD               - Symptoms started 10/5/2020              - Acanthamoeba risks: sleeps in CTL, wears in shower              - Cultures obtained 11/05/20,  Culture positive for acanthamoeba              - Confocal: many PMNs, no definite acanthamoeba (but could be masked by deeper infection, no fungal elements    - Discussed non-FDA use of PHMB     - Vision down to 20/400 today (LV 20/100), likely due to K edema  - 12/10/20: pachy 658 // 508               - continue PHMB 0.2 mg/mL to Q3H during day only                     - NO Prednisolone acetate              - Continue Vigamox QID OD              - CTL holiday     Follow up: 1 week            Again, thank you for allowing me to participate in the care of your patient.      Sincerely,    Anson Cheng MD

## 2020-12-10 NOTE — PATIENT INSTRUCTIONS
1. Continue PHMB 0.2 mg/mL every 3 hours while awake, and twice overnight:  [address], [place] for refills    2. Continue Vigamox (tan cap) 1 drop 4x/day in the RIGHT eye

## 2020-12-10 NOTE — PROGRESS NOTES
CC: Keratoconjunctivitis, OD     Referring provider: Dr. Rosie Han     HPI:  Derik Hamm is a(n) 65 year old male who presents for evaluation of persistent keratoconjunctivitis, OD, since 10/5/2020. Initially seen by Dr. Ngo at [facility] and felt it was related to severe dryness vs bacterial keratitis, so he was started on PFATs and Vigamox Q2H WA. Patient was seen frequently for follow up; he was started on Valtrex 500 mg TID on 10/9/20 and has been on that since then. Despite this therapy, pt failed to improve so he was started prednisolone. He has had a waxing and waning course, but more recently has gradually declined. Stopped PF on 11/2 after running out. He was seen by Dr. Ngo on 11/2 who noted worsening VA and worsening exam, so the patient was referred to Dr. Han, whom he saw 11/4/20. Dr. Han referred to our clinic due to concerns for acanthamoeba keratitis.    Interval History:  Over the interval, vision cloudier than previous visit.  Overall stable.  No new flashes, floaters, or diplopia.  No pain, redness, or tearing.     POHx:  PVD OS  Hyperopia OU  Presbyopia OU     Glasses: Yes  CTL wearer: yes - none x1.5 months; wears while showering or in the sauna; he sleeps in them once every 3-4 weeks; never wears them in a lake or pool.      Family hx of eye disease: negative for glaucoma/macular degeneration     Social Hx: (-) smoking, (-) EtOH, (-) illicit drugs     Meds:  Vigamox QID  PHMB 0.2 mg/mL Q3H during day (started 11/5/2020)  Erythromycin vanessa at bedtime (started 10/6/2020)  PFATS Q2H (started 10/5/2020) - holding             Surgical Hx:  No eye surgery     A/P:  # Acanthamoeba keratitis, OD              - Symptoms started 10/5/2020              - Acanthamoeba risks: sleeps in CTL, wears in shower              - Cultures obtained 11/05/20,  Culture positive for acanthamoeba              - Confocal: many PMNs, no definite acanthamoeba (but could be masked by deeper infection,  no fungal elements    - Discussed non-FDA use of PHMB     - Vision down to 20/400 today (LV 20/100), likely due to K edema  - 12/10/20: pachy 658 // 508               - continue PHMB 0.2 mg/mL to Q3H during day only                     - NO Prednisolone acetate              - Continue Vigamox QID OD              - CTL holiday     Follow up: 1 week    Jason Goldberg, MD  Cornea & External Disease Fellow  Department of Ophthalmology and Visual Neurosciences    Attending Physician Attestation:  Complete documentation of historical and exam elements from today's encounter can be found in the full encounter summary report (not reduplicated in this progress note).  I personally obtained the chief complaint(s) and history of present illness.  I confirmed and edited as necessary the review of systems, past medical/surgical history, family history, social history, and examination findings as documented by others; and I examined the patient myself.  I personally reviewed the relevant tests, images, and reports as documented above.  I formulated and edited as necessary the assessment and plan and discussed the findings and management plan with the patient and family. - Anson Cheng MD

## 2020-12-15 ENCOUNTER — OFFICE VISIT (OUTPATIENT)
Dept: OPHTHALMOLOGY | Facility: CLINIC | Age: 65
End: 2020-12-15
Attending: OPHTHALMOLOGY
Payer: COMMERCIAL

## 2020-12-15 DIAGNOSIS — H16.011 CENTRAL CORNEAL ULCER OF RIGHT EYE: ICD-10-CM

## 2020-12-15 DIAGNOSIS — H04.121 DRY EYE SYNDROME OF RIGHT EYE: ICD-10-CM

## 2020-12-15 DIAGNOSIS — B60.13 ACANTHAMOEBA KERATITIS: ICD-10-CM

## 2020-12-15 DIAGNOSIS — B60.13 ACANTHAMOEBA KERATITIS: Primary | ICD-10-CM

## 2020-12-15 PROCEDURE — 92285 EXTERNAL OCULAR PHOTOGRAPHY: CPT | Performed by: OPHTHALMOLOGY

## 2020-12-15 PROCEDURE — G0463 HOSPITAL OUTPT CLINIC VISIT: HCPCS

## 2020-12-15 PROCEDURE — 99213 OFFICE O/P EST LOW 20 MIN: CPT | Mod: GC | Performed by: OPHTHALMOLOGY

## 2020-12-15 RX ORDER — MOXIFLOXACIN 5 MG/ML
1 SOLUTION/ DROPS OPHTHALMIC 4 TIMES DAILY
Qty: 1 BOTTLE | Refills: 1 | Status: SHIPPED | OUTPATIENT
Start: 2020-12-15 | End: 2021-02-18

## 2020-12-15 ASSESSMENT — TONOMETRY
OD_IOP_MMHG: 09
OS_IOP_MMHG: 12
IOP_METHOD: ICARE

## 2020-12-15 ASSESSMENT — VISUAL ACUITY
METHOD: SNELLEN - LINEAR
OD_CC: 20/500
OS_CC: 20/25
OD_PH_CC: 20/400
CORRECTION_TYPE: GLASSES
OS_CC+: +2

## 2020-12-15 ASSESSMENT — PACHYMETRY
OS_CT(UM): 508
OD_CT(UM): 658

## 2020-12-15 ASSESSMENT — REFRACTION_WEARINGRX
OD_SPHERE: +1.00
OD_ADD: +2.00
SPECS_TYPE: PAL
OS_ADD: +2.00
OS_AXIS: 162
OS_CYLINDER: +0.25
OD_CYLINDER: +0.50
OD_AXIS: 134
OS_SPHERE: +1.25

## 2020-12-15 ASSESSMENT — EXTERNAL EXAM - LEFT EYE: OS_EXAM: NORMAL

## 2020-12-15 ASSESSMENT — CONF VISUAL FIELD
METHOD: COUNTING FINGERS
OS_NORMAL: 1

## 2020-12-15 ASSESSMENT — EXTERNAL EXAM - RIGHT EYE: OD_EXAM: NORMAL; NO RASH

## 2020-12-15 ASSESSMENT — SLIT LAMP EXAM - LIDS
COMMENTS: PROTECTIVE PTOSIS, MGD
COMMENTS: MGD

## 2020-12-15 NOTE — PROGRESS NOTES
CC: Keratoconjunctivitis, OD     Referring provider: Dr. Rosie Han     HPI:  Derik Hamm is a(n) 65 year old male who presents for evaluation of persistent keratoconjunctivitis, OD, since 10/5/2020. Initially seen by Dr. Ngo at [facility] and felt it was related to severe dryness vs bacterial keratitis, so he was started on PFATs and Vigamox Q2H WA. Patient was seen frequently for follow up; he was started on Valtrex 500 mg TID on 10/9/20 and has been on that since then. Despite this therapy, pt failed to improve so he was started prednisolone. He has had a waxing and waning course, but more recently has gradually declined. Stopped PF on 11/2 after running out. He was seen by Dr. Ngo on 11/2 who noted worsening VA and worsening exam, so the patient was referred to Dr. Han, whom he saw 11/4/20. Dr. Han referred to our clinic due to concerns for acanthamoeba keratitis.    Interval History:  Vision stable from 1 week prior, remains cloudy.  Had some tenderness yesterday which resolved today.  Improving light sensitivity.  Redness is the same.  No new flashes/floaters.      POHx:  PVD OS  Hyperopia OU  Presbyopia OU     Glasses: Yes  CTL wearer: yes - none x1.5 months; wears while showering or in the sauna; he sleeps in them once every 3-4 weeks; never wears them in a lake or pool.      Family hx of eye disease: negative for glaucoma/macular degeneration     Social Hx: (-) smoking, (-) EtOH, (-) illicit drugs     Meds:  Vigamox QID  PHMB 0.2 mg/mL Q3H during day (started 11/5/2020) and once overnight  Erythromycin vanessa at bedtime (started 10/6/2020)  PFATS Q2H (started 10/5/2020) - holding             Surgical Hx:  No eye surgery     A/P:  # Acanthamoeba keratitis, OD              - Symptoms started 10/5/2020              - Acanthamoeba risks: sleeps in CTL, wears in shower              - Cultures obtained 11/05/20,  Culture positive for acanthamoeba.              - Confocal: many PMNs, no definite  acanthamoeba (but could be masked by deeper infection, no fungal elements)   - Discussed non-FDA use of PHMB    - Vision down today (LV 20/400), likely due to K edema  - 12/10/20: pachy 658 // 508   - 12/15/20: cornea slightly clearer, SLP stable to improved              - REDUCE PHMB 0.2 mg/mL to QID during day, and once overnight              - NO Prednisolone acetate              - Continue Vigamox QID OD              - CTL holiday    -Cornea appears slightly clearer today. No ring infiltrates.    Follow up: 1 week    Torres Zepeda, PGY3  Ophthalmology Resident    Attending Physician Attestation:  Complete documentation of historical and exam elements from today's encounter can be found in the full encounter summary report (not reduplicated in this progress note).  I personally obtained the chief complaint(s) and history of present illness.  I confirmed and edited as necessary the review of systems, past medical/surgical history, family history, social history, and examination findings as documented by others; and I examined the patient myself.  I personally reviewed the relevant tests, images, and reports as documented above.  I formulated and edited as necessary the assessment and plan and discussed the findings and management plan with the patient and family. - Anson Cheng MD

## 2020-12-15 NOTE — PATIENT INSTRUCTIONS
REDUCE PHMB to 4x/day and once overnight right eye  BEGIN preservative free artificial tears up to every hour (30 minutes after PHMB)  Continue vigamox 4x/day right eye

## 2020-12-15 NOTE — NURSING NOTE
Chief Complaints and History of Present Illnesses   Patient presents with     Follow Up     5 day follow up Acanthamoeba keratitis, OD     Chief Complaint(s) and History of Present Illness(es)     Follow Up     Laterality: right eye    Comments: 5 day follow up Acanthamoeba keratitis, OD              Comments     Pt states vision is about the same as last visit.   Tenderness to touch RLL yesterday, but no eye pain today.  Increased tearing over the past day. No flashes or floaters.    AUSTYN Olvera December 15, 2020 1:24 PM

## 2020-12-22 ENCOUNTER — OFFICE VISIT (OUTPATIENT)
Dept: OPHTHALMOLOGY | Facility: CLINIC | Age: 65
End: 2020-12-22
Attending: OPHTHALMOLOGY
Payer: COMMERCIAL

## 2020-12-22 DIAGNOSIS — H16.011 CENTRAL CORNEAL ULCER OF RIGHT EYE: ICD-10-CM

## 2020-12-22 DIAGNOSIS — B60.13 ACANTHAMOEBA KERATITIS: Primary | ICD-10-CM

## 2020-12-22 PROCEDURE — G0463 HOSPITAL OUTPT CLINIC VISIT: HCPCS

## 2020-12-22 PROCEDURE — 99213 OFFICE O/P EST LOW 20 MIN: CPT | Performed by: OPHTHALMOLOGY

## 2020-12-22 ASSESSMENT — PACHYMETRY
OS_CT(UM): 508
OD_CT(UM): 658

## 2020-12-22 ASSESSMENT — SLIT LAMP EXAM - LIDS
COMMENTS: PROTECTIVE PTOSIS, MGD
COMMENTS: MGD

## 2020-12-22 ASSESSMENT — CONF VISUAL FIELD
OS_NORMAL: 1
METHOD: COUNTING FINGERS

## 2020-12-22 ASSESSMENT — VISUAL ACUITY
OS_CC+: -2
CORRECTION_TYPE: GLASSES
OS_CC: 20/30
METHOD: SNELLEN - LINEAR

## 2020-12-22 ASSESSMENT — TONOMETRY
IOP_METHOD: ICARE
OS_IOP_MMHG: 12
OD_IOP_MMHG: 10

## 2020-12-22 ASSESSMENT — EXTERNAL EXAM - LEFT EYE: OS_EXAM: NORMAL

## 2020-12-22 ASSESSMENT — EXTERNAL EXAM - RIGHT EYE: OD_EXAM: NORMAL; NO RASH

## 2020-12-22 NOTE — PROGRESS NOTES
CC: Keratoconjunctivitis, OD     Referring provider: Dr. Rosie Han     HPI:  Derik Hamm is a(n) 65 year old male who presents for evaluation of persistent keratoconjunctivitis, OD, since 10/5/2020. Initially seen by Dr. Ngo at [facility] and felt it was related to severe dryness vs bacterial keratitis, so he was started on PFATs and Vigamox Q2H WA. Patient was seen frequently for follow up; he was started on Valtrex 500 mg TID on 10/9/20 and has been on that since then. Despite this therapy, pt failed to improve so he was started prednisolone. He has had a waxing and waning course, but more recently has gradually declined. Stopped PF on 11/2 after running out. He was seen by Dr. Ngo on 11/2 who noted worsening VA and worsening exam, so the patient was referred to Dr. Han, whom he saw 11/4/20. Dr. Han referred to our clinic due to concerns for acanthamoeba keratitis.     Interval History:  Vision stable from 1 week prior, remains cloudy.  Had some tenderness yesterday which resolved today.  Improving light sensitivity.  Redness is the same.  No new flashes/floaters.       POHx:  PVD OS  Hyperopia OU  Presbyopia OU     Glasses: Yes  CTL wearer: yes - none x1.5 months; wears while showering or in the sauna; he sleeps in them once every 3-4 weeks; never wears them in a lake or pool.      Family hx of eye disease: negative for glaucoma/macular degeneration     Social Hx: (-) smoking, (-) EtOH, (-) illicit drugs     Meds:  Vigamox QID  PHMB 0.2 mg/mL (started 11/5/2020) QID and once overnight  Erythromycin vanessa at bedtime (started 10/6/2020)  PFATS Q2H - holding              Surgical Hx:  No eye surgery     A/P:  # Acanthamoeba keratitis, OD              - Symptoms started 10/5/2020              - Acanthamoeba risks: sleeps in CTL, wears in shower              - Cultures obtained 11/05/20,   Culture positive for acanthamoeba.              - Confocal: many PMNs, no definite acanthamoeba (but could be  masked by deeper infection, no fungal elements)              - Discussed non-FDA use of PHMB     - Vision down today (LV 20/400), likely due to K edema  - 12/10/20: pachy 658 // 508   - 12/15/20: cornea slightly clearer, SLP stable to improved              - will increase PHMB 0.2 mg/mL to Q3 hrs during day due to increased central corneal haze              - NO Prednisolone acetate              - Continue Vigamox QID OD              - CTL holiday               -Cornea appears slightly clearer today. No ring infiltrates.     Follow up: 1 week - call sooner with any problems     Anson betancourt MD     Attending Physician Attestation:  Complete documentation of historical and exam elements from today's encounter can be found in the full encounter summary report (not reduplicated in this progress note).  I personally obtained the chief complaint(s) and history of present illness.  I confirmed and edited as necessary the review of systems, past medical/surgical history, family history, social history, and examination findings as documented by others; and I examined the patient myself.  I personally reviewed the relevant tests, images, and reports as documented above.  I formulated and edited as necessary the assessment and plan and discussed the findings and management plan with the patient and family. - Anson Betancourt MD

## 2020-12-22 NOTE — NURSING NOTE
Chief Complaints and History of Present Illnesses   Patient presents with     Follow Up     Chief Complaint(s) and History of Present Illness(es)     Follow Up     Laterality: right eye    Onset: 2 weeks ago    Associated symptoms: eye pain, redness, swelling, photophobia and tearing.  Negative for foreign body sensation, flashes, floaters, dryness, discharge, itching and burning    Treatments tried: eye drops and artificial tears    Response to treatment: no improvement    Pain scale: 3/10              Comments     Pt reports a lot of pain RE this past week.  Pt states vision is same today.  Pt is compliant with drops.    TANNER Corral December 22, 2020 9:44 AM

## 2020-12-31 ENCOUNTER — OFFICE VISIT (OUTPATIENT)
Dept: OPHTHALMOLOGY | Facility: CLINIC | Age: 65
End: 2020-12-31
Attending: OPHTHALMOLOGY
Payer: COMMERCIAL

## 2020-12-31 DIAGNOSIS — B60.13 ACANTHAMOEBA KERATITIS: ICD-10-CM

## 2020-12-31 DIAGNOSIS — B60.13 ACANTHAMOEBA KERATITIS: Primary | ICD-10-CM

## 2020-12-31 PROCEDURE — 92285 EXTERNAL OCULAR PHOTOGRAPHY: CPT | Performed by: OPHTHALMOLOGY

## 2020-12-31 PROCEDURE — 99213 OFFICE O/P EST LOW 20 MIN: CPT | Mod: GC | Performed by: OPHTHALMOLOGY

## 2020-12-31 PROCEDURE — G0463 HOSPITAL OUTPT CLINIC VISIT: HCPCS

## 2020-12-31 RX ORDER — ERYTHROMYCIN 5 MG/G
0.5 OINTMENT OPHTHALMIC AT BEDTIME
Qty: 3.5 G | Refills: 11 | Status: SHIPPED | OUTPATIENT
Start: 2020-12-31 | End: 2020-12-31

## 2020-12-31 ASSESSMENT — REFRACTION_WEARINGRX
OD_SPHERE: +1.00
OD_AXIS: 134
SPECS_TYPE: PAL
OS_SPHERE: +1.25
OS_CYLINDER: +0.25
OD_CYLINDER: +0.50
OS_ADD: +2.00
OD_ADD: +2.00
OS_AXIS: 162

## 2020-12-31 ASSESSMENT — TONOMETRY
IOP_METHOD: ICARE
OS_IOP_MMHG: 09
OD_IOP_MMHG: 07

## 2020-12-31 ASSESSMENT — VISUAL ACUITY
METHOD: SNELLEN - LINEAR
CORRECTION_TYPE: GLASSES
OS_CC: 20/30
OS_CC+: +1
OD_CC: 3/200 E

## 2020-12-31 ASSESSMENT — PACHYMETRY
OS_CT(UM): 508
OD_CT(UM): 658

## 2020-12-31 ASSESSMENT — SLIT LAMP EXAM - LIDS
COMMENTS: MGD
COMMENTS: PROTECTIVE PTOSIS, MGD

## 2020-12-31 ASSESSMENT — CONF VISUAL FIELD
OS_NORMAL: 1
METHOD: COUNTING FINGERS

## 2020-12-31 ASSESSMENT — EXTERNAL EXAM - RIGHT EYE: OD_EXAM: NORMAL; NO RASH

## 2020-12-31 ASSESSMENT — EXTERNAL EXAM - LEFT EYE: OS_EXAM: NORMAL

## 2020-12-31 NOTE — PROGRESS NOTES
CC: Keratoconjunctivitis, OD     Referring provider: Dr. Rosie Han     HPI:  Derik Hamm is a(n) 65 year old male who presents for evaluation of persistent keratoconjunctivitis, OD, since 10/5/2020. Initially seen by Dr. Ngo at [facility] and felt it was related to severe dryness vs bacterial keratitis, so he was started on PFATs and Vigamox Q2H WA. Patient was seen frequently for follow up; he was started on Valtrex 500 mg TID on 10/9/20 and has been on that since then. Despite this therapy, pt failed to improve so he was started prednisolone. He has had a waxing and waning course, but more recently has gradually declined. Stopped PF on 11/2 after running out. He was seen by Dr. Ngo on 11/2 who noted worsening VA and worsening exam, so the patient was referred to Dr. Han, whom he saw 11/4/20. Dr. Han referred to our clinic due to concerns for acanthamoeba keratitis.     Interval History:  Pt feels things are worse. He has had worsening pain the past 1-2 weeks. He describes a throb around the eye and a headache, especially if he touches the periorbital rim. He has random bouts of pain while at work. Vision is stable. No new flashes, floaters, or diplopia. No new ocular discharge.     POHx:  PVD OS  Hyperopia OU  Presbyopia OU     Glasses: Yes  CTL wearer: yes - none x1.5 months; wears while showering or in the sauna; he sleeps in them once every 3-4 weeks; never wears them in a lake or pool.      Family hx of eye disease: negative for glaucoma/macular degeneration     Social Hx: (-) smoking, (-) EtOH, (-) illicit drugs     Meds:  Vigamox TID  PHMB 0.2 mg/mL (started 11/5/2020) -- Q3H WA + once overnight (getting 7x/day)  PFATS Q2H - holding              Surgical Hx:  No eye surgery     A/P:  # Acanthamoeba keratitis, OD              - Symptoms started 10/5/2020              - Acanthamoeba risks: sleeps in CTL, wears in shower              - Cultures obtained 11/05/20,   Culture positive for  acanthamoeba.              - Confocal: many PMNs, no definite acanthamoeba (but could be masked by deeper infection, no fungal elements)              - Discussed non-FDA use of PHMB    12/31: pain worse likely 2/2 surface toxicity from PHMB, although difficult to know. Plan to increase lubrication.                 - continue PHMB 0.2 mg/mL to every 3 hours WA (7x/day) + once overnight   - start PFAT's every 1-2 hours -- 5 minutes before every PHMB dose   - Hold emycin (erroneously sent to pharmacy)              - NO Prednisolone acetate              - Continue Vigamox TID OD              - CTL holiday      Follow up: 2 weeks - call sooner with any problems     Jordyn Holguin MD  Cornea & External Disease Fellow    Attending Physician Attestation:  Complete documentation of historical and exam elements from today's encounter can be found in the full encounter summary report (not reduplicated in this progress note).  I personally obtained the chief complaint(s) and history of present illness.  I confirmed and edited as necessary the review of systems, past medical/surgical history, family history, social history, and examination findings as documented by others; and I examined the patient myself.  I personally reviewed the relevant tests, images, and reports as documented above.  I formulated and edited as necessary the assessment and plan and discussed the findings and management plan with the patient and family. - Anson Cheng MD

## 2020-12-31 NOTE — NURSING NOTE
Chief Complaints and History of Present Illnesses   Patient presents with     Follow Up     1 week follow up Acanthamoeba keratitis, OD     Chief Complaint(s) and History of Present Illness(es)     Follow Up     Laterality: right eye    Comments: 1 week follow up Acanthamoeba keratitis, OD              Comments     Pt having constant throbbing eye pain in RE about 3 days ago. Pt now feeling an achy eye pain in RE.  Relief with tylenol.  No flashes or floaters. No new redness.    AUSTYN Olvera December 31, 2020 12:59 PM

## 2020-12-31 NOTE — PATIENT INSTRUCTIONS
- Continue PHMB every 3 hours while awake and once overnight right eye    -BEGIN preservative free artificial tears up to every hour (give 5 minutes before every PHMB dose)    -Continue vigamox 3x/day right eye

## 2021-01-07 ENCOUNTER — OFFICE VISIT (OUTPATIENT)
Dept: OPHTHALMOLOGY | Facility: CLINIC | Age: 66
End: 2021-01-07
Attending: OPHTHALMOLOGY
Payer: COMMERCIAL

## 2021-01-07 DIAGNOSIS — H04.121 DRY EYE SYNDROME OF RIGHT EYE: ICD-10-CM

## 2021-01-07 DIAGNOSIS — H16.011 CENTRAL CORNEAL ULCER OF RIGHT EYE: ICD-10-CM

## 2021-01-07 DIAGNOSIS — B60.13 ACANTHAMOEBA KERATITIS: Primary | ICD-10-CM

## 2021-01-07 DIAGNOSIS — B60.13 ACANTHAMOEBA KERATITIS: ICD-10-CM

## 2021-01-07 PROCEDURE — G0463 HOSPITAL OUTPT CLINIC VISIT: HCPCS

## 2021-01-07 PROCEDURE — 99213 OFFICE O/P EST LOW 20 MIN: CPT | Mod: GC | Performed by: OPHTHALMOLOGY

## 2021-01-07 PROCEDURE — 92285 EXTERNAL OCULAR PHOTOGRAPHY: CPT | Performed by: OPHTHALMOLOGY

## 2021-01-07 ASSESSMENT — VISUAL ACUITY
CORRECTION_TYPE: GLASSES
METHOD: SNELLEN - LINEAR
OD_CC: HM
OS_CC: 20/25

## 2021-01-07 ASSESSMENT — CONF VISUAL FIELD
OS_NORMAL: 1
OD_SUPERIOR_NASAL_RESTRICTION: 1
OD_SUPERIOR_TEMPORAL_RESTRICTION: 1
METHOD: COUNTING FINGERS
OD_INFERIOR_TEMPORAL_RESTRICTION: 2
OD_INFERIOR_NASAL_RESTRICTION: 2

## 2021-01-07 ASSESSMENT — REFRACTION_WEARINGRX
SPECS_TYPE: PAL
OD_AXIS: 134
OS_SPHERE: +1.25
OS_AXIS: 162
OS_ADD: +2.00
OD_SPHERE: +1.00
OD_CYLINDER: +0.50
OS_CYLINDER: +0.25
OD_ADD: +2.00

## 2021-01-07 ASSESSMENT — TONOMETRY
OD_IOP_MMHG: 09
IOP_METHOD: ICARE
OS_IOP_MMHG: 11

## 2021-01-07 ASSESSMENT — SLIT LAMP EXAM - LIDS
COMMENTS: MGD
COMMENTS: PROTECTIVE PTOSIS, MGD

## 2021-01-07 ASSESSMENT — EXTERNAL EXAM - LEFT EYE: OS_EXAM: NORMAL

## 2021-01-07 ASSESSMENT — EXTERNAL EXAM - RIGHT EYE: OD_EXAM: NORMAL; NO RASH

## 2021-01-07 NOTE — PROGRESS NOTES
CC: Keratoconjunctivitis, OD     Referring provider: Dr. Rosie Han     HPI:  Derik Hamm is a(n) 65 year old male who presents for evaluation of persistent keratoconjunctivitis, OD, since 10/5/2020. Initially seen by Dr. Ngo at [facility] and felt it was related to severe dryness vs bacterial keratitis, so he was started on PFATs and Vigamox Q2H WA. Patient was seen frequently for follow up; he was started on Valtrex 500 mg TID on 10/9/20 and has been on that since then. Despite this therapy, pt failed to improve so he was started prednisolone. He has had a waxing and waning course, but more recently has gradually declined. Stopped PF on 11/2 after running out. He was seen by Dr. Ngo on 11/2 who noted worsening VA and worsening exam, so the patient was referred to Dr. Han, whom he saw 11/4/20. Dr. Han referred to our clinic due to concerns for acanthamoeba keratitis.     Interval History:  Vision is about the same.  Taking more advil/tylenol but actually thinks the pain is improved.  Stable redness, no new discharge.  Stable tearing.        POHx:  PVD OS  Hyperopia OU  Presbyopia OU     Glasses: Yes  CTL wearer: yes - none x1.5 months; wears while showering or in the sauna; he sleeps in them once every 3-4 weeks; never wears them in a lake or pool.      Family hx of eye disease: negative for glaucoma/macular degeneration     Social Hx: (-) smoking, (-) EtOH, (-) illicit drugs     Meds:  Vigamox TID  PHMB 0.2 mg/mL (started 11/5/2020) -- Q3H WA + once overnight (getting 7x/day)  PFATS Q2H               Surgical Hx:  No eye surgery     A/P:  # Acanthamoeba keratitis, OD              - Symptoms started 10/5/2020              - Acanthamoeba risks: sleeps in CTL, wears in shower              - Cultures obtained 11/05/20,   Culture positive for acanthamoeba.              - Confocal: many PMNs, no definite acanthamoeba (but could be masked by deeper infection, no fungal elements)              -  Discussed non-FDA use of PHMB    12/31: Pain worse likely 2/2 surface toxicity from PHMB, although difficult to know. Plan to increase lubrication.   1/7: Better overall.  pain better.  Stromal haze/KP better as well.                - contPHMB 0.2 mg/mL   - continue PFAT's every 1-2 hours -- 5 minutes before every PHMB dose              - NO Prednisolone acetate              - continue Vigamox TID OD              - CTL holiday      Follow up: 1 weeks - call sooner with any problems     Torres Zepeda, PGY3  Ophthalmology Resident    Attending Physician Attestation:  Complete documentation of historical and exam elements from today's encounter can be found in the full encounter summary report (not reduplicated in this progress note).  I personally obtained the chief complaint(s) and history of present illness.  I confirmed and edited as necessary the review of systems, past medical/surgical history, family history, social history, and examination findings as documented by others; and I examined the patient myself.  I personally reviewed the relevant tests, images, and reports as documented above.  I formulated and edited as necessary the assessment and plan and discussed the findings and management plan with the patient and family. - Anson Cheng MD

## 2021-01-07 NOTE — NURSING NOTE
Chief Complaints and History of Present Illnesses   Patient presents with     Keratitis Follow Up     Chief Complaint(s) and History of Present Illness(es)     Keratitis Follow Up     Laterality: right eye (States va is the same since last visit  )    Associated symptoms: photophobia (has decreased), eye pain and lid swelling (has decreased) (+watery)    Duration: weeks    Pain scale: 0/10              Comments     Here for Acanthamoeba keratitis   Juliana HART 8:26 AM January 7, 2021

## 2021-01-19 ENCOUNTER — OFFICE VISIT (OUTPATIENT)
Dept: OPHTHALMOLOGY | Facility: CLINIC | Age: 66
End: 2021-01-19
Attending: OPHTHALMOLOGY
Payer: COMMERCIAL

## 2021-01-19 ENCOUNTER — OFFICE VISIT (OUTPATIENT)
Dept: DERMATOLOGY | Facility: CLINIC | Age: 66
End: 2021-01-19
Payer: COMMERCIAL

## 2021-01-19 VITALS — DIASTOLIC BLOOD PRESSURE: 81 MMHG | OXYGEN SATURATION: 98 % | SYSTOLIC BLOOD PRESSURE: 121 MMHG | HEART RATE: 79 BPM

## 2021-01-19 DIAGNOSIS — L81.4 LENTIGO: ICD-10-CM

## 2021-01-19 DIAGNOSIS — L82.1 SEBORRHEIC KERATOSIS: ICD-10-CM

## 2021-01-19 DIAGNOSIS — H04.121 DRY EYE SYNDROME OF RIGHT EYE: ICD-10-CM

## 2021-01-19 DIAGNOSIS — B60.13 ACANTHAMOEBA KERATITIS: Primary | ICD-10-CM

## 2021-01-19 DIAGNOSIS — H16.011 CENTRAL CORNEAL ULCER OF RIGHT EYE: ICD-10-CM

## 2021-01-19 DIAGNOSIS — D48.5 NEOPLASM OF UNCERTAIN BEHAVIOR OF SKIN: Primary | ICD-10-CM

## 2021-01-19 DIAGNOSIS — L40.8 SEBOPSORIASIS: ICD-10-CM

## 2021-01-19 DIAGNOSIS — D18.01 ANGIOMA OF SKIN: ICD-10-CM

## 2021-01-19 DIAGNOSIS — D22.9 NEVUS: ICD-10-CM

## 2021-01-19 PROCEDURE — 99214 OFFICE O/P EST MOD 30 MIN: CPT | Mod: 25 | Performed by: PHYSICIAN ASSISTANT

## 2021-01-19 PROCEDURE — G0463 HOSPITAL OUTPT CLINIC VISIT: HCPCS

## 2021-01-19 PROCEDURE — 11102 TANGNTL BX SKIN SINGLE LES: CPT | Performed by: PHYSICIAN ASSISTANT

## 2021-01-19 PROCEDURE — 88305 TISSUE EXAM BY PATHOLOGIST: CPT | Performed by: DERMATOLOGY

## 2021-01-19 PROCEDURE — 11103 TANGNTL BX SKIN EA SEP/ADDL: CPT | Performed by: PHYSICIAN ASSISTANT

## 2021-01-19 PROCEDURE — 99214 OFFICE O/P EST MOD 30 MIN: CPT | Mod: GC | Performed by: OPHTHALMOLOGY

## 2021-01-19 RX ORDER — OMEGA-3 FATTY ACIDS/FISH OIL 300-1000MG
200 CAPSULE ORAL EVERY 4 HOURS PRN
COMMUNITY
End: 2021-09-29

## 2021-01-19 RX ORDER — TRIAMCINOLONE ACETONIDE 1 MG/G
CREAM TOPICAL
Qty: 80 G | Refills: 2 | Status: SHIPPED | OUTPATIENT
Start: 2021-01-19 | End: 2021-09-29

## 2021-01-19 RX ORDER — KETOCONAZOLE 20 MG/ML
SHAMPOO TOPICAL
Qty: 120 ML | Refills: 11 | Status: SHIPPED | OUTPATIENT
Start: 2021-01-19 | End: 2021-09-29

## 2021-01-19 ASSESSMENT — REFRACTION_WEARINGRX
OD_AXIS: 134
OS_ADD: +2.00
SPECS_TYPE: PAL
OD_SPHERE: +1.00
OD_ADD: +2.00
OS_AXIS: 162
OS_SPHERE: +1.25
OS_CYLINDER: +0.25
OD_CYLINDER: +0.50

## 2021-01-19 ASSESSMENT — SLIT LAMP EXAM - LIDS
COMMENTS: PROTECTIVE PTOSIS, MGD
COMMENTS: MGD

## 2021-01-19 ASSESSMENT — CONF VISUAL FIELD
OS_NORMAL: 1
OD_INFERIOR_TEMPORAL_RESTRICTION: 1
OD_SUPERIOR_NASAL_RESTRICTION: 1
OD_INFERIOR_NASAL_RESTRICTION: 1
METHOD: COUNTING FINGERS
OD_SUPERIOR_TEMPORAL_RESTRICTION: 1

## 2021-01-19 ASSESSMENT — TONOMETRY
OD_IOP_MMHG: 21
OS_IOP_MMHG: 11
IOP_METHOD: ICARE

## 2021-01-19 ASSESSMENT — EXTERNAL EXAM - RIGHT EYE: OD_EXAM: NORMAL; NO RASH

## 2021-01-19 ASSESSMENT — EXTERNAL EXAM - LEFT EYE: OS_EXAM: NORMAL

## 2021-01-19 ASSESSMENT — VISUAL ACUITY
OS_CC: 20/25
OD_CC: HM
OS_CC+: -1
CORRECTION_TYPE: GLASSES
METHOD: SNELLEN - LINEAR

## 2021-01-19 NOTE — PATIENT INSTRUCTIONS
Continue PHMB every 3 hours while awake, once overnight.  Preservative free artificial tears every 2 hours   Moxifloxacin 3x/day.

## 2021-01-19 NOTE — LETTER
1/19/2021         RE: Derik Hamm  [address] [place] [address]        Dear Colleague,    Thank you for referring your patient, Derik Hamm, to the [facility]. Please see a copy of my visit note below.    HPI:   Chief complaints: Derik Hamm is a 65 year old male who presents for Full skin cancer screening to rule out skin cancer   Last Skin Exam: 1 year ago      1st Baseline: no  Personal HX of Skin Cancer: no   Personal HX of Malignant Melanoma: no   Family HX of Skin Cancer / Malignant Melanoma: no  Personal HX of Atypical Moles:   no  Risk factors: history of sun exposure and burns  New / Changing lesions:no  Social History: dealing with an amoeba infection in the right eye - he has lost vision and it is painful  On review of systems, there are no further skin complaints, patient is feeling otherwise well.  See patient intake sheet.  ROS of the following were done and are negative: Constitutional, Eyes, Ears, Nose,   Mouth, Throat, Cardiovascular, Respiratory, GI, Genitourinary, Musculoskeletal,   Psychiatric, Endocrine, Allergic/Immunologic.    PHYSICAL EXAM:   /81   Pulse 79   SpO2 98%   Skin exam performed as follows: Type 2 skin. Mood appropriate  Alert and Oriented X 3. Well developed, well nourished in no distress.  General appearance: Normal  Head including face: Normal  Eyes: conjunctiva and lids: Normal  Mouth: Lips, teeth, gums: Normal  Neck: Normal  Chest-breast/axillae: Normal  Back: Normal  Spleen and liver: Normal  Cardiovascular: Exam of peripheral vascular system by observation for swelling, varicosities, edema: Normal  Genitalia: groin, buttocks: Normal  Extremities: digits/nails (clubbing): Normal  Eccrine and Apocrine glands: Normal  Right upper extremity: Normal  Left upper extremity: Normal  Right lower extremity: Normal  Left lower extremity: Normal  Skin: Scalp and body hair: See below    Pt deferred exam of breasts,  groin, buttocks: No    Other physical findings:  1. Multiple pigmented macules on extremities and trunk  2. Multiple pigmented macules on face, trunk and extremities  3. Multiple vascular papules on trunk, arms and legs  4. Multiple scattered keratotic plaques  5. 5 mm pink scabbed papule on the right anterior neck  6. 3 mm pearly papule on the left medial chest  7. Flaking and erythema in ears, right elbow  8. Flaking in the beard       Except as noted above, no other signs of skin cancer or melanoma.     ASSESSMENT/PLAN:   Benign Full skin cancer screening today. . Patient with history of none  Advised on monthly self exams and 1 year  Patient Education: Appropriate brochures given.    1. Multiple benign appearing nevi on arms, legs and trunk. Discussed ABCDEs of melanoma and sunscreen.   2. Multiple lentigos on arms, legs and trunk. Advised benign, no treatment needed.  3. Multiple scattered angiomas. Advised benign, no treatment needed.   4. Seborrheic keratosis on arms, legs and trunk. Advised benign, no treatment needed.  5. R/o scc on the right anterior neck. Shave biopsy performed.  Area cleaned and anesthetized with 1% lidocaine with epinephrine.  Dermablade used to remove the lesion and sent to pathology. Bleeding was cauterized. Pt tolerated procedure well with no complications.   6. R/o BCC on the left medial chest. Shave biopsy performed.  Area cleaned and anesthetized with 1% lidocaine with epinephrine.  Dermablade used to remove the lesion and sent to pathology. Bleeding was cauterized. Pt tolerated procedure well with no complications.   7. Psoriasis on ears, elbow; few spots on the trunk.   --Start TAC cream BID x 1-2 weeks then PRN  8. Seborrheic dermatitis in the beard  --Start ketoconazole shampoo daily as needed  9. Derik to follow up with Primary Care provider regarding elevated blood pressure.            Follow-up: yearly FSE/PRN sooner    1.) Patient was asked about new and changing moles.  YES  2.) Patient received a complete physical skin examination: YES  3.) Patient was counseled to perform a monthly self skin examination: YES  Scribed By: Kristi Lyons MS, PAKarenC          Again, thank you for allowing me to participate in the care of your patient.        Sincerely,        Kristi Lyons PA-C

## 2021-01-19 NOTE — PROGRESS NOTES
HPI:   Chief complaints: Derik Hamm is a 65 year old male who presents for Full skin cancer screening to rule out skin cancer   Last Skin Exam: 1 year ago      1st Baseline: no  Personal HX of Skin Cancer: no   Personal HX of Malignant Melanoma: no   Family HX of Skin Cancer / Malignant Melanoma: no  Personal HX of Atypical Moles:   no  Risk factors: history of sun exposure and burns  New / Changing lesions:no  Social History: dealing with an amoeba infection in the right eye - he has lost vision and it is painful  On review of systems, there are no further skin complaints, patient is feeling otherwise well.  See patient intake sheet.  ROS of the following were done and are negative: Constitutional, Eyes, Ears, Nose,   Mouth, Throat, Cardiovascular, Respiratory, GI, Genitourinary, Musculoskeletal,   Psychiatric, Endocrine, Allergic/Immunologic.    PHYSICAL EXAM:   /81   Pulse 79   SpO2 98%   Skin exam performed as follows: Type 2 skin. Mood appropriate  Alert and Oriented X 3. Well developed, well nourished in no distress.  General appearance: Normal  Head including face: Normal  Eyes: conjunctiva and lids: Normal  Mouth: Lips, teeth, gums: Normal  Neck: Normal  Chest-breast/axillae: Normal  Back: Normal  Spleen and liver: Normal  Cardiovascular: Exam of peripheral vascular system by observation for swelling, varicosities, edema: Normal  Genitalia: groin, buttocks: Normal  Extremities: digits/nails (clubbing): Normal  Eccrine and Apocrine glands: Normal  Right upper extremity: Normal  Left upper extremity: Normal  Right lower extremity: Normal  Left lower extremity: Normal  Skin: Scalp and body hair: See below    Pt deferred exam of breasts, groin, buttocks: No    Other physical findings:  1. Multiple pigmented macules on extremities and trunk  2. Multiple pigmented macules on face, trunk and extremities  3. Multiple vascular papules on trunk, arms and legs  4. Multiple scattered keratotic  plaques  5. 5 mm pink scabbed papule on the right anterior neck  6. 3 mm pearly papule on the left medial chest  7. Flaking and erythema in ears, right elbow  8. Flaking in the beard       Except as noted above, no other signs of skin cancer or melanoma.     ASSESSMENT/PLAN:   Benign Full skin cancer screening today. . Patient with history of none  Advised on monthly self exams and 1 year  Patient Education: Appropriate brochures given.    1. Multiple benign appearing nevi on arms, legs and trunk. Discussed ABCDEs of melanoma and sunscreen.   2. Multiple lentigos on arms, legs and trunk. Advised benign, no treatment needed.  3. Multiple scattered angiomas. Advised benign, no treatment needed.   4. Seborrheic keratosis on arms, legs and trunk. Advised benign, no treatment needed.  5. R/o scc on the right anterior neck. Shave biopsy performed.  Area cleaned and anesthetized with 1% lidocaine with epinephrine.  Dermablade used to remove the lesion and sent to pathology. Bleeding was cauterized. Pt tolerated procedure well with no complications.   6. R/o BCC on the left medial chest. Shave biopsy performed.  Area cleaned and anesthetized with 1% lidocaine with epinephrine.  Dermablade used to remove the lesion and sent to pathology. Bleeding was cauterized. Pt tolerated procedure well with no complications.   7. Psoriasis on ears, elbow; few spots on the trunk.   --Start TAC cream BID x 1-2 weeks then PRN  8. Seborrheic dermatitis in the beard  --Start ketoconazole shampoo daily as needed  9. Derik to follow up with Primary Care provider regarding elevated blood pressure.            Follow-up: yearly FSE/PRN sooner    1.) Patient was asked about new and changing moles. YES  2.) Patient received a complete physical skin examination: YES  3.) Patient was counseled to perform a monthly self skin examination: YES  Scribed By: Kristi Lyons, MS, PAISAEL

## 2021-01-19 NOTE — PATIENT INSTRUCTIONS
Wound Care Instructions     FOR SUPERFICIAL WOUNDS     Right anterior neck  Left medial  chest    [facility] [phone]    [facility] [phone]                       AFTER 24 HOURS YOU SHOULD REMOVE THE BANDAGE AND BEGIN DAILY DRESSING CHANGES AS FOLLOWS:     1) Remove Dressing.     2) Clean and dry the area with tap water using a Q-tip or sterile gauze pad.     3) Apply Vaseline, Aquaphor, Polysporin ointment or Bacitracin ointment over entire wound.  Do NOT use Neosporin ointment.     4) Cover the wound with a band-aid, or a sterile non-stick gauze pad and micropore paper tape      REPEAT THESE INSTRUCTIONS AT LEAST ONCE A DAY UNTIL THE WOUND HAS COMPLETELY HEALED.    It is an old wives tale that a wound heals better when it is exposed to air and allowed to dry out. The wound will heal faster with a better cosmetic result if it is kept moist with ointment and covered with a bandage.    **Do not let the wound dry out.**      Supplies Needed:      *Cotton tipped applicators (Q-tips)    *Polysporin Ointment or Bacitracin Ointment (NOT NEOSPORIN)    *Band-aids or non-stick gauze pads and micropore paper tape.      PATIENT INFORMATION:    During the healing process you will notice a number of changes. All wounds develop a small halo of redness surrounding the wound.  This means healing is occurring. Severe itching with extensive redness usually indicates sensitivity to the ointment or bandage tape used to dress the wound.  You should call our office if this develops.      Swelling  and/or discoloration around your surgical site is common, particularly when performed around the eye.    All wounds normally drain.  The larger the wound the more drainage there will be.  After 7-10 days, you will notice the wound beginning to shrink and new skin will begin to grow.  The wound is healed when you can see skin has formed over the entire area.  A healed wound has a healthy, shiny look to  the surface and is red to dark pink in color to normalize.  Wounds may take approximately 4-6 weeks to heal.  Larger wounds may take 6-8 weeks.  After the wound is healed you may discontinue dressing changes.    You may experience a sensation of tightness as your wound heals. This is normal and will gradually subside.    Your healed wound may be sensitive to temperature changes. This sensitivity improves with time, but if you re having a lot of discomfort, try to avoid temperature extremes.    Patients frequently experience itching after their wound appears to have healed because of the continue healing under the skin.  Plain Vaseline will help relieve the itching.        POSSIBLE COMPLICATIONS    BLEEDIN. Leave the bandage in place.  2. Use tightly rolled up gauze or a cloth to apply direct pressure over the bandage for 30  minutes.  3. Reapply pressure for an additional 30 minutes if necessary  4. Use additional gauze and tape to maintain pressure once the bleeding has stopped.

## 2021-01-19 NOTE — NURSING NOTE
Chief Complaints and History of Present Illnesses   Patient presents with     Follow Up     Acanthamoeba keratitis - Right Eye     Chief Complaint(s) and History of Present Illness(es)     Follow Up     Laterality: right eye    Course: gradually improving    Associated symptoms: tearing, photophobia, eye pain and headache (intermittent)    Treatments tried: eye drops    Pain scale: 3/10    Comments: Acanthamoeba keratitis - Right Eye              Comments     He states that his right eye has become less painful over the past week.  On Saturday he had a notable headache.  The headache may have been stress related.    He uses in his right eye:  PHMB 0.2 mg/mL 7 times a day  PFAT's every 1-2 hours -- 5 minutes before every PHMB dose  Vigamox 3x a day    TANNER Gomez 11:59 AM  January 19, 2021

## 2021-01-19 NOTE — PROGRESS NOTES
CC: Keratoconjunctivitis, OD     Referring provider: Dr. Rosie Han     HPI:  Derik Hamm is a(n) 65 year old male who presents for evaluation of persistent keratoconjunctivitis, OD, since 10/5/2020. Initially seen by Dr. Ngo at [facility] and felt it was related to severe dryness vs bacterial keratitis, so he was started on PFATs and Vigamox Q2H WA. Patient was seen frequently for follow up; he was started on Valtrex 500 mg TID on 10/9/20 and has been on that since then. Despite this therapy, pt failed to improve so he was started prednisolone. He has had a waxing and waning course, but more recently has gradually declined. Stopped PF on 11/2 after running out. He was seen by Dr. Ngo on 11/2 who noted worsening VA and worsening exam, so the patient was referred to Dr. Han, whom he saw 11/4/20. Dr. Han referred to our clinic due to concerns for acanthamoeba keratitis.     Interval History:  Vision is about the same.  Taking more advil/tylenol but actually thinks the pain is improved.  Stable redness, no new discharge.  Stable tearing.        POHx:  PVD OS  Hyperopia OU  Presbyopia OU     Glasses: Yes  CTL wearer: yes - none x1.5 months; wears while showering or in the sauna; he sleeps in them once every 3-4 weeks; never wears them in a lake or pool.      Family hx of eye disease: negative for glaucoma/macular degeneration     Social Hx: (-) smoking, (-) EtOH, (-) illicit drugs     Meds:  Vigamox TID  PHMB 0.2 mg/mL (started 11/5/2020) -- Q3H WA + once overnight (getting 7x/day)  PFATS Q2H               Surgical Hx:  No eye surgery     A/P:  # Acanthamoeba keratitis, OD              - Symptoms started 10/5/2020              - Acanthamoeba risks: sleeps in CTL, wears in shower              - Cultures obtained 11/05/20,   Culture positive for acanthamoeba.              - Confocal: many PMNs, no definite acanthamoeba (but could be masked by deeper infection, no fungal elements)              -  Discussed non-FDA use of PHMB    12/31: Pain worse likely 2/2 surface toxicity from PHMB, although difficult to know. Plan to increase lubrication.   1/7: Better overall.  pain better.  Stromal haze/KP better as well.  1/19: stable from previous visit w/ continued diffuse haze,no epi defects.                 - cont PHMB 0.2 mg/mL q3h WA and once overnight (7x/day)   - continue PFAT's every 1-2 hours -- 5 minutes before every PHMB dose              - NO Prednisolone acetate              - continue Vigamox TID OD              - CTL holiday      Follow up: 1 weeks - call sooner with any problems     Jason Goldberg, MD  Cornea & External Disease Fellow  Department of Ophthalmology and Visual Neurosciences    Attending Physician Attestation:  Complete documentation of historical and exam elements from today's encounter can be found in the full encounter summary report (not reduplicated in this progress note).  I personally obtained the chief complaint(s) and history of present illness.  I confirmed and edited as necessary the review of systems, past medical/surgical history, family history, social history, and examination findings as documented by others; and I examined the patient myself.  I personally reviewed the relevant tests, images, and reports as documented above.  I formulated and edited as necessary the assessment and plan and discussed the findings and management plan with the patient and family. - Anson Cheng MD

## 2021-01-23 LAB — COPATH REPORT: NORMAL

## 2021-01-25 ENCOUNTER — TELEPHONE (OUTPATIENT)
Dept: DERMATOLOGY | Facility: CLINIC | Age: 66
End: 2021-01-25

## 2021-01-25 NOTE — TELEPHONE ENCOUNTER
Patient called back.    Educated patient on biopsy results- BCC x2.    Educated patient on BCC, mohs, scheduled mohs appointment, and letter/packet sent.    *Dr. Retana approved 1 appointment    Patient voiced understanding.    Cat RN-BSN-PHN  [facility]  [phone]

## 2021-01-25 NOTE — LETTER
[address]  [address]  [phone]    1/25/2021       Derik Hamm  [address] [place] [address]      Dear Derik:    You are scheduled for Mohs Surgery on: 2/25/21 @9:00am.    Please check in at [facility], [address].     You don't need to arrive more than 5-10 minutes prior to your appointment time.     Be sure to eat a good breakfast and bathe and wash your hair prior to surgery.     If you are taking any anti-coagulants that are prescribed by your Doctor (such as Coumadin/Warfarin, Plavix, Aspirin, Ibuprofen), please continue taking them.     However, if you are taking anti-coagulants over the counter without a Doctor's order for a medical condition, please discontinue them 10 days prior to surgery.     Please wear loose comfortable clothing as it could possibly be 4-6 hours until your surgery is completed depending upon how many layers of tissue need to be removed.      Thank you,    LINDA Retana MD

## 2021-01-25 NOTE — TELEPHONE ENCOUNTER
----- Message from Kristi Lyons PA-C sent at 1/25/2021 11:38 AM CST -----  Right anterior neck, left medial chest BCC please schedule for excision

## 2021-01-25 NOTE — TELEPHONE ENCOUNTER
Called and LM for patient to call back in regards to biopsy results rigo Shelton RN-BSN-PHN  [facility]  [phone]

## 2021-01-28 ENCOUNTER — OFFICE VISIT (OUTPATIENT)
Dept: OPHTHALMOLOGY | Facility: CLINIC | Age: 66
End: 2021-01-28
Attending: OPHTHALMOLOGY
Payer: COMMERCIAL

## 2021-01-28 DIAGNOSIS — H16.011 CENTRAL CORNEAL ULCER OF RIGHT EYE: ICD-10-CM

## 2021-01-28 DIAGNOSIS — H04.121 DRY EYE SYNDROME OF RIGHT EYE: ICD-10-CM

## 2021-01-28 DIAGNOSIS — B60.13 ACANTHAMOEBA KERATITIS: Primary | ICD-10-CM

## 2021-01-28 DIAGNOSIS — B60.13 ACANTHAMOEBA KERATITIS: ICD-10-CM

## 2021-01-28 PROCEDURE — G0463 HOSPITAL OUTPT CLINIC VISIT: HCPCS

## 2021-01-28 PROCEDURE — 99214 OFFICE O/P EST MOD 30 MIN: CPT | Performed by: OPHTHALMOLOGY

## 2021-01-28 PROCEDURE — 92285 EXTERNAL OCULAR PHOTOGRAPHY: CPT | Performed by: OPHTHALMOLOGY

## 2021-01-28 ASSESSMENT — VISUAL ACUITY
OD_CC: HM
METHOD: SNELLEN - LINEAR
OS_CC+: -3
OS_CC: 20/25

## 2021-01-28 ASSESSMENT — REFRACTION_WEARINGRX
OS_SPHERE: +1.25
OS_ADD: +2.00
OD_ADD: +2.00
OS_CYLINDER: +0.25
OS_AXIS: 162
OD_AXIS: 134
OD_SPHERE: +1.00
OD_CYLINDER: +0.50
SPECS_TYPE: PAL

## 2021-01-28 ASSESSMENT — CONF VISUAL FIELD
OD_SUPERIOR_NASAL_RESTRICTION: 1
OS_NORMAL: 1
OD_SUPERIOR_TEMPORAL_RESTRICTION: 1
OD_INFERIOR_TEMPORAL_RESTRICTION: 1
OD_INFERIOR_NASAL_RESTRICTION: 1

## 2021-01-28 ASSESSMENT — PACHYMETRY
OD_CT(UM): 658
OS_CT(UM): 508

## 2021-01-28 ASSESSMENT — TONOMETRY
OS_IOP_MMHG: 10
OD_IOP_MMHG: 8
IOP_METHOD: ICARE

## 2021-01-28 ASSESSMENT — SLIT LAMP EXAM - LIDS
COMMENTS: PROTECTIVE PTOSIS, MGD
COMMENTS: MGD

## 2021-01-28 ASSESSMENT — EXTERNAL EXAM - RIGHT EYE: OD_EXAM: NORMAL; NO RASH

## 2021-01-28 ASSESSMENT — EXTERNAL EXAM - LEFT EYE: OS_EXAM: NORMAL

## 2021-01-28 NOTE — NURSING NOTE
Chief Complaints and History of Present Illnesses   Patient presents with     Keratitis Follow Up     Chief Complaint(s) and History of Present Illness(es)     Keratitis Follow Up     Laterality: right eye    Associated symptoms: Negative for eye pain    Pain scale: 0/10              Comments     Derik is here to continue care for Acanthamoeba keratitis - Right Eye. He states no vision change since last visit. He sometimes feels pain RE that he uses pain relievers to reduce the symptoms. He usually keeps RE closed to reduce overall blurriness.     Daniel Fernando COT 8:41 AM January 28, 2021

## 2021-01-28 NOTE — PROGRESS NOTES
CC: Keratoconjunctivitis, OD     Referring provider: Dr. Rosie Han     HPI:  Derik Hamm is a(n) 65 year old male who presents for evaluation of persistent keratoconjunctivitis, OD, since 10/5/2020. Initially seen by Dr. Ngo at [facility] and felt it was related to severe dryness vs bacterial keratitis, so he was started on PFATs and Vigamox Q2H WA. Patient was seen frequently for follow up; he was started on Valtrex 500 mg TID on 10/9/20 and has been on that since then. Despite this therapy, pt failed to improve so he was started prednisolone. He has had a waxing and waning course, but more recently has gradually declined. Stopped PF on 11/2 after running out. He was seen by Dr. Ngo on 11/2 who noted worsening VA and worsening exam, so the patient was referred to Dr. Han, whom he saw 11/4/20. Dr. Han referred to our clinic due to concerns for acanthamoeba keratitis.     Interval History:  Vision is about the same.  Taking more advil/tylenol but actually thinks the pain is improved.  Stable redness, no new discharge.  Stable tearing.        POHx:  PVD OS  Hyperopia OU  Presbyopia OU     Glasses: Yes  CTL wearer: yes - none x1.5 months; wears while showering or in the sauna; he sleeps in them once every 3-4 weeks; never wears them in a lake or pool.      Family hx of eye disease: negative for glaucoma/macular degeneration     Social Hx: (-) smoking, (-) EtOH, (-) illicit drugs     Meds:  Vigamox TID  PHMB 0.2 mg/mL (started 11/5/2020) -- Q3H WA + once overnight (getting 7x/day)  PFATS Q2H               Surgical Hx:  No eye surgery     A/P:  # Acanthamoeba keratitis, OD              - Symptoms started 10/5/2020              - Acanthamoeba risks: sleeps in CTL, wears in shower              - Cultures obtained 11/05/20,   Culture positive for acanthamoeba.              - Confocal: many PMNs, no definite acanthamoeba (but could be masked by deeper infection, no fungal elements)              -  Discussed non-FDA use of PHMB    12/31: Pain worse likely 2/2 surface toxicity from PHMB, although difficult to know. Plan to increase lubrication.   1/7: Better overall.  pain better.  Stromal haze/KP better as well.  1/19: stable from previous visit w/ continued diffuse haze,no epi defects.   1/28/21 Slightly improved corneal clarity.                - cont PHMB 0.2 mg/mL q3h WA and once overnight (7x/day)   - continue PFAT's every 1-2 hours -- 5 minutes before every PHMB dose              - NO Prednisolone acetate              - continue Vigamox TID OD              - CTL holiday    -Photo today     Follow up: 1 weeks - call sooner with any problems    Anson Cheng MD  Attending Physician Attestation:  Complete documentation of historical and exam elements from today's encounter can be found in the full encounter summary report (not reduplicated in this progress note).  I personally obtained the chief complaint(s) and history of present illness.  I confirmed and edited as necessary the review of systems, past medical/surgical history, family history, social history, and examination findings as documented by others; and I examined the patient myself.  I personally reviewed the relevant tests, images, and reports as documented above.  I formulated and edited as necessary the assessment and plan and discussed the findings and management plan with the patient and family. - Anson Cheng MD

## 2021-02-04 ENCOUNTER — OFFICE VISIT (OUTPATIENT)
Dept: OPHTHALMOLOGY | Facility: CLINIC | Age: 66
End: 2021-02-04
Attending: OPHTHALMOLOGY
Payer: COMMERCIAL

## 2021-02-04 DIAGNOSIS — B60.13 ACANTHAMOEBA KERATITIS: Primary | ICD-10-CM

## 2021-02-04 DIAGNOSIS — H16.011 CENTRAL CORNEAL ULCER OF RIGHT EYE: ICD-10-CM

## 2021-02-04 DIAGNOSIS — H04.121 DRY EYE SYNDROME OF RIGHT EYE: ICD-10-CM

## 2021-02-04 PROCEDURE — G0463 HOSPITAL OUTPT CLINIC VISIT: HCPCS

## 2021-02-04 PROCEDURE — 99214 OFFICE O/P EST MOD 30 MIN: CPT | Mod: GC | Performed by: OPHTHALMOLOGY

## 2021-02-04 ASSESSMENT — VISUAL ACUITY
OS_CC: 20/25
CORRECTION_TYPE: GLASSES
OD_CC: 3'/200
METHOD: SNELLEN - LINEAR
OS_CC+: -1

## 2021-02-04 ASSESSMENT — TONOMETRY
OD_IOP_MMHG: 11
OS_IOP_MMHG: 13
IOP_METHOD: ICARE

## 2021-02-04 ASSESSMENT — CONF VISUAL FIELD
OD_SUPERIOR_TEMPORAL_RESTRICTION: 1
OD_INFERIOR_TEMPORAL_RESTRICTION: 1
OD_INFERIOR_NASAL_RESTRICTION: 1
METHOD: COUNTING FINGERS
OD_SUPERIOR_NASAL_RESTRICTION: 1

## 2021-02-04 ASSESSMENT — EXTERNAL EXAM - RIGHT EYE: OD_EXAM: NORMAL; NO RASH

## 2021-02-04 ASSESSMENT — REFRACTION_WEARINGRX
OD_AXIS: 134
OD_SPHERE: +1.00
OS_SPHERE: +1.25
OS_AXIS: 162
OS_CYLINDER: +0.25
SPECS_TYPE: PAL
OS_ADD: +2.00
OD_ADD: +2.00
OD_CYLINDER: +0.50

## 2021-02-04 ASSESSMENT — SLIT LAMP EXAM - LIDS
COMMENTS: MGD
COMMENTS: PROTECTIVE PTOSIS, MGD

## 2021-02-04 ASSESSMENT — EXTERNAL EXAM - LEFT EYE: OS_EXAM: NORMAL

## 2021-02-04 NOTE — PROGRESS NOTES
CC:  Acanthamoeba keratitis OD     Referring provider: Dr. Rosie Han     HPI:  Derik Hamm is a(n) 65 year old male who presents as follow up for Acanthamoeba Keratitis right eye.    Patient initially presented for evaluation of persistent keratoconjunctivitis, OD, since 10/5/2020. Initially seen by Dr. Ngo at [facility] and felt it was related to severe dryness vs bacterial keratitis, so he was started on PFATs and Vigamox Q2H WA. Patient was seen frequently for follow up; he was started on Valtrex 500 mg TID on 10/9/20 and has been on that since then. Despite this therapy, pt failed to improve so he was started prednisolone. He has had a waxing and waning course, but more recently has gradually declined. Stopped PF on 11/2 after running out. He was seen by Dr. Ngo on 11/2 who noted worsening VA and worsening exam, so the patient was referred to Dr. Han, whom he saw 11/4/20. Dr. Han referred to our clinic due to concerns for acanthamoeba keratitis. Culture positive for acanthamoeba on 11/05/20.     Interval History:  Today with some aching pain, similar to prior, mildly relieved with Advil and Tylenol. Also endorsing some nausea. Vision is about the same. Stable redness, no discharge. Stable tearing.        POHx:  PVD OS  Hyperopia OU  Presbyopia OU     Glasses: Yes  CTL wearer: yes - none x1.5 months; wears while showering or in the sauna; he sleeps in them once every 3-4 weeks; never wears them in a lake or pool.      Family hx of eye disease: negative for glaucoma/macular degeneration     Social Hx: (-) smoking, (-) EtOH, (-) illicit drugs     Meds:  Vigamox TID  PHMB 0.2 mg/mL (started 11/5/2020) -- Q3H WA + once overnight (getting 7x/day)  PFATS QID       -      Surgical Hx:  No eye surgery     A/P:  # Acanthamoeba keratitis, OD              - Symptoms started 10/5/2020              - Acanthamoeba risks: sleeps in CTL, wears in shower, sauna              - Cultures obtained 11/05/20,    Culture positive for acanthamoeba.              - Confocal: many PMNs, no definite acanthamoeba (but could be masked by deeper infection, no fungal elements)              - Discussed non-FDA use of PHMB    12/31: Pain worse likely 2/2 surface toxicity from PHMB, although difficult to know. Plan to increase lubrication.   1/07: Better overall.  pain better.  Stromal haze/KP better as well.  1/19: Stable from previous visit w/ continued diffuse haze,no epi defects.   1/28: Slightly improved corneal clarity.  2/4: Slightly improved corneal clarity   PLAN:              - Cont PHMB 0.2 mg/mL q3h WA and once overnight (7x/day) - this dose since 1/19/21   - Continue PFAT's every 1-2 hours -- 5 minutes before every PHMB dose              - Stop Vigamox TID OD              - NO Prednisolone acetate              - CTL holiday    - Photo today     Follow up: 1 weeks - call sooner with any problems    Josefina Garces MD  Ophthalmology PGY-3  [facility]    Attending Physician Attestation:  Complete documentation of historical and exam elements from today's encounter can be found in the full encounter summary report (not reduplicated in this progress note).  I personally obtained the chief complaint(s) and history of present illness.  I confirmed and edited as necessary the review of systems, past medical/surgical history, family history, social history, and examination findings as documented by others; and I examined the patient myself.  I personally reviewed the relevant tests, images, and reports as documented above.  I formulated and edited as necessary the assessment and plan and discussed the findings and management plan with the patient and family. - Anson Cheng MD

## 2021-02-04 NOTE — NURSING NOTE
Chief Complaints and History of Present Illnesses   Patient presents with     Follow Up     Chief Complaint(s) and History of Present Illness(es)     Follow Up     Laterality: right eye    Quality: blurred vision    Associated symptoms: photophobia, headache and eye pain.  Negative for flashes and floaters    Treatments tried: eye drops    Pain scale: 5/10              Comments     Follow up for Acanthamoeba keratitis, right eye.    The patient is using Vigamox three times daily, PHMB q3H WA and PF AT's 4 times daily.  Flor Betancourt, COA, COA 9:45 AM 02/04/2021

## 2021-02-18 ENCOUNTER — OFFICE VISIT (OUTPATIENT)
Dept: OPHTHALMOLOGY | Facility: CLINIC | Age: 66
End: 2021-02-18
Attending: OPHTHALMOLOGY
Payer: COMMERCIAL

## 2021-02-18 DIAGNOSIS — B60.13 ACANTHAMOEBA KERATITIS: Primary | ICD-10-CM

## 2021-02-18 DIAGNOSIS — H16.011 CENTRAL CORNEAL ULCER OF RIGHT EYE: ICD-10-CM

## 2021-02-18 DIAGNOSIS — B60.13 ACANTHAMOEBA KERATITIS: ICD-10-CM

## 2021-02-18 DIAGNOSIS — H04.121 DRY EYE SYNDROME OF RIGHT EYE: ICD-10-CM

## 2021-02-18 PROCEDURE — 99214 OFFICE O/P EST MOD 30 MIN: CPT | Mod: GC | Performed by: OPHTHALMOLOGY

## 2021-02-18 PROCEDURE — G0463 HOSPITAL OUTPT CLINIC VISIT: HCPCS

## 2021-02-18 PROCEDURE — 92285 EXTERNAL OCULAR PHOTOGRAPHY: CPT | Performed by: OPHTHALMOLOGY

## 2021-02-18 RX ORDER — MOXIFLOXACIN 5 MG/ML
1 SOLUTION/ DROPS OPHTHALMIC 3 TIMES DAILY
Qty: 3 ML | Refills: 6 | Status: SHIPPED | OUTPATIENT
Start: 2021-02-18 | End: 2021-07-16

## 2021-02-18 ASSESSMENT — CONF VISUAL FIELD
OD_INFERIOR_NASAL_RESTRICTION: 1
OD_INFERIOR_TEMPORAL_RESTRICTION: 1
OS_NORMAL: 1
METHOD: COUNTING FINGERS
OD_SUPERIOR_TEMPORAL_RESTRICTION: 1
OD_SUPERIOR_NASAL_RESTRICTION: 1

## 2021-02-18 ASSESSMENT — TONOMETRY
OS_IOP_MMHG: 12
OD_IOP_MMHG: 20
IOP_METHOD: ICARE

## 2021-02-18 ASSESSMENT — VISUAL ACUITY
OD_CC: CF@1FT
CORRECTION_TYPE: GLASSES
OS_CC: 20/20
METHOD: SNELLEN - LINEAR

## 2021-02-18 ASSESSMENT — EXTERNAL EXAM - LEFT EYE: OS_EXAM: NORMAL

## 2021-02-18 ASSESSMENT — SLIT LAMP EXAM - LIDS
COMMENTS: MGD
COMMENTS: PROTECTIVE PTOSIS, MGD

## 2021-02-18 ASSESSMENT — EXTERNAL EXAM - RIGHT EYE: OD_EXAM: NORMAL; NO RASH

## 2021-02-18 NOTE — NURSING NOTE
Chief Complaints and History of Present Illnesses   Patient presents with     Follow Up     2 week follow up Acanthamoeba keratitis, Right Eye     Chief Complaint(s) and History of Present Illness(es)     Follow Up     Laterality: right eye    Quality: blurred vision    Severity: severe    Course: gradually improving    Associated symptoms: Negative for eye pain, tearing, photophobia and discharge    Pain scale: 1/10    Comments: 2 week follow up Acanthamoeba keratitis, Right Eye              Comments     Pt denies any significant vision changes RE since last visit.  Complains of having some pain still RE - but has gone down significantly.  Complains of RE having a lot of discharge last week - none today.  Denies any irritation, photophobia, discharge, or tearing.  Ocular meds: PHMB q3h WA/ once overnight RE, Vigamox TID RE & PFAT's q1-2h RE    Anupama Pearson OT 9:38 AM February 18, 2021

## 2021-02-18 NOTE — PROGRESS NOTES
CC:  Acanthamoeba keratitis OD     Referring provider: Dr. Rosie Hna     HPI:  Derik Hamm is a(n) 65 year old male who presents as follow up for Acanthamoeba Keratitis right eye.    Patient initially presented for evaluation of persistent keratoconjunctivitis, OD, since 10/5/2020. Initially seen by Dr. Ngo at [facility] and felt it was related to severe dryness vs bacterial keratitis, so he was started on PFATs and Vigamox Q2H WA. Patient was seen frequently for follow up; he was started on Valtrex 500 mg TID on 10/9/20 and has been on that since then. Despite this therapy, pt failed to improve so he was started prednisolone. He has had a waxing and waning course, but more recently has gradually declined. Stopped PF on 11/2 after running out. He was seen by Dr. Ngo on 11/2 who noted worsening VA and worsening exam, so the patient was referred to Dr. Han, whom he saw 11/4/20. Dr. Han referred to our clinic due to concerns for acanthamoeba keratitis. Culture positive for acanthamoeba on 11/05/20.     Interval History:  Over the interval, no significant changes.  Patient denies any changes in vision.  No pain, but some irritation -- eye injected.  Tearing improved.  No new flashes, floaters, diplopia.       POHx:  PVD OS  Hyperopia OU  Presbyopia OU     Glasses: Yes  CTL wearer: yes - none x1.5 months; wears while showering or in the sauna; he sleeps in them once every 3-4 weeks; never wears them in a lake or pool.      Family hx of eye disease: negative for glaucoma/macular degeneration     Social Hx: (-) smoking, (-) EtOH, (-) illicit drugs     Meds:  Vigamox TID  PHMB 0.2 mg/mL (started 11/5/2020) -- Q3H WA + once overnight (getting 7x/day)  PFATS q1-2h       -      Surgical Hx:  No eye surgery     A/P:  # Acanthamoeba keratitis, OD              - Symptoms started 10/5/2020              - Acanthamoeba risks: sleeps in CTL, wears in shower, sauna              - Cultures obtained 11/05/20,    Culture positive for acanthamoeba.              - Confocal: many PMNs, no definite acanthamoeba (but could be masked by deeper infection, no fungal elements)              - Discussed non-FDA use of PHMB    12/31: Pain worse likely 2/2 surface toxicity from PHMB, although difficult to know. Plan to increase lubrication.   1/07: Better overall.  pain better.  Stromal haze/KP better as well.  1/19: Stable from previous visit w/ continued diffuse haze,no epi defects.   1/28: Slightly improved corneal clarity.  2/4: Slightly improved corneal clarity    2/18: Exam largely stable, new central bulla   PLAN:              - Cont PHMB 0.2 mg/mL 5x/day WA and once overnight (6x/day) - this started 2/18/21   - Continue PFAT's every 1-2 hours -- 5 minutes before every PHMB dose              - continue Vigamox TID OD              - NO Prednisolone acetate              - CTL holiday    - Photo today     Follow up: approx 10 days - call sooner with any problems    Jason Goldberg, MD  Cornea & External Disease Fellow  Department of Ophthalmology and Visual Neurosciences    Attending Physician Attestation:  Complete documentation of historical and exam elements from today's encounter can be found in the full encounter summary report (not reduplicated in this progress note).  I personally obtained the chief complaint(s) and history of present illness.  I confirmed and edited as necessary the review of systems, past medical/surgical history, family history, social history, and examination findings as documented by others; and I examined the patient myself.  I personally reviewed the relevant tests, images, and reports as documented above.  I formulated and edited as necessary the assessment and plan and discussed the findings and management plan with the patient and family. - Anson Cheng MD

## 2021-02-24 NOTE — PROGRESS NOTES
Surgical Office Location:  [facility]  [address] [place] [id number]

## 2021-02-25 ENCOUNTER — OFFICE VISIT (OUTPATIENT)
Dept: DERMATOLOGY | Facility: CLINIC | Age: 66
End: 2021-02-25
Payer: COMMERCIAL

## 2021-02-25 VITALS — OXYGEN SATURATION: 97 % | SYSTOLIC BLOOD PRESSURE: 122 MMHG | DIASTOLIC BLOOD PRESSURE: 74 MMHG | HEART RATE: 95 BPM

## 2021-02-25 DIAGNOSIS — C44.41 BASAL CELL CARCINOMA (BCC) OF NECK: Primary | ICD-10-CM

## 2021-02-25 DIAGNOSIS — C44.519 BASAL CELL CARCINOMA OF ANTERIOR CHEST: ICD-10-CM

## 2021-02-25 PROCEDURE — 11602 EXC TR-EXT MAL+MARG 1.1-2 CM: CPT | Mod: 59 | Performed by: DERMATOLOGY

## 2021-02-25 PROCEDURE — 99207 PR NO CHARGE LOS: CPT | Performed by: DERMATOLOGY

## 2021-02-25 PROCEDURE — 88331 PATH CONSLTJ SURG 1 BLK 1SPC: CPT | Mod: 59 | Performed by: DERMATOLOGY

## 2021-02-25 PROCEDURE — 17311 MOHS 1 STAGE H/N/HF/G: CPT | Performed by: DERMATOLOGY

## 2021-02-25 PROCEDURE — 13132 CMPLX RPR F/C/C/M/N/AX/G/H/F: CPT | Mod: 51 | Performed by: DERMATOLOGY

## 2021-02-25 NOTE — NURSING NOTE
"Initial /74   Pulse 95   SpO2 97%  Estimated body mass index is 28.89 kg/m  as calculated from the following:    Height as of 3/14/19: 1.88 m (6' 2\").    Weight as of 3/14/19: 102.1 kg (225 lb). .    FRANCIA Shelton-BSN-N  [facility]  [phone]  "

## 2021-02-25 NOTE — PROGRESS NOTES
Derik Hamm is an extremely pleasant 65 year old year old male patient here today for evaluation and managment of basal cell carcinoma on chest and neck.  Patient has no other skin complaints today.  Remainder of the HPI, Meds, PMH, Allergies, FH, and SH was reviewed in chart.      Past Medical History:   Diagnosis Date     Acanthamoeba keratitis      Basal cell carcinoma      Presbyopia      PVD (posterior vitreous detachment), left        Past Surgical History:   Procedure Laterality Date     LAPAROSCOPIC APPENDECTOMY N/A 11/4/2018    Procedure: LAPAROSCOPIC APPENDECTOMY;  Surgeon: Mackenzie Dale MD;  Location: [facility] OR     ORTHOPEDIC SURGERY          Family History   Problem Relation Age of Onset     Lung Cancer Father 85     Skin Cancer Mother      Skin Cancer Brother      Glaucoma No family hx of      Macular Degeneration No family hx of      Diabetes No family hx of      Hypertension No family hx of        Social History     Socioeconomic History     Marital status: Single     Spouse name: Not on file     Number of children: Not on file     Years of education: Not on file     Highest education level: Not on file   Occupational History     Not on file   Social Needs     Financial resource strain: Not on file     Food insecurity     Worry: Not on file     Inability: Not on file     Transportation needs     Medical: Not on file     Non-medical: Not on file   Tobacco Use     Smoking status: Never Smoker     Smokeless tobacco: Never Used   Substance and Sexual Activity     Alcohol use: No     Drug use: No     Sexual activity: Yes     Partners: Male     Comment: Monogmous relationship     Lifestyle     Physical activity     Days per week: Not on file     Minutes per session: Not on file     Stress: Not on file   Relationships     Social connections     Talks on phone: Not on file     Gets together: Not on file     Attends Protestant service: Not on file     Active member of club or organization: Not on  file     Attends meetings of clubs or organizations: Not on file     Relationship status: Not on file     Intimate partner violence     Fear of current or ex partner: Not on file     Emotionally abused: Not on file     Physically abused: Not on file     Forced sexual activity: Not on file   Other Topics Concern     Parent/sibling w/ CABG, MI or angioplasty before 65F 55M? Not Asked   Social History Narrative     Not on file       Outpatient Encounter Medications as of 2/25/2021   Medication Sig Dispense Refill     acetaminophen (TYLENOL) 325 MG tablet Take 325-650 mg by mouth daily as needed for mild pain       COMPOUNDED NON-CONTROLLED SUBSTANCE (CMPD RX) - PHARMACY TO MIX COMPOUNDED MEDICATION Place 1 drop into the right eye every hour PHMB 0.2mg/ml 1 Bottle 11     ibuprofen (ADVIL/MOTRIN) 200 MG capsule Take 200 mg by mouth every 4 hours as needed       ketoconazole (NIZORAL) 2 % external shampoo Use daily as needed 120 mL 11     moxifloxacin (VIGAMOX) 0.5 % ophthalmic solution Place 1 drop into the right eye 3 times daily 3 mL 6     multivitamin, therapeutic (THERA-VIT) TABS tablet Take 1 tablet by mouth daily       naproxen sodium (ALEVE) 220 MG tablet Take 220 mg by mouth daily as needed for moderate pain       polyvinyl alcohol-povidone PF (REFRESH) 1.4-0.6 % ophthalmic solution 1-2 drops       triamcinolone (KENALOG) 0.1 % external cream Apply to AA BID x 1-2 week then PRN only 80 g 2     No facility-administered encounter medications on file as of 2/25/2021.              Review Of Systems  Skin: As above  Eyes: negative  Ears/Nose/Throat: negative  Respiratory: No shortness of breath, dyspnea on exertion, cough, or hemoptysis  Cardiovascular: negative  Gastrointestinal: negative  Genitourinary: negative  Musculoskeletal: negative  Neurologic: negative  Psychiatric: negative  Hematologic/Lymphatic/Immunologic: negative  Endocrine: negative      O:   NAD, WDWN, Alert & Oriented, Mood & Affect wnl, Vitals  stable   Here today alone   /74   Pulse 95   SpO2 97%    General appearance normal   Vitals stable   Alert, oriented and in no acute distress     R ant neck 1.4cm red pearly plaque   L medial chest 5mm red scaly papule       Eyes: Conjunctivae/lids:Normal     ENT: Lips, buccal mucosa, tongue: normal    MSK:Normal    Cardiovascular: peripheral edema none    Pulm: Breathing Normal    Neuro/Psych: Orientation:Alert and Orientedx3 ; Mood/Affect:normal       MICRO:   L medial chest:Unremarkable epidermis and dermis.No concerning areas for malignancy.     A/P:  1. L medial chest basal cell carcinoma   EXCISION OF BASAL CELL CARCINOMA, Margins confirmed with FROZEN SECTIONS AND Second intent: After thorough discussion of PGACAC, consent obtained, anesthesia and prep, the margins of the lesion were identified and an elliptical incision was made encompassing the lesion with 4mm margin. The incisions were made through the skin and down to and including the superficial dermis.  The lesion was removed en bloc and submitted for frozen section pathologic review. Clear margins obtained (1.1x1.2cm).    REPAIR SECOND INTENT: We discussed the options for wound management in full with the patient including risks/benefits/possible outcomes. Decision made to allow the wound to heal by second intention. EBL minimal; complications none; wound care routine.  The patient was discharged in good condition and will return in one month or prn for wound evaluation.     2. R ant neck  Basal cell carcinoma   MOHS:   Location    The rationale for Mohs surgery was discussed with the patient and consent was obtained.  The risks and benefits as well as alternatives to therapy were discussed, in detail.  Specifically, the risks of infection, scarring, bleeding, prolonged wound healing, incomplete removal, allergy to anesthesia, nerve injury and recurrence were addressed.  Indication for Mohs was Location. Prior to the procedure, the treatment  site was clearly identified and, if available, confirmed with previous photos and confirmed by the patient   All components of the Universal Protocol/PAUSE rule were completed.  The Mohs surgeon operated in two distinct and integrated capacities as the surgeon and pathologist.      The area was prepped with Betasept.  A rim of normal appearing skin was marked circumferentially around the lesion.  The area was infiltrated with local anesthesia.  The tumor was first debulked to remove all clinically apparent tumor.  An incision following the standard Mohs approach was done and the specimen was oriented,mapped and placed in 1 block(s).  Each specimen was then chromacoded and processed in the Mohs laboratory using standard Mohs technique and submitted for frozen section histology.  Frozen section analysis showed no residual tumor but CLEAR MARGINS.      The tumor was excised using standard Mohs technique in 1 stages(s).  CLEAR MARGINS OBTAINED and Final defect size was 1.7 x 2 cm.     We discussed the options for wound management in full with the patient including risks/benefits/ possible outcomes.        REPAIR COMPLEX: Because of the tightness of the surrounding skin and Because of the size and full thickness nature of the defect, a complex closure was planned. After LEC anesthesia and prep, Burow's triangles were excised in the relaxed skin tension lines. The wound edges were widely undermined by dissection in the subcutaneous plane until adequate tissue mobility was obtained. Hemostasis was obtained. The wound edges were closed in a layered fashion using Vicryl and Fast Absorbing Plain Gut sutures. Postoperative length was 4.7 cm.   EBL minimal; complications none; wound care routine.  The patient was discharged in good condition and will return in one week for wound evaluation.  It was a pleasure speaking to Derik Hamm today.  Signs and Symptoms of skin cancer discussed with patient.  Patient encouraged to  perform monthly skin exams.  UV precautions reviewed with patient.  Risks of non-melanoma skin cancer discussed with patient   Return to clinic 6 months

## 2021-02-25 NOTE — PATIENT INSTRUCTIONS
Sutured Wound Care     [facility]: [phone]    [facility]: [phone]          ? No strenuous activity for 48 hours. Resume moderate activity in 48 hours. No heavy exercising until you are seen for follow up in one week.     ? Take Tylenol as needed for discomfort.                         ? Do not drink alcoholic beverages for 48 hours.     ? Keep the pressure bandage in place for 24 hours. If the bandage becomes blood tinged or loose, reinforce it with gauze and tape.        (Refer to the reverse side of this page for management of bleeding).    ? Remove pressure bandage in 24 hours     ? Leave the flat bandage in place until your follow up appointment.    ? Keep the bandage dry. Wash around it carefully.    ? If the tape becomes soiled or starts to come off, reinforce it with additional paper tape.    ? Do not smoke for 3 weeks; smoking is detrimental to wound healing.    ? It is normal to have swelling and bruising around the surgical site. The bruising will fade in approximately 10-14 days. Elevate the area to reduce swelling.    ? Numbness, itchiness and sensitivity to temperature changes can occur after surgery and may take up to 18 months to normalize.                    POSSIBLE COMPLICATIONS    BLEEDIN. Leave the bandage in place.Use tightly rolled up gauze or a cloth to apply direct pressure over the bandage for 20 minutes.  2. Reapply pressure for an additional 20 minutes if necessary  3. Call the office or go to the nearest emergency room if pressure fails to stop the bleeding.  4. Use additional gauze and tape to maintain pressure once the bleeding has stopped.        PAIN:    1. Post operative pain should slowly get better, never worse.  2. A severe increase in pain may indicate a problem. Call the office if this occurs.    In case of emergency phone:Dr Retana [phone]

## 2021-02-25 NOTE — LETTER
2/25/2021         RE: Derik Hamm  [address] [place] [address]        Dear Colleague,    Thank you for referring your patient, Derik Hamm, to the [facility]. Please see a copy of my visit note below.    Surgical Office Location:  [facility]  [address] [place] [id number]      Derik Hamm is an extremely pleasant 65 year old year old male patient here today for evaluation and managment of basal cell carcinoma on chest and neck.  Patient has no other skin complaints today.  Remainder of the HPI, Meds, PMH, Allergies, FH, and SH was reviewed in chart.      Past Medical History:   Diagnosis Date     Acanthamoeba keratitis      Basal cell carcinoma      Presbyopia      PVD (posterior vitreous detachment), left        Past Surgical History:   Procedure Laterality Date     LAPAROSCOPIC APPENDECTOMY N/A 11/4/2018    Procedure: LAPAROSCOPIC APPENDECTOMY;  Surgeon: Mackenzie Dale MD;  Location: [facility] OR     ORTHOPEDIC SURGERY          Family History   Problem Relation Age of Onset     Lung Cancer Father 85     Skin Cancer Mother      Skin Cancer Brother      Glaucoma No family hx of      Macular Degeneration No family hx of      Diabetes No family hx of      Hypertension No family hx of        Social History     Socioeconomic History     Marital status: Single     Spouse name: Not on file     Number of children: Not on file     Years of education: Not on file     Highest education level: Not on file   Occupational History     Not on file   Social Needs     Financial resource strain: Not on file     Food insecurity     Worry: Not on file     Inability: Not on file     Transportation needs     Medical: Not on file     Non-medical: Not on file   Tobacco Use     Smoking status: Never Smoker     Smokeless tobacco: Never Used   Substance and Sexual Activity     Alcohol use: No     Drug use: No     Sexual activity: Yes     Partners:  Male     Comment: Monogmous relationship     Lifestyle     Physical activity     Days per week: Not on file     Minutes per session: Not on file     Stress: Not on file   Relationships     Social connections     Talks on phone: Not on file     Gets together: Not on file     Attends Yazidi service: Not on file     Active member of club or organization: Not on file     Attends meetings of clubs or organizations: Not on file     Relationship status: Not on file     Intimate partner violence     Fear of current or ex partner: Not on file     Emotionally abused: Not on file     Physically abused: Not on file     Forced sexual activity: Not on file   Other Topics Concern     Parent/sibling w/ CABG, MI or angioplasty before 65F 55M? Not Asked   Social History Narrative     Not on file       Outpatient Encounter Medications as of 2/25/2021   Medication Sig Dispense Refill     acetaminophen (TYLENOL) 325 MG tablet Take 325-650 mg by mouth daily as needed for mild pain       COMPOUNDED NON-CONTROLLED SUBSTANCE (CMPD RX) - PHARMACY TO MIX COMPOUNDED MEDICATION Place 1 drop into the right eye every hour PHMB 0.2mg/ml 1 Bottle 11     ibuprofen (ADVIL/MOTRIN) 200 MG capsule Take 200 mg by mouth every 4 hours as needed       ketoconazole (NIZORAL) 2 % external shampoo Use daily as needed 120 mL 11     moxifloxacin (VIGAMOX) 0.5 % ophthalmic solution Place 1 drop into the right eye 3 times daily 3 mL 6     multivitamin, therapeutic (THERA-VIT) TABS tablet Take 1 tablet by mouth daily       naproxen sodium (ALEVE) 220 MG tablet Take 220 mg by mouth daily as needed for moderate pain       polyvinyl alcohol-povidone PF (REFRESH) 1.4-0.6 % ophthalmic solution 1-2 drops       triamcinolone (KENALOG) 0.1 % external cream Apply to AA BID x 1-2 week then PRN only 80 g 2     No facility-administered encounter medications on file as of 2/25/2021.              Review Of Systems  Skin: As above  Eyes: negative  Ears/Nose/Throat:  negative  Respiratory: No shortness of breath, dyspnea on exertion, cough, or hemoptysis  Cardiovascular: negative  Gastrointestinal: negative  Genitourinary: negative  Musculoskeletal: negative  Neurologic: negative  Psychiatric: negative  Hematologic/Lymphatic/Immunologic: negative  Endocrine: negative      O:   NAD, WDWN, Alert & Oriented, Mood & Affect wnl, Vitals stable   Here today alone   /74   Pulse 95   SpO2 97%    General appearance normal   Vitals stable   Alert, oriented and in no acute distress     R ant neck 1.4cm red pearly plaque   L medial chest 5mm red scaly papule       Eyes: Conjunctivae/lids:Normal     ENT: Lips, buccal mucosa, tongue: normal    MSK:Normal    Cardiovascular: peripheral edema none    Pulm: Breathing Normal    Neuro/Psych: Orientation:Alert and Orientedx3 ; Mood/Affect:normal       MICRO:   L medial chest:Unremarkable epidermis and dermis.No concerning areas for malignancy.     A/P:  1. L medial chest basal cell carcinoma   EXCISION OF BASAL CELL CARCINOMA, Margins confirmed with FROZEN SECTIONS AND Second intent: After thorough discussion of PGACAC, consent obtained, anesthesia and prep, the margins of the lesion were identified and an elliptical incision was made encompassing the lesion with 4mm margin. The incisions were made through the skin and down to and including the superficial dermis.  The lesion was removed en bloc and submitted for frozen section pathologic review. Clear margins obtained (1.1x1.2cm).    REPAIR SECOND INTENT: We discussed the options for wound management in full with the patient including risks/benefits/possible outcomes. Decision made to allow the wound to heal by second intention. EBL minimal; complications none; wound care routine.  The patient was discharged in good condition and will return in one month or prn for wound evaluation.     2. R ant neck  Basal cell carcinoma   MOHS:   Location    The rationale for Mohs surgery was discussed  with the patient and consent was obtained.  The risks and benefits as well as alternatives to therapy were discussed, in detail.  Specifically, the risks of infection, scarring, bleeding, prolonged wound healing, incomplete removal, allergy to anesthesia, nerve injury and recurrence were addressed.  Indication for Mohs was Location. Prior to the procedure, the treatment site was clearly identified and, if available, confirmed with previous photos and confirmed by the patient   All components of the Universal Protocol/PAUSE rule were completed.  The Mohs surgeon operated in two distinct and integrated capacities as the surgeon and pathologist.      The area was prepped with Betasept.  A rim of normal appearing skin was marked circumferentially around the lesion.  The area was infiltrated with local anesthesia.  The tumor was first debulked to remove all clinically apparent tumor.  An incision following the standard Mohs approach was done and the specimen was oriented,mapped and placed in 1 block(s).  Each specimen was then chromacoded and processed in the Mohs laboratory using standard Mohs technique and submitted for frozen section histology.  Frozen section analysis showed no residual tumor but CLEAR MARGINS.      The tumor was excised using standard Mohs technique in 1 stages(s).  CLEAR MARGINS OBTAINED and Final defect size was 1.7 x 2 cm.     We discussed the options for wound management in full with the patient including risks/benefits/ possible outcomes.        REPAIR COMPLEX: Because of the tightness of the surrounding skin and Because of the size and full thickness nature of the defect, a complex closure was planned. After LEC anesthesia and prep, Burow's triangles were excised in the relaxed skin tension lines. The wound edges were widely undermined by dissection in the subcutaneous plane until adequate tissue mobility was obtained. Hemostasis was obtained. The wound edges were closed in a layered fashion  using Vicryl and Fast Absorbing Plain Gut sutures. Postoperative length was 4.7 cm.   EBL minimal; complications none; wound care routine.  The patient was discharged in good condition and will return in one week for wound evaluation.  It was a pleasure speaking to Derik Hamm today.  Signs and Symptoms of skin cancer discussed with patient.  Patient encouraged to perform monthly skin exams.  UV precautions reviewed with patient.  Risks of non-melanoma skin cancer discussed with patient   Return to clinic 6 months        Again, thank you for allowing me to participate in the care of your patient.        Sincerely,        Nahum Retana MD

## 2021-03-02 ENCOUNTER — OFFICE VISIT (OUTPATIENT)
Dept: OPHTHALMOLOGY | Facility: CLINIC | Age: 66
End: 2021-03-02
Attending: OPHTHALMOLOGY
Payer: COMMERCIAL

## 2021-03-02 DIAGNOSIS — B60.13 ACANTHAMOEBA KERATITIS: ICD-10-CM

## 2021-03-02 DIAGNOSIS — B60.13 ACANTHAMOEBA KERATITIS: Primary | ICD-10-CM

## 2021-03-02 PROCEDURE — 99214 OFFICE O/P EST MOD 30 MIN: CPT | Mod: GC | Performed by: OPHTHALMOLOGY

## 2021-03-02 PROCEDURE — 92285 EXTERNAL OCULAR PHOTOGRAPHY: CPT | Performed by: OPHTHALMOLOGY

## 2021-03-02 PROCEDURE — G0463 HOSPITAL OUTPT CLINIC VISIT: HCPCS

## 2021-03-02 ASSESSMENT — REFRACTION_WEARINGRX
OD_CYLINDER: +0.50
OD_SPHERE: +1.00
OS_CYLINDER: +0.25
OS_AXIS: 162
OS_ADD: +2.00
OS_SPHERE: +1.25
SPECS_TYPE: PAL
OD_AXIS: 134
OD_ADD: +2.00

## 2021-03-02 ASSESSMENT — VISUAL ACUITY
METHOD: SNELLEN - LINEAR
OD_CC: HM
CORRECTION_TYPE: GLASSES
OS_CC: 20/25

## 2021-03-02 ASSESSMENT — CONF VISUAL FIELD
OD_SUPERIOR_TEMPORAL_RESTRICTION: 1
OD_SUPERIOR_NASAL_RESTRICTION: 1
OS_NORMAL: 1
OD_INFERIOR_NASAL_RESTRICTION: 1
OD_INFERIOR_TEMPORAL_RESTRICTION: 1

## 2021-03-02 ASSESSMENT — SLIT LAMP EXAM - LIDS
COMMENTS: PROTECTIVE PTOSIS, MGD
COMMENTS: MGD

## 2021-03-02 ASSESSMENT — TONOMETRY
IOP_METHOD: ICARE
OS_IOP_MMHG: 10
OD_IOP_MMHG: 11

## 2021-03-02 ASSESSMENT — EXTERNAL EXAM - LEFT EYE: OS_EXAM: NORMAL

## 2021-03-02 ASSESSMENT — EXTERNAL EXAM - RIGHT EYE: OD_EXAM: NORMAL; NO RASH

## 2021-03-02 NOTE — NURSING NOTE
Chief Complaints and History of Present Illnesses   Patient presents with     Keratitis Follow Up     Chief Complaint(s) and History of Present Illness(es)     Keratitis Follow Up     Laterality: right eye    Characteristics: constant    Associated symptoms: eye pain, blurred vision and photophobia    Pain scale: 3/10              Comments     Pt. States that he was having a lot pain RE the last 2 days. Has improved slightly today but still painful. No change in VA BE.  Rohini Bertrand COT 9:08 AM March 2, 2021

## 2021-03-02 NOTE — PROGRESS NOTES
CC:  Acanthamoeba keratitis OD     Referring provider: Dr. Rosie Han     HPI:  Derik Hamm is a(n) 65 year old male who presents as follow up for Acanthamoeba Keratitis right eye.    Patient initially presented for evaluation of persistent keratoconjunctivitis, OD, since 10/5/2020. Initially seen by Dr. Ngo at [facility] and felt it was related to severe dryness vs bacterial keratitis, so he was started on PFATs and Vigamox Q2H WA. Patient was seen frequently for follow up; he was started on Valtrex 500 mg TID on 10/9/20 and has been on that since then. Despite this therapy, pt failed to improve so he was started prednisolone. He has had a waxing and waning course, but more recently has gradually declined. Stopped PF on 11/2 after running out. He was seen by Dr. Ngo on 11/2 who noted worsening VA and worsening exam, so the patient was referred to Dr. Han, whom he saw 11/4/20. Dr. Han referred to our clinic due to concerns for acanthamoeba keratitis. Culture positive for acanthamoeba on 11/05/20.     Interval History:  Over the interval, no significant changes.  Vision may have gotten a little worse.  Also sore the past few days. Significant tearing, redness.  No new flashes, floaters, diplopia.       POHx:  PVD OS  Hyperopia OU  Presbyopia OU     Glasses: Yes  CTL wearer: yes - none x1.5 months; wears while showering or in the sauna; he sleeps in them once every 3-4 weeks; never wears them in a lake or pool.      Family hx of eye disease: negative for glaucoma/macular degeneration     Social Hx: (-) smoking, (-) EtOH, (-) illicit drugs     Meds:  Vigamox TID  PHMB 0.2 mg/mL (started 11/5/2020) --5x/day + once overnight (getting 6x/day)  PFATS q1-2h       -      Surgical Hx:  No eye surgery     A/P:  # Acanthamoeba keratitis, OD              - Symptoms started 10/5/2020              - Acanthamoeba risks: sleeps in CTL, wears in shower, sauna              - Cultures obtained 11/05/20,   Culture  positive for acanthamoeba.              - Confocal: many PMNs, no definite acanthamoeba (but could be masked by deeper infection, no fungal elements)              - Discussed non-FDA use of PHMB    12/31: Pain worse likely 2/2 surface toxicity from PHMB, although difficult to know. Plan to increase lubrication.   1/07: Better overall.  pain better.  Stromal haze/KP better as well.  1/19: Stable from previous visit w/ continued diffuse haze,no epi defects.   1/28: Slightly improved corneal clarity.  2/4: Slightly improved corneal clarity  2/18: Exam largely stable, new central bulla  3/2: increased soreness and tearing past few days, exam stable.  Diffuse haze, edema but improved from last photos. - will get photos today     PLAN:              - dec PHMB 0.2 mg/mL to 4x/day WA and once overnight (6x/day) - this started 3/2/21   - Continue PFAT's every 1-2 hours -- 5 minutes before every PHMB dose              - continue Vigamox TID OD              - NO Prednisolone acetate              - CTL holiday    - Photo today     Follow up: 1 week - call sooner with any problems    Jason Goldberg, MD  Cornea & External Disease Fellow  Department of Ophthalmology and Visual Neurosciences    Attending Physician Attestation:  Complete documentation of historical and exam elements from today's encounter can be found in the full encounter summary report (not reduplicated in this progress note).  I personally obtained the chief complaint(s) and history of present illness.  I confirmed and edited as necessary the review of systems, past medical/surgical history, family history, social history, and examination findings as documented by others; and I examined the patient myself.  I personally reviewed the relevant tests, images, and reports as documented above.  I formulated and edited as necessary the assessment and plan and discussed the findings and management plan with the patient and family. - Anson Cheng MD

## 2021-03-02 NOTE — PATIENT INSTRUCTIONS

## 2021-03-02 NOTE — NURSING NOTE
Pt returned to clinic for post surgery 1 week follow up bandage change. Pt has no complaints, denies pain. Bandage removed from right anterior neck, area cleansed with normal saline. Site is healing and wound edges approximating well. Reapplied new steri strips and paper tape.    Advised to watch for signs/sx of infection; spreading redness, drainage, odor, fever. Call or report promptly to clinic. Pt given written instructions and informed to rtc as needed. Patient verbalized understanding.     FRANCIA Shelton-BSN-PHN  [facility]  [phone]

## 2021-03-03 ENCOUNTER — ALLIED HEALTH/NURSE VISIT (OUTPATIENT)
Dept: DERMATOLOGY | Facility: CLINIC | Age: 66
End: 2021-03-03
Payer: COMMERCIAL

## 2021-03-03 DIAGNOSIS — Z48.01 ENCOUNTER FOR CHANGE OR REMOVAL OF SURGICAL WOUND DRESSING: Primary | ICD-10-CM

## 2021-03-03 PROCEDURE — 99207 PR NO CHARGE NURSE ONLY: CPT

## 2021-03-11 ENCOUNTER — OFFICE VISIT (OUTPATIENT)
Dept: OPHTHALMOLOGY | Facility: CLINIC | Age: 66
End: 2021-03-11
Attending: OPHTHALMOLOGY
Payer: COMMERCIAL

## 2021-03-11 DIAGNOSIS — H16.011 CENTRAL CORNEAL ULCER OF RIGHT EYE: ICD-10-CM

## 2021-03-11 DIAGNOSIS — B60.13 ACANTHAMOEBA KERATITIS: ICD-10-CM

## 2021-03-11 DIAGNOSIS — H04.121 DRY EYE SYNDROME OF RIGHT EYE: Primary | ICD-10-CM

## 2021-03-11 PROCEDURE — 99214 OFFICE O/P EST MOD 30 MIN: CPT | Mod: GC | Performed by: OPHTHALMOLOGY

## 2021-03-11 PROCEDURE — G0463 HOSPITAL OUTPT CLINIC VISIT: HCPCS

## 2021-03-11 ASSESSMENT — EXTERNAL EXAM - LEFT EYE: OS_EXAM: NORMAL

## 2021-03-11 ASSESSMENT — VISUAL ACUITY
CORRECTION_TYPE: GLASSES
OD_CC: HM
OS_CC: 20/25
METHOD: SNELLEN - LINEAR

## 2021-03-11 ASSESSMENT — TONOMETRY
IOP_METHOD: TONOPEN
OD_IOP_MMHG: 7
OS_IOP_MMHG: 14

## 2021-03-11 ASSESSMENT — SLIT LAMP EXAM - LIDS
COMMENTS: MGD
COMMENTS: PROTECTIVE PTOSIS, MGD

## 2021-03-11 ASSESSMENT — EXTERNAL EXAM - RIGHT EYE: OD_EXAM: NORMAL; NO RASH

## 2021-03-11 NOTE — NURSING NOTE
Chief Complaints and History of Present Illnesses   Patient presents with     Follow Up      Chief Complaint(s) and History of Present Illness(es)     Follow Up     Course: gradually worsening    Associated symptoms: eye pain, headache, tearing and photophobia    Pain scale: 9/10              Comments     3 week follow up of ancanthomoeba keratitis right eye.  Throbbing pain right side around the eye, mostly light sensitivity.  Says no improvement vision right eye.  Right eye tears a lot, no matter.  No concerns of left eye.  Eye meds: PFAT's every 1-2 hours -- 5 minutes before every PHMB dose, Vigamox TID right eye  GERRI Self 3/11/2021 8:48 AM

## 2021-03-11 NOTE — PROGRESS NOTES
CC:  Acanthamoeba keratitis OD     Referring provider: Dr. Rosie Han     HPI:  Derik Hamm is a(n) 65 year old male who presents as follow up for Acanthamoeba Keratitis right eye.    Patient initially presented for evaluation of persistent keratoconjunctivitis, OD, since 10/5/2020. Initially seen by Dr. Ngo at [facility] and felt it was related to severe dryness vs bacterial keratitis, so he was started on PFATs and Vigamox Q2H WA. Patient was seen frequently for follow up; he was started on Valtrex 500 mg TID on 10/9/20 and has been on that since then. Despite this therapy, pt failed to improve so he was started prednisolone. He has had a waxing and waning course, but more recently has gradually declined. Stopped PF on 11/2 after running out. He was seen by Dr. Ngo on 11/2 who noted worsening VA and worsening exam, so the patient was referred to Dr. Han, whom he saw 11/4/20. Dr. Han referred to our clinic due to concerns for acanthamoeba keratitis. Culture positive for acanthamoeba on 11/05/20.     Interval History:  Reporting worsening throbbing pain right eye, taking 3 alleve/advil with mild relief. Having throbbing on side of face. Some days with worsening photophobia, today improved. Still with significant tearing, redness.  No new flashes, floaters, diplopia.       POHx:  PVD OS  Hyperopia OU  Presbyopia OU     Glasses: Yes  CTL wearer: yes - none x1.5 months; wears while showering or in the sauna; he sleeps in them once every 3-4 weeks; never wears them in a lake or pool.      Family hx of eye disease: negative for glaucoma/macular degeneration     Social Hx: (-) smoking, (-) EtOH, (-) illicit drugs     Meds:  Vigamox TID  PHMB 0.2 mg/mL (started 11/5/2020) --5x/day + once overnight (getting 6x/day)  PFATS q1-2h       -      Surgical Hx:  No eye surgery     A/P:  # Acanthamoeba keratitis, OD              - Symptoms started 10/5/2020              - Acanthamoeba risks: sleeps in CTL,  wears in shower, sauna              - Cultures obtained 11/05/20,   Culture positive for acanthamoeba.              - Confocal: many PMNs, no definite acanthamoeba (but could be masked by deeper infection, no fungal elements)              - Discussed non-FDA use of PHMB    12/31: Pain worse likely 2/2 surface toxicity from PHMB, although difficult to know. Plan to increase lubrication.   1/07: Better overall.  pain better.  Stromal haze/KP better as well.  1/19: Stable from previous visit w/ continued diffuse haze,no epi defects.   1/28: Slightly improved corneal clarity.  2/4: Slightly improved corneal clarity  2/18: Exam largely stable, new central bulla  3/2: increased soreness and tearing past few days, exam stable.  Diffuse haze, edema but improved from last photos.  3/11: Worsening pain and soreness over the past week. 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 CL placed OD   PLAN:              - decrease PHMB 0.2 mg/mL to 4x/day WA and once at night    - Continue PFAT's every 1-2 hours -- 5 minutes before every PHMB dose              - Continue Vigamox TID right eye   - BCL today              - NO Prednisolone acetate   - Photo today   - RTC 1 week - call sooner if sx worsen    Josefina Garces MD  Ophthalmology PGY-3  [facility]    Attending Physician Attestation:  Complete documentation of historical and exam elements from today's encounter can be found in the full encounter summary report (not reduplicated in this progress note).  I personally obtained the chief complaint(s) and history of present illness.  I confirmed and edited as necessary the review of systems, past medical/surgical history, family history, social history, and examination findings as documented by others; and I examined the patient myself.  I personally reviewed the relevant tests, images, and reports as documented above.  I  formulated and edited as necessary the assessment and plan and discussed the findings and management plan with the patient and family. - Anson Cheng MD

## 2021-03-19 ENCOUNTER — OFFICE VISIT (OUTPATIENT)
Dept: OPHTHALMOLOGY | Facility: CLINIC | Age: 66
End: 2021-03-19
Attending: OPHTHALMOLOGY
Payer: COMMERCIAL

## 2021-03-19 DIAGNOSIS — H16.011 CENTRAL CORNEAL ULCER OF RIGHT EYE: ICD-10-CM

## 2021-03-19 DIAGNOSIS — H04.121 DRY EYE SYNDROME OF RIGHT EYE: Primary | ICD-10-CM

## 2021-03-19 DIAGNOSIS — B60.13 ACANTHAMOEBA KERATITIS: ICD-10-CM

## 2021-03-19 PROCEDURE — G0463 HOSPITAL OUTPT CLINIC VISIT: HCPCS

## 2021-03-19 PROCEDURE — 99214 OFFICE O/P EST MOD 30 MIN: CPT | Performed by: OPHTHALMOLOGY

## 2021-03-19 ASSESSMENT — REFRACTION_WEARINGRX
OS_SPHERE: +1.25
OS_ADD: +2.00
OS_CYLINDER: +0.25
OD_ADD: +2.00
OD_SPHERE: +1.00
SPECS_TYPE: PAL
OS_AXIS: 162
OD_CYLINDER: +0.50
OD_AXIS: 134

## 2021-03-19 ASSESSMENT — PACHYMETRY
OD_CT(UM): 658
OS_CT(UM): 508

## 2021-03-19 ASSESSMENT — CONF VISUAL FIELD
OS_NORMAL: 1
OD_INFERIOR_TEMPORAL_RESTRICTION: 1
OD_SUPERIOR_NASAL_RESTRICTION: 1
OD_INFERIOR_NASAL_RESTRICTION: 1
OD_SUPERIOR_TEMPORAL_RESTRICTION: 1

## 2021-03-19 ASSESSMENT — VISUAL ACUITY
OS_CC: 20/25
CORRECTION_TYPE: GLASSES
METHOD: SNELLEN - LINEAR
OD_CC: HM
OS_CC+: +3

## 2021-03-19 ASSESSMENT — SLIT LAMP EXAM - LIDS
COMMENTS: PROTECTIVE PTOSIS, MGD
COMMENTS: MGD

## 2021-03-19 ASSESSMENT — TONOMETRY
IOP_METHOD: ICARE
OD_IOP_MMHG: 8
OS_IOP_MMHG: 11

## 2021-03-19 ASSESSMENT — EXTERNAL EXAM - LEFT EYE: OS_EXAM: NORMAL

## 2021-03-19 ASSESSMENT — EXTERNAL EXAM - RIGHT EYE: OD_EXAM: NORMAL; NO RASH

## 2021-03-19 NOTE — PROGRESS NOTES
CC:  Acanthamoeba keratitis OD     Referring provider: Dr. Rosie Han     HPI:  Derik Hamm is a(n) 65 year old male who presents as follow up for Acanthamoeba Keratitis right eye.    Patient initially presented for evaluation of persistent keratoconjunctivitis, OD, since 10/5/2020. Initially seen by Dr. Ngo at [facility] and felt it was related to severe dryness vs bacterial keratitis, so he was started on PFATs and Vigamox Q2H WA. Patient was seen frequently for follow up; he was started on Valtrex 500 mg TID on 10/9/20 and has been on that since then. Despite this therapy, pt failed to improve so he was started prednisolone. He has had a waxing and waning course, but more recently has gradually declined. Stopped PF on 11/2 after running out. He was seen by Dr. Ngo on 11/2 who noted worsening VA and worsening exam, so the patient was referred to Dr. Han, whom he saw 11/4/20. Dr. Han referred to our clinic due to concerns for acanthamoeba keratitis. Culture positive for acanthamoeba on 11/05/20.     Interval History:  Reporting worsening throbbing pain right eye, taking 3 alleve/advil with mild relief. Having throbbing on side of face. Some days with worsening photophobia, today improved. Still with significant tearing, redness.  No new flashes, floaters, diplopia.       POHx:  PVD OS  Hyperopia OU  Presbyopia OU     Glasses: Yes  CTL wearer: yes - none x1.5 months; wears while showering or in the sauna; he sleeps in them once every 3-4 weeks; never wears them in a lake or pool.      Family hx of eye disease: negative for glaucoma/macular degeneration     Social Hx: (-) smoking, (-) EtOH, (-) illicit drugs     Meds:  Vigamox TID  PHMB 0.2 mg/mL (started 11/5/2020) --5x/day + once overnight (getting 6x/day)  PFATS q1-2h       -      Surgical Hx:  No eye surgery     A/P:  # Acanthamoeba keratitis, OD              - Symptoms started 10/5/2020              - Acanthamoeba risks: sleeps in CTL,  wears in shower, sauna              - Cultures obtained 11/05/20,   Culture positive for acanthamoeba.              - Confocal: many PMNs, no definite acanthamoeba (but could be masked by deeper infection, no fungal elements)              - Discussed non-FDA use of PHMB    12/31: Pain worse likely 2/2 surface toxicity from PHMB, although difficult to know. Plan to increase lubrication.   1/07: Better overall.  pain better.  Stromal haze/KP better as well.  1/19: Stable from previous visit w/ continued diffuse haze,no epi defects.   1/28: Slightly improved corneal clarity.  2/4: Slightly improved corneal clarity  2/18: Exam largely stable, new central bulla  3/2: increased soreness and tearing past few days, exam stable.  Diffuse haze, edema but improved from last photos.  3/11: Worsening pain and soreness over the past week. 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 CL placed OD   PLAN:              - decrease PHMB 0.2 mg/mL to 4x/day WA and once at night    - Continue PFAT's every 1-2 hours -- 5 minutes before every PHMB dose              - Continue Vigamox TID right eye   - BCL today              - NO Prednisolone acetate   - Photo today   - RTC 1 week - call sooner if sx worsen    Anson Cheng MD    Attending Physician Attestation:  Complete documentation of historical and exam elements from today's encounter can be found in the full encounter summary report (not reduplicated in this progress note).  I personally obtained the chief complaint(s) and history of present illness.  I confirmed and edited as necessary the review of systems, past medical/surgical history, family history, social history, and examination findings as documented by others; and I examined the patient myself.  I personally reviewed the relevant tests, images, and reports as documented above.  I formulated and edited as necessary the assessment  and plan and discussed the findings and management plan with the patient and family. - Anson Cheng MD

## 2021-03-30 ENCOUNTER — OFFICE VISIT (OUTPATIENT)
Dept: OPHTHALMOLOGY | Facility: CLINIC | Age: 66
End: 2021-03-30
Attending: OPHTHALMOLOGY
Payer: COMMERCIAL

## 2021-03-30 DIAGNOSIS — H16.011 CENTRAL CORNEAL ULCER OF RIGHT EYE: ICD-10-CM

## 2021-03-30 DIAGNOSIS — B60.13 ACANTHAMOEBA KERATITIS: Primary | ICD-10-CM

## 2021-03-30 DIAGNOSIS — B60.13 ACANTHAMOEBA KERATITIS: ICD-10-CM

## 2021-03-30 PROCEDURE — 92285 EXTERNAL OCULAR PHOTOGRAPHY: CPT | Performed by: OPHTHALMOLOGY

## 2021-03-30 PROCEDURE — 99214 OFFICE O/P EST MOD 30 MIN: CPT | Performed by: OPHTHALMOLOGY

## 2021-03-30 PROCEDURE — G0463 HOSPITAL OUTPT CLINIC VISIT: HCPCS

## 2021-03-30 ASSESSMENT — TONOMETRY
IOP_METHOD: ICARE
OS_IOP_MMHG: 12
OD_IOP_MMHG: 10

## 2021-03-30 ASSESSMENT — REFRACTION_WEARINGRX
OD_SPHERE: +1.00
SPECS_TYPE: PAL
OS_AXIS: 162
OD_ADD: +2.00
OD_CYLINDER: +0.50
OS_SPHERE: +1.25
OD_AXIS: 134
OS_ADD: +2.00
OS_CYLINDER: +0.25

## 2021-03-30 ASSESSMENT — PACHYMETRY
OD_CT(UM): 658
OS_CT(UM): 508

## 2021-03-30 ASSESSMENT — VISUAL ACUITY
CORRECTION_TYPE: GLASSES
METHOD: SNELLEN - LINEAR
OS_CC: 20/20
OS_CC+: -2
OD_CC: HM

## 2021-03-30 ASSESSMENT — CONF VISUAL FIELD
OD_INFERIOR_NASAL_RESTRICTION: 1
OD_SUPERIOR_TEMPORAL_RESTRICTION: 1
OS_NORMAL: 1
METHOD: COUNTING FINGERS
OD_SUPERIOR_NASAL_RESTRICTION: 1
OD_INFERIOR_TEMPORAL_RESTRICTION: 1

## 2021-03-30 ASSESSMENT — SLIT LAMP EXAM - LIDS
COMMENTS: PROTECTIVE PTOSIS, MGD
COMMENTS: MGD

## 2021-03-30 ASSESSMENT — EXTERNAL EXAM - RIGHT EYE: OD_EXAM: NORMAL; NO RASH

## 2021-03-30 ASSESSMENT — EXTERNAL EXAM - LEFT EYE: OS_EXAM: NORMAL

## 2021-03-30 NOTE — PROGRESS NOTES
CC:  Acanthamoeba keratitis OD     Referring provider: Dr. Rosie Han     HPI:  Derik Hamm is a(n) 65 year old male who presents as follow up for Acanthamoeba Keratitis right eye.     Patient initially presented for evaluation of persistent keratoconjunctivitis, OD, since 10/5/2020. Initially seen by Dr. Nog at [facility] and felt it was related to severe dryness vs bacterial keratitis, so he was started on PFATs and Vigamox Q2H WA. Patient was seen frequently for follow up; he was started on Valtrex 500 mg TID on 10/9/20 and has been on that since then. Despite this therapy, pt failed to improve so he was started prednisolone. He has had a waxing and waning course, but more recently has gradually declined. Stopped PF on 11/2 after running out. He was seen by Dr. Ngo on 11/2 who noted worsening VA and worsening exam, so the patient was referred to Dr. Han, whom he saw 11/4/20. Dr. Han referred to our clinic due to concerns for acanthamoeba keratitis. Culture positive for acanthamoeba on 11/05/20.     Interval History:  Reporting worsening throbbing pain right eye, taking 3 alleve/advil with mild relief. Having throbbing on side of face. Some days with worsening photophobia, today improved. Still with significant tearing, redness.  No new flashes, floaters, diplopia.        POHx:  PVD OS  Hyperopia OU  Presbyopia OU     Glasses: Yes  CTL wearer: yes - none x1.5 months; wears while showering or in the sauna; he sleeps in them once every 3-4 weeks; never wears them in a lake or pool.      Family hx of eye disease: negative for glaucoma/macular degeneration     Social Hx: (-) smoking, (-) EtOH, (-) illicit drugs     Meds:  Vigamox TID  PHMB 0.2 mg/mL (started 11/5/2020) --5x/day + once overnight (getting 6x/day)  PFATS q1-2h       -      Surgical Hx:  No eye surgery     A/P:  # Acanthamoeba keratitis, OD              - Symptoms started 10/5/2020              - Acanthamoeba risks: sleeps in CTL,  wears in shower, sauna              - Cultures obtained 11/05/20,   Culture positive for acanthamoeba.              - Confocal: many PMNs, no definite acanthamoeba (but could be masked by deeper infection, no fungal elements)              - Discussed non-FDA use of PHMB     12/31: Pain worse likely 2/2 surface toxicity from PHMB, although difficult to know. Plan to increase lubrication.   1/07: Better overall.  pain better.  Stromal haze/KP better as well.  1/19: Stable from previous visit w/ continued diffuse haze,no epi defects.   1/28: Slightly improved corneal clarity.  2/4: Slightly improved corneal clarity  2/18: Exam largely stable, new central bulla  3/2: increased soreness and tearing past few days, exam stable.  Diffuse haze, edema but improved from last photos.  3/11: Worsening pain and soreness over the past week. 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 CL placed OD              PLAN:              - decrease PHMB 0.2 mg/mL to 4x/day WA and once at night               - Continue PFAT's every 1-2 hours -- 5 minutes before every PHMB dose              - Continue Vigamox TID right eye              - BCL removed today              - NO Prednisolone acetate              - Photo today              - RTC 1.5 week - call sooner if sx worsen     Anson Cheng MD    Attending Physician Attestation:  Complete documentation of historical and exam elements from today's encounter can be found in the full encounter summary report (not reduplicated in this progress note).  I personally obtained the chief complaint(s) and history of present illness.  I confirmed and edited as necessary the review of systems, past medical/surgical history, family history, social history, and examination findings as documented by others; and I examined the patient myself.  I personally reviewed the relevant tests, images, and reports as  documented above.  I formulated and edited as necessary the assessment and plan and discussed the findings and management plan with the patient and family. - Anson Cheng MD

## 2021-03-30 NOTE — NURSING NOTE
Chief Complaints and History of Present Illnesses   Patient presents with     Follow Up     Dry eye syndrome of right eye      Chief Complaint(s) and History of Present Illness(es)     Follow Up     Laterality: right eye    Course: stable    Associated symptoms: eye pain    Treatments tried: eye drops    Pain scale: 3/10    Comments: Dry eye syndrome of right eye               Comments     1 week Follow up  Pt states no change in VA since last visit BE  Ocular Meds:  PFAT's every 1-2 hours -- RE   PHMB 5Xper day RE   Vigamox TID right eye    Akanksha Beníetz COT 1:23 PM March 30, 2021

## 2021-04-08 ENCOUNTER — OFFICE VISIT (OUTPATIENT)
Dept: OPHTHALMOLOGY | Facility: CLINIC | Age: 66
End: 2021-04-08
Attending: OPHTHALMOLOGY
Payer: COMMERCIAL

## 2021-04-08 DIAGNOSIS — H16.011 CENTRAL CORNEAL ULCER OF RIGHT EYE: Primary | ICD-10-CM

## 2021-04-08 DIAGNOSIS — H16.011 CENTRAL CORNEAL ULCER OF RIGHT EYE: ICD-10-CM

## 2021-04-08 PROCEDURE — G0463 HOSPITAL OUTPT CLINIC VISIT: HCPCS

## 2021-04-08 PROCEDURE — 99214 OFFICE O/P EST MOD 30 MIN: CPT | Mod: 25 | Performed by: OPHTHALMOLOGY

## 2021-04-08 PROCEDURE — 65778 COVER EYE W/MEMBRANE: CPT | Mod: RT | Performed by: OPHTHALMOLOGY

## 2021-04-08 PROCEDURE — 92285 EXTERNAL OCULAR PHOTOGRAPHY: CPT | Performed by: OPHTHALMOLOGY

## 2021-04-08 ASSESSMENT — REFRACTION_WEARINGRX
OD_CYLINDER: +0.50
SPECS_TYPE: PAL
OD_AXIS: 134
OS_ADD: +2.00
OD_ADD: +2.00
OS_CYLINDER: +0.25
OS_AXIS: 162
OD_SPHERE: +1.00
OS_SPHERE: +1.25

## 2021-04-08 ASSESSMENT — TONOMETRY
OD_IOP_MMHG: 11
OS_IOP_MMHG: 12
IOP_METHOD: ICARE

## 2021-04-08 ASSESSMENT — VISUAL ACUITY
METHOD: SNELLEN - LINEAR
OD_CC: HM
OS_CC: 20/20

## 2021-04-08 ASSESSMENT — SLIT LAMP EXAM - LIDS
COMMENTS: MGD
COMMENTS: PROTECTIVE PTOSIS, MGD

## 2021-04-08 ASSESSMENT — PACHYMETRY
OS_CT(UM): 508
OD_CT(UM): 658

## 2021-04-08 ASSESSMENT — CONF VISUAL FIELD
OD_INFERIOR_TEMPORAL_RESTRICTION: 1
OD_SUPERIOR_TEMPORAL_RESTRICTION: 1
OD_SUPERIOR_NASAL_RESTRICTION: 1
OD_INFERIOR_NASAL_RESTRICTION: 1

## 2021-04-08 ASSESSMENT — EXTERNAL EXAM - RIGHT EYE: OD_EXAM: NORMAL; NO RASH

## 2021-04-08 ASSESSMENT — EXTERNAL EXAM - LEFT EYE: OS_EXAM: NORMAL

## 2021-04-08 NOTE — PROGRESS NOTES
CC:  Acanthamoeba keratitis OD     Referring provider: Dr. Rosie Han     HPI:  Derik Hamm is a(n) 65 year old male who presents as follow up for Acanthamoeba Keratitis right eye.     Patient initially presented for evaluation of persistent keratoconjunctivitis, OD, since 10/5/2020. Initially seen by Dr. Ngo at [facility] and felt it was related to severe dryness vs bacterial keratitis, so he was started on PFATs and Vigamox Q2H WA. Patient was seen frequently for follow up; he was started on Valtrex 500 mg TID on 10/9/20 and has been on that since then. Despite this therapy, pt failed to improve so he was started prednisolone. He has had a waxing and waning course, but more recently has gradually declined. Stopped PF on 11/2 after running out. He was seen by Dr. Ngo on 11/2 who noted worsening VA and worsening exam, so the patient was referred to Dr. Han, whom he saw 11/4/20. Dr. Han referred to our clinic due to concerns for acanthamoeba keratitis. Culture positive for acanthamoeba on 11/05/20.     Interval History:  Reporting worsening throbbing pain right eye, taking 3 alleve/advil with mild relief. The BCL really helped his pain. Using tears occasionally but these aren't helping pain much at all. Vision stable.      POHx:  PVD OS  Hyperopia OU  Presbyopia OU     Glasses: Yes  CTL wearer: yes - none x1.5 months; wears while showering or in the sauna; he sleeps in them once every 3-4 weeks; never wears them in a lake or pool.      Family hx of eye disease: negative for glaucoma/macular degeneration     Social Hx: (-) smoking, (-) EtOH, (-) illicit drugs     Meds:  Vigamox TID  PHMB 0.2 mg/mL (started 11/5/2020) --4x/day + once overnight (getting 5x/day)  PFATS q1-2h       -      Surgical Hx:  No eye surgery     A/P:  # Acanthamoeba keratitis, OD              - Symptoms started 10/5/2020              - Acanthamoeba risks: sleeps in CTL, wears in shower, sauna              - Cultures  obtained 11/05/20,   Culture positive for acanthamoeba.              - Confocal: many PMNs, no definite acanthamoeba (but could be masked by deeper infection, no fungal elements)              - Discussed non-FDA use of PHMB     12/31: Pain worse likely 2/2 surface toxicity from PHMB, although difficult to know. Plan to increase lubrication.   1/07: Better overall.  pain better.  Stromal haze/KP better as well.  1/19: Stable from previous visit w/ continued diffuse haze,no epi defects.   1/28: Slightly improved corneal clarity.  2/4: Slightly improved corneal clarity  2/18: Exam largely stable, new central bulla  3/2: increased soreness and tearing past few days, exam stable.  Diffuse haze, edema but improved from last photos.  3/11: Worsening pain and soreness over the past week. 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 CL placed right eye  4/8: BCL really helped his pain. Much worse with it off. Epi defect larger with rolled edges. 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 Infiltrate improved.                PLAN: Discussed Prokera vs BCL - patient going on a trip next week and prefers BCL. If no better next visit, will put on Prokera     - AmbioDisk 9.0 + Kontur lens 8.6/16.0 today 4/8/21                - REDUCE PHMB 0.2 mg/mL to 3x/day WA and once at night (QID total)              - Continue PFAT's every 1-2 hours -- 5 minutes before every PHMB dose              - Continue Vigamox TID right eye                     - NO Prednisolone acetate    Jordyn Holguin MD  Cornea & External Disease Fellow         Anson Cheng MD    Attending Physician Attestation:  Complete documentation of historical and exam elements from today's encounter can be found in the full encounter summary report (not reduplicated in this progress note).  I personally obtained the chief complaint(s) and history of present illness.  I confirmed  and edited as necessary the review of systems, past medical/surgical history, family history, social history, and examination findings as documented by others; and I examined the patient myself.  I personally reviewed the relevant tests, images, and reports as documented above.  I formulated and edited as necessary the assessment and plan and discussed the findings and management plan with the patient and family. - Anson Cheng MD     I was present for the entire procedure(s). - Anson Cheng MD

## 2021-04-08 NOTE — PATIENT INSTRUCTIONS
DECREASE PHMB 3x during the day and once overnight (total 4x/day)    Preservative free artificial tears every 2 hours     Moxifloxacin 3x/day.

## 2021-04-08 NOTE — NURSING NOTE
Chief Complaints and History of Present Illnesses   Patient presents with     Follow Up     Chief Complaint(s) and History of Present Illness(es)     Follow Up     Laterality: right eye    Course: stable    Associated symptoms: eye pain and photophobia.  Negative for pain with eye movement, itching, floaters and flashes    Treatments tried: eye drops and artificial tears    Pain scale: 3/10              Comments     1 week follow up Acanthamoeba keratitis RE  Pain ranges 3-8 each day  Bandage lens help a lot w pain. Wondering if can have it again  PHmb 5X DAY RE  Vigamox TID RE  Art tears qid RE  Emberevaristo Muhammad COA 9:08 AM April 8, 2021     .

## 2021-04-22 ENCOUNTER — OFFICE VISIT (OUTPATIENT)
Dept: OPHTHALMOLOGY | Facility: CLINIC | Age: 66
End: 2021-04-22
Attending: OPHTHALMOLOGY
Payer: COMMERCIAL

## 2021-04-22 DIAGNOSIS — H16.011 CENTRAL CORNEAL ULCER OF RIGHT EYE: ICD-10-CM

## 2021-04-22 PROCEDURE — G0463 HOSPITAL OUTPT CLINIC VISIT: HCPCS | Mod: 25

## 2021-04-22 PROCEDURE — 65778 COVER EYE W/MEMBRANE: CPT | Performed by: OPHTHALMOLOGY

## 2021-04-22 ASSESSMENT — TONOMETRY
OS_IOP_MMHG: 14
IOP_METHOD: ICARE
OD_IOP_MMHG: 07

## 2021-04-22 ASSESSMENT — VISUAL ACUITY
METHOD: SNELLEN - LINEAR
OS_CC: 20/20
OD_CC: HM
CORRECTION_TYPE: GLASSES

## 2021-04-22 ASSESSMENT — REFRACTION_WEARINGRX
OS_AXIS: 162
OD_SPHERE: +1.00
OS_SPHERE: +1.25
OD_CYLINDER: +0.50
OS_CYLINDER: +0.25
OD_ADD: +2.00
SPECS_TYPE: PAL
OD_AXIS: 134
OS_ADD: +2.00

## 2021-04-22 ASSESSMENT — CONF VISUAL FIELD
OS_NORMAL: 1
OD_SUPERIOR_NASAL_RESTRICTION: 1
OD_INFERIOR_NASAL_RESTRICTION: 1
METHOD: COUNTING FINGERS
OD_INFERIOR_TEMPORAL_RESTRICTION: 1
OD_SUPERIOR_TEMPORAL_RESTRICTION: 1

## 2021-04-22 ASSESSMENT — EXTERNAL EXAM - LEFT EYE: OS_EXAM: NORMAL

## 2021-04-22 ASSESSMENT — SLIT LAMP EXAM - LIDS
COMMENTS: MGD
COMMENTS: PROTECTIVE PTOSIS, MGD

## 2021-04-22 ASSESSMENT — EXTERNAL EXAM - RIGHT EYE: OD_EXAM: NORMAL; NO RASH

## 2021-04-22 NOTE — PROGRESS NOTES
CC:  Acanthamoeba keratitis OD     Referring provider: Dr. Rosie Han     HPI:  Derik Hamm is a(n) 65 year old male who presents as follow up for Acanthamoeba Keratitis right eye.     Patient initially presented for evaluation of persistent keratoconjunctivitis, OD, since 10/5/2020. Initially seen by Dr. Ngo at [facility] and felt it was related to severe dryness vs bacterial keratitis, so he was started on PFATs and Vigamox Q2H WA. Patient was seen frequently for follow up; he was started on Valtrex 500 mg TID on 10/9/20 and has been on that since then. Despite this therapy, pt failed to improve so he was started prednisolone. He has had a waxing and waning course, but more recently has gradually declined. Stopped PF on 11/2 after running out. He was seen by Dr. Ngo on 11/2 who noted worsening VA and worsening exam, so the patient was referred to Dr. Han, whom he saw 11/4/20. Dr. Han referred to our clinic due to concerns for acanthamoeba keratitis. Culture positive for acanthamoeba on 11/05/20.     Interval History:  Pain has improved. Vision is stable. The BCL really helped his pain. Using tears occasionally but these aren't helping pain much at all.     POHx:  PVD OS  Hyperopia OU  Presbyopia OU     Glasses: Yes  CTL wearer: yes - none x1.5 months; wears while showering or in the sauna; he sleeps in them once every 3-4 weeks; never wears them in a lake or pool.      Family hx of eye disease: negative for glaucoma/macular degeneration     Social Hx: (-) smoking, (-) EtOH, (-) illicit drugs     Meds:  Vigamox TID  PHMB 0.2 mg/mL (started 11/5/2020) --3x/day + once overnight (getting 5x/day)  PFATS q1-2h       -      Surgical Hx:  No eye surgery     A/P:  # Acanthamoeba keratitis, OD              - Symptoms started 10/5/2020              - Acanthamoeba risks: sleeps in CTL, wears in shower, sauna              - Cultures obtained 11/05/20,   Culture positive for acanthamoeba.              -  Confocal: many PMNs, no definite acanthamoeba (but could be masked by deeper infection, no fungal elements)              - Discussed non-FDA use of PHMB     12/31: Pain worse likely 2/2 surface toxicity from PHMB, although difficult to know. Plan to increase lubrication.   1/07: Better overall.  pain better.  Stromal haze/KP better as well.  1/19: Stable from previous visit w/ continued diffuse haze,no epi defects.   1/28: Slightly improved corneal clarity.  2/4: Slightly improved corneal clarity  2/18: Exam largely stable, new central bulla  3/2: increased soreness and tearing past few days, exam stable.  Diffuse haze, edema but improved from last photos.  3/11: Worsening pain and soreness over the past week. 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 CL placed right eye  4/8: BCL really helped his pain. Much worse with it off. Epi defect larger with rolled edges. 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 Infiltrate improved. AmbioDisk 9.0 + Kontur lens 8.6/16.0 4/8/21 4/22: Pain improving. Ulcer looks stable to slightly improved                PLAN:      - Prokera ring put it right eye              - REDUCE PHMB 0.2 mg/mL to 3x/day WA and once at night (TID total)              - Continue PFAT's every 1-2 hours -- 5 minutes before every PHMB dose              - Continue Vigamox TID right eye                     - NO Prednisolone acetate    Damion Meek MD  Ophthalmology Resident, PGY-3          Anson Cheng MD    Attending Physician Attestation:  Complete documentation of historical and exam elements from today's encounter can be found in the full encounter summary report (not reduplicated in this progress note).  I personally obtained the chief complaint(s) and history of present illness.  I confirmed and edited as necessary the review of systems, past medical/surgical history, family history, social history, and  examination findings as documented by others; and I examined the patient myself.  I personally reviewed the relevant tests, images, and reports as documented above.  I formulated and edited as necessary the assessment and plan and discussed the findings and management plan with the patient and family. - Anson Cheng MD     I was present for the entire procedure(s). - Anson Cheng MD

## 2021-04-22 NOTE — NURSING NOTE
Chief Complaints and History of Present Illnesses   Patient presents with     Follow Up     Acanthamoeba keratitis, OD     Chief Complaint(s) and History of Present Illness(es)     Follow Up     Laterality: right eye    Course: stable    Associated symptoms: tearing and eye pain.  Negative for burning and foreign body sensation    Treatments tried: eye drops and artificial tears    Response to treatment: mild improvement    Pain scale: 2/10    Comments: Acanthamoeba keratitis, OD              Comments     Pt states no change in VA since last visit BE  States some improvement with comfort RE  Ocular meds:  PHMB 0.2 mg/mL to 3x/day WA and once at night (QID total) RE    Continue PFAT's every 1-2 hours RE -- 5 minutes before every PHMB dose    Continue Vigamox TID right eye      Akanksha Benítez COT 9:04 AM April 22, 2021

## 2021-04-29 ENCOUNTER — OFFICE VISIT (OUTPATIENT)
Dept: OPHTHALMOLOGY | Facility: CLINIC | Age: 66
End: 2021-04-29
Attending: OPHTHALMOLOGY
Payer: COMMERCIAL

## 2021-04-29 DIAGNOSIS — H04.121 DRY EYE SYNDROME OF RIGHT EYE: ICD-10-CM

## 2021-04-29 DIAGNOSIS — H16.011 CENTRAL CORNEAL ULCER OF RIGHT EYE: Primary | ICD-10-CM

## 2021-04-29 DIAGNOSIS — H16.011 CENTRAL CORNEAL ULCER OF RIGHT EYE: ICD-10-CM

## 2021-04-29 DIAGNOSIS — B60.13 ACANTHAMOEBA KERATITIS: ICD-10-CM

## 2021-04-29 PROCEDURE — G0463 HOSPITAL OUTPT CLINIC VISIT: HCPCS

## 2021-04-29 PROCEDURE — 99214 OFFICE O/P EST MOD 30 MIN: CPT | Mod: 25 | Performed by: OPHTHALMOLOGY

## 2021-04-29 PROCEDURE — 65778 COVER EYE W/MEMBRANE: CPT | Mod: RT | Performed by: OPHTHALMOLOGY

## 2021-04-29 ASSESSMENT — VISUAL ACUITY
OS_CC: 20/20
OD_SC: HM
METHOD: SNELLEN - LINEAR

## 2021-04-29 ASSESSMENT — SLIT LAMP EXAM - LIDS
COMMENTS: PROTECTIVE PTOSIS, MGD
COMMENTS: MGD

## 2021-04-29 ASSESSMENT — EXTERNAL EXAM - RIGHT EYE: OD_EXAM: NORMAL; NO RASH

## 2021-04-29 ASSESSMENT — CONF VISUAL FIELD
OD_INFERIOR_TEMPORAL_RESTRICTION: 1
OD_INFERIOR_NASAL_RESTRICTION: 1
OD_SUPERIOR_TEMPORAL_RESTRICTION: 1
METHOD: COUNTING FINGERS
OS_NORMAL: 1
OD_SUPERIOR_NASAL_RESTRICTION: 1

## 2021-04-29 ASSESSMENT — TONOMETRY
OD_IOP_MMHG: 22
OS_IOP_MMHG: 15
IOP_METHOD: ICARE

## 2021-04-29 ASSESSMENT — EXTERNAL EXAM - LEFT EYE: OS_EXAM: NORMAL

## 2021-04-29 NOTE — PROGRESS NOTES
CC:  Acanthamoeba keratitis OD     Referring provider: Dr. Rosie Han     HPI:  Derik Hamm is a(n) 65 year old male who presents as follow up for Acanthamoeba Keratitis right eye.     Patient initially presented for evaluation of persistent keratoconjunctivitis, OD, since 10/5/2020. Initially seen by Dr. Ngo at [facility] and felt it was related to severe dryness vs bacterial keratitis, so he was started on PFATs and Vigamox Q2H WA. Patient was seen frequently for follow up; he was started on Valtrex 500 mg TID on 10/9/20 and has been on that since then. Despite this therapy, pt failed to improve so he was started prednisolone. He has had a waxing and waning course, but more recently has gradually declined. Stopped PF on 11/2 after running out. He was seen by Dr. Ngo on 11/2 who noted worsening VA and worsening exam, so the patient was referred to Dr. Han, whom he saw 11/4/20. Dr. Han referred to our clinic due to concerns for acanthamoeba keratitis. Culture positive for acanthamoeba on 11/05/20.     Interval History:  Prokera put in 7 days ago. Had difficulty and discomfort with it the next day but it has subsided. VA stable. Pain has improved.      POHx:  PVD OS  Hyperopia OU  Presbyopia OU     Glasses: Yes  CTL wearer: yes - none x1.5 months; wears while showering or in the sauna; he sleeps in them once every 3-4 weeks; never wears them in a lake or pool.      Family hx of eye disease: negative for glaucoma/macular degeneration     Social Hx: (-) smoking, (-) EtOH, (-) illicit drugs     Meds:  Vigamox TID  PHMB 0.2 mg/mL (started 11/5/2020) --2x/day + once overnight (getting 3x/day)  PFATS q1-2h       -      Surgical Hx:  No eye surgery     A/P:  # Acanthamoeba keratitis, OD              - Symptoms started 10/5/2020              - Acanthamoeba risks: sleeps in CTL, wears in shower, sauna              - Cultures obtained 11/05/20,   Culture positive for acanthamoeba.              - Confocal:  many PMNs, no definite acanthamoeba (but could be masked by deeper infection, no fungal elements)              - Discussed non-FDA use of PHMB     12/31: Pain worse likely 2/2 surface toxicity from PHMB, although difficult to know. Plan to increase lubrication.   1/07: Better overall.  pain better.  Stromal haze/KP better as well.  1/19: Stable from previous visit w/ continued diffuse haze,no epi defects.   1/28: Slightly improved corneal clarity.  2/4: Slightly improved corneal clarity  2/18: Exam largely stable, new central bulla  3/2: increased soreness and tearing past few days, exam stable.  Diffuse haze, edema but improved from last photos.  3/11: Worsening pain and soreness over the past week. 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 CL placed right eye  4/8: BCL really helped his pain. Much worse with it off. Epi defect larger with rolled edges. 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 Infiltrate improved. AmbioDisk 9.0 + Kontur lens 8.6/16.0 4/8/21 4/22: Pain improving. Ulcer looks stable to slightly improved. Prokera ring placed  4/29: right eye Much improved infiltrate. Epi defect resolving. Am dissolved.                PLAN:      - Prokera ring placed it right eye              -Continue PHMB 0.2 mg/mL to 2x/day WA and once at night (TID total)              - Continue PFAT's every 1-2 hours -- 5 minutes before every PHMB dose              - Continue Vigamox TID right eye                     - NO Prednisolone acetate        RTC: 1wks w/ VT, call if problems.    Damion Meek MD  Ophthalmology Resident, PGY-3     Attending Physician Attestation:  Complete documentation of historical and exam elements from today's encounter can be found in the full encounter summary report (not reduplicated in this progress note).  I personally obtained the chief complaint(s) and history of present illness.  I confirmed and  edited as necessary the review of systems, past medical/surgical history, family history, social history, and examination findings as documented by others; and I examined the patient myself.  I personally reviewed the relevant tests, images, and reports as documented above.  I formulated and edited as necessary the assessment and plan and discussed the findings and management plan with the patient and family. - Anson Cheng MD     I was present for the entire procedure(s). - Anson Cheng MD

## 2021-04-29 NOTE — NURSING NOTE
Chief Complaints and History of Present Illnesses   Patient presents with     Follow Up     Acanthamoeba keratitis, OD     Chief Complaint(s) and History of Present Illness(es)     Follow Up     Laterality: right eye    Associated symptoms: eye pain and tearing.  Negative for headache and burning    Treatments tried: eye drops and artificial tears    Pain scale: 2/10    Comments: Acanthamoeba keratitis, OD              Comments     Pt states no changes since last visit   PHMB 0.2 mg/mL to 3x/day    PFAT's every 1-2 hours -- 5 minutes before every PHMB dose     Vigamox TID right eye    Akanksha Benítez COT 8:45 AM April 29, 2021

## 2021-05-06 ENCOUNTER — OFFICE VISIT (OUTPATIENT)
Dept: OPHTHALMOLOGY | Facility: CLINIC | Age: 66
End: 2021-05-06
Attending: OPHTHALMOLOGY
Payer: COMMERCIAL

## 2021-05-06 DIAGNOSIS — H04.121 DRY EYE SYNDROME OF RIGHT EYE: ICD-10-CM

## 2021-05-06 DIAGNOSIS — B60.13 ACANTHAMOEBA KERATITIS: Primary | ICD-10-CM

## 2021-05-06 DIAGNOSIS — H18.891 PERSISTENT EPITHELIAL DEFECT OF RIGHT CORNEA: ICD-10-CM

## 2021-05-06 DIAGNOSIS — B60.13 ACANTHAMOEBA KERATITIS: ICD-10-CM

## 2021-05-06 PROCEDURE — 99214 OFFICE O/P EST MOD 30 MIN: CPT | Mod: GC | Performed by: OPHTHALMOLOGY

## 2021-05-06 PROCEDURE — G0463 HOSPITAL OUTPT CLINIC VISIT: HCPCS

## 2021-05-06 PROCEDURE — 92285 EXTERNAL OCULAR PHOTOGRAPHY: CPT | Performed by: OPHTHALMOLOGY

## 2021-05-06 ASSESSMENT — TONOMETRY
IOP_METHOD: ICARE
OS_IOP_MMHG: 15
OD_IOP_MMHG: 16

## 2021-05-06 ASSESSMENT — EXTERNAL EXAM - RIGHT EYE: OD_EXAM: NORMAL; NO RASH

## 2021-05-06 ASSESSMENT — CONF VISUAL FIELD
OD_INFERIOR_TEMPORAL_RESTRICTION: 2
OD_INFERIOR_NASAL_RESTRICTION: 1
OD_SUPERIOR_NASAL_RESTRICTION: 1
OD_SUPERIOR_TEMPORAL_RESTRICTION: 1

## 2021-05-06 ASSESSMENT — SLIT LAMP EXAM - LIDS
COMMENTS: MGD
COMMENTS: PROTECTIVE PTOSIS, MGD

## 2021-05-06 ASSESSMENT — EXTERNAL EXAM - LEFT EYE: OS_EXAM: NORMAL

## 2021-05-06 ASSESSMENT — VISUAL ACUITY
OS_CC+: -1
OS_CC: 20/20
OD_CC: CF @ 1'
METHOD: SNELLEN - LINEAR

## 2021-05-06 NOTE — NURSING NOTE
Chief Complaints and History of Present Illnesses   Patient presents with     Follow Up     Chief Complaint(s) and History of Present Illness(es)     Follow Up     Laterality: right eye    Associated symptoms: Negative for pain with eye movement, eye pain, flashes and floaters    Response to treatment: mild improvement    Pain scale: 0/10              Comments     1 week follow up Acanthamoeba keratitis right eye  Feels and looks better  PHMB 0.2 mg/mL to 2x/day WA and once at night (TID total)   PFAT's every 1-2 hours -- 5 minutes before every PHMB dose    Vigamox TID right eye  Ember ZIMMERMAN 8:52 AM May 6, 2021

## 2021-05-06 NOTE — PROGRESS NOTES
CC:  Acanthamoeba keratitis OD     Referring provider: Dr. Rosie Han     HPI:  Derik Hamm is a(n) 65 year old male who presents as follow up for Acanthamoeba Keratitis right eye.     Patient initially presented for evaluation of persistent keratoconjunctivitis, OD, since 10/5/2020. Initially seen by Dr. Ngo at [facility] and felt it was related to severe dryness vs bacterial keratitis, so he was started on PFATs and Vigamox Q2H WA. Patient was seen frequently for follow up; he was started on Valtrex 500 mg TID on 10/9/20 and has been on that since then. Despite this therapy, pt failed to improve so he was started prednisolone. He has had a waxing and waning course, but more recently has gradually declined. Stopped PF on 11/2 after running out. He was seen by Dr. Ngo on 11/2 who noted worsening VA and worsening exam, so the patient was referred to Dr. Han, whom he saw 11/4/20. Dr. Han referred to our clinic due to concerns for acanthamoeba keratitis. Culture positive for acanthamoeba on 11/05/20.     Interval History:  No changes over the interval, Prokera in place, no pain, vision stable, no concerns.  Drops as below.       POHx:  PVD OS  Hyperopia OU  Presbyopia OU     Glasses: Yes  CTL wearer: yes - none x1.5 months; wears while showering or in the sauna; he sleeps in them once every 3-4 weeks; never wears them in a lake or pool.      Family hx of eye disease: negative for glaucoma/macular degeneration     Social Hx: (-) smoking, (-) EtOH, (-) illicit drugs     Meds:  Vigamox TID  PHMB 0.2 mg/mL (started 11/5/2020) --2x/day + once overnight (getting 3x/day)  PFATS q1-2h       -      Surgical Hx:  No eye surgery     A/P:  # Acanthamoeba keratitis, OD              - Symptoms started 10/5/2020              - Acanthamoeba risks: sleeps in CTL, wears in shower, sauna              - Cultures obtained 11/05/20,   Culture positive for acanthamoeba.              - Confocal: many PMNs, no definite  acanthamoeba (but could be masked by deeper infection, no fungal elements)              - Discussed non-FDA use of PHMB     12/31: Pain worse likely 2/2 surface toxicity from PHMB, although difficult to know. Plan to increase lubrication.   1/07: Better overall.  pain better.  Stromal haze/KP better as well.  1/19: Stable from previous visit w/ continued diffuse haze,no epi defects.   1/28: Slightly improved corneal clarity.  2/4: Slightly improved corneal clarity  2/18: Exam largely stable, new central bulla  3/2: increased soreness and tearing past few days, exam stable.  Diffuse haze, edema but improved from last photos.  3/11: Worsening pain and soreness over the past week. 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 CL placed right eye  4/8: BCL really helped his pain. Much worse with it off. Epi defect larger with rolled edges. 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 Infiltrate improved. AmbioDisk 9.0 + Kontur lens 8.6/16.0 4/8/21 4/22: Pain improving. Ulcer looks stable to slightly improved. Prokera ring placed  4/29: right eye Much improved infiltrate. Epi defect resolving. Am dissolved.  5/6/21:  AM dissolved, ring removed.  Epi healed, small superocentral crescentic infiltrate remains w/ surrounding central scarring.                  PLAN:      - Removed Prokera ring 5/6/21              - Continue PHMB 0.2 mg/mL to 2x/day WA and once at night (TID total)              - Continue PFAT's every 1-2 hours -- 5 minutes before every PHMB dose              - STOP Vigamox TID right eye (no epi defect).                     - NO Prednisolone acetate        RTC: 1.5 - 2 wks w/ VT, call if problems.    Jason Goldberg, MD  Cornea & External Disease Fellow  Department of Ophthalmology and Visual Neurosciences    Attending Physician Attestation:  Complete documentation of historical and exam elements from today's encounter can  be found in the full encounter summary report (not reduplicated in this progress note).  I personally obtained the chief complaint(s) and history of present illness.  I confirmed and edited as necessary the review of systems, past medical/surgical history, family history, social history, and examination findings as documented by others; and I examined the patient myself.  I personally reviewed the relevant tests, images, and reports as documented above.  I formulated and edited as necessary the assessment and plan and discussed the findings and management plan with the patient and family. - Anson Cheng MD

## 2021-05-20 ENCOUNTER — OFFICE VISIT (OUTPATIENT)
Dept: OPHTHALMOLOGY | Facility: CLINIC | Age: 66
End: 2021-05-20
Attending: OPHTHALMOLOGY
Payer: COMMERCIAL

## 2021-05-20 DIAGNOSIS — H17.9 CORNEAL SCAR, RIGHT EYE: ICD-10-CM

## 2021-05-20 DIAGNOSIS — H16.401 CORNEAL NEOVASCULARIZATION OF RIGHT EYE: ICD-10-CM

## 2021-05-20 DIAGNOSIS — B60.13 ACANTHAMOEBA KERATITIS: ICD-10-CM

## 2021-05-20 DIAGNOSIS — B60.13 ACANTHAMOEBA KERATITIS: Primary | ICD-10-CM

## 2021-05-20 PROCEDURE — 92285 EXTERNAL OCULAR PHOTOGRAPHY: CPT | Performed by: OPHTHALMOLOGY

## 2021-05-20 PROCEDURE — 99214 OFFICE O/P EST MOD 30 MIN: CPT | Mod: GC | Performed by: OPHTHALMOLOGY

## 2021-05-20 PROCEDURE — G0463 HOSPITAL OUTPT CLINIC VISIT: HCPCS

## 2021-05-20 ASSESSMENT — SLIT LAMP EXAM - LIDS
COMMENTS: MGD
COMMENTS: PROTECTIVE PTOSIS, MGD

## 2021-05-20 ASSESSMENT — REFRACTION_WEARINGRX
OS_CYLINDER: +0.25
OS_ADD: +2.00
OS_SPHERE: +1.25
OD_SPHERE: +1.00
OD_CYLINDER: +0.50
OS_AXIS: 162
OD_ADD: +2.00
SPECS_TYPE: PAL
OD_AXIS: 134

## 2021-05-20 ASSESSMENT — TONOMETRY
OD_IOP_MMHG: 09
IOP_METHOD: ICARE
OS_IOP_MMHG: 15

## 2021-05-20 ASSESSMENT — VISUAL ACUITY
CORRECTION_TYPE: GLASSES
OD_PH_SC: 20/60
OD_SC: 20/150
OS_CC: 20/20
OS_CC+: -2
OD_PH_SC+: -2
METHOD: SNELLEN - LINEAR

## 2021-05-20 ASSESSMENT — CONF VISUAL FIELD
METHOD: COUNTING FINGERS
OS_INFERIOR_NASAL_RESTRICTION: 3
OS_INFERIOR_TEMPORAL_RESTRICTION: 3

## 2021-05-20 ASSESSMENT — EXTERNAL EXAM - LEFT EYE: OS_EXAM: NORMAL

## 2021-05-20 ASSESSMENT — EXTERNAL EXAM - RIGHT EYE: OD_EXAM: NORMAL; NO RASH

## 2021-05-20 NOTE — PROGRESS NOTES
CC:  Acanthamoeba keratitis OD     Referring provider: Dr. Rosie Han     HPI:  Derik Hamm is a(n) 65 year old male who presents as follow up for Acanthamoeba Keratitis right eye.     Patient initially presented for evaluation of persistent keratoconjunctivitis, OD, since 10/5/2020. Initially seen by Dr. Ngo at [facility] and felt it was related to severe dryness vs bacterial keratitis, so he was started on PFATs and Vigamox Q2H WA. Patient was seen frequently for follow up; he was started on Valtrex 500 mg TID on 10/9/20 and has been on that since then. Despite this therapy, pt failed to improve so he was started prednisolone. He has had a waxing and waning course, but more recently has gradually declined. Stopped PF on 11/2 after running out. He was seen by Dr. Ngo on 11/2 who noted worsening VA and worsening exam, so the patient was referred to Dr. Han, whom he saw 11/4/20. Dr. Han referred to our clinic due to concerns for acanthamoeba keratitis. Culture positive for acanthamoeba on 11/05/20.     Interval History:  No changes over the interval, no pain, vision stable, no concerns. Drops as below.       POHx:  PVD OS  Hyperopia OU  Presbyopia OU     Glasses: Yes  CTL wearer: yes - none x1.5 months; wears while showering or in the sauna; he sleeps in them once every 3-4 weeks; never wears them in a lake or pool.      Family hx of eye disease: negative for glaucoma/macular degeneration     Social Hx: (-) smoking, (-) EtOH, (-) illicit drugs     Meds:  Vigamox TID  PHMB 0.2 mg/mL (started 11/5/2020) --2x/day   PFATS q1-2h       -      Surgical Hx:  No eye surgery     A/P:  # Acanthamoeba keratitis, OD              - Symptoms started 10/5/2020              - Acanthamoeba risks: sleeps in CTL, wears in shower, sauna              - Cultures obtained 11/05/20,   Culture positive for acanthamoeba.              - Confocal: many PMNs, no definite acanthamoeba (but could be masked by deeper infection,  no fungal elements)              - Discussed non-FDA use of PHMB     12/31: Pain worse likely 2/2 surface toxicity from PHMB, although difficult to know. Plan to increase lubrication.   1/07: Better overall.  pain better.  Stromal haze/KP better as well.  1/19: Stable from previous visit w/ continued diffuse haze,no epi defects.   1/28: Slightly improved corneal clarity.  2/4: Slightly improved corneal clarity  2/18: Exam largely stable, new central bulla  3/2: increased soreness and tearing past few days, exam stable.  Diffuse haze, edema but improved from last photos.  3/11: Worsening pain and soreness over the past week. 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 CL placed right eye  4/8: BCL really helped his pain. Much worse with it off. Epi defect larger with rolled edges. 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 Infiltrate improved. AmbioDisk 9.0 + Kontur lens 8.6/16.0 4/8/21 4/22: Pain improving. Ulcer looks stable to slightly improved. Prokera ring placed  4/29: right eye Much improved infiltrate. Epi defect resolving. Am dissolved.  5/6/21:  AM dissolved, ring removed.  Epi healed, small superocentral crescentic infiltrate remains w/ surrounding central scarring.    5/20: significant improvement in Snellen acuity today. Crescentic infiltrate smaller and less dense since 5/6 photo.                PLAN:    - SLP 5/20/21              - Continue PHMB 0.2 mg/mL to 2x/day               - Continue PFAT's every 1-2 hours -- 5 minutes before every PHMB dose              - NO Prednisolone acetate      RTC:  4 wks w/ VT, call if problems.    Jason Goldberg, MD  Cornea & External Disease Fellow  Department of Ophthalmology and Visual Neurosciences    Attending Physician Attestation:  Complete documentation of historical and exam elements from today's encounter can be found in the full encounter summary report (not  reduplicated in this progress note).  I personally obtained the chief complaint(s) and history of present illness.  I confirmed and edited as necessary the review of systems, past medical/surgical history, family history, social history, and examination findings as documented by others; and I examined the patient myself.  I personally reviewed the relevant tests, images, and reports as documented above.  I formulated and edited as necessary the assessment and plan and discussed the findings and management plan with the patient and family. - Anson Cheng MD

## 2021-05-20 NOTE — NURSING NOTE
Chief Complaints and History of Present Illnesses   Patient presents with     Follow Up     Chief Complaint(s) and History of Present Illness(es)     Follow Up     Laterality: right eye    Quality: blurred vision    Associated symptoms: Negative for itching, dryness, flashes and floaters    Treatments tried: eye drops    Pain scale: 0/10              Comments     Follow up for Acanthamoeba keratitis right eye.  The patient is using PHMB twice daily and PF AT's qh1 to qh2 in the right eye.  Flor Betancourt COA, COA 8:42 AM 05/20/2021

## 2021-06-17 ENCOUNTER — OFFICE VISIT (OUTPATIENT)
Dept: OPHTHALMOLOGY | Facility: CLINIC | Age: 66
End: 2021-06-17
Attending: OPHTHALMOLOGY
Payer: COMMERCIAL

## 2021-06-17 DIAGNOSIS — H04.121 DRY EYE SYNDROME OF RIGHT EYE: ICD-10-CM

## 2021-06-17 DIAGNOSIS — H16.401 CORNEAL NEOVASCULARIZATION OF RIGHT EYE: ICD-10-CM

## 2021-06-17 DIAGNOSIS — H16.011 CENTRAL CORNEAL ULCER OF RIGHT EYE: ICD-10-CM

## 2021-06-17 DIAGNOSIS — B60.13 ACANTHAMOEBA KERATITIS: Primary | ICD-10-CM

## 2021-06-17 DIAGNOSIS — H17.9 CORNEAL SCAR, RIGHT EYE: ICD-10-CM

## 2021-06-17 PROCEDURE — G0463 HOSPITAL OUTPT CLINIC VISIT: HCPCS

## 2021-06-17 PROCEDURE — 99213 OFFICE O/P EST LOW 20 MIN: CPT | Mod: GC | Performed by: OPHTHALMOLOGY

## 2021-06-17 ASSESSMENT — REFRACTION_WEARINGRX
OD_AXIS: 134
OD_SPHERE: +1.00
OS_AXIS: 162
OS_CYLINDER: +0.25
OD_CYLINDER: +0.50
SPECS_TYPE: PAL
OS_ADD: +2.00
OS_SPHERE: +1.25
OD_ADD: +2.00

## 2021-06-17 ASSESSMENT — CONF VISUAL FIELD
OD_SUPERIOR_NASAL_RESTRICTION: 3
OD_INFERIOR_TEMPORAL_RESTRICTION: 3
OD_SUPERIOR_TEMPORAL_RESTRICTION: 3
OD_INFERIOR_NASAL_RESTRICTION: 3

## 2021-06-17 ASSESSMENT — VISUAL ACUITY
OS_CC+: -1
OD_CC: 20/125
CORRECTION_TYPE: GLASSES
METHOD: SNELLEN - LINEAR
OD_CC+: -1
OD_PH_CC: 20/70
OS_CC: 20/25

## 2021-06-17 ASSESSMENT — TONOMETRY
OS_IOP_MMHG: 13
OD_IOP_MMHG: 11
IOP_METHOD: TONOPEN

## 2021-06-17 ASSESSMENT — SLIT LAMP EXAM - LIDS
COMMENTS: MGD
COMMENTS: PROTECTIVE PTOSIS, MGD

## 2021-06-17 ASSESSMENT — EXTERNAL EXAM - LEFT EYE: OS_EXAM: NORMAL

## 2021-06-17 ASSESSMENT — EXTERNAL EXAM - RIGHT EYE: OD_EXAM: NORMAL; NO RASH

## 2021-06-17 NOTE — PROGRESS NOTES
CC:  Acanthamoeba keratitis OD     Referring provider: Dr. Rosie Han     HPI:  Derik Hamm is a(n) 65 year old male who presents as follow up for Acanthamoeba Keratitis right eye.     Patient initially presented for evaluation of persistent keratoconjunctivitis, OD, since 10/5/2020. Initially seen by Dr. Ngo at [facility] and felt it was related to severe dryness vs bacterial keratitis, so he was started on PFATs and Vigamox Q2H WA. Patient was seen frequently for follow up; he was started on Valtrex 500 mg TID on 10/9/20 and has been on that since then. Despite this therapy, pt failed to improve so he was started prednisolone. He has had a waxing and waning course, but more recently has gradually declined. Stopped PF on 11/2 after running out. He was seen by Dr. Ngo on 11/2 who noted worsening VA and worsening exam, so the patient was referred to Dr. Han, whom he saw 11/4/20. Dr. Han referred to our clinic due to concerns for acanthamoeba keratitis. Culture positive for acanthamoeba on 11/05/20.     Interval History:  No changes over the interval, no pain, vision stable, no concerns. Drops as below.  Pain remains well controlled. Patient doing well.     POHx:  PVD OS  Hyperopia OU  Presbyopia OU     Glasses: Yes  CTL wearer: yes - none x1.5 months; wears while showering or in the sauna; he sleeps in them once every 3-4 weeks; never wears them in a lake or pool.      Family hx of eye disease: negative for glaucoma/macular degeneration     Social Hx: (-) smoking, (-) EtOH, (-) illicit drugs     Meds:  PHMB 0.2 mg/mL (started 11/5/2020) --2x/day   PFATS q1-2h       -      Surgical Hx:  No eye surgery     A/P:  # Acanthamoeba keratitis, OD              - Symptoms started 10/5/2020              - Acanthamoeba risks: sleeps in CTL, wears in shower, sauna              - Cultures obtained 11/05/20,   Culture positive for acanthamoeba.              - Confocal: many PMNs, no definite acanthamoeba (but  could be masked by deeper infection, no fungal elements)              - Discussed non-FDA use of PHMB     12/31: Pain worse likely 2/2 surface toxicity from PHMB, although difficult to know. Plan to increase lubrication.   1/07: Better overall.  pain better.  Stromal haze/KP better as well.  1/19: Stable from previous visit w/ continued diffuse haze,no epi defects.   1/28: Slightly improved corneal clarity.  2/4: Slightly improved corneal clarity  2/18: Exam largely stable, new central bulla  3/2: increased soreness and tearing past few days, exam stable.  Diffuse haze, edema but improved from last photos.  3/11: Worsening pain and soreness over the past week. 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 CL placed right eye  4/8: BCL really helped his pain. Much worse with it off. Epi defect larger with rolled edges. 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 Infiltrate improved. AmbioDisk 9.0 + Kontur lens 8.6/16.0 4/8/21 4/22: Pain improving. Ulcer looks stable to slightly improved. Prokera ring placed  4/29: right eye Much improved infiltrate. Epi defect resolving. Am dissolved.  5/6/21:  AM dissolved, ring removed.  Epi healed, small superocentral crescentic infiltrate remains w/ surrounding central scarring.    5/20: significant improvement in Snellen acuity today. Crescentic infiltrate smaller and less dense since 5/6 photo.  6/17: small central infiltrate present at inferior aspect of pupil - smaller than 5/20. Edema much improved. Vision stable.     Would like to see active infiltrate completely resolved before stopping / reducing PHMB further.                PLAN:               - Continue PHMB 0.2 mg/mL 2x/day               - Continue PFAT's every 1-2 hours -- 5 minutes before every PHMB dose              - NO Prednisolone acetate      RTC:  4 wks w/ VT, call if problems develope.    Jordyn Holguin MD  Cornea &  External Disease Fellow    Attending Physician Attestation:  Complete documentation of historical and exam elements from today's encounter can be found in the full encounter summary report (not reduplicated in this progress note).  I personally obtained the chief complaint(s) and history of present illness.  I confirmed and edited as necessary the review of systems, past medical/surgical history, family history, social history, and examination findings as documented by others; and I examined the patient myself.  I personally reviewed the relevant tests, images, and reports as documented above.  I formulated and edited as necessary the assessment and plan and discussed the findings and management plan with the patient and family. - Anson Cheng MD

## 2021-06-17 NOTE — NURSING NOTE
Chief Complaints and History of Present Illnesses   Patient presents with     Follow Up     Acanthamoeba keratitis, OD     Chief Complaint(s) and History of Present Illness(es)     Follow Up     Laterality: right eye    Associated symptoms: Negative for eye pain, redness, tearing and dryness    Treatments tried: eye drops    Pain scale: 0/10    Comments: Acanthamoeba keratitis, OD              Comments     Vision unchanged since last visit.   No pain or discharge.    TANNER Hughes COT 9:09 AM June 17, 2021

## 2021-07-16 ENCOUNTER — OFFICE VISIT (OUTPATIENT)
Dept: OPHTHALMOLOGY | Facility: CLINIC | Age: 66
End: 2021-07-16
Attending: OPHTHALMOLOGY
Payer: COMMERCIAL

## 2021-07-16 DIAGNOSIS — B60.13 ACANTHAMOEBA KERATITIS: Primary | ICD-10-CM

## 2021-07-16 DIAGNOSIS — H16.401 CORNEAL NEOVASCULARIZATION OF RIGHT EYE: ICD-10-CM

## 2021-07-16 DIAGNOSIS — B60.13 ACANTHAMOEBA KERATITIS: ICD-10-CM

## 2021-07-16 DIAGNOSIS — H17.9 CORNEAL SCAR, RIGHT EYE: ICD-10-CM

## 2021-07-16 DIAGNOSIS — H04.121 DRY EYE SYNDROME OF RIGHT EYE: ICD-10-CM

## 2021-07-16 PROCEDURE — 92285 EXTERNAL OCULAR PHOTOGRAPHY: CPT | Performed by: OPHTHALMOLOGY

## 2021-07-16 PROCEDURE — 99214 OFFICE O/P EST MOD 30 MIN: CPT | Mod: GC | Performed by: OPHTHALMOLOGY

## 2021-07-16 PROCEDURE — G0463 HOSPITAL OUTPT CLINIC VISIT: HCPCS

## 2021-07-16 ASSESSMENT — CONF VISUAL FIELD
OD_SUPERIOR_NASAL_RESTRICTION: 1
METHOD: COUNTING FINGERS
OD_INFERIOR_NASAL_RESTRICTION: 3
OD_INFERIOR_TEMPORAL_RESTRICTION: 3
OS_NORMAL: 1
OD_SUPERIOR_TEMPORAL_RESTRICTION: 3

## 2021-07-16 ASSESSMENT — PACHYMETRY
OD_CT(UM): 658
OS_CT(UM): 508

## 2021-07-16 ASSESSMENT — EXTERNAL EXAM - RIGHT EYE: OD_EXAM: NORMAL; NO RASH

## 2021-07-16 ASSESSMENT — TONOMETRY
OS_IOP_MMHG: 12
IOP_METHOD: ICARE
OD_IOP_MMHG: 8

## 2021-07-16 ASSESSMENT — REFRACTION_WEARINGRX
OS_CYLINDER: +0.25
OD_ADD: +2.00
OS_ADD: +2.00
OS_AXIS: 162
OD_CYLINDER: +0.50
OS_SPHERE: +1.25
OD_SPHERE: +1.00
OD_AXIS: 134
SPECS_TYPE: PAL

## 2021-07-16 ASSESSMENT — VISUAL ACUITY
OS_PH_CC: 20/30
OD_CC+: -1
METHOD: SNELLEN - LINEAR
OS_CC+: -1
OS_CC: 20/40
OD_CC: 20/100

## 2021-07-16 ASSESSMENT — SLIT LAMP EXAM - LIDS: COMMENTS: MGD

## 2021-07-16 ASSESSMENT — EXTERNAL EXAM - LEFT EYE: OS_EXAM: NORMAL

## 2021-07-16 NOTE — PROGRESS NOTES
CC:  Acanthamoeba keratitis OD     Referring provider: Dr. Rosie Han     HPI:  Derik Hamm is a(n) 65 year old male who presents as follow up for Acanthamoeba Keratitis right eye.     Patient initially presented for evaluation of persistent keratoconjunctivitis, OD, since 10/5/2020. Initially seen by Dr. Ngo at [facility] and felt it was related to severe dryness vs bacterial keratitis, so he was started on PFATs and Vigamox Q2H WA. Patient was seen frequently for follow up; he was started on Valtrex 500 mg TID on 10/9/20 and has been on that since then. Despite this therapy, pt failed to improve so he was started prednisolone. He has had a waxing and waning course, but more recently has gradually declined. Stopped PF on 11/2 after running out. He was seen by Dr. Ngo on 11/2 who noted worsening VA and worsening exam, so the patient was referred to Dr. Han, whom he saw 11/4/20. Dr. Han referred to our clinic due to concerns for acanthamoeba keratitis. Culture positive for acanthamoeba on 11/05/20.     Interval History:  No changes over the interval, no pain, vision stable, no concerns. Drops as below.  Pain remains well controlled. Patient doing well.     POHx:  PVD OS  Hyperopia OU  Presbyopia OU     Glasses: Yes  CTL wearer: yes - none x1.5 months; wears while showering or in the sauna; he sleeps in them once every 3-4 weeks; never wears them in a lake or pool.      Family hx of eye disease: negative for glaucoma/macular degeneration     Social Hx: (-) smoking, (-) EtOH, (-) illicit drugs     Meds:  PHMB 0.2 mg/mL (started 11/5/2020) --2x/day   PFATS q1-2h (only using them about 2-3 times a day)       -      Surgical Hx:  No eye surgery     A/P:  # Acanthamoeba keratitis, OD              - Symptoms started 10/5/2020              - Acanthamoeba risks: sleeps in CTL, wears in shower, sauna              - Cultures obtained 11/05/20,   Culture positive for acanthamoeba.              - Confocal:  many PMNs, no definite acanthamoeba (but could be masked by deeper infection, no fungal elements)              - Discussed non-FDA use of PHMB     12/31: Pain worse likely 2/2 surface toxicity from PHMB, although difficult to know. Plan to increase lubrication.   1/07: Better overall.  pain better.  Stromal haze/KP better as well.  1/19: Stable from previous visit w/ continued diffuse haze,no epi defects.   1/28: Slightly improved corneal clarity.  2/4: Slightly improved corneal clarity  2/18: Exam largely stable, new central bulla  3/2: increased soreness and tearing past few days, exam stable.  Diffuse haze, edema but improved from last photos.  3/11: Worsening pain and soreness over the past week. 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 CL placed right eye  4/8: BCL really helped his pain. Much worse with it off. Epi defect larger with rolled edges. 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 Infiltrate improved. AmbioDisk 9.0 + Kontur lens 8.6/16.0 4/8/21 4/22: Pain improving. Ulcer looks stable to slightly improved. Prokera ring placed  4/29: right eye Much improved infiltrate. Epi defect resolving. Am dissolved.  5/6/21:  AM dissolved, ring removed.  Epi healed, small superocentral crescentic infiltrate remains w/ surrounding central scarring.    5/20: significant improvement in Snellen acuity today. Crescentic infiltrate smaller and less dense since 5/6 photo.  6/17: small central infiltrate present at inferior aspect of pupil - smaller than 5/20. Edema much improved. Vision stable.   7/16/21: Stable vision, pt comfortable.    Would like to see active infiltrate completely resolved before stopping / reducing PHMB further.                PLAN:               - Continue PHMB 0.2 mg/mL 2x/day               - Continue PFAT's every 1-2 hours -- 5 minutes before every PHMB dose              - NO Prednisolone  acetate      RTC:  4 wks w/ VT, call if problems develope.    Nahum Kent,   Fellow, Cornea & External Disease  Department of Ophthalmology  [facility]  Attending Physician Attestation:  Complete documentation of historical and exam elements from today's encounter can be found in the full encounter summary report (not reduplicated in this progress note).  I personally obtained the chief complaint(s) and history of present illness.  I confirmed and edited as necessary the review of systems, past medical/surgical history, family history, social history, and examination findings as documented by others; and I examined the patient myself.  I personally reviewed the relevant tests, images, and reports as documented above.  I formulated and edited as necessary the assessment and plan and discussed the findings and management plan with the patient and family. - Anson Cheng MD

## 2021-07-16 NOTE — NURSING NOTE
Chief Complaints and History of Present Illnesses   Patient presents with     Keratitis Follow Up     Chief Complaint(s) and History of Present Illness(es)     Keratitis Follow Up     Laterality: right eye    Characteristics: constant    Associated symptoms: Negative for eye pain, dryness and photophobia    Frequency: constantly    Timing: throughout the day    Course: gradually improving    Response to treatment: mild improvement    Pain scale: 0/10              Comments     Derik is here to continue care for Acanthamoeba keratitis - Right Eye. He states RE feels about the same as last visit, but much better than a year ago.     Daniel Fernando COT 9:15 AM July 16, 2021

## 2021-08-10 DIAGNOSIS — B60.13 ACANTHAMOEBA KERATITIS: Primary | ICD-10-CM

## 2021-08-11 NOTE — TELEPHONE ENCOUNTER
Medication:polyhexamethylene biguanide 0.2mg/mL (BAQUACIL) 0.2mg/ml compounded ophthalmic solution    Requested directions: place 1 gtt in right eye every hour  Current directions on the medication list: Place 1 drop into the right eye 2 times daily -     Last Written Prescription Date:  n/a  Last Fill Quantity: n/a,   # refills: n/a    Last Office Visit: 7/6/21  Future Office visit: 8/20/21    Attending Provider: Anson Cheng MD  Last Clinic Note: 7/16/21  PLAN:               - Continue PHMB 0.2 mg/mL 2x/day               - Continue PFAT's every 1-2 hours -- 5 minutes before every PHMB dose              - NO Prednisolone acetate        RTC:  4 wks w/ VT, call if problems develope.    Routing refill request to provider for review/approval because:  -medication not on the refill protocol.

## 2021-08-12 NOTE — TELEPHONE ENCOUNTER
Previous prescription was entered so that it needed to be printed. Cancelled that prescription, and created new prescription that could be sent electronically. Called patient to inform him that the prescription was sent to the pharmacy electronically.     Dequan Keenan MD  Ophthalmology, PGY-2

## 2021-08-12 NOTE — TELEPHONE ENCOUNTER
The pt called about this medication. The medication expires today. If he can't  the refill, he will be using  drops for a week as he is going out of town. He is asking that this be expedited. Please call the pt to let him know the status. Thanks.

## 2021-08-20 ENCOUNTER — OFFICE VISIT (OUTPATIENT)
Dept: OPHTHALMOLOGY | Facility: CLINIC | Age: 66
End: 2021-08-20
Attending: OPHTHALMOLOGY
Payer: COMMERCIAL

## 2021-08-20 DIAGNOSIS — H04.121 DRY EYE SYNDROME OF RIGHT EYE: ICD-10-CM

## 2021-08-20 DIAGNOSIS — H16.401 CORNEAL NEOVASCULARIZATION OF RIGHT EYE: ICD-10-CM

## 2021-08-20 DIAGNOSIS — H17.9 CORNEAL SCAR, RIGHT EYE: Primary | ICD-10-CM

## 2021-08-20 PROCEDURE — 99214 OFFICE O/P EST MOD 30 MIN: CPT | Performed by: OPHTHALMOLOGY

## 2021-08-20 PROCEDURE — G0463 HOSPITAL OUTPT CLINIC VISIT: HCPCS

## 2021-08-20 ASSESSMENT — CONF VISUAL FIELD
OD_SUPERIOR_TEMPORAL_RESTRICTION: 3
OS_NORMAL: 1
OD_INFERIOR_TEMPORAL_RESTRICTION: 3
OD_INFERIOR_NASAL_RESTRICTION: 3
OD_SUPERIOR_NASAL_RESTRICTION: 1

## 2021-08-20 ASSESSMENT — EXTERNAL EXAM - LEFT EYE: OS_EXAM: NORMAL

## 2021-08-20 ASSESSMENT — VISUAL ACUITY
METHOD: SNELLEN - LINEAR
OS_CC+: -2
CORRECTION_TYPE: GLASSES
OD_CC+: +1
OD_CC: 20/100
OS_CC: 20/20

## 2021-08-20 ASSESSMENT — SLIT LAMP EXAM - LIDS: COMMENTS: MGD

## 2021-08-20 ASSESSMENT — TONOMETRY
OS_IOP_MMHG: 12
OD_IOP_MMHG: 10
IOP_METHOD: ICARE

## 2021-08-20 ASSESSMENT — EXTERNAL EXAM - RIGHT EYE: OD_EXAM: NORMAL; NO RASH

## 2021-08-20 NOTE — PROGRESS NOTES
CC:  Acanthamoeba keratitis OD     Referring provider: Dr. Rosie Han     HPI:  Derik Hamm is a(n) 65 year old male who presents as follow up for Acanthamoeba Keratitis right eye.     Patient initially presented for evaluation of persistent keratoconjunctivitis, OD, since 10/5/2020. Initially seen by Dr. Ngo at [facility] and felt it was related to severe dryness vs bacterial keratitis, so he was started on PFATs and Vigamox Q2H WA. Patient was seen frequently for follow up; he was started on Valtrex 500 mg TID on 10/9/20 and has been on that since then. Despite this therapy, pt failed to improve so he was started prednisolone. He has had a waxing and waning course, but more recently has gradually declined. Stopped PF on 11/2 after running out. He was seen by Dr. Ngo on 11/2 who noted worsening VA and worsening exam, so the patient was referred to Dr. Han, whom he saw 11/4/20. Dr. Han referred to our clinic due to concerns for acanthamoeba keratitis. Culture positive for acanthamoeba on 11/05/20.     Interval History:  No changes over the interval, no pain, vision stable, no concerns. Drops as below.  Pain remains well controlled. Patient doing well.     POHx:  PVD OS  Hyperopia OU  Presbyopia OU     Glasses: Yes  CTL wearer: yes - none x1.5 months; wears while showering or in the sauna; he sleeps in them once every 3-4 weeks; never wears them in a lake or pool.      Family hx of eye disease: negative for glaucoma/macular degeneration     Social Hx: (-) smoking, (-) EtOH, (-) illicit drugs     Meds:  PHMB 0.2 mg/mL (started 11/5/2020) --2x/day   PFATS q1-2h (only using them about 2-3 times a day)       -      Surgical Hx:  No eye surgery     A/P:  # Acanthamoeba keratitis, OD              - Symptoms started 10/5/2020              - Acanthamoeba risks: sleeps in CTL, wears in shower, sauna              - Cultures obtained 11/05/20,   Culture positive for acanthamoeba.              - Confocal:  many PMNs, no definite acanthamoeba (but could be masked by deeper infection, no fungal elements)              - Discussed non-FDA use of PHMB     12/31: Pain worse likely 2/2 surface toxicity from PHMB, although difficult to know. Plan to increase lubrication.   1/07: Better overall.  pain better.  Stromal haze/KP better as well.  1/19: Stable from previous visit w/ continued diffuse haze,no epi defects.   1/28: Slightly improved corneal clarity.  2/4: Slightly improved corneal clarity  2/18: Exam largely stable, new central bulla  3/2: increased soreness and tearing past few days, exam stable.  Diffuse haze, edema but improved from last photos.  3/11: Worsening pain and soreness over the past week. 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 CL placed right eye  4/8: BCL really helped his pain. Much worse with it off. Epi defect larger with rolled edges. 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 Infiltrate improved. AmbioDisk 9.0 + Kontur lens 8.6/16.0 4/8/21 4/22: Pain improving. Ulcer looks stable to slightly improved. Prokera ring placed  4/29: right eye Much improved infiltrate. Epi defect resolving. Am dissolved.  5/6/21:  AM dissolved, ring removed.  Epi healed, small superocentral crescentic infiltrate remains w/ surrounding central scarring.    5/20: significant improvement in Snellen acuity today. Crescentic infiltrate smaller and less dense since 5/6 photo.  6/17: small central infiltrate present at inferior aspect of pupil - smaller than 5/20. Edema much improved. Vision stable.   7/16/21: Stable vision, pt comfortable.    Would like to see active infiltrate completely resolved before stopping / reducing PHMB further.                PLAN:               - Stop PHMB 0.2 mg/mL 2x/day               - Continue PFAT's every 1-2 hours -- 5 minutes before every PHMB dose              - NO Prednisolone acetate   - will  discuss right eye PKP and possible triple next visit.      RTC:  8  wks w/ VT, call if problems develope.      Anson Cheng MD, PhD    Attending Physician Attestation:  Complete documentation of historical and exam elements from today's encounter can be found in the full encounter summary report (not reduplicated in this progress note).  I personally obtained the chief complaint(s) and history of present illness.  I confirmed and edited as necessary the review of systems, past medical/surgical history, family history, social history, and examination findings as documented by others; and I examined the patient myself.  I personally reviewed the relevant tests, images, and reports as documented above.  I formulated and edited as necessary the assessment and plan and discussed the findings and management plan with the patient and family. - Anson Cheng MD

## 2021-09-21 ENCOUNTER — OFFICE VISIT (OUTPATIENT)
Dept: DERMATOLOGY | Facility: CLINIC | Age: 66
End: 2021-09-21
Payer: COMMERCIAL

## 2021-09-21 VITALS — OXYGEN SATURATION: 97 % | SYSTOLIC BLOOD PRESSURE: 110 MMHG | HEART RATE: 100 BPM | DIASTOLIC BLOOD PRESSURE: 78 MMHG

## 2021-09-21 DIAGNOSIS — Z85.828 HISTORY OF BASAL CELL CARCINOMA (BCC) OF SKIN: ICD-10-CM

## 2021-09-21 DIAGNOSIS — L82.1 SEBORRHEIC KERATOSIS: ICD-10-CM

## 2021-09-21 DIAGNOSIS — D18.01 ANGIOMA OF SKIN: ICD-10-CM

## 2021-09-21 DIAGNOSIS — D48.5 NEOPLASM OF UNCERTAIN BEHAVIOR OF SKIN: ICD-10-CM

## 2021-09-21 DIAGNOSIS — D22.9 NEVUS: Primary | ICD-10-CM

## 2021-09-21 DIAGNOSIS — L81.4 LENTIGO: ICD-10-CM

## 2021-09-21 PROCEDURE — 88305 TISSUE EXAM BY PATHOLOGIST: CPT | Performed by: DERMATOLOGY

## 2021-09-21 PROCEDURE — 11102 TANGNTL BX SKIN SINGLE LES: CPT | Performed by: PHYSICIAN ASSISTANT

## 2021-09-21 PROCEDURE — 99213 OFFICE O/P EST LOW 20 MIN: CPT | Mod: 25 | Performed by: PHYSICIAN ASSISTANT

## 2021-09-21 NOTE — LETTER
9/21/2021         RE: Derik Hamm  [address] [place] [address]        Dear Colleague,    Thank you for referring your patient, Derik Hamm, to the [facility]. Please see a copy of my visit note below.    HPI:   Chief complaints: Derik Hamm is a 66 year old male who presents for Full skin cancer screening to rule out skin cancer   Last Skin Exam: 1 year ago      1st Baseline: no  Personal HX of Skin Cancer: no   Personal HX of Malignant Melanoma: no   Family HX of Skin Cancer / Malignant Melanoma: no  Personal HX of Atypical Moles:   no  Risk factors: history of sun exposure and burns  New / Changing lesions:no  Social History: had an amoeba infection in the right eye - vision is still cloudy. He may need a corneal transplant.   On review of systems, there are no further skin complaints, patient is feeling otherwise well.  See patient intake sheet.  ROS of the following were done and are negative: Constitutional, Eyes, Ears, Nose,   Mouth, Throat, Cardiovascular, Respiratory, GI, Genitourinary, Musculoskeletal,   Psychiatric, Endocrine, Allergic/Immunologic.    PHYSICAL EXAM:   Pulse 100   SpO2 97%   Skin exam performed as follows: Type 2 skin. Mood appropriate  Alert and Oriented X 3. Well developed, well nourished in no distress.  General appearance: Normal  Head including face: Normal  Eyes: conjunctiva and lids: Normal  Mouth: Lips, teeth, gums: Normal  Neck: Normal  Chest-breast/axillae: Normal  Back: Normal  Spleen and liver: Normal  Cardiovascular: Exam of peripheral vascular system by observation for swelling, varicosities, edema: Normal  Genitalia: groin, buttocks: Normal  Extremities: digits/nails (clubbing): Normal  Eccrine and Apocrine glands: Normal  Right upper extremity: Normal  Left upper extremity: Normal  Right lower extremity: Normal  Left lower extremity: Normal  Skin: Scalp and body hair: See below    Pt deferred exam of breasts,  groin, buttocks: No    Other physical findings:  1. Multiple pigmented macules on extremities and trunk  2. Multiple pigmented macules on face, trunk and extremities  3. Multiple vascular papules on trunk, arms and legs  4. Multiple scattered keratotic plaques  5. Right medial chest with 9 mm ulcerated papule       Except as noted above, no other signs of skin cancer or melanoma.     ASSESSMENT/PLAN:   Benign Full skin cancer screening today. . Patient with history of BCC  Advised on monthly self exams and 1 year  Patient Education: Appropriate brochures given.    1. Multiple benign appearing nevi on arms, legs and trunk. Discussed ABCDEs of melanoma and sunscreen.   2. Multiple lentigos on arms, legs and trunk. Advised benign, no treatment needed.  3. Multiple scattered angiomas. Advised benign, no treatment needed.   4. Seborrheic keratosis on arms, legs and trunk. Advised benign, no treatment needed.  5. R/o BCC on the right medial chest. Shave biopsy performed.  Area cleaned and anesthetized with 1% lidocaine with epinephrine.  Dermablade used to remove the lesion and sent to pathology. Bleeding was cauterized. Pt tolerated procedure well with no complications.   6. Psoriasis on ears, elbow; few spots on the trunk - continue TAC PRN  7. Seborrheic dermatitis in the beard --Continue ketoconazole shampoo daily as needed  8. Irregular heart beat with tachycardia - will send to  directly after today's visit for further evaluation               Follow-up: yearly FSE/PRN sooner    1.) Patient was asked about new and changing moles. YES  2.) Patient received a complete physical skin examination: YES  3.) Patient was counseled to perform a monthly self skin examination: YES  Scribed By: Kristi Lyons, MS, PAISAEL          Again, thank you for allowing me to participate in the care of your patient.        Sincerely,        Kristi Lyons PA-C

## 2021-09-21 NOTE — PATIENT INSTRUCTIONS
Wound Care Instructions     FOR SUPERFICIAL WOUNDS     [facility] [phone]                 AFTER 24 HOURS YOU SHOULD REMOVE THE BANDAGE AND BEGIN DAILY DRESSING CHANGES AS FOLLOWS:     1) Remove Dressing.     2) Clean and dry the area with tap water using a Q-tip or sterile gauze pad.     3) Apply Vaseline, Aquaphor, Polysporin ointment or Bacitracin ointment over entire wound.  Do NOT use Neosporin ointment.     4) Cover the wound with a band-aid, or a sterile non-stick gauze pad and micropore paper tape    REPEAT THESE INSTRUCTIONS AT LEAST ONCE A DAY UNTIL THE WOUND HAS COMPLETELY HEALED.    It is an old wives tale that a wound heals better when it is exposed to air and allowed to dry out. The wound will heal faster with a better cosmetic result if it is kept moist with ointment and covered with a bandage.    **Do not let the wound dry out.**    Supplies Needed:      *Cotton tipped applicators (Q-tips)    *Vaseline, Aquaphor, Polysporin or Bacitracin Ointment (NOT NEOSPORIN)    *Band-aids or non-stick gauze pads and micropore paper tape.      PATIENT INFORMATION:    During the healing process you will notice a number of changes. All wounds develop a small halo of redness surrounding the wound.  This means healing is occurring. Severe itching with extensive redness usually indicates sensitivity to the ointment or bandage tape used to dress the wound.  You should call our office if this develops.      Swelling  and/or discoloration around your surgical site is common, particularly when performed around the eye.    All wounds normally drain.  The larger the wound the more drainage there will be.  After 7-10 days, you will notice the wound beginning to shrink and new skin will begin to grow.  The wound is healed when you can see skin has formed over the entire area.  A healed wound has a healthy, shiny look to the surface and is red to dark pink in color to normalize.  Wounds may take approximately  4-6 weeks to heal.  Larger wounds may take 6-8 weeks.  After the wound is healed you may discontinue dressing changes.    You may experience a sensation of tightness as your wound heals. This is normal and will gradually subside.    Your healed wound may be sensitive to temperature changes. This sensitivity improves with time, but if you re having a lot of discomfort, try to avoid temperature extremes.    Patients frequently experience itching after their wound appears to have healed because of the continue healing under the skin.  Plain Vaseline will help relieve the itching.      POSSIBLE COMPLICATIONS    BLEEDIN. Leave the bandage in place.  2. Use tightly rolled up gauze or a cloth to apply direct pressure over the bandage for 30  minutes.  3. Reapply pressure for an additional 30 minutes if necessary  4. Use additional gauze and tape to maintain pressure once the bleeding has stopped.

## 2021-09-21 NOTE — PROGRESS NOTES
HPI:   Chief complaints: Derik Hamm is a 66 year old male who presents for Full skin cancer screening to rule out skin cancer   Last Skin Exam: 1 year ago      1st Baseline: no  Personal HX of Skin Cancer: no   Personal HX of Malignant Melanoma: no   Family HX of Skin Cancer / Malignant Melanoma: no  Personal HX of Atypical Moles:   no  Risk factors: history of sun exposure and burns  New / Changing lesions:no  Social History: had an amoeba infection in the right eye - vision is still cloudy. He may need a corneal transplant.   On review of systems, there are no further skin complaints, patient is feeling otherwise well.  See patient intake sheet.  ROS of the following were done and are negative: Constitutional, Eyes, Ears, Nose,   Mouth, Throat, Cardiovascular, Respiratory, GI, Genitourinary, Musculoskeletal,   Psychiatric, Endocrine, Allergic/Immunologic.    PHYSICAL EXAM:   Pulse 100   SpO2 97%   Skin exam performed as follows: Type 2 skin. Mood appropriate  Alert and Oriented X 3. Well developed, well nourished in no distress.  General appearance: Normal  Head including face: Normal  Eyes: conjunctiva and lids: Normal  Mouth: Lips, teeth, gums: Normal  Neck: Normal  Chest-breast/axillae: Normal  Back: Normal  Spleen and liver: Normal  Cardiovascular: Exam of peripheral vascular system by observation for swelling, varicosities, edema: Normal  Genitalia: groin, buttocks: Normal  Extremities: digits/nails (clubbing): Normal  Eccrine and Apocrine glands: Normal  Right upper extremity: Normal  Left upper extremity: Normal  Right lower extremity: Normal  Left lower extremity: Normal  Skin: Scalp and body hair: See below    Pt deferred exam of breasts, groin, buttocks: No    Other physical findings:  1. Multiple pigmented macules on extremities and trunk  2. Multiple pigmented macules on face, trunk and extremities  3. Multiple vascular papules on trunk, arms and legs  4. Multiple scattered keratotic  plaques  5. Right medial chest with 9 mm ulcerated papule       Except as noted above, no other signs of skin cancer or melanoma.     ASSESSMENT/PLAN:   Benign Full skin cancer screening today. . Patient with history of BCC  Advised on monthly self exams and 1 year  Patient Education: Appropriate brochures given.    1. Multiple benign appearing nevi on arms, legs and trunk. Discussed ABCDEs of melanoma and sunscreen.   2. Multiple lentigos on arms, legs and trunk. Advised benign, no treatment needed.  3. Multiple scattered angiomas. Advised benign, no treatment needed.   4. Seborrheic keratosis on arms, legs and trunk. Advised benign, no treatment needed.  5. R/o BCC on the right medial chest. Shave biopsy performed.  Area cleaned and anesthetized with 1% lidocaine with epinephrine.  Dermablade used to remove the lesion and sent to pathology. Bleeding was cauterized. Pt tolerated procedure well with no complications.   6. Psoriasis on ears, elbow; few spots on the trunk - continue TAC PRN  7. Seborrheic dermatitis in the beard --Continue ketoconazole shampoo daily as needed  8. Irregular heart beat with tachycardia - will send to  directly after today's visit for further evaluation               Follow-up: yearly FSE/PRN sooner    1.) Patient was asked about new and changing moles. YES  2.) Patient received a complete physical skin examination: YES  3.) Patient was counseled to perform a monthly self skin examination: YES  Scribed By: Kristi Lyons, MS, PA-C

## 2021-09-24 LAB
PATH REPORT.COMMENTS IMP SPEC: NORMAL
PATH REPORT.COMMENTS IMP SPEC: NORMAL
PATH REPORT.FINAL DX SPEC: NORMAL
PATH REPORT.GROSS SPEC: NORMAL
PATH REPORT.MICROSCOPIC SPEC OTHER STN: NORMAL
PATH REPORT.RELEVANT HX SPEC: NORMAL

## 2021-09-27 ENCOUNTER — TELEPHONE (OUTPATIENT)
Dept: DERMATOLOGY | Facility: CLINIC | Age: 66
End: 2021-09-27

## 2021-09-27 NOTE — TELEPHONE ENCOUNTER
Called and spoke to patient.    Educated patient on biopsy results- AK (cryo).    Educated patient on AK, cryo, and scheduled future cryo appointment.    Patient voiced understanding.    Cat RN-BSN-PHN  [facility]  [phone]

## 2021-09-27 NOTE — TELEPHONE ENCOUNTER
----- Message from Kristi Lyons PA-C sent at 9/24/2021 12:33 PM CDT -----  Path for the biopsy on the right medial chest came back as an actinic keratosis. Recommend cryo for complete resolution.

## 2021-09-29 ENCOUNTER — OFFICE VISIT (OUTPATIENT)
Dept: PEDIATRICS | Facility: CLINIC | Age: 66
End: 2021-09-29
Payer: COMMERCIAL

## 2021-09-29 ENCOUNTER — HOSPITAL ENCOUNTER (INPATIENT)
Facility: CLINIC | Age: 66
LOS: 2 days | Discharge: HOME | End: 2021-10-01
Attending: EMERGENCY MEDICINE | Admitting: INTERNAL MEDICINE
Payer: COMMERCIAL

## 2021-09-29 ENCOUNTER — RESULTS ONLY (OUTPATIENT)
Facility: CLINIC | Age: 66
End: 2021-09-29

## 2021-09-29 VITALS
HEART RATE: 171 BPM | BODY MASS INDEX: 28.6 KG/M2 | DIASTOLIC BLOOD PRESSURE: 90 MMHG | WEIGHT: 215.8 LBS | OXYGEN SATURATION: 97 % | HEIGHT: 73 IN | TEMPERATURE: 97.9 F | SYSTOLIC BLOOD PRESSURE: 129 MMHG

## 2021-09-29 DIAGNOSIS — I48.92 NEW ONSET ATRIAL FLUTTER (H): ICD-10-CM

## 2021-09-29 DIAGNOSIS — I48.92 ATRIAL FLUTTER WITH RAPID VENTRICULAR RESPONSE (H): ICD-10-CM

## 2021-09-29 DIAGNOSIS — Z00.00 ROUTINE GENERAL MEDICAL EXAMINATION AT A HEALTH CARE FACILITY: Primary | ICD-10-CM

## 2021-09-29 DIAGNOSIS — R00.0 TACHYCARDIA: ICD-10-CM

## 2021-09-29 DIAGNOSIS — I42.9 SECONDARY CARDIOMYOPATHY (H): Primary | ICD-10-CM

## 2021-09-29 LAB
ANION GAP SERPL CALCULATED.3IONS-SCNC: 5 MMOL/L (ref 3–14)
ATRIAL RATE - MUSE: 166 BPM
ATRIAL RATE - MUSE: 340 BPM
BASOPHILS # BLD AUTO: 0 10E3/UL (ref 0–0.2)
BASOPHILS NFR BLD AUTO: 0 %
BUN SERPL-MCNC: 22 MG/DL (ref 7–30)
CALCIUM SERPL-MCNC: 9.3 MG/DL (ref 8.5–10.1)
CHLORIDE BLD-SCNC: 105 MMOL/L (ref 94–109)
CO2 SERPL-SCNC: 26 MMOL/L (ref 20–32)
CREAT SERPL-MCNC: 1.23 MG/DL (ref 0.66–1.25)
DIASTOLIC BLOOD PRESSURE - MUSE: NORMAL MMHG
DIASTOLIC BLOOD PRESSURE - MUSE: NORMAL MMHG
EOSINOPHIL # BLD AUTO: 0.1 10E3/UL (ref 0–0.7)
EOSINOPHIL NFR BLD AUTO: 2 %
ERYTHROCYTE [DISTWIDTH] IN BLOOD BY AUTOMATED COUNT: 18 % (ref 10–15)
GFR SERPL CREATININE-BSD FRML MDRD: 62 ML/MIN/1.73M2
GLUCOSE BLD-MCNC: 94 MG/DL (ref 70–99)
HCT VFR BLD AUTO: 57 % (ref 40–53)
HGB BLD-MCNC: 18.4 G/DL (ref 13.3–17.7)
IMM GRANULOCYTES # BLD: 0 10E3/UL
IMM GRANULOCYTES NFR BLD: 0 %
INTERPRETATION ECG - MUSE: NORMAL
INTERPRETATION ECG - MUSE: NORMAL
LYMPHOCYTES # BLD AUTO: 1.4 10E3/UL (ref 0.8–5.3)
LYMPHOCYTES NFR BLD AUTO: 16 %
MAGNESIUM SERPL-MCNC: 1.7 MG/DL (ref 1.6–2.3)
MCH RBC QN AUTO: 28.9 PG (ref 26.5–33)
MCHC RBC AUTO-ENTMCNC: 32.3 G/DL (ref 31.5–36.5)
MCV RBC AUTO: 90 FL (ref 78–100)
MONOCYTES # BLD AUTO: 0.7 10E3/UL (ref 0–1.3)
MONOCYTES NFR BLD AUTO: 8 %
NEUTROPHILS # BLD AUTO: 6.6 10E3/UL (ref 1.6–8.3)
NEUTROPHILS NFR BLD AUTO: 74 %
NRBC # BLD AUTO: 0 10E3/UL
NRBC BLD AUTO-RTO: 0 /100
NT-PROBNP SERPL-MCNC: 255 PG/ML (ref 0–900)
P AXIS - MUSE: 265 DEGREES
P AXIS - MUSE: NORMAL DEGREES
PLATELET # BLD AUTO: 203 10E3/UL (ref 150–450)
POTASSIUM BLD-SCNC: 4 MMOL/L (ref 3.4–5.3)
PR INTERVAL - MUSE: NORMAL MS
PR INTERVAL - MUSE: NORMAL MS
QRS DURATION - MUSE: 120 MS
QRS DURATION - MUSE: 94 MS
QT - MUSE: 280 MS
QT - MUSE: 362 MS
QTC - MUSE: 430 MS
QTC - MUSE: 465 MS
R AXIS - MUSE: 80 DEGREES
R AXIS - MUSE: 81 DEGREES
RBC # BLD AUTO: 6.37 10E6/UL (ref 4.4–5.9)
SARS-COV-2 RNA RESP QL NAA+PROBE: NEGATIVE
SODIUM SERPL-SCNC: 136 MMOL/L (ref 133–144)
SYSTOLIC BLOOD PRESSURE - MUSE: NORMAL MMHG
SYSTOLIC BLOOD PRESSURE - MUSE: NORMAL MMHG
T AXIS - MUSE: 263 DEGREES
T AXIS - MUSE: 89 DEGREES
TROPONIN I SERPL-MCNC: <0.015 UG/L (ref 0–0.04)
TSH SERPL DL<=0.005 MIU/L-ACNC: 1.99 MU/L (ref 0.4–4)
UFH PPP CHRO-ACNC: 0.15 IU/ML
VENTRICULAR RATE- MUSE: 166 BPM
VENTRICULAR RATE- MUSE: 85 BPM
WBC # BLD AUTO: 8.9 10E3/UL (ref 4–11)

## 2021-09-29 PROCEDURE — 84484 ASSAY OF TROPONIN QUANT: CPT | Performed by: EMERGENCY MEDICINE

## 2021-09-29 PROCEDURE — 96375 TX/PRO/DX INJ NEW DRUG ADDON: CPT

## 2021-09-29 PROCEDURE — 99285 EMERGENCY DEPT VISIT HI MDM: CPT | Mod: 25

## 2021-09-29 PROCEDURE — 93005 ELECTROCARDIOGRAM TRACING: CPT

## 2021-09-29 PROCEDURE — 99207 PR CDG-CODE CATEGORY CHANGED: CPT | Performed by: INTERNAL MEDICINE

## 2021-09-29 PROCEDURE — 80048 BASIC METABOLIC PNL TOTAL CA: CPT | Performed by: EMERGENCY MEDICINE

## 2021-09-29 PROCEDURE — 250N000011 HC RX IP 250 OP 636

## 2021-09-29 PROCEDURE — 87635 SARS-COV-2 COVID-19 AMP PRB: CPT | Performed by: EMERGENCY MEDICINE

## 2021-09-29 PROCEDURE — 36415 COLL VENOUS BLD VENIPUNCTURE: CPT | Performed by: EMERGENCY MEDICINE

## 2021-09-29 PROCEDURE — 99396 PREV VISIT EST AGE 40-64: CPT | Mod: GC | Performed by: STUDENT IN AN ORGANIZED HEALTH CARE EDUCATION/TRAINING PROGRAM

## 2021-09-29 PROCEDURE — 258N000003 HC RX IP 258 OP 636: Performed by: EMERGENCY MEDICINE

## 2021-09-29 PROCEDURE — 250N000011 HC RX IP 250 OP 636: Performed by: EMERGENCY MEDICINE

## 2021-09-29 PROCEDURE — 83735 ASSAY OF MAGNESIUM: CPT | Performed by: EMERGENCY MEDICINE

## 2021-09-29 PROCEDURE — 84443 ASSAY THYROID STIM HORMONE: CPT | Performed by: EMERGENCY MEDICINE

## 2021-09-29 PROCEDURE — 85025 COMPLETE CBC W/AUTO DIFF WBC: CPT | Performed by: EMERGENCY MEDICINE

## 2021-09-29 PROCEDURE — 120N000001 HC R&B MED SURG/OB

## 2021-09-29 PROCEDURE — 93005 ELECTROCARDIOGRAM TRACING: CPT | Mod: 76

## 2021-09-29 PROCEDURE — 96366 THER/PROPH/DIAG IV INF ADDON: CPT

## 2021-09-29 PROCEDURE — 93000 ELECTROCARDIOGRAM COMPLETE: CPT | Performed by: STUDENT IN AN ORGANIZED HEALTH CARE EDUCATION/TRAINING PROGRAM

## 2021-09-29 PROCEDURE — 96376 TX/PRO/DX INJ SAME DRUG ADON: CPT

## 2021-09-29 PROCEDURE — 96361 HYDRATE IV INFUSION ADD-ON: CPT

## 2021-09-29 PROCEDURE — 250N000013 HC RX MED GY IP 250 OP 250 PS 637: Performed by: INTERNAL MEDICINE

## 2021-09-29 PROCEDURE — 250N000011 HC RX IP 250 OP 636: Performed by: INTERNAL MEDICINE

## 2021-09-29 PROCEDURE — 250N000009 HC RX 250: Performed by: EMERGENCY MEDICINE

## 2021-09-29 PROCEDURE — 99223 1ST HOSP IP/OBS HIGH 75: CPT | Mod: AI | Performed by: INTERNAL MEDICINE

## 2021-09-29 PROCEDURE — 83880 ASSAY OF NATRIURETIC PEPTIDE: CPT | Performed by: INTERNAL MEDICINE

## 2021-09-29 PROCEDURE — 85520 HEPARIN ASSAY: CPT | Performed by: INTERNAL MEDICINE

## 2021-09-29 PROCEDURE — 36415 COLL VENOUS BLD VENIPUNCTURE: CPT | Performed by: INTERNAL MEDICINE

## 2021-09-29 PROCEDURE — 96365 THER/PROPH/DIAG IV INF INIT: CPT

## 2021-09-29 PROCEDURE — C9803 HOPD COVID-19 SPEC COLLECT: HCPCS

## 2021-09-29 RX ORDER — ONDANSETRON 4 MG/1
4 TABLET, ORALLY DISINTEGRATING ORAL EVERY 6 HOURS PRN
Status: DISCONTINUED | OUTPATIENT
Start: 2021-09-29 | End: 2021-10-01 | Stop reason: HOSPADM

## 2021-09-29 RX ORDER — HEPARIN SODIUM 10000 [USP'U]/100ML
0-5000 INJECTION, SOLUTION INTRAVENOUS CONTINUOUS
Status: DISCONTINUED | OUTPATIENT
Start: 2021-09-29 | End: 2021-09-29

## 2021-09-29 RX ORDER — HEPARIN SODIUM 10000 [USP'U]/100ML
0-5000 INJECTION, SOLUTION INTRAVENOUS CONTINUOUS
Status: DISCONTINUED | OUTPATIENT
Start: 2021-09-29 | End: 2021-10-01

## 2021-09-29 RX ORDER — ACETAMINOPHEN 650 MG/1
650 SUPPOSITORY RECTAL EVERY 6 HOURS PRN
Status: DISCONTINUED | OUTPATIENT
Start: 2021-09-29 | End: 2021-10-01 | Stop reason: HOSPADM

## 2021-09-29 RX ORDER — MAGNESIUM OXIDE 400 MG/1
400 TABLET ORAL 2 TIMES DAILY
Status: COMPLETED | OUTPATIENT
Start: 2021-09-29 | End: 2021-10-01

## 2021-09-29 RX ORDER — ACETAMINOPHEN 325 MG/1
650 TABLET ORAL EVERY 6 HOURS PRN
Status: DISCONTINUED | OUTPATIENT
Start: 2021-09-29 | End: 2021-10-01 | Stop reason: HOSPADM

## 2021-09-29 RX ORDER — ONDANSETRON 2 MG/ML
4 INJECTION INTRAMUSCULAR; INTRAVENOUS EVERY 6 HOURS PRN
Status: DISCONTINUED | OUTPATIENT
Start: 2021-09-29 | End: 2021-10-01 | Stop reason: HOSPADM

## 2021-09-29 RX ORDER — DILTIAZEM HYDROCHLORIDE 5 MG/ML
20 INJECTION INTRAVENOUS ONCE
Status: COMPLETED | OUTPATIENT
Start: 2021-09-29 | End: 2021-09-29

## 2021-09-29 RX ORDER — ADENOSINE 3 MG/ML
INJECTION, SOLUTION INTRAVENOUS
Status: COMPLETED
Start: 2021-09-29 | End: 2021-09-29

## 2021-09-29 RX ORDER — ADENOSINE 3 MG/ML
6 INJECTION, SOLUTION INTRAVENOUS ONCE
Status: COMPLETED | OUTPATIENT
Start: 2021-09-29 | End: 2021-09-29

## 2021-09-29 RX ORDER — DILTIAZEM HYDROCHLORIDE 30 MG/1
30 TABLET, FILM COATED ORAL EVERY 6 HOURS SCHEDULED
Status: DISCONTINUED | OUTPATIENT
Start: 2021-09-29 | End: 2021-10-01

## 2021-09-29 RX ORDER — DILTIAZEM HYDROCHLORIDE 5 MG/ML
10 INJECTION INTRAVENOUS EVERY 4 HOURS PRN
Status: DISCONTINUED | OUTPATIENT
Start: 2021-09-29 | End: 2021-10-01

## 2021-09-29 RX ORDER — DILTIAZEM HYDROCHLORIDE 5 MG/ML
25 INJECTION INTRAVENOUS ONCE
Status: COMPLETED | OUTPATIENT
Start: 2021-09-29 | End: 2021-09-29

## 2021-09-29 RX ADMIN — ADENOSINE 6 MG: 3 INJECTION, SOLUTION INTRAVENOUS at 13:42

## 2021-09-29 RX ADMIN — DILTIAZEM HYDROCHLORIDE 25 MG: 5 INJECTION, SOLUTION INTRAVENOUS at 13:46

## 2021-09-29 RX ADMIN — SODIUM CHLORIDE 1000 ML: 9 INJECTION, SOLUTION INTRAVENOUS at 13:24

## 2021-09-29 RX ADMIN — HEPARIN SODIUM 1350 UNITS/HR: 10000 INJECTION, SOLUTION INTRAVENOUS at 22:45

## 2021-09-29 RX ADMIN — HEPARIN SODIUM 1200 UNITS/HR: 10000 INJECTION, SOLUTION INTRAVENOUS at 14:51

## 2021-09-29 RX ADMIN — Medication 400 MG: at 23:44

## 2021-09-29 RX ADMIN — DILTIAZEM HYDROCHLORIDE 20 MG: 5 INJECTION INTRAVENOUS at 16:44

## 2021-09-29 RX ADMIN — DILTIAZEM HYDROCHLORIDE 30 MG: 30 TABLET, FILM COATED ORAL at 19:22

## 2021-09-29 ASSESSMENT — MIFFLIN-ST. JEOR
SCORE: 1863.75
SCORE: 1859.93
SCORE: 1815.12
SCORE: 1834.98

## 2021-09-29 ASSESSMENT — ACTIVITIES OF DAILY LIVING (ADL): ADLS_ACUITY_SCORE: 12

## 2021-09-29 ASSESSMENT — ENCOUNTER SYMPTOMS
DIZZINESS: 0
SHORTNESS OF BREATH: 0

## 2021-09-29 NOTE — ED PROVIDER NOTES
History   Chief Complaint:  Abnormal Ekg       HPI   Derik Hamm is a 66 year old male with history of basal cell carcinoma, appendicitis, prediabetes who presents with outpatient concerns for an abnormal EKG. The patient reports that he went to a clinic appointment today for a physical when he was found to have an abnormal heart rate.  He denies any symptoms and states that he held well then and still feels well.  The patient denies any history of dysrhythmia aside from noting that he may have been in an unknown abnormal heart rhythm during past appendicitis about 3 years ago, but he never followed up with a cardiologist and he is unsure how he was treated. The patient is reportedly not on any prescribed, regular medications. He reports no heavy alcohol use and is not a smoker. He drinks caffeine, but not excessively. The patient also denies dizziness or chest pain.    Review of Systems   Respiratory: Negative for shortness of breath.    Cardiovascular: Negative for chest pain.   Neurological: Negative for dizziness.   All other systems reviewed and are negative.      Allergies:  The patient has no known allergies.     Medications:  Aleve    Past Medical History:    Acanthamoeba keratitis  Basal cell carcinoma  Presbyopia  Posterior vitreous detachment, left  Actinic keratosis  Appendicitis with perforation  Syphilis  PrEP with [employer]  Prediabetes  Hammer toe  Acid reflux  Benign prostatic hyperplasia  Zoster  Anal warts    Past Surgical History:    Laparoscopic appendectomy  Ankle ORIF  Patellar ORIF  Tonsillectomy and adenoidectomy  C[place]way teeth extraction  Colonoscopy    Family History:    Lung cancer  Skin cancer  Chronic back pain    Social History:  Arrives to the emergency department alone  Patient is     Physical Exam     Patient Vitals for the past 24 hrs:   BP Temp Temp src Pulse Resp SpO2 Height Weight   09/29/21 1500 103/80 -- -- 85 15 98 % -- --   09/29/21 1422 -- -- -- -- -- -- --  "100.4 kg (221 lb 5.5 oz)   09/29/21 1400 108/70 -- -- 84 11 95 % -- --   09/29/21 1353 111/69 -- -- 84 -- 97 % -- --   09/29/21 1330 (!) 125/95 -- -- (!) 165 (!) 7 95 % -- --   09/29/21 1319 (!) 139/109 -- -- (!) 165 -- 98 % -- --   09/29/21 1308 (!) 127/95 98.1  F (36.7  C) Oral (!) 172 20 99 % 1.88 m (6' 2\") 97.5 kg (215 lb)       Physical Exam  General: Adult male sitting upright  Eyes: PERRL, Conjunctive within normal limits  ENT: Moist mucous membranes, oropharynx clear.   Neck: No appreciable thyromegaly  CV: Normal S1S2, no murmur, rub or gallop.  Tachycardic, regular.  Resp: Clear to auscultation bilaterally, no wheezes, rales or rhonchi. Normal respiratory effort.  GI: Abdomen is soft, nontender and nondistended. No palpable masses. No rebound or guarding.  MSK: No edema. Nontender. Normal active range of motion.  Skin: Warm and dry. No rashes or lesions or ecchymoses on visible skin.  Neuro: Alert and oriented. Responds appropriately to all questions and commands. No focal findings appreciated. Normal muscle tone.  Psych: Normal mood and affect. Pleasant.    Emergency Department Course   ECG  ECG taken at 1319, ECG read at 1320  Supraventricular tachycardia  Incomplete right bundle branch block  ST & T wave abnormality, consider inferolateral ischemia   Rate 168 bpm. AR interval * ms. QRS duration 112 ms. QT/QTc 284/474 ms. P-R-T axes * 75 254.     ECG ##2  ECG taken at 1408, ECG read at 1411  Atrial flutter with 4:1 AV conduction  ST & T wave abnormality, consider inferior ischemia  Rate 85 bpm. AR interval * ms. QRS duration 94 ms. QT/QTc 362/430 ms. P-R-T axes 265 80 89.    Laboratory:    CBC: WBC 8.9, HGB 18.4 (H),      BMP: WNL (Creatinine 1.23)     Troponin (Collected 1315): <0.015    TSH: 1.99    Magnesium: 1.7    Heparin Unfractionated Anti Xa Level: Pending    Asymptomatic COVID19 Virus PCR by nasopharyngeal swab Pending    Emergency Department Course:    Reviewed:  I reviewed nursing " notes, vitals, past medical history and care everywhere    Assessments:  1319 I obtained history and examined the patient as noted above.  1329 I rechecked the patient.  1412 I rechecked the patient and explained findings.    Consults:   1457 I spoke with Dr. Werner Avila, hospitalist, who agreed to admit the patient.    Interventions:  1324 NS bolus 1L IV  1342 Adenosine 6mg IV  1346 Diltiazem 25mg IV  1450 Heparin loading dose 6000units IV  1451 Heparin 66785 units in 0.45% NaCl 250mL infusion, 1200 units/hr IV    Disposition:  The patient was admitted to the hospital under the care of Dr. Avila.       Impression & Plan     Medical Decision Making:  Derik Hamm is a 66-year-old male with no known medical problems who presents emergency department with concerns for an irregularity noted on ECG as an outpatient.  On arrival EKG is consistent with either SVT or rapid a flutter.  With use of adenosine, the heart rate slowed enough to appreciate flutter waves.  With this in mind, rate control was achieved with diltiazem.  He required a single dose of diltiazem for good rate control.  Without chest pain or any other symptoms, ACS seems unlikely prompt for atrial flutter.  As he was asymptomatic during rapid atrial flutter, it is unclear as to duration of this arrhythmia.  Heparin was administered with concern for possible atrial thrombus the patient will be admitted for echocardiogram and possible cardiology consult.  I discussed this plan with the patient.   Use of heparin was discussed and the benefit outweighs risk based on his history.  He verbalized understanding and agreement with the plan.  All questions were answered prior to admission.    Diagnosis:    ICD-10-CM    1. Atrial flutter with rapid ventricular response (H)  I48.92    2. New onset atrial flutter (H)  I48.92        Scribe Disclosure:  I, Ric Hewitt, am serving as a scribe at 1:18 PM on 9/29/2021 to document services personally performed by  Rohini Chapa MD based on my observations and the provider's statements to me.              Rohini Chapa MD  09/29/21 [id number]

## 2021-09-29 NOTE — H&P
[facility]  Hospitalist Admission Note  Name: Derik Hamm    MRN: [id number]  YOB: 1955    Age: 66 year old  Date of admission: 9/29/2021  Primary care provider: Murali De León    Chief Complaint:  Abnormal EKG    Assessment and Plan:   New atrial flutter with RVR: Tachycardic last week in clinic for skin biopsy.  Tachycardic at annual visit today and EKG showed SVT with heart rate 170.  Adenosine given in the ER and 25 mg IV diltiazem, repeat EKG shows clear atrial flutter with 4:1 conduction and heart rate in the 80s.  Heart rate remains in the 80s after diltiazem.  He is asymptomatic.  TSH normal.  Trigger unclear.  Did have history of atrial fibrillation with RVR during episode of appendicitis in 2018.  TTE then was normal, normal left atrial size, borderline right atrial enlargement.  He does not use alcohol.  Drinks 2 Mountain Dew energy drinks per day.  -As heparin drip was started in the ER already, will continue overnight in case echocardiogram significantly abnormal that may require angiogram evaluation  -Start oral diltiazem 25 mg p.o. every 6 hours  -IV diltiazem 10 mg every 4 hours as needed for heart rate > 120  -If strategy ineffective may need to start IV diltiazem infusion although has been symptomatic and not hypotensive so have time to attempt oral dosing and as needed IV boluses  -Obtain TTE  -Telemetry  -Consult cardiology given atrial flutter, no clear stimulus for this  -High potassium and magnesium replacement protocols, keep K >4, Mg >2  -Decrease caffeine intake    Polycythemia: Hemoglobin 18.4, hematocrit 57.  2 years ago hemoglobin 17.4.  He smoked in the past, but quit in the 1980s.  Isolated erythrocytosis.  WBC normal at 8.9 with normal differential and platelet count 203.  He does have flushing of the upper chest and face.  Consideration for P vera.  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  Still has blurry vision.  Has follow-up next month.    Skin lesions: Lesion on the chest biopsied last week.  Follows with dermatology.    Asymptomatic COVID-19 testing negative, he is vaccinated  DVT Prophylaxis: Heparin drip  Code Status: Full Code  FEN: regular diet  Discharge Dispo: home  Estimated Disch Date / # of Days until Disch: Admit to observation for new atrial flutter evaluation, rate controlled after 1 dose IV diltiazem in the ER.  Anticipate discharge within 24-48 hours.      History of Present Illness:  Derik Hamm is a 66 year old male former smoker with PMH including episode of atrial fibrillation during hospitalization for appendicitis in 2018, skin lesions, and right eye amoeba infection diagnosed this last year and has been on antibiotic eyedrops for this who presents to the ER after initially going to clinic for his annual visit and physical exam.  He was actually found to be tachycardic during his biopsy from a skin lesion on his chest last week and was referred to urgent care.  He went to 2 different urgent cares and the weight was over 3 hours so he did not wait.  He had an annual physical today and there he was found to be tachycardic as well.  An EKG showed SVT with heart rate 170 so he was referred to the ER.  He did have atrial fibrillation when he was hospitalized for appendicitis in 2018, but no other issues since.  Since his appendicitis he has felt generally fatigued and his mood is not as good.  He is less ambitious at work.  He does still go to the gym every day and exercises.  He denies any chest pain or pressure sensation with exertion or at rest.  He does not get short of breath unless exercising vigorously.  Sometimes he  feels anxious, but he does not really have any palpitations or realize that his heart is beating fast.  Has not had any leg swelling or leg pain.  No recent fevers, chills, cough, nausea, vomiting, diarrhea, abdominal pain, dysuria.  He is vaccinated for COVID-19.  He smoked in the past, but quit in the .  No alcohol use.  Does drink 2 Mountain Dew energy drinks twice per day.    History obtained from patient, medical record, and from Dr. Chapa in the emergency department.  Blood pressure initially 129/90, heart rate 171, temperature 97  F, oxygen 97% on room air.  He received 1 L normal saline, 6 mg of IV adenosine, and 25 mg IV diltiazem.  Repeat EKG shows clear atrial flutter with 4:1 conduction heart rate 80s.  Blood pressure 108/70 after diltiazem.  Heart rate remains in the 80s.  WBC 8.9, elevated hemoglobin 18.4, platelet count 207.  TSH normal at 1.99.  Troponin is undetectable.  Creatinine is 1.23, K 4.0, Mg 1.7.  He was started on heparin infusion.  Admit to observation.  Obtain TTE and cardiology consultation.     Past Medical History reviewed:  Past Medical History:   Diagnosis Date     Acanthamoeba keratitis      Basal cell carcinoma      Presbyopia      PVD (posterior vitreous detachment), left    Right eye amoeba infection  Atrial fibrillation episode during appendicitis    Past Surgical History reviewed:  Past Surgical History:   Procedure Laterality Date     LAPAROSCOPIC APPENDECTOMY N/A 2018    Procedure: LAPAROSCOPIC APPENDECTOMY;  Surgeon: Mackenzie Dale MD;  Location: [facility] OR     ORTHOPEDIC SURGERY       Social History reviewed:  Social History     Tobacco Use     Smoking status: Former Smoker     Quit date:      Years since quittin.7     Smokeless tobacco: Never Used   Substance Use Topics     Alcohol use: No     Social History     Social History Narrative     Not on file     Family History reviewed:  Family History   Problem Relation Age of Onset     Lung Cancer  "Father 85     Skin Cancer Mother      Skin Cancer Brother      Glaucoma No family hx of      Macular Degeneration No family hx of      Diabetes No family hx of      Hypertension No family hx of      Allergies:  No Known Allergies  Medications:  Prior to Admission medications    Medication Sig Last Dose Taking? Auth Provider   acetaminophen (TYLENOL) 325 MG tablet Take 325-650 mg by mouth daily as needed for mild pain   Unknown, Entered By History   multivitamin, therapeutic (THERA-VIT) TABS tablet Take 1 tablet by mouth daily   Unknown, Entered By History   naproxen sodium (ALEVE) 220 MG tablet Take 220 mg by mouth daily as needed for moderate pain   Unknown, Entered By History   polyvinyl alcohol-povidone PF (REFRESH) 1.4-0.6 % ophthalmic solution 1-2 drops every hour   Reported, Patient     Review of Systems:  A Comprehensive greater than 10 system review of systems was carried out.  Pertinent positives and negatives are noted above.  Otherwise negative.     Physical Exam:  Blood pressure 108/70, pulse 84, temperature 98.1  F (36.7  C), temperature source Oral, resp. rate 11, height 1.88 m (6' 2\"), weight 100.4 kg (221 lb 5.5 oz), SpO2 95 %.  Wt Readings from Last 1 Encounters:   09/29/21 100.4 kg (221 lb 5.5 oz)     Exam:  Constitutional: Awake, NAD   Eyes: sclera white   HEENT: atraumatic, MMM  Respiratory: no respiratory distress, lungs cta bilaterally, no crackles or wheeze  Cardiovascular: Irregularly irregular rhythm, regular rate, no murmur  GI: non-tender, not distended, bowel sounds present  Skin: Flushing of the upper chest and face  Musculoskeletal/extremities: atraumatic, no major deformities. No lower extremity edema  Neurologic: A&Ox3, speech clear, strength and light touch sensation grossly normal  Psychiatric: calm, cooperative, normal affect    Lab and imaging data personally reviewed:  Labs:  Recent Labs   Lab 09/29/21  1315   WBC 8.9   HGB 18.4*   HCT 57.0*   MCV 90        Recent Labs "   Lab 09/29/21  1315      POTASSIUM 4.0   CHLORIDE 105   CO2 26   ANIONGAP 5   GLC 94   BUN 22   CR 1.23   GFRESTIMATED 62   HENRY 9.3     Recent Labs   Lab 09/29/21  1315   TSH 1.99     Recent Labs   Lab 09/29/21  1315   TROPONIN <0.015       EKG1:  Atrial flutter,   EKG2: atrial flutter, HR 80s, 4:1 conduction    Imaging:  None obtained    Werner Avila MD  Hospitalist  [facility]

## 2021-09-29 NOTE — PROGRESS NOTES
SUBJECTIVE:   CC: Derik Hamm is an 66 year old male who presents for preventative health visit.       Patient has been advised of split billing requirements and indicates understanding: Yes  Healthy Habits:     Getting at least 3 servings of Calcium per day:  Yes    Bi-annual eye exam:  Yes (Possible cornea transplant )    Dental care twice a year:  Yes    Sleep apnea or symptoms of sleep apnea:  None    Diet:  Regular (no restrictions)    Frequency of exercise:  6-7 days/week    Duration of exercise:  Greater than 60 minutes    Taking medications regularly:  Not Applicable    Barriers to taking medications:  Not applicable    Medication side effects:  Not applicable    PHQ-2 Total Score: 2    Additional concerns today:  Yes (check on HB and fatigue )  History of Present Illness       Vascular Disease:  He presents for follow up of vascular disease.  He never takes nitroglycerin. He takes daily aspirin.    He eats 2-3 servings of fruits and vegetables daily.He consumes 2 sweetened beverage(s) daily.He exercises with enough effort to increase his heart rate 10 to 19 minutes per day.  He exercises with enough effort to increase his heart rate 6 days per week.   He is not taking prescribed medications regularly due to Not applicable.    No daily medications.     #Tachycardia   H/o a fib with RVR +/- a flutter 2:1 when inpatient for acute appendicitis in 2018. Not currently on beta blocker.   Did have caffeinated drink (rise) + pre-workout this morning.   No chest pain or difficulty breathing. No lightheadedness.  Will soak the bed with sweat overnight.    #Chronic health concerns   Limited right eye vision, has appointment with Dr. Cheng in October to discuss possible corneal transplant     Today's PHQ-2 Score:   PHQ-2 ( 1999 Pfizer) 9/22/2021   Q1: Little interest or pleasure in doing things 2   Q2: Feeling down, depressed or hopeless 0   PHQ-2 Score 2   Q1: Little interest or pleasure in doing things More  "than half the days   Q2: Feeling down, depressed or hopeless Not at all   PHQ-2 Score 2       Abuse: Current or Past(Physical, Sexual or Emotional)- No  Do you feel safe in your environment? Yes      Social History     Tobacco Use     Smoking status: Never Smoker     Smokeless tobacco: Never Used   Substance Use Topics     Alcohol use: No     Has two roommates.     Smoked cigarettes in [place]a high. Quit in 1984.   Never used illicit substances.   Rare alcohol use.     If you drink alcohol do you typically have >3 drinks per day or >7 drinks per week? No    Alcohol Use 9/29/2021   Prescreen: >3 drinks/day or >7 drinks/week? -   Prescreen: >3 drinks/day or >7 drinks/week? No       Last PSA: No results found for: PSA   No family history of prostate cancer or colon cancer.     Reviewed orders with patient. Reviewed health maintenance and updated orders accordingly - Yes    Reviewed and updated as needed this visit by clinical staff  Tobacco  Allergies  Meds              Reviewed and updated as needed this visit by Provider                    Review of Systems  CONSTITUTIONAL: NEGATIVE for fever, chills, change in weight  INTEGUMENTARY/SKIN: POSITIVE for concerning moles (followed by derm)  EYES: NEGATIVE for vision changes or irritation  ENT: NEGATIVE for ear, mouth and throat problems  RESP: NEGATIVE for significant cough or SOB  CV: NEGATIVE for chest pain, palpitations or peripheral edema  GI: NEGATIVE for nausea, abdominal pain, heartburn, or change in bowel habits   male: negative for dysuria, hematuria, decreased urinary stream, erectile dysfunction, urethral discharge  MUSCULOSKELETAL: NEGATIVE for significant arthralgias or myalgia  NEURO: NEGATIVE for weakness, dizziness or paresthesias  PSYCHIATRIC: NEGATIVE for changes in mood or affect    OBJECTIVE:   BP (!) 129/90   Pulse (!) 171   Temp 97.9  F (36.6  C)   Ht 1.842 m (6' 0.52\")   Wt 97.9 kg (215 lb 12.8 oz)   SpO2 97%   BMI 28.85 kg/m  " "    Physical Exam  GENERAL: healthy, alert and no distress  NECK: no adenopathy, no asymmetry, masses, or scars and thyroid normal to palpation  RESP: lungs clear to auscultation - no crackles  CV: tachycardic, normal S1 S2, no S3 or S4, no murmur, click or rub, no peripheral edema and peripheral pulses strong  ABDOMEN: soft, nontender, no hepatosplenomegaly, no masses and bowel sounds normal  MS: no gross musculoskeletal defects noted, no edema    Diagnostic Test Results:  Labs reviewed in Epic    ASSESSMENT/PLAN:   (Z00.00) Routine general medical examination at a health care facility  (primary encounter diagnosis)  Comment: Discussed healthcare concerns as above but will defer basic labs etc as needed ED evaluation for tachycardia.   Plan: Discuss labs at future visit.     (R00.0) Tachycardia  Comment: Had atrial fibrillation with RVR in 2018 while inpatient. Doesn't appear he was discharged on beta blockade. Was recommended to go to urgent care after derm visit last week due to \"irregular\" heart beat but wait was too long in urgent care so he went home. Appears to be asymptomatic today, apart from fatigue that may be related. Otherwise hemodynamically stable. Recommended he be evaluated in the ED for likely beta blockade, potential for short term anticoagulation and scheduled cardioversion if doesn't spontaneously convert with rate control. Patient expressed understanding. Safe to transport by private car.   Plan: EKG 12-lead complete w/read - Clinics          HCM  Had colonoscopy April 2021 with [facility]   No other screening labs since 2019    Patient has been advised of split billing requirements and indicates understanding: Yes  COUNSELING:     Estimated body mass index is 28.85 kg/m  as calculated from the following:    Height as of this encounter: 1.842 m (6' 0.52\").    Weight as of this encounter: 97.9 kg (215 lb 12.8 oz).       He reports that he has never smoked. He has never used smokeless " tobacco.      Counseling Resources:  ATP IV Guidelines  Pooled Cohorts Equation Calculator  FRAX Risk Assessment  ICSI Preventive Guidelines  Dietary Guidelines for Americans, 2010  [employer]'s MyPlate  ASA Prophylaxis  Lung CA Screening    Jennifer Rodas MD  [facility] [place]    ===========  STAFF NOTE:  Patient seen with resident physician today.  I was physically present during key portions of the visit and participated in the evaluation and management of the patient today.   Tachycardia work-up today as above.  Sent to [facility] ER for aflutter, tachycardia   See hospital notes  Andrea Razo MD

## 2021-09-29 NOTE — ED NOTES
[facility]  ED Nurse Handoff Report    Derik Hamm is a 66 year old male   ED Chief complaint: Abnormal Ekg  . ED Diagnosis:   Final diagnoses:   Atrial flutter with rapid ventricular response (H)   New onset atrial flutter (H)     Allergies: No Known Allergies    Code Status: Full Code  Activity level - Baseline/Home:  Independent. Activity Level - Current:   Independent. Lift room needed: No. Bariatric: No   Needed: No   Isolation: No. Infection: Not Applicable.     Vital Signs:   Vitals:    09/29/21 1400 09/29/21 1422 09/29/21 1500 09/29/21 1600   BP: 108/70  103/80 103/75   Pulse: 84  85 117   Resp: 11  15 19   Temp:       TempSrc:       SpO2: 95%  98% 99%   Weight:  100.4 kg (221 lb 5.5 oz)     Height:           Cardiac Rhythm:  ,   Cardiac  Cardiac Rhythm: Atrial flutter  Pain level:    Patient confused: No. Patient Falls Risk: Yes.   Elimination Status: Has voided   Patient Report - Initial Complaint:   Pt presents for evaluation of A-flutter. Pt was at the doctor today for a physical and was found to be in A-flutter. Denies chest pain or shortness of breath. Had a dysrhythmia episode when he appendicitis in 2017, but doesn't remember the rhythm.    Pt rate controlled with dilt boluses. Is to receive oral dilt for further rate control.   Tests Performed:   No orders to display     Labs Ordered and Resulted from Time of ED Arrival Up to the Time of Departure from the ED   CBC WITH PLATELETS AND DIFFERENTIAL - Abnormal; Notable for the following components:       Result Value    RBC Count 6.37 (*)     Hemoglobin 18.4 (*)     Hematocrit 57.0 (*)     RDW 18.0 (*)     All other components within normal limits   BASIC METABOLIC PANEL - Normal   TROPONIN I - Normal   TSH WITH FREE T4 REFLEX - Normal   MAGNESIUM - Normal   COVID-19 VIRUS (CORONAVIRUS) BY PCR - Normal    Narrative:     Testing was performed using the lydia  SARS-CoV-2 & Influenza A/B Assay on the lydia  Leslye  System.   This test should be ordered for the detection of SARS-COV-2 in individuals who meet SARS-CoV-2 clinical and/or epidemiological criteria. Test performance is unknown in asymptomatic patients.  This test is for in vitro diagnostic use under the FDA EUA for laboratories certified under CLIA to perform moderate and/or high complexity testing. This test has not been FDA cleared or approved.  A negative test does not rule out the presence of PCR inhibitors in the specimen or target RNA in concentration below the limit of detection for the assay. The possibility of a false negative should be considered if the patient's recent exposure or clinical presentation suggests COVID-19.  [facility] are certified under the Clinical Laboratory Improvement Amendments of 1988 (CLIA-88) as qualified to perform moderate and/or high complexity laboratory testing.   EXTRA BLUE TOP TUBE   EXTRA RED TOP TUBE   PERIPHERAL IV CATHETER   PULSE OXIMETRY NURSING   CARDIAC CONTINUOUS MONITORING   MEASURE WEIGHT   NOTIFY PHYSICIAN   NOTIFY PHYSICIAN   CBC WITH PLATELETS & DIFFERENTIAL    Narrative:     The following orders were created for panel order CBC with platelets differential.  Procedure                               Abnormality         Status                     ---------                               -----------         ------                     CBC with platelets and d...[[id number]]  Abnormal            Final result                 Please view results for these tests on the individual orders.   EXTRA TUBE    Narrative:     The following orders were created for panel order Extra Tube ([place] Draw).  Procedure                               Abnormality         Status                     ---------                               -----------         ------                     Extra Blue Top Tube[[id number]]                                                         Extra Red Top Tube[[id number]]                                                             Please view results for these tests on the individual orders.     Treatments provided: monitoring, diltiazem boluses, heparin gtt  Family Comments: none present  OBS brochure/video discussed/provided to patient:  Yes  ED Medications:   Medications   heparin 25,000 units in 0.45% NaCl 250 mL ANTICOAGULANT infusion (1,200 Units/hr Intravenous Rate/Dose Verify 9/29/21 1710)   0.9% sodium chloride BOLUS (0 mLs Intravenous Stopped 9/29/21 1427)   adenosine (ADENOCARD) injection 6 mg (6 mg Intravenous Given 9/29/21 1342)   diltiazem (CARDIZEM) injection 25 mg (25 mg Intravenous Given 9/29/21 1346)   heparin ANTICOAGULANT loading dose for  LOW INTENSITY TREATMENT* Give BEFORE starting heparin infusion (6,000 Units Intravenous Given 9/29/21 1450)   diltiazem (CARDIZEM) injection 20 mg (20 mg Intravenous Given 9/29/21 1644)     Drips infusing:  Yes  For the majority of the shift, the patient's behavior Green. Interventions performed were rounding.    Sepsis treatment initiated: No     Patient tested for COVID 19 prior to admission: YES    ED Nurse Name/Phone Number: Ju Roberts RN,   5:23 PM    RECEIVING UNIT ED HANDOFF REVIEW    Above ED Nurse Handoff Report was reviewed: Yes  Reviewed by: Yadira Beal RN on September 29, 2021 at 6:03 PM

## 2021-09-29 NOTE — LETTER
10/1/2021         Dreik Hamm   [address] [place] [address]   [phone] (home)     :     1955      To Whom it May Concern:     Mr. Derik Hamm  was hospitalized from 21 to 10/1/2021 for an acute medical issue.    He is unable to return to work until at least 10/6/21 and at that point should only return if he feels physically ready.  I have advised he consider starting back part time but this may be re-evaluated by his primary care doctor.    Please contact me for questions or concerns.    Sincerely,     Kota Townsend MD  Internal Medicine  [facility]  [phone]

## 2021-09-29 NOTE — ED TRIAGE NOTES
Pt presents for evaluation of A-flutter. Pt was at the doctor today for a physical and was found to be in A-flutter. Denies chest pain or shortness of breath. Had a dysrhythmia episode when he appendicitis in 2017, but doesn't remember the rhythm.

## 2021-09-29 NOTE — PHARMACY-ADMISSION MEDICATION HISTORY
Admission medication history interview status for this patient is complete. See [facility] admission navigator for allergy information, prior to admission medications and immunization status.     Medication history interview done, indicate source(s): Patient  Medication history resources (including written lists, pill bottles, clinic record):[facility]  Pharmacy: [facility]    Changes made to [facility] medication list:  Added: None  Changed: naproxen  Reported as Not Taking: None  Removed: Compounded eye drops, triamcinolone, ketoconazole shampoo, ibuprofen    Actions taken by pharmacist (provider contacted, etc):None     Additional medication history information:None    Medication reconciliation/reorder completed by provider prior to medication history?  N   (Y/N)       Prior to Admission medications    Medication Sig Last Dose Taking? Auth Provider   acetaminophen (TYLENOL) 325 MG tablet Take 325-650 mg by mouth daily as needed for mild pain  Yes Unknown, Entered By History   multivitamin, therapeutic (THERA-VIT) TABS tablet Take 1 tablet by mouth daily 9/28/2021 at Unknown time Yes Unknown, Entered By History   naproxen sodium (ALEVE) 220 MG tablet Take 220-440 mg by mouth daily as needed for moderate pain  9/29/2021 at Unknown time Yes Unknown, Entered By History

## 2021-09-30 ENCOUNTER — APPOINTMENT (OUTPATIENT)
Dept: CARDIOLOGY | Facility: CLINIC | Age: 66
End: 2021-09-30
Attending: INTERNAL MEDICINE
Payer: COMMERCIAL

## 2021-09-30 LAB
ANION GAP SERPL CALCULATED.3IONS-SCNC: 4 MMOL/L (ref 3–14)
ATRIAL RATE - MUSE: 168 BPM
BASOPHILS # BLD AUTO: 0 10E3/UL (ref 0–0.2)
BASOPHILS NFR BLD AUTO: 1 %
BUN SERPL-MCNC: 17 MG/DL (ref 7–30)
CALCIUM SERPL-MCNC: 8.4 MG/DL (ref 8.5–10.1)
CHLORIDE BLD-SCNC: 104 MMOL/L (ref 94–109)
CO2 SERPL-SCNC: 28 MMOL/L (ref 20–32)
CREAT SERPL-MCNC: 1.15 MG/DL (ref 0.66–1.25)
DIASTOLIC BLOOD PRESSURE - MUSE: NORMAL MMHG
EOSINOPHIL # BLD AUTO: 0.2 10E3/UL (ref 0–0.7)
EOSINOPHIL NFR BLD AUTO: 3 %
ERYTHROCYTE [DISTWIDTH] IN BLOOD BY AUTOMATED COUNT: 17.4 % (ref 10–15)
GFR SERPL CREATININE-BSD FRML MDRD: 67 ML/MIN/1.73M2
GLUCOSE BLD-MCNC: 96 MG/DL (ref 70–99)
HCT VFR BLD AUTO: 55.2 % (ref 40–53)
HGB BLD-MCNC: 17.3 G/DL (ref 13.3–17.7)
HOLD SPECIMEN: NORMAL
HOLD SPECIMEN: NORMAL
IMM GRANULOCYTES # BLD: 0 10E3/UL
IMM GRANULOCYTES NFR BLD: 1 %
INTERPRETATION ECG - MUSE: NORMAL
LVEF ECHO: NORMAL
LYMPHOCYTES # BLD AUTO: 1.1 10E3/UL (ref 0.8–5.3)
LYMPHOCYTES NFR BLD AUTO: 21 %
MAGNESIUM SERPL-MCNC: 1.9 MG/DL (ref 1.6–2.3)
MCH RBC QN AUTO: 28.1 PG (ref 26.5–33)
MCHC RBC AUTO-ENTMCNC: 31.3 G/DL (ref 31.5–36.5)
MCV RBC AUTO: 90 FL (ref 78–100)
MONOCYTES # BLD AUTO: 0.5 10E3/UL (ref 0–1.3)
MONOCYTES NFR BLD AUTO: 9 %
NEUTROPHILS # BLD AUTO: 3.7 10E3/UL (ref 1.6–8.3)
NEUTROPHILS NFR BLD AUTO: 65 %
NRBC # BLD AUTO: 0 10E3/UL
NRBC BLD AUTO-RTO: 0 /100
P AXIS - MUSE: NORMAL DEGREES
PLATELET # BLD AUTO: 210 10E3/UL (ref 150–450)
POTASSIUM BLD-SCNC: 3.9 MMOL/L (ref 3.4–5.3)
PR INTERVAL - MUSE: NORMAL MS
QRS DURATION - MUSE: 112 MS
QT - MUSE: 284 MS
QTC - MUSE: 474 MS
R AXIS - MUSE: 75 DEGREES
RBC # BLD AUTO: 6.15 10E6/UL (ref 4.4–5.9)
RETICS # AUTO: 0.1 10E6/UL (ref 0.03–0.1)
RETICS/RBC NFR AUTO: 1.6 % (ref 0.5–2)
SODIUM SERPL-SCNC: 136 MMOL/L (ref 133–144)
SYSTOLIC BLOOD PRESSURE - MUSE: NORMAL MMHG
T AXIS - MUSE: 254 DEGREES
UFH PPP CHRO-ACNC: 0.14 IU/ML
UFH PPP CHRO-ACNC: 0.24 IU/ML
UFH PPP CHRO-ACNC: 0.29 IU/ML
VENTRICULAR RATE- MUSE: 168 BPM
WBC # BLD AUTO: 5.5 10E3/UL (ref 4–11)

## 2021-09-30 PROCEDURE — 85025 COMPLETE CBC W/AUTO DIFF WBC: CPT | Performed by: INTERNAL MEDICINE

## 2021-09-30 PROCEDURE — 36415 COLL VENOUS BLD VENIPUNCTURE: CPT | Performed by: INTERNAL MEDICINE

## 2021-09-30 PROCEDURE — 80048 BASIC METABOLIC PNL TOTAL CA: CPT | Performed by: INTERNAL MEDICINE

## 2021-09-30 PROCEDURE — 85520 HEPARIN ASSAY: CPT | Performed by: INTERNAL MEDICINE

## 2021-09-30 PROCEDURE — 93306 TTE W/DOPPLER COMPLETE: CPT

## 2021-09-30 PROCEDURE — 83735 ASSAY OF MAGNESIUM: CPT | Performed by: INTERNAL MEDICINE

## 2021-09-30 PROCEDURE — 99222 1ST HOSP IP/OBS MODERATE 55: CPT | Mod: 25 | Performed by: INTERNAL MEDICINE

## 2021-09-30 PROCEDURE — 120N000001 HC R&B MED SURG/OB

## 2021-09-30 PROCEDURE — 250N000009 HC RX 250: Performed by: INTERNAL MEDICINE

## 2021-09-30 PROCEDURE — 250N000011 HC RX IP 250 OP 636: Performed by: INTERNAL MEDICINE

## 2021-09-30 PROCEDURE — 250N000013 HC RX MED GY IP 250 OP 250 PS 637: Performed by: INTERNAL MEDICINE

## 2021-09-30 PROCEDURE — 93306 TTE W/DOPPLER COMPLETE: CPT | Mod: 26 | Performed by: INTERNAL MEDICINE

## 2021-09-30 PROCEDURE — 85045 AUTOMATED RETICULOCYTE COUNT: CPT | Performed by: INTERNAL MEDICINE

## 2021-09-30 PROCEDURE — 99233 SBSQ HOSP IP/OBS HIGH 50: CPT | Performed by: INTERNAL MEDICINE

## 2021-09-30 RX ORDER — DIGOXIN 0.25 MG/ML
250 INJECTION INTRAMUSCULAR; INTRAVENOUS ONCE
Status: COMPLETED | OUTPATIENT
Start: 2021-09-30 | End: 2021-09-30

## 2021-09-30 RX ADMIN — Medication 400 MG: at 20:36

## 2021-09-30 RX ADMIN — HEPARIN SODIUM 1350 UNITS/HR: 10000 INJECTION, SOLUTION INTRAVENOUS at 03:52

## 2021-09-30 RX ADMIN — DILTIAZEM HYDROCHLORIDE 30 MG: 30 TABLET, FILM COATED ORAL at 05:48

## 2021-09-30 RX ADMIN — Medication 400 MG: at 09:11

## 2021-09-30 RX ADMIN — DILTIAZEM HYDROCHLORIDE 30 MG: 30 TABLET, FILM COATED ORAL at 17:01

## 2021-09-30 RX ADMIN — HEPARIN SODIUM 1650 UNITS/HR: 10000 INJECTION, SOLUTION INTRAVENOUS at 17:00

## 2021-09-30 RX ADMIN — DILTIAZEM HYDROCHLORIDE 10 MG: 5 INJECTION, SOLUTION INTRAVENOUS at 10:14

## 2021-09-30 RX ADMIN — DIGOXIN 250 MCG: 250 INJECTION, SOLUTION INTRAMUSCULAR; INTRAVENOUS; PARENTERAL at 12:30

## 2021-09-30 RX ADMIN — DILTIAZEM HYDROCHLORIDE 10 MG: 5 INJECTION, SOLUTION INTRAVENOUS at 01:41

## 2021-09-30 RX ADMIN — DILTIAZEM HYDROCHLORIDE 30 MG: 30 TABLET, FILM COATED ORAL at 11:58

## 2021-09-30 RX ADMIN — DILTIAZEM HYDROCHLORIDE 30 MG: 30 TABLET, FILM COATED ORAL at 00:00

## 2021-09-30 ASSESSMENT — ACTIVITIES OF DAILY LIVING (ADL)
ADLS_ACUITY_SCORE: 4

## 2021-09-30 NOTE — PLAN OF CARE
Orientation: A&Ox4  VS: stable except HR reaching 160's, given PRN IV diltiazem injection (see MAR)  Pain: denies  Tele: A-flutter RVR  Activity: independent  Resp: WDL  GI:  WDL  : WDL   Lines: upper L arm PIV infusing heparin 1350 units/mL, L wrist PIV S/L  Other:   Plan: TTE this morning

## 2021-09-30 NOTE — CONSULTS
Cardiology Consultation:    Derik Hamm MRN#: [id number]   YOB: 1955 Age: 66 year old     Date of Admission: 9/29/2021  Consult indication: atrial flutter         Assessment and Plan / Recommendations:      # Atrial flutter with RVR, now on diltiazem with HR generally in the 80 bpm range, occasionally up to 150-160 bpm range transiently, remains in atrial flutter.  Asymptomatic, chronicity unknown.  [id number] 1 (almost 2, patient is 66 years old).  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  Patient denies symptoms concerning for angina.    Discussed options for further evaluation and management.  Rates have been generally well controlled on oral diltiazem, however still has occasional increases in heart rate.  Up titration of diltiazem limited due to relative hypotension.  Suspect that heart rates will be poorly controlled in the outpatient setting.  Discussed with cardiology EP Dr. Blevins, appreciate assistance/discussion.  Will plan on CAMI/DCCV in a.m.  Continue heparin GTT, start oral anticoagulation per Pharmacy liaison consultation.  After cardioversion, will transition to metoprolol due to mildly reduced LV function, ideally would start ACE/ARB therapy however blood pressures have been low.  Follow-up with cardiology EP outpatient in a general cardiology, will need reassessment of LV function in about a month, if LV function remains reduced, or if he develops symptoms concerning for angina, will need an ischemic evaluation.  Maintain potassium greater than 4, magnesium greater than 2, n.p.o. at midnight, cardiology will follow.    Thank you for allowing our team to participate in the care of Derik CULP  Hansel. Please do not hesitate to page me with any questions or concerns.    Alexey Hudson MD, [facility]  Cardiology  Text Page   September 30, 2021    Voice recognition software utilized. Although reviewed after completion, some word and grammatical errors may be present.         History of Present Illness:     [facility].     Note, patient is a 66-year-old male past medical history significant for paroxysmal atrial fibrillation with RVR postoperative from laparoscopic appendectomy for perforated appendicitis 11/4/2018 (no known recurrence since then) who was admitted on 9/29/2021 with tachycardia.    Patient was in clinic for a skin biopsy last week, and was noted to be tachycardic, was directed to urgent care for further evaluation.  Unfortunately the weight there was over 3 hours, and so he chose to follow-up with his PCP.  He was found to be in SVT, and so was directed to the ED for further evaluation.  ECG 9/29/2021 demonstrates atrial flutter at approximately 171 bpm, 2-1 conduction,  ms.    He denies any chest pain, chest pressure, palpitations.  He exercises regularly, notes that he feels slightly more fatigued than usual, otherwise feels at baseline.  In the ED, initial blood pressure was 127/95 mmHg.  Initial labs were notable for sodium 136, potassium 4, creatinine 1.2, NT proBNP 255, troponin normal, hemoglobin 18.4, platelets 203.    Patient was administered adenosine 6 mg IV once which slowed the rhythm down to reveal flutter waves, patient was then administered diltiazem, and admitted to the Internal Medicine service.  Patient was started on oral diltiazem, and rates have been generally well controlled, remains in atrial flutter at around 80 bpm.  Remains asymptomatic.    TTE 10/21/2021 demonstrates LVEF 45 to 50%, normal RV function, moderate MR.  Prior TTE 11/6/2018 demonstrated normal  biventricular function.    Patient does not smoke cigarettes, does not drink alcohol regularly, works at [employer].  No family history of arrhythmia, or early ASCVD.         Past Medical History:   I have reviewed this patient's past medical history    Past Medical History:   Diagnosis Date     Acanthamoeba keratitis      Basal cell carcinoma      Presbyopia      PVD (posterior vitreous detachment), left              Past Surgical History:   I have reviewed this patient's past surgical history   Past Surgical History:   Procedure Laterality Date     LAPAROSCOPIC APPENDECTOMY N/A 2018    Procedure: LAPAROSCOPIC APPENDECTOMY;  Surgeon: Mackenzie Dale MD;  Location: [facility] OR     ORTHOPEDIC SURGERY               Social History:   I have reviewed this patient's social history  Social History     Tobacco Use     Smoking status: Former Smoker     Quit date:      Years since quittin.7     Smokeless tobacco: Never Used   Substance Use Topics     Alcohol use: No             Family History:   I have reviewed this patient's family history  Family History   Problem Relation Age of Onset     Lung Cancer Father 85     Skin Cancer Mother      Skin Cancer Brother      Glaucoma No family hx of      Macular Degeneration No family hx of      Diabetes No family hx of      Hypertension No family hx of              Allergies:   I have reviewed this patient's allergy history  No Known Allergies          Medications reviewed:   Prior to admission medications:  Prior to Admission medications    Medication Sig Start Date End Date Taking? Authorizing Provider   acetaminophen (TYLENOL) 325 MG tablet Take 325-650 mg by mouth daily as needed for mild pain   Yes Unknown, Entered By History   multivitamin, therapeutic (THERA-VIT) TABS tablet Take 1 tablet by mouth daily   Yes Unknown, Entered By History   naproxen sodium (ALEVE) 220 MG tablet Take 220-440 mg by mouth daily as needed for moderate pain    Yes Unknown, Entered By  History      Current medications:  Current Facility-Administered Medications Ordered in Epic   Medication Dose Route Frequency Last Rate Last Admin     acetaminophen (TYLENOL) tablet 650 mg  650 mg Oral Q6H PRN        Or     acetaminophen (TYLENOL) Suppository 650 mg  650 mg Rectal Q6H PRN         digoxin (LANOXIN) injection 250 mcg  250 mcg Intravenous Once         diltiazem (CARDIZEM) injection 10 mg  10 mg Intravenous Q4H PRN   10 mg at 21 1014     diltiazem (CARDIZEM) tablet 30 mg  30 mg Oral Q6H TWIN   30 mg at 21 0548     heparin 25,000 units in 0.45% NaCl 250 mL ANTICOAGULANT infusion  0-5,000 Units/hr Intravenous Continuous 15 mL/hr at 21 0547 1,500 Units/hr at 21 0547     magnesium oxide (MAG-OX) tablet 400 mg  400 mg Oral BID   400 mg at 21 0911     melatonin tablet 1 mg  1 mg Oral At Bedtime PRN         ondansetron (ZOFRAN-ODT) ODT tab 4 mg  4 mg Oral Q6H PRN        Or     ondansetron (ZOFRAN) injection 4 mg  4 mg Intravenous Q6H PRN         No current [facility]-ordered outpatient medications on file.             Review of Systems:   A complete review of systems was performed and was negative except as mentioned in the HPI.          Physical Exam:   Vital signs were personally reviewed:  Temperatures:  Current - Temp: 97.7  F (36.5  C); Max - Temp  Av.9  F (36.6  C)  Min: 97.7  F (36.5  C)  Max: 98.1  F (36.7  C)  Respiration range: Resp  Avg: 15.8  Min: 7  Max: 20  Pulse range: Pulse  Av.3  Min: 61  Max: 172  Blood pressure range: Systolic (24hrs), Av , Min:95 , Max:139   ; Diastolic (24hrs), Av, Min:54, Max:109    Pulse oximetry range: SpO2  Av.5 %  Min: 95 %  Max: 99 %    Intake/Output Summary (Last 24 hours) at 2021 1129  Last data filed at 2021 1900  Gross per 24 hour   Intake 120 ml   Output --   Net 120 ml     220 lbs 8 oz  Body mass index is 28.31 kg/m .   Body surface area is 2.28 meters squared.    Constitutional: appears stated age, in  no apparent distress, appears to be well nourished  Eyes: sclera anicteric, conjunctiva normal, no lesions on eyelids or lashes  ENT: normocephalic, without obvious abnormality, atraumatic, external ears without lesions,   Pulmonary: clear to auscultation bilaterally, no wheezes, no rales, no increased work of breathing  Cardiovascular: JVP normal, tachycardic, regular rhythm no murmur appreciated, no lower extremity edema  Gastrointestinal: abdominal exam benign, non-tender, no rigidity, no guarding  Neurologic: awake, alert, face symmetrical, moves all extremities  Skin: no abnormal rashes or lesions on limited exam, nails normal without discoloration or clubbing, no jaundice  Psychiatric: affect is normal, answers questions appropriately, oriented to self and place         Laboratory tests:   Laboratory tests personally reviewed:   CMP  Recent Labs   Lab 09/30/21  0737 09/29/21  1315    136   POTASSIUM 3.9 4.0   CHLORIDE 104 105   CO2 28 26   ANIONGAP 4 5   GLC 96 94   BUN 17 22   CR 1.15 1.23   GFRESTIMATED 67 62   HENRY 8.4* 9.3   MAG 1.9 1.7     CBC  Recent Labs   Lab 09/30/21  0737 09/29/21  1315   WBC 5.5 8.9   RBC 6.15* 6.37*   HGB 17.3 18.4*   HCT 55.2* 57.0*   MCV 90 90   MCH 28.1 28.9   MCHC 31.3* 32.3   RDW 17.4* 18.0*    203     INRNo lab results found in last 7 days.  Lab Results   Component Value Date    TROPONIN <0.015 09/29/2021     Recent Labs   Lab Test 03/15/19  0702   CHOL 110   HDL 30*   LDL 68   TRIG 61     No results found for: A1C  TSH   Date Value Ref Range Status   09/29/2021 1.99 0.40 - 4.00 mU/L Final   11/05/2018 1.10 0.40 - 4.00 mU/L Final            Imaging and Additional Data:   Additional data personally reviewed:  Recent Results (from the past 4320 hour(s))   Echocardiogram Complete   Result Value    LVEF  45-50%    Narrative    [id number]  [id number]  [id number]  [id number]^KAREY^PAULO^[facility]  Echocardiography Laboratory  [address]  [place] [address]     Name: ANU GARZA  MRN: [id number]  : 1955  Study Date: 2021 08:24 AM  Age: 66 yrs  Gender: Male  Patient [facility]: [facility]  Reason For Study: Atrial Flutter  Ordering Physician: PAULO EDEN  Referring Physician: Murali De León MD  Performed By: Cony Dowd RDCS     BSA: 2.3 m2  Height: 74 in  Weight: 221 lb  HR: 71  BP: 110/73 mmHg  ______________________________________________________________________________  Procedure  Complete Portable Echo Adult.  ______________________________________________________________________________  Interpretation Summary     Left ventricular systolic function is mildly reduced.The visual ejection  fraction is 45-50%.  The right ventricular systolic function is normal.  There is moderate (2+) mitral regurgitation.  Dilation of the inferior vena cava is present with abnormal respiratory  variation in diameter.  Rhythm appears atrial flutter.     Echo dated 2018 LVEF 60-65% , trace MR, sinus rhythm.  ______________________________________________________________________________  Left Ventricle  The left ventricle is normal in size. There is mild concentric left  ventricular hypertrophy. Left ventricular systolic function is mildly reduced.  Diastolic Doppler findings (E/E' ratio and/or other parameters) suggest left  ventricular filling pressures are indeterminate. The visual ejection fraction  is 45-50%. There is mild global hypokinesia of the left ventricle.     Right Ventricle  The right ventricle is normal size. The right ventricular systolic function is  normal.     Atria  The left atrium is mildly dilated. Right atrial size is normal. There is no  color Doppler evidence of an atrial shunt.     Mitral Valve  There is moderate (2+) mitral regurgitation. There is no mitral valve  stenosis.     Tricuspid Valve  There is trace tricuspid regurgitation. The right ventricular systolic  pressure is approximated  at 22.7 mmHg plus the right atrial pressure.     Aortic Valve  The aortic valve is trileaflet. No aortic regurgitation is present. No aortic  stenosis is present.     Pulmonic Valve  There is trace pulmonic valvular regurgitation. There is no pulmonic valvular  stenosis.     Vessels  The aortic root is normal size. Normal size ascending aorta. Dilation of the  inferior vena cava is present with abnormal respiratory variation in diameter.     Pericardium  There is no pericardial effusion.     Rhythm  The rhythm was atrial flutter.  ______________________________________________________________________________  MMode/2D Measurements & Calculations  IVSd: 1.5 cm     LVIDd: 4.9 cm  LVIDs: 3.8 cm  LVPWd: 1.2 cm  FS: 22.2 %  LV mass(C)d: 264.6 grams  LV mass(C)dI: 116.7 grams/m2  Ao root diam: 3.4 cm  LA dimension: 3.7 cm  asc Aorta Diam: 3.6 cm  LA/Ao: 1.1     EF(MOD-bp): 38.8 %  LA Volume (BP): 77.0 ml  LA Volume Index (BP): 33.9 ml/m2  RWT: 0.50     Doppler Measurements & Calculations  MV E max nataliia: 123.7 cm/sec  MV max P.1 mmHg  MV mean P.0 mmHg  MV V2 VTI: 23.3 cm  MV dec time: 0.13 sec  MR ERO: 0.20 cm2  MR volume: 27.4 ml  PA acc time: 0.08 sec  TR max nataliia: 238.0 cm/sec  TR max P.7 mmHg  E/E' av.9  Lateral E/e': 9.0  Medial E/e': 8.8     ______________________________________________________________________________  Report approved by: Kellie Su 2021 09:31 AM            Clinically Significant Risk Factors Present on Admission                  Cardiovascular: Cardiac Arrhythmia: Atrial flutter: Typical

## 2021-09-30 NOTE — CONSULTS
Anticoagulation coverage check.  Patient has BCBS through an employer.    Xarelto:  $245/mo. Upon receipt of RX [facility] can provide one month free, as well as copay savings card to reduce this to $10/mo for the first two fills, and then $45/mo thereafter.     Eliquis:  NOT COVERED.     Jantoven (warfarin): $1/mo.      -JAREK Cabral, Pharmacy Technician/Liaison, [facility], [phone]

## 2021-09-30 NOTE — PROGRESS NOTES
"/88 (BP Location: Left arm)   Pulse 85   Temp 97.9  F (36.6  C) (Oral)   Resp 18   Ht 1.88 m (6' 2\")   Wt 100 kg (220 lb 8 oz)   SpO2 95%   BMI 28.31 kg/m      A&O. Up ad gissell. Tele A flutter RVR (HR 80-160s). On RA. Denies pain. Heparin infusing at 1350units/hr. Plan is for echo tomorrow, cards consult.   "

## 2021-09-30 NOTE — PROGRESS NOTES
[facility]  Hospitalist Progress Note  Kota Townsend MD 09/30/21    Reason for Stay (Diagnosis): Atrial flutter         Assessment and Plan:      Summary of Stay: Derik Hamm is a 66 year old male admitted on 9/29/2021 with new onset atrial flutter with RVR which likely has been ongoing for up to a week or more.  No focal trigger has been identified and he was started on diltiazem as well as a heparin drip.  Echocardiogram shows EF of 45 to 50% with moderate mitral regurgitation.  Cardiology has been consulted and there are plans for [facility] cardioversion on 10/1.    Problem List/Assessment and Plan:   New atrial flutter with RVR: He is asymptomatic.  TSH normal.  Trigger unclear.  Did have history of atrial fibrillation with RVR during episode of appendicitis in 2018.  He does not use alcohol.  Drinks 2 Mountain Dew energy drinks per day.  -Continue heparin drip for now  -Started oral diltiazem 25 mg p.o. every 6 hours, will continue.  -IV diltiazem 10 mg every 4 hours as needed for heart rate > 120  -TTE shows slightly impaired EF which may be tachycardia deviated.  Defer to cardiology regarding further ischemic work-up  -Telemetry  -Check cardiology input.  -High potassium and magnesium replacement protocols, keep K >4, Mg >2  -Decrease caffeine intake     Polycythemia: Hemoglobin 18.4 upon presentation, hematocrit 57.  2 years ago hemoglobin 17.4.  He smoked in the past, but quit in the 1980s.  Isolated erythrocytosis.  WBC normal at 8.9 with normal differential and platelet count 203.  He does have flushing of the upper chest and face.  Consideration for P vera.  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  Still has blurry  "vision.  Has follow-up next month.     Skin lesions: Lesion on the chest biopsied last week.  Follows with dermatology.  Reduced ejection fraction: Likely tachycardia mediated.  Potentially could be related to ischemia.  Cardiology following.  Anticipate starting ACE inhibitor after cardioversion and will need follow-up echocardiogram and potentially outpatient stress testing.  DVT Prophylaxis: Currently on a heparin drip  Code Status: Full Code  Discharge Dispo/Date: Pending cardioversion.        Interval History (Subjective):      I assumed care  Generally asymptomatic, no chest pain, pressure or shortness of breath or dizziness but does have a generally warm feeling  Echocardiogram shows mild impairment in EF                  Physical Exam:      Last Vital Signs:  /82 (BP Location: Right arm)   Pulse (!) 160   Temp 98.2  F (36.8  C) (Oral)   Resp 18   Ht 1.88 m (6' 2\")   Wt 100 kg (220 lb 8 oz)   SpO2 94%   BMI 28.31 kg/m      I/O last 3 completed shifts:  In: 120 [P.O.:120]  Out: -     General: Alert, awake, no acute distress.  HEENT: NC/AT, eyes anicteric, external occular movements intact, face symmetric.    Cardiac: RRR, S1, S2.  No murmurs appreciated.  Pulmonary: Normal chest rise, normal work of breathing.  Lungs CTA BL  Abdomen: soft, non-tender, non-distended.  Bowel Sounds Present.  No guarding.  Extremities: no deformities.  Warm, well perfused.  Skin: no rashes or lesions noted.  Warm and Dry.  Neuro: No focal deficits noted.  Speech clear.  Coordination and strength grossly normal.  Psych: Appropriate affect.         Medications:      All current medications were reviewed with changes reflected in problem list.         Data:      All new lab and imaging data was reviewed.   Labs:  Recent Labs   Lab 09/30/21  0737      POTASSIUM 3.9   CHLORIDE 104   CO2 28   ANIONGAP 4   GLC 96   BUN 17   CR 1.15   GFRESTIMATED 67   HENRY 8.4*     Recent Labs   Lab 09/30/21  0737   WBC 5.5   HGB 17.3 "   HCT 55.2*   MCV 90        Recent Labs   Lab 21  1315   TROPONIN <0.015     Recent Labs   Lab 21  1315   TSH 1.99      Imaging:   Recent Results (from the past 48 hour(s))   Echocardiogram Complete   Result Value    LVEF  45-50%    [facility]    [id number]  [id number]  [id number]  [id number]^KAREY^PAULO^MARY ANN     [facility]  Echocardiography Laboratory  [address] [place] [address]     Name: ANU GARZA  MRN: [id number]  : 1955  Study Date: 2021 08:24 AM  Age: 66 yrs  Gender: Male  Patient Location: [facility]  Reason For Study: Atrial Flutter  Ordering Physician: PAULO EDEN  Referring Physician: Murali De León MD  Performed By: Cony Dowd [facility]     BSA: 2.3 m2  Height: 74 in  Weight: 221 lb  HR: 71  BP: 110/73 mmHg  ______________________________________________________________________________  Procedure  Complete Portable Echo Adult.  ______________________________________________________________________________  Interpretation Summary     Left ventricular systolic function is mildly reduced.The visual ejection  fraction is 45-50%.  The right ventricular systolic function is normal.  There is moderate (2+) mitral regurgitation.  Dilation of the inferior vena cava is present with abnormal respiratory  variation in diameter.  Rhythm appears atrial flutter.     Echo dated 2018 LVEF 60-65% , trace MR, sinus rhythm.  ______________________________________________________________________________  Left Ventricle  The left ventricle is normal in size. There is mild concentric left  ventricular hypertrophy. Left ventricular systolic function is mildly reduced.  Diastolic Doppler findings (E/E' ratio and/or other parameters) suggest left  ventricular filling pressures are indeterminate. The visual ejection fraction  is 45-50%. There is mild global hypokinesia of the left ventricle.     Right Ventricle  The right ventricle is normal size.  The right ventricular systolic function is  normal.     Atria  The left atrium is mildly dilated. Right atrial size is normal. There is no  color Doppler evidence of an atrial shunt.     Mitral Valve  There is moderate (2+) mitral regurgitation. There is no mitral valve  stenosis.     Tricuspid Valve  There is trace tricuspid regurgitation. The right ventricular systolic  pressure is approximated at 22.7 mmHg plus the right atrial pressure.     Aortic Valve  The aortic valve is trileaflet. No aortic regurgitation is present. No aortic  stenosis is present.     Pulmonic Valve  There is trace pulmonic valvular regurgitation. There is no pulmonic valvular  stenosis.     Vessels  The aortic root is normal size. Normal size ascending aorta. Dilation of the  inferior vena cava is present with abnormal respiratory variation in diameter.     Pericardium  There is no pericardial effusion.     Rhythm  The rhythm was atrial flutter.  ______________________________________________________________________________  MMode/2D Measurements & Calculations  IVSd: 1.5 cm     LVIDd: 4.9 cm  LVIDs: 3.8 cm  LVPWd: 1.2 cm  FS: 22.2 %  LV mass(C)d: 264.6 grams  LV mass(C)dI: 116.7 grams/m2  Ao root diam: 3.4 cm  LA dimension: 3.7 cm  asc Aorta Diam: 3.6 cm  LA/Ao: 1.1     EF(MOD-bp): 38.8 %  LA Volume (BP): 77.0 ml  LA Volume Index (BP): 33.9 ml/m2  RWT: 0.50     Doppler Measurements & Calculations  MV E max nataliia: 123.7 cm/sec  MV max P.1 mmHg  MV mean P.0 mmHg  MV V2 VTI: 23.3 cm  MV dec time: 0.13 sec  MR ERO: 0.20 cm2  MR volume: 27.4 ml  PA acc time: 0.08 sec  TR max nataliia: 238.0 cm/sec  TR max P.7 mmHg  E/E' av.9  Lateral E/e': 9.0  Medial E/e': 8.8     ______________________________________________________________________________  Report approved by: Kellie Su 2021 09:31 AM                 Kota Townsend MD , MD.

## 2021-09-30 NOTE — PLAN OF CARE
VSS, HR's high, see tele. A&Ox4. Denies pain, CP, and SOB. HR's 80's-160's, PRN dilt given x1. One time dose of digoxin given. HR's now 80's-90's. Ambulating independently in room. Echocadiogram completed, see results. Plan for cardioversion/[facility] in AM, NPO after midnight. Tele shows Aflutter VVR. Discharge 1-2 days.

## 2021-10-01 ENCOUNTER — ANESTHESIA EVENT (OUTPATIENT)
Dept: SURGERY | Facility: CLINIC | Age: 66
End: 2021-10-01
Payer: COMMERCIAL

## 2021-10-01 ENCOUNTER — APPOINTMENT (OUTPATIENT)
Dept: CARDIOLOGY | Facility: CLINIC | Age: 66
End: 2021-10-01
Attending: INTERNAL MEDICINE
Payer: COMMERCIAL

## 2021-10-01 ENCOUNTER — ANESTHESIA (OUTPATIENT)
Dept: SURGERY | Facility: CLINIC | Age: 66
End: 2021-10-01
Payer: COMMERCIAL

## 2021-10-01 VITALS
HEART RATE: 102 BPM | BODY MASS INDEX: 27.8 KG/M2 | HEIGHT: 74 IN | DIASTOLIC BLOOD PRESSURE: 82 MMHG | RESPIRATION RATE: 18 BRPM | SYSTOLIC BLOOD PRESSURE: 125 MMHG | WEIGHT: 216.6 LBS | OXYGEN SATURATION: 96 % | TEMPERATURE: 98.5 F

## 2021-10-01 LAB
ATRIAL RATE - MUSE: 103 BPM
ATRIAL RATE - MUSE: 333 BPM
DIASTOLIC BLOOD PRESSURE - MUSE: NORMAL MMHG
DIASTOLIC BLOOD PRESSURE - MUSE: NORMAL MMHG
INR PPP: 1.03 (ref 0.85–1.15)
INTERPRETATION ECG - MUSE: NORMAL
INTERPRETATION ECG - MUSE: NORMAL
LVEF ECHO: NORMAL
MAGNESIUM SERPL-MCNC: 2 MG/DL (ref 1.6–2.3)
P AXIS - MUSE: 69 DEGREES
P AXIS - MUSE: 79 DEGREES
PATH REPORT.COMMENTS IMP SPEC: NORMAL
PATH REPORT.COMMENTS IMP SPEC: NORMAL
PATH REPORT.FINAL DX SPEC: NORMAL
PATH REPORT.MICROSCOPIC SPEC OTHER STN: NORMAL
PATH REPORT.MICROSCOPIC SPEC OTHER STN: NORMAL
PATH REPORT.RELEVANT HX SPEC: NORMAL
POTASSIUM BLD-SCNC: 4.3 MMOL/L (ref 3.4–5.3)
PR INTERVAL - MUSE: 152 MS
PR INTERVAL - MUSE: NORMAL MS
QRS DURATION - MUSE: 82 MS
QRS DURATION - MUSE: 92 MS
QT - MUSE: 320 MS
QT - MUSE: 340 MS
QTC - MUSE: 430 MS
QTC - MUSE: 445 MS
R AXIS - MUSE: 78 DEGREES
R AXIS - MUSE: 91 DEGREES
SYSTOLIC BLOOD PRESSURE - MUSE: NORMAL MMHG
SYSTOLIC BLOOD PRESSURE - MUSE: NORMAL MMHG
T AXIS - MUSE: 68 DEGREES
T AXIS - MUSE: 74 DEGREES
UFH PPP CHRO-ACNC: 0.34 IU/ML
VENTRICULAR RATE- MUSE: 103 BPM
VENTRICULAR RATE- MUSE: 109 BPM

## 2021-10-01 PROCEDURE — 36415 COLL VENOUS BLD VENIPUNCTURE: CPT | Performed by: INTERNAL MEDICINE

## 2021-10-01 PROCEDURE — 99239 HOSP IP/OBS DSCHRG MGMT >30: CPT | Performed by: INTERNAL MEDICINE

## 2021-10-01 PROCEDURE — 99232 SBSQ HOSP IP/OBS MODERATE 35: CPT | Mod: 25 | Performed by: INTERNAL MEDICINE

## 2021-10-01 PROCEDURE — 85060 BLOOD SMEAR INTERPRETATION: CPT | Performed by: PATHOLOGY

## 2021-10-01 PROCEDURE — 250N000009 HC RX 250: Performed by: NURSE ANESTHETIST, CERTIFIED REGISTERED

## 2021-10-01 PROCEDURE — 250N000011 HC RX IP 250 OP 636: Performed by: INTERNAL MEDICINE

## 2021-10-01 PROCEDURE — 93320 DOPPLER ECHO COMPLETE: CPT | Mod: 26 | Performed by: INTERNAL MEDICINE

## 2021-10-01 PROCEDURE — 93312 ECHO TRANSESOPHAGEAL: CPT | Mod: 26 | Performed by: INTERNAL MEDICINE

## 2021-10-01 PROCEDURE — 250N000011 HC RX IP 250 OP 636

## 2021-10-01 PROCEDURE — 92960 CARDIOVERSION ELECTRIC EXT: CPT | Performed by: INTERNAL MEDICINE

## 2021-10-01 PROCEDURE — 93242 EXT ECG>48HR<7D RECORDING: CPT

## 2021-10-01 PROCEDURE — 92960 CARDIOVERSION ELECTRIC EXT: CPT

## 2021-10-01 PROCEDURE — 83735 ASSAY OF MAGNESIUM: CPT | Performed by: INTERNAL MEDICINE

## 2021-10-01 PROCEDURE — 85520 HEPARIN ASSAY: CPT | Performed by: INTERNAL MEDICINE

## 2021-10-01 PROCEDURE — 5A2204Z RESTORATION OF CARDIAC RHYTHM, SINGLE: ICD-10-PCS | Performed by: INTERNAL MEDICINE

## 2021-10-01 PROCEDURE — 85610 PROTHROMBIN TIME: CPT | Performed by: INTERNAL MEDICINE

## 2021-10-01 PROCEDURE — 99152 MOD SED SAME PHYS/QHP 5/>YRS: CPT | Performed by: INTERNAL MEDICINE

## 2021-10-01 PROCEDURE — 84132 ASSAY OF SERUM POTASSIUM: CPT | Performed by: INTERNAL MEDICINE

## 2021-10-01 PROCEDURE — 250N000009 HC RX 250

## 2021-10-01 PROCEDURE — 250N000013 HC RX MED GY IP 250 OP 250 PS 637: Performed by: INTERNAL MEDICINE

## 2021-10-01 PROCEDURE — 93005 ELECTROCARDIOGRAM TRACING: CPT

## 2021-10-01 PROCEDURE — 93325 DOPPLER ECHO COLOR FLOW MAPG: CPT | Mod: 26 | Performed by: INTERNAL MEDICINE

## 2021-10-01 PROCEDURE — 93325 DOPPLER ECHO COLOR FLOW MAPG: CPT

## 2021-10-01 PROCEDURE — 250N000011 HC RX IP 250 OP 636: Performed by: NURSE ANESTHETIST, CERTIFIED REGISTERED

## 2021-10-01 PROCEDURE — 370N000017 HC ANESTHESIA TECHNICAL FEE, PER MIN

## 2021-10-01 RX ORDER — GLYCOPYRROLATE 0.2 MG/ML
0.1 INJECTION, SOLUTION INTRAMUSCULAR; INTRAVENOUS ONCE
Status: COMPLETED | OUTPATIENT
Start: 2021-10-01 | End: 2021-10-01

## 2021-10-01 RX ORDER — LIDOCAINE HYDROCHLORIDE 20 MG/ML
SOLUTION OROPHARYNGEAL
Status: COMPLETED
Start: 2021-10-01 | End: 2021-10-01

## 2021-10-01 RX ORDER — MAGNESIUM SULFATE HEPTAHYDRATE 40 MG/ML
2 INJECTION, SOLUTION INTRAVENOUS
Status: DISCONTINUED | OUTPATIENT
Start: 2021-10-01 | End: 2021-10-01 | Stop reason: HOSPADM

## 2021-10-01 RX ORDER — LIDOCAINE HYDROCHLORIDE 10 MG/ML
INJECTION, SOLUTION INFILTRATION; PERINEURAL PRN
Status: DISCONTINUED | OUTPATIENT
Start: 2021-10-01 | End: 2021-10-01

## 2021-10-01 RX ORDER — LIDOCAINE HYDROCHLORIDE 40 MG/ML
1.5 SOLUTION TOPICAL ONCE
Status: DISCONTINUED | OUTPATIENT
Start: 2021-10-01 | End: 2021-10-01 | Stop reason: HOSPADM

## 2021-10-01 RX ORDER — FENTANYL CITRATE 50 UG/ML
25 INJECTION, SOLUTION INTRAMUSCULAR; INTRAVENOUS
Status: DISCONTINUED | OUTPATIENT
Start: 2021-10-01 | End: 2021-10-01

## 2021-10-01 RX ORDER — POTASSIUM CHLORIDE 1500 MG/1
40 TABLET, EXTENDED RELEASE ORAL
Status: DISCONTINUED | OUTPATIENT
Start: 2021-10-01 | End: 2021-10-01 | Stop reason: HOSPADM

## 2021-10-01 RX ORDER — FENTANYL CITRATE 50 UG/ML
INJECTION, SOLUTION INTRAMUSCULAR; INTRAVENOUS
Status: DISCONTINUED
Start: 2021-10-01 | End: 2021-10-01 | Stop reason: HOSPADM

## 2021-10-01 RX ORDER — FLUMAZENIL 0.1 MG/ML
INJECTION, SOLUTION INTRAVENOUS
Status: DISCONTINUED
Start: 2021-10-01 | End: 2021-10-01 | Stop reason: WASHOUT

## 2021-10-01 RX ORDER — FENTANYL CITRATE 50 UG/ML
50 INJECTION, SOLUTION INTRAMUSCULAR; INTRAVENOUS ONCE
Status: COMPLETED | OUTPATIENT
Start: 2021-10-01 | End: 2021-10-01

## 2021-10-01 RX ORDER — NALOXONE HYDROCHLORIDE 0.4 MG/ML
INJECTION, SOLUTION INTRAMUSCULAR; INTRAVENOUS; SUBCUTANEOUS
Status: DISCONTINUED
Start: 2021-10-01 | End: 2021-10-01 | Stop reason: WASHOUT

## 2021-10-01 RX ORDER — METOPROLOL TARTRATE 1 MG/ML
2.5-5 INJECTION, SOLUTION INTRAVENOUS
Status: DISCONTINUED | OUTPATIENT
Start: 2021-10-01 | End: 2021-10-01 | Stop reason: HOSPADM

## 2021-10-01 RX ORDER — FENTANYL CITRATE 50 UG/ML
INJECTION, SOLUTION INTRAMUSCULAR; INTRAVENOUS
Status: COMPLETED
Start: 2021-10-01 | End: 2021-10-01

## 2021-10-01 RX ORDER — NALOXONE HYDROCHLORIDE 0.4 MG/ML
0.4 INJECTION, SOLUTION INTRAMUSCULAR; INTRAVENOUS; SUBCUTANEOUS
Status: DISCONTINUED | OUTPATIENT
Start: 2021-10-01 | End: 2021-10-01

## 2021-10-01 RX ORDER — METOPROLOL SUCCINATE 25 MG/1
25 TABLET, EXTENDED RELEASE ORAL DAILY
Status: DISCONTINUED | OUTPATIENT
Start: 2021-10-01 | End: 2021-10-01 | Stop reason: HOSPADM

## 2021-10-01 RX ORDER — DEXTROSE MONOHYDRATE 25 G/50ML
9.5 INJECTION, SOLUTION INTRAVENOUS
Status: DISCONTINUED | OUTPATIENT
Start: 2021-10-01 | End: 2021-10-01 | Stop reason: HOSPADM

## 2021-10-01 RX ORDER — SODIUM CHLORIDE 9 MG/ML
1000 INJECTION, SOLUTION INTRAVENOUS CONTINUOUS
Status: DISCONTINUED | OUTPATIENT
Start: 2021-10-01 | End: 2021-10-01

## 2021-10-01 RX ORDER — NALOXONE HYDROCHLORIDE 0.4 MG/ML
0.2 INJECTION, SOLUTION INTRAMUSCULAR; INTRAVENOUS; SUBCUTANEOUS
Status: DISCONTINUED | OUTPATIENT
Start: 2021-10-01 | End: 2021-10-01 | Stop reason: HOSPADM

## 2021-10-01 RX ORDER — ATROPINE SULFATE 0.1 MG/ML
.5-1 INJECTION INTRAVENOUS
Status: DISCONTINUED | OUTPATIENT
Start: 2021-10-01 | End: 2021-10-01

## 2021-10-01 RX ORDER — PROPOFOL 10 MG/ML
INJECTION, EMULSION INTRAVENOUS PRN
Status: DISCONTINUED | OUTPATIENT
Start: 2021-10-01 | End: 2021-10-01

## 2021-10-01 RX ORDER — NALOXONE HYDROCHLORIDE 0.4 MG/ML
0.4 INJECTION, SOLUTION INTRAMUSCULAR; INTRAVENOUS; SUBCUTANEOUS
Status: DISCONTINUED | OUTPATIENT
Start: 2021-10-01 | End: 2021-10-01 | Stop reason: HOSPADM

## 2021-10-01 RX ORDER — GLYCOPYRROLATE 0.2 MG/ML
INJECTION INTRAMUSCULAR; INTRAVENOUS
Status: COMPLETED
Start: 2021-10-01 | End: 2021-10-01

## 2021-10-01 RX ORDER — LISINOPRIL 5 MG/1
5 TABLET ORAL DAILY
Status: DISCONTINUED | OUTPATIENT
Start: 2021-10-01 | End: 2021-10-01 | Stop reason: HOSPADM

## 2021-10-01 RX ORDER — METOPROLOL SUCCINATE 25 MG/1
25 TABLET, EXTENDED RELEASE ORAL DAILY
Qty: 30 TABLET | Refills: 0 | Status: SHIPPED | OUTPATIENT
Start: 2021-10-01 | End: 2021-10-22

## 2021-10-01 RX ORDER — LIDOCAINE 50 MG/G
OINTMENT TOPICAL ONCE
Status: DISCONTINUED | OUTPATIENT
Start: 2021-10-01 | End: 2021-10-01 | Stop reason: HOSPADM

## 2021-10-01 RX ORDER — LIDOCAINE HYDROCHLORIDE 20 MG/ML
15 SOLUTION OROPHARYNGEAL ONCE
Status: COMPLETED | OUTPATIENT
Start: 2021-10-01 | End: 2021-10-01

## 2021-10-01 RX ORDER — NALOXONE HYDROCHLORIDE 0.4 MG/ML
0.2 INJECTION, SOLUTION INTRAMUSCULAR; INTRAVENOUS; SUBCUTANEOUS
Status: DISCONTINUED | OUTPATIENT
Start: 2021-10-01 | End: 2021-10-01

## 2021-10-01 RX ORDER — LISINOPRIL 5 MG/1
5 TABLET ORAL DAILY
Qty: 30 TABLET | Refills: 0 | Status: SHIPPED | OUTPATIENT
Start: 2021-10-01 | End: 2021-10-22

## 2021-10-01 RX ORDER — FLUMAZENIL 0.1 MG/ML
0.2 INJECTION, SOLUTION INTRAVENOUS
Status: DISCONTINUED | OUTPATIENT
Start: 2021-10-01 | End: 2021-10-01

## 2021-10-01 RX ORDER — POTASSIUM CHLORIDE 1500 MG/1
20 TABLET, EXTENDED RELEASE ORAL
Status: DISCONTINUED | OUTPATIENT
Start: 2021-10-01 | End: 2021-10-01 | Stop reason: HOSPADM

## 2021-10-01 RX ADMIN — DILTIAZEM HYDROCHLORIDE 30 MG: 30 TABLET, FILM COATED ORAL at 00:30

## 2021-10-01 RX ADMIN — MIDAZOLAM 1 MG: 1 INJECTION INTRAMUSCULAR; INTRAVENOUS at 10:44

## 2021-10-01 RX ADMIN — PROPOFOL 50 MG: 10 INJECTION, EMULSION INTRAVENOUS at 11:18

## 2021-10-01 RX ADMIN — GLYCOPYRROLATE 0.1 MG: 0.2 INJECTION, SOLUTION INTRAMUSCULAR; INTRAVENOUS at 10:26

## 2021-10-01 RX ADMIN — DILTIAZEM HYDROCHLORIDE 30 MG: 30 TABLET, FILM COATED ORAL at 12:09

## 2021-10-01 RX ADMIN — FENTANYL CITRATE 50 MCG: 50 INJECTION, SOLUTION INTRAMUSCULAR; INTRAVENOUS at 10:40

## 2021-10-01 RX ADMIN — LIDOCAINE HYDROCHLORIDE 15 ML: 20 SOLUTION ORAL; TOPICAL at 10:21

## 2021-10-01 RX ADMIN — FENTANYL CITRATE 50 MCG: 50 INJECTION, SOLUTION INTRAMUSCULAR; INTRAVENOUS at 10:44

## 2021-10-01 RX ADMIN — MIDAZOLAM 1 MG: 1 INJECTION INTRAMUSCULAR; INTRAVENOUS at 10:49

## 2021-10-01 RX ADMIN — LIDOCAINE HYDROCHLORIDE 30 MG: 10 INJECTION, SOLUTION INFILTRATION; PERINEURAL at 11:17

## 2021-10-01 RX ADMIN — HEPARIN SODIUM 1650 UNITS/HR: 10000 INJECTION, SOLUTION INTRAVENOUS at 08:14

## 2021-10-01 RX ADMIN — TOPICAL ANESTHETIC 0.5 ML: 200 SPRAY DENTAL; PERIODONTAL at 10:20

## 2021-10-01 RX ADMIN — DILTIAZEM HYDROCHLORIDE 30 MG: 30 TABLET, FILM COATED ORAL at 06:13

## 2021-10-01 RX ADMIN — MIDAZOLAM 2 MG: 1 INJECTION INTRAMUSCULAR; INTRAVENOUS at 10:40

## 2021-10-01 RX ADMIN — RIVAROXABAN 15 MG: 15 TABLET, FILM COATED ORAL at 12:09

## 2021-10-01 RX ADMIN — LIDOCAINE HYDROCHLORIDE 15 ML: 20 SOLUTION OROPHARYNGEAL at 10:21

## 2021-10-01 RX ADMIN — LISINOPRIL 5 MG: 5 TABLET ORAL at 14:09

## 2021-10-01 RX ADMIN — METOPROLOL SUCCINATE 25 MG: 25 TABLET, EXTENDED RELEASE ORAL at 14:09

## 2021-10-01 RX ADMIN — Medication 400 MG: at 08:14

## 2021-10-01 RX ADMIN — FENTANYL CITRATE 25 MCG: 50 INJECTION, SOLUTION INTRAMUSCULAR; INTRAVENOUS at 10:50

## 2021-10-01 ASSESSMENT — ACTIVITIES OF DAILY LIVING (ADL)
ADLS_ACUITY_SCORE: 4

## 2021-10-01 ASSESSMENT — ENCOUNTER SYMPTOMS: DYSRHYTHMIAS: 1

## 2021-10-01 ASSESSMENT — MIFFLIN-ST. JEOR: SCORE: 1842.24

## 2021-10-01 NOTE — ANESTHESIA PREPROCEDURE EVALUATION
Anesthesia Pre-Procedure Evaluation    Patient: Derik Hamm   MRN: [id number] : 1955        Preoperative Diagnosis: * No pre-op diagnosis entered *   Procedure : Procedure(s):  ANESTHESIA, FOR CARDIOVERSION     Past Medical History:   Diagnosis Date     Acanthamoeba keratitis      Basal cell carcinoma      Presbyopia      PVD (posterior vitreous detachment), left       Past Surgical History:   Procedure Laterality Date     LAPAROSCOPIC APPENDECTOMY N/A 2018    Procedure: LAPAROSCOPIC APPENDECTOMY;  Surgeon: Mackenzie Dale MD;  Location: [facility] OR     ORTHOPEDIC SURGERY        No Known Allergies   Social History     Tobacco Use     Smoking status: Former Smoker     Quit date:      Years since quittin.7     Smokeless tobacco: Never Used   Substance Use Topics     Alcohol use: No      Wt Readings from Last 1 Encounters:   10/01/21 98.2 kg (216 lb 9.6 oz)        Anesthesia Evaluation            ROS/MED HX  ENT/Pulmonary:       Neurologic:       Cardiovascular:     (+) -----dysrhythmias, a-flutter,     METS/Exercise Tolerance:     Hematologic:       Musculoskeletal:       GI/Hepatic:       Renal/Genitourinary:       Endo:       Psychiatric/Substance Use:       Infectious Disease:       Malignancy:       Other:            Physical Exam    Airway        Mallampati: II   TM distance: > 3 FB   Neck ROM: full   Mouth opening: > 3 cm    Respiratory Devices and Support     Nasal Canula 2  l/min.     Dental  no notable dental history         Cardiovascular          Rhythm and rate: irregular and tachycardia     Pulmonary                   OUTSIDE LABS:  CBC:   Lab Results   Component Value Date    WBC 5.5 2021    WBC 8.9 2021    HGB 17.3 2021    HGB 18.4 (H) 2021    HCT 55.2 (H) 2021    HCT 57.0 (H) 2021     2021     2021     BMP:   Lab Results   Component Value Date     2021     2021    POTASSIUM 4.3  10/01/2021    POTASSIUM 3.9 09/30/2021    CHLORIDE 104 09/30/2021    CHLORIDE 105 09/29/2021    CO2 28 09/30/2021    CO2 26 09/29/2021    BUN 17 09/30/2021    BUN 22 09/29/2021    CR 1.15 09/30/2021    CR 1.23 09/29/2021    GLC 96 09/30/2021    GLC 94 09/29/2021     COAGS:   Lab Results   Component Value Date    INR 1.03 10/01/2021     POC: No results found for: BGM, HCG, HCGS  HEPATIC:   Lab Results   Component Value Date    ALBUMIN 4.1 11/04/2018    PROTTOTAL 7.6 11/04/2018    ALT 31 11/04/2018    AST 16 11/04/2018    ALKPHOS 68 11/04/2018    BILITOTAL 1.3 11/04/2018     OTHER:   Lab Results   Component Value Date    HENRY 8.4 (L) 09/30/2021    MAG 2.0 10/01/2021    LIPASE 69 (L) 11/04/2018    TSH 1.99 09/29/2021       Anesthesia Plan    ASA Status:  2   NPO Status:  NPO Appropriate    Anesthesia Type: MAC.   Induction: Intravenous.           Consents            Postoperative Care            Comments:                DEREK Chou CRNA

## 2021-10-01 NOTE — ANESTHESIA CARE TRANSFER NOTE
Patient: Derik E Vanvoorhis    Procedure(s):  ANESTHESIA, FOR CARDIOVERSION    Diagnosis: * No pre-op diagnosis entered *  Diagnosis Additional Information: No value filed.    Anesthesia Type:   MAC     Note:    Oropharynx: oropharynx clear of all foreign objects  Level of Consciousness: awake and drowsy  Oxygen Supplementation: face mask  Level of Supplemental Oxygen (L/min / FiO2): 6  Independent Airway: airway patency satisfactory and stable  Dentition: dentition unchanged  Vital Signs Stable: post-procedure vital signs reviewed and stable  Report to RN Given: handoff report given  Destination: cardiopulmonary.          Vitals:  Vitals Value Taken Time   /82 10/01/21 1131   Temp     Pulse 100 10/01/21 1131   Resp 12 10/01/21 1131   SpO2 94 % 10/01/21 1131       Electronically Signed By: DEREK Chou CRNA  October 1, 2021  11:40 AM

## 2021-10-01 NOTE — PROCEDURES
[facility]    Procedure: EP Cardioversion External    Date/Time: 10/1/2021 11:38 AM  Performed by: Luiz Graham MD  Authorized by: Alexey Hudson MD     UNIVERSAL PROTOCOL   Site Marked: Yes  Prior Images Obtained and Reviewed:  Yes  Required items: Required blood products, implants, devices and special equipment available    Patient identity confirmed:  Verbally with patient  Patient was reevaluated immediately before administering moderate or deep sedation or anesthesia  Confirmation Checklist:  Patient's identity using two indicators  Time out: Immediately prior to the procedure a time out was called    Universal Protocol: the Joint Commission Universal Protocol was followed    Preparation: Patient was prepped and draped in usual sterile fashion           ANESTHESIA  Anesthesia was administered and monitored by anesthesiology.  See anesthesia documentation for details.    SEDATION    Patient Sedated: Yes    Vital signs: Vital signs monitored during sedation      PROCEDURE DETAILS  Cardioversion basis: elective  Indications: failure of anti-arrhythmic medications  Pre-procedure rhythm: atrial flutter  Patient position: patient was placed in a supine position  Chest area: chest area exposed  Electrodes: pads  Electrodes placed: anterior-posterior  Number of attempts: 1    Details of Attempts:  DC Cardioversion Procedure Note:    Informed consent obtained.  Pads placed in AP position.  Anesthesia used, please see their documentation.    Synchronized, biphasic 120J shock x 1 delivered and successful in converting atrial flutter RVR to normal sinus rhythm without bradycardia or pauses.    No apparent complications.    Post-procedure rhythm: normal sinus rhythm    PROCEDURE   Patient Tolerance:  Patient tolerated the procedure well with no immediate complications

## 2021-10-01 NOTE — PROGRESS NOTES
Inpatient Cardiology Consultation Progress Note:    Derik Hamm MRN#: [id number]   YOB: 1955 Age: 66 year old     Date of Admission: 9/29/2021  Consult indication: atrial flutter with RVR         Assessment and Plan:     # Atrial flutter with RVR, Asymptomatic, chronicity unknown.  [id number] 1 (almost 2, patient is 66 years old).  Heart rates challenging to control.    Now status post CAMI/DCCV  with restoration of normal sinus rhythm.  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  Patient denies symptoms concerning for angina.    -Discussed with EP colleague Dr. Blevins, appreciate assistance.follow-up with cardiology EP outpatient.  -Continue metoprolol succinate, lisinopril  -Euvolemic on exam, will hold off on empiric diuretics  -Rivaroxaban  -Cardiology follow-up orders placed discharge navigator    Thank you for allowing our team to participate in the care of Derik Hamm,.  Please do not hesitate to page me with any questions or concerns.     Alexey Hudson MD, [facility]  Cardiology  Text Page   October 1, 2021         Interval Events:     -Note status post CAMI/DCCV  -No chest pain, shortness of breath, palpitations         Medications reviewed:   Prior to admission medications:  Prior to Admission medications    Medication Sig Start Date End Date Taking? Authorizing Provider   acetaminophen (TYLENOL) 325 MG tablet Take 325-650 mg by mouth daily as needed for mild pain   Yes Unknown, Entered By History   lisinopril (ZESTRIL) 5 MG tablet Take 1 tablet (5 mg) by mouth daily 10/1/21  Yes Kota Townsend MD   metoprolol succinate ER (TOPROL-XL) 25 MG 24 hr tablet Take 1 tablet (25 mg) by  mouth daily 10/1/21  Yes Kota Townsend MD   multivitamin, therapeutic (THERA-VIT) TABS tablet Take 1 tablet by mouth daily   Yes Unknown, Entered By History   rivaroxaban ANTICOAGULANT (XARELTO) 20 MG TABS tablet Take 1 tablet (20 mg) by mouth daily (with dinner) 10/2/21  Yes Kota Townsend MD        Current medications:  Current Facility-Administered Medications Ordered in Epic   Medication Dose Route Frequency Last Rate Last Admin     acetaminophen (TYLENOL) tablet 650 mg  650 mg Oral Q6H PRN        Or     acetaminophen (TYLENOL) Suppository 650 mg  650 mg Rectal Q6H PRN         sodium chloride (PF) 0.9% PF flush 9.5 mL  9.5 mL Intracatheter q1 min prn        Or     dextrose 50 % injection 9.5 mL  9.5 mL Intravenous q1 min prn         fentaNYL (PF) (SUBLIMAZE) 100 MCG/2ML injection             Give   of usual dose of LONG ACTING insulin AM of procedure IF diabetic   Does not apply Continuous PRN         lidocaine (XYLOCAINE) 5 % ointment   Topical Once        Or     lidocaine (XYLOCAINE) 4 % solution 1.5 mL  1.5 mL Topical Once         lisinopril (ZESTRIL) tablet 5 mg  5 mg Oral Daily   5 mg at 10/01/21 1409     magnesium sulfate 2 g in water intermittent infusion  2 g Intravenous Once PRN         May take oral meds with a sip of water, the morning of Cardioversion procedure.       Does not apply Continuous PRN         melatonin tablet 1 mg  1 mg Oral At Bedtime PRN         metoprolol (LOPRESSOR) injection 2.5-5 mg  2.5-5 mg Intravenous Q2 Min PRN         metoprolol succinate ER (TOPROL-XL) 24 hr tablet 25 mg  25 mg Oral Daily   25 mg at 10/01/21 1409     naloxone (NARCAN) injection 0.2 mg  0.2 mg Intravenous Q2 Min PRN         naloxone (NARCAN) injection 0.4 mg  0.4 mg Intramuscular Q2 Min PRN         ondansetron (ZOFRAN-ODT) ODT tab 4 mg  4 mg Oral Q6H PRN        Or     ondansetron (ZOFRAN) injection 4 mg  4 mg Intravenous Q6H PRN         potassium chloride ER (KLOR-CON M) CR  tablet 20 mEq  20 mEq Oral Once PRN         potassium chloride ER (KLOR-CON M) CR tablet 40 mEq  40 mEq Oral Once PRN         rivaroxaban ANTICOAGULANT (XARELTO) tablet 20 mg  20 mg Oral Daily with supper         sodium chloride (PF) 0.9% PF flush 3 mL  3 mL Intravenous Q1H PRN         sodium chloride (PF) 0.9% PF flush 3 mL  3 mL Intravenous Q8H         sodium chloride (PF) 0.9% PF flush 3 mL  3 mL Intravenous Q1H PRN         Current Outpatient Medications Ordered in Epic   Medication     lisinopril (ZESTRIL) 5 MG tablet     metoprolol succinate ER (TOPROL-XL) 25 MG 24 hr tablet     [START ON 10/2/2021] rivaroxaban ANTICOAGULANT (XARELTO) 20 MG TABS tablet             Physical Exam:   Vital signs were reviewed:  Temperatures:  Current - Temp: 98.5  F (36.9  C); Max - Temp  Av.1  F (36.7  C)  Min: 97.7  F (36.5  C)  Max: 98.5  F (36.9  C)  Respiration range: Resp  Av.8  Min: 8  Max: 20  Pulse range: Pulse  Av.6  Min: 82  Max: 138  Blood pressure range: Systolic (24hrs), Av , Min:90 , Max:138   ; Diastolic (24hrs), Av, Min:52, Max:104    Pulse oximetry range: SpO2  Av.6 %  Min: 93 %  Max: 99 %  No intake or output data in the 24 hours ending 10/01/21 1421  216 lbs 9.6 oz  Body mass index is 27.81 kg/m .   Body surface area is 2.26 meters squared.    Constitutional: appears stated age, in no apparent distress, appears to be well nourished  Eyes: sclera anicteric, conjunctiva normal, no lesions on eyelids or lashes  ENT: normocephalic, without obvious abnormality, atraumatic, external ears without lesions  Pulmonary: clear to auscultation bilaterally, no wheezes, no rales, no increased work of breathing  Cardiovascular: JVP normal, regular rate, regular rhythm, normal S1 and S2, no S3, S4, no murmur appreciated, no lower extremity edema  Gastrointestinal: abdominal exam benign, non-tender, no rigidity, no guarding  Neurologic: awake, alert, face symmetrical, moves all extremities  Skin: no  abnormal rashes or lesions on limited exam, nails normal without discoloration or clubbing, no jaundice  Psychiatric: affect is normal, answers questions appropriately, oriented to self and place         Selected laboratory tests:   Laboratory test results personally reviewed:   CMP  Recent Labs   Lab 10/01/21  0731 21  0737 21  1315   NA  --  136 136   POTASSIUM 4.3 3.9 4.0   CHLORIDE  --  104 105   CO2  --  28 26   ANIONGAP  --  4 5   GLC  --  96 94   BUN  --  17 22   CR  --  1.15 1.23   GFRESTIMATED  --  67 62   HENRY  --  8.4* 9.3   MAG 2.0 1.9 1.7     CBC  Recent Labs   Lab 21  0737 21  1315   WBC 5.5 8.9   RBC 6.15* 6.37*   HGB 17.3 18.4*   HCT 55.2* 57.0*   MCV 90 90   MCH 28.1 28.9   MCHC 31.3* 32.3   RDW 17.4* 18.0*    203     INR  Recent Labs   Lab 10/01/21  0731   INR 1.03     Lab Results   Component Value Date    TROPONIN <0.015 2021     Recent Labs   Lab Test 03/15/19  0702   CHOL 110   HDL 30*   LDL 68   TRIG 61     No results found for: A1C  TSH   Date Value Ref Range Status   2021 1.99 0.40 - 4.00 mU/L Final   2018 1.10 0.40 - 4.00 mU/L Final            Selected Imaging and Additional Data:   Additional data personally reviewed:  Recent Results (from the past 4320 hour(s))   Transesophageal Echocardiogram   Result Value    LVEF  40-45%    Narrative    [id number]  [facility]  [id number]  [id number]^DOE^OPAL^PENNY     [facility]  Echocardiography Laboratory  [address] [place] [address]     Name: ANU GARZA  MRN: [id number]  : 1955  Study Date: 10/01/2021 10:19 AM  Age: 66 yrs  Gender: Male  Patient Location: [facility]  Reason For Study: Afib  Ordering Physician: OPAL AZAR  Performed By: Willa Mathis     BSA: 2.2 m2  Height: 74 in  Weight: 215 lb  HR: 120  ______________________________________________________________________________  Procedure  Complete CAMI Adult. CAMI Probe serial #[id number] (R) was used during  the  procedure. The heart rate, respiratory rate and response to care were  monitored throughout the procedure with the assistance of the nurse.  ______________________________________________________________________________  Interpretation Summary     The left ventricle is normal in size.  The visual ejection fraction is 40-45%.  No regional wall motion abnormalities noted.  Left to right atrial shunt, trivial  A contrast injection (Bubble Study) was performed that was very mildly  positive for flow across the interatrial septum with aggressive Valsalva.  No thrombus is detected in the left atrial appendage.  The rhythm was atrial flutter.  ______________________________________________________________________________  CAMI  I determined this patient to be an appropriate candidate for the planned  sedation and procedure and have reassessed the patient immediately prior to  sedation and procedure. Total sedation time: 12 mins minutes of continuous  bedside 1:1 monitoring. Versed (5mg) was given intravenously. Fentanyl  (125mcg) was given intravenously. Prior to the exam, the oral cavity was  checked and no overcrowding was noted. Consent to the procedure was obtained  prior to sedation. The transesophageal probe was passed without difficulty.  There were no complications associated with this procedure.     Left Ventricle  The left ventricle is normal in size. There is normal left ventricular wall  thickness. The visual ejection fraction is 40-45%. No regional wall motion  abnormalities noted.     Right Ventricle  The right ventricle is normal in size and function.     Atria  The left atrium is moderately dilated. Right atrial size is normal. Left to  right atrial shunt, trivial. A contrast injection (Bubble Study) was performed  that was mildly positive for flow across the interatrial septum. No thrombus  is detected in the left atrial appendage.     Mitral Valve  The mitral valve leaflets are mildly thickened. There  is mild (1+) mitral  regurgitation.     Tricuspid Valve  There is mild (1+) tricuspid regurgitation.     Aortic Valve  There is trivial trileaflet aortic sclerosis. There is trace aortic  regurgitation.     Pulmonic Valve  There is trace pulmonic valvular regurgitation.     Vessels  The aortic root is normal size. Normal size ascending aorta.     Pericardial/Pleural  There is no pericardial effusion.     Rhythm  The rhythm was atrial flutter.  ______________________________________________________________________________  Report approved by: Kellie Dunn 10/01/2021 11:41 AM     ______________________________________________________________________________      Echocardiogram Complete   Result Value    LVEF  45-50%    Narrative    [id number]  [id number]  [id number]  [id number]^KAREY^PAULO^MARY ANN     [facility]  Echocardiography Laboratory  [address] [place] [address]     Name: ANU GARZA  MRN: [id number]  : 1955  Study Date: 2021 08:24 AM  Age: 66 yrs  Gender: Male  Patient Location: [facility] [place]  Reason For Study: Atrial Flutter  Ordering Physician: PAULO EDEN  Referring Physician: Murali De León MD  Performed By: Cony Dowd RDCS     BSA: 2.3 m2  Height: 74 in  Weight: 221 lb  HR: 71  BP: 110/73 mmHg  ______________________________________________________________________________  Procedure  Complete Portable Echo Adult.  ______________________________________________________________________________  Interpretation Summary     Left ventricular systolic function is mildly reduced.The visual ejection  fraction is 45-50%.  The right ventricular systolic function is normal.  There is moderate (2+) mitral regurgitation.  Dilation of the inferior vena cava is present with abnormal respiratory  variation in diameter.  Rhythm appears atrial flutter.     Echo dated 2018 LVEF 60-65% , trace MR, sinus  rhythm.  ______________________________________________________________________________  Left Ventricle  The left ventricle is normal in size. There is mild concentric left  ventricular hypertrophy. Left ventricular systolic function is mildly reduced.  Diastolic Doppler findings (E/E' ratio and/or other parameters) suggest left  ventricular filling pressures are indeterminate. The visual ejection fraction  is 45-50%. There is mild global hypokinesia of the left ventricle.     Right Ventricle  The right ventricle is normal size. The right ventricular systolic function is  normal.     Atria  The left atrium is mildly dilated. Right atrial size is normal. There is no  color Doppler evidence of an atrial shunt.     Mitral Valve  There is moderate (2+) mitral regurgitation. There is no mitral valve  stenosis.     Tricuspid Valve  There is trace tricuspid regurgitation. The right ventricular systolic  pressure is approximated at 22.7 mmHg plus the right atrial pressure.     Aortic Valve  The aortic valve is trileaflet. No aortic regurgitation is present. No aortic  stenosis is present.     Pulmonic Valve  There is trace pulmonic valvular regurgitation. There is no pulmonic valvular  stenosis.     Vessels  The aortic root is normal size. Normal size ascending aorta. Dilation of the  inferior vena cava is present with abnormal respiratory variation in diameter.     Pericardium  There is no pericardial effusion.     Rhythm  The rhythm was atrial flutter.  ______________________________________________________________________________  MMode/2D Measurements & Calculations  IVSd: 1.5 cm     LVIDd: 4.9 cm  LVIDs: 3.8 cm  LVPWd: 1.2 cm  FS: 22.2 %  LV mass(C)d: 264.6 grams  LV mass(C)dI: 116.7 grams/m2  Ao root diam: 3.4 cm  LA dimension: 3.7 cm  asc Aorta Diam: 3.6 cm  LA/Ao: 1.1     EF(MOD-bp): 38.8 %  LA Volume (BP): 77.0 ml  LA Volume Index (BP): 33.9 ml/m2  RWT: 0.50     Doppler Measurements & Calculations  MV E max nataliia:  123.7 cm/sec  MV max P.1 mmHg  MV mean P.0 mmHg  MV V2 VTI: 23.3 cm  MV dec time: 0.13 sec  MR ERO: 0.20 cm2  MR volume: 27.4 ml  PA acc time: 0.08 sec  TR max nataliia: 238.0 cm/sec  TR max P.7 mmHg  E/E' av.9  Lateral E/e': 9.0  Medial E/e': 8.8     ______________________________________________________________________________  Report approved by: Kellie Su 2021 09:31 AM

## 2021-10-01 NOTE — PLAN OF CARE
A/Ox4. Tele A flutter HR (80-160s). Pt on schedule PO diltiazem. RA, LS clear. PIV (L) arm, infusing Heparin 1650u. NPO for this writer's shift for CAMI. Cards following. Denies CP/SOB. Ambulates independently. VSS. Plan of discharge, 2 days.Continue POC.

## 2021-10-01 NOTE — PRE-PROCEDURE
GENERAL PRE-PROCEDURE:   Procedure:  CAMI and cardioversion  Date/Time:  10/1/2021 10:37 AM    Verbal consent obtained?: Yes    Written consent obtained?: Yes    Risks and benefits: Risks, benefits and alternatives were discussed    Consent given by:  Patient  Patient states understanding of procedure being performed: Yes    Patient's understanding of procedure matches consent: Yes    Procedure consent matches procedure scheduled: Yes    Expected level of sedation:  Moderate  Appropriately NPO:  Yes  ASA Class:  2  Mallampati  :  Grade 2- soft palate, base of uvula, tonsillar pillars, and portion of posterior pharyngeal wall visible  Lungs:  Lungs clear with good breath sounds bilaterally  Heart:  A-flutter  History & Physical reviewed:  History and physical reviewed and no updates needed  Statement of review:  I have reviewed the lab findings, diagnostic data, medications, and the plan for sedation

## 2021-10-01 NOTE — PLAN OF CARE
"/65 (BP Location: Right arm)   Pulse 85   Temp 97.8  F (36.6  C) (Oral)   Resp 16   Ht 1.88 m (6' 2\")   Wt 100 kg (220 lb 8 oz)   SpO2 95%   BMI 28.31 kg/m      A&O. Up ad gissell. Denies pain, N/V. Tele is Aflutter VVR (HR 80-160s). Heparin infusing at 1650units/hr. Cards following, plan for CAMI Cardioversion tomorrow. NPO after midnight.   "

## 2021-10-01 NOTE — DISCHARGE SUMMARY
[facility]  Discharge Summary  Name: Derik Hamm    MRN: [id number]  YOB: 1955    Age: 66 year old  Date of Discharge:  10/1/2021  Date of Admission: 9/29/2021  Primary Care Provider: Murlai De León  Discharge Physician:  Jose Townsend MD  Discharging Service:  Hospitalist      Hospital Course/Discharge Diagnoses:  Derik Hamm is a 66 year old male admitted on 9/29/2021 with new onset atrial flutter with RVR which likely has been ongoing for up to a week or more.  No focal trigger has been identified and he was started on diltiazem as well as a heparin drip.  Echocardiogram shows EF of 45 to 50% with moderate mitral regurgitation.  Cardiology has been consulted and he underwent uneventful and successful CAMI cardioversion on 10/1.    Given his mildly impaired EF he will be discharged on oral lisinopril, Toprol-XL and in the post cardioversion.  Will be maintained on Xarelto.  He will follow-up with cardiology in clinic for reevaluation and I have also requested a 7-day cardiac monitor patch be placed to evaluate for any further asymptomatic atrial flutter over the next week.      Problem List/Assessment and Plan:   New atrial flutter with RVR: Was asymptomatic.  Patient unclear.  TSH normal.  Trigger unclear.  Did have history of atrial fibrillation with RVR during episode of appendicitis in 2018.  He does not use alcohol.  Drinks 2 Mountain Dew energy drinks per day.  -Discharging on Xarelto 20 mg each evening.  -Toprol-XL 25 mg daily, lisinopril 5 mg daily.  -TTE shows slightly impaired EF which may be tachycardia deviated.  Defer to cardiology regarding further ischemic work-up in the outpatient setting.  He does have moderate mitral regurgitation as well which will need to be followed in the outpatient setting.  -Decrease caffeine intake     Polycythemia: Hemoglobin 18.4 upon presentation, hematocrit 57.  2 years ago hemoglobin 17.4.  He smoked in the  past, but quit in the 1980s.  Isolated erythrocytosis.  WBC normal at 8.9 with normal differential and platelet count 203.  He does have flushing of the upper chest and face.  Consideration for P vera.  -- peripheral smear grossly normal aside from some polycythemia.  --Would recommend hematology follow-up.     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  Still has blurry vision.  Has follow-up next month.     Skin lesions: Lesion on the chest biopsied last week.  Follows with dermatology.    Reduced ejection fraction: Likely tachycardia mediated.  Potentially could be related to ischemia.  Cardiology following.    Started ACE inhibitor after cardioversion and will need follow-up echocardiogram and potentially outpatient stress testing/other ischemic work-up to be directed by cardiology.  Clinically not in heart failure at this time.    Discharge Disposition:  Discharged to home     Allergies:  No Known Allergies     Discharge Medications:        Review of your medicines      START taking      Dose / Directions   lisinopril 5 MG tablet  Commonly known as: ZESTRIL  Used for: Secondary cardiomyopathy (H)      Dose: 5 mg  Take 1 tablet (5 mg) by mouth daily  Quantity: 30 tablet  Refills: 0     metoprolol succinate ER 25 MG 24 hr tablet  Commonly known as: TOPROL-XL      Dose: 25 mg  Take 1 tablet (25 mg) by mouth daily  Quantity: 30 tablet  Refills: 0     rivaroxaban ANTICOAGULANT 20 MG Tabs tablet  Commonly known as: XARELTO  Indication: atrial flutter      Dose: 20 mg  Start taking on: October 2, 2021  Take 1 tablet (20 mg) by mouth daily (with dinner)  Quantity: 30 tablet  Refills: 0        CONTINUE these medicines which have NOT CHANGED      Dose / Directions   acetaminophen 325 MG tablet  Commonly known as: TYLENOL      Dose: 325-650  "mg  Take 325-650 mg by mouth daily as needed for mild pain  Refills: 0     multivitamin, therapeutic Tabs tablet      Dose: 1 tablet  Take 1 tablet by mouth daily  Refills: 0           Where to get your medicines      These medications were sent to [place], [place] - [address] [facility]  [address] [facility] [address]    Phone: [phone]     lisinopril 5 MG tablet    metoprolol succinate ER 25 MG 24 hr tablet    rivaroxaban ANTICOAGULANT 20 MG Tabs tablet         Condition on Discharge:  Discharge condition: Stable       Code status on discharge: Full Code     History of Illness:  See detailed admission note for full details.    Physical Exam:  Vital signs:  Temp: 98.5  F (36.9  C) Temp src: Oral BP: 122/87 Pulse: 104   Resp: 18 SpO2: 96 % O2 Device: None (Room air) Oxygen Delivery: 4 LPM Height: 188 cm (6' 2\") Weight: 98.2 kg (216 lb 9.6 oz)  Estimated body mass index is 27.81 kg/m  as calculated from the following:    Height as of this encounter: 1.88 m (6' 2\").    Weight as of this encounter: 98.2 kg (216 lb 9.6 oz).    Wt Readings from Last 1 Encounters:   10/01/21 98.2 kg (216 lb 9.6 oz)     General: Alert, awake, no acute distress.  HEENT: NC/AT, eyes anicteric, external occular movements intact, face symmetric.  Dentition WNL, MM moist.  Cardiac: RRR, S1, S2.  No murmurs appreciated.  Pulmonary: Normal chest rise, normal work of breathing.  Lungs CTA BL  Abdomen: soft, non-tender, non-distended.  Bowel Sounds Present.  No guarding.  Extremities: no deformities.  Warm, well perfused.  Skin: no rashes or lesions noted.  Warm and Dry.  Neuro: No focal deficits noted.  Speech clear.  Coordination and strength grossly normal.  Psych: Appropriate affect.    Procedures other than Imaging:  CAMI cardioversion     Imaging:  Recent Results (from the past 48 hour(s))   Echocardiogram Complete   Result Value    LVEF  45-50%    Narrative    " [id number]  [id number]  [id number]  [id number]^KAREY^PAULO^MARY ANN     [facility]  Echocardiography Laboratory  [address] [place] [address]     Name: ANU GARZA  MRN: [id number]  : 1955  Study Date: 2021 08:24 AM  Age: 66 yrs  Gender: Male  Patient Location: [facility]  Reason For Study: Atrial Flutter  Ordering Physician: PAULO EDEN  Referring Physician: Murali De León MD  Performed By: Cony Dowd [facility]     BSA: 2.3 m2  Height: 74 in  Weight: 221 lb  HR: 71  BP: 110/73 mmHg  ______________________________________________________________________________  Procedure  Complete Portable Echo Adult.  ______________________________________________________________________________  Interpretation Summary     Left ventricular systolic function is mildly reduced.The visual ejection  fraction is 45-50%.  The right ventricular systolic function is normal.  There is moderate (2+) mitral regurgitation.  Dilation of the inferior vena cava is present with abnormal respiratory  variation in diameter.  Rhythm appears atrial flutter.     Echo dated 2018 LVEF 60-65% , trace MR, sinus rhythm.  ______________________________________________________________________________  Left Ventricle  The left ventricle is normal in size. There is mild concentric left  ventricular hypertrophy. Left ventricular systolic function is mildly reduced.  Diastolic Doppler findings (E/E' ratio and/or other parameters) suggest left  ventricular filling pressures are indeterminate. The visual ejection fraction  is 45-50%. There is mild global hypokinesia of the left ventricle.     Right Ventricle  The right ventricle is normal size. The right ventricular systolic function is  normal.     Atria  The left atrium is mildly dilated. Right atrial size is normal. There is no  color Doppler evidence of an atrial shunt.     Mitral Valve  There is moderate (2+) mitral regurgitation. There is no mitral  valve  stenosis.     Tricuspid Valve  There is trace tricuspid regurgitation. The right ventricular systolic  pressure is approximated at 22.7 mmHg plus the right atrial pressure.     Aortic Valve  The aortic valve is trileaflet. No aortic regurgitation is present. No aortic  stenosis is present.     Pulmonic Valve  There is trace pulmonic valvular regurgitation. There is no pulmonic valvular  stenosis.     Vessels  The aortic root is normal size. Normal size ascending aorta. Dilation of the  inferior vena cava is present with abnormal respiratory variation in diameter.     Pericardium  There is no pericardial effusion.     Rhythm  The rhythm was atrial flutter.  ______________________________________________________________________________  MMode/2D Measurements & Calculations  IVSd: 1.5 cm     LVIDd: 4.9 cm  LVIDs: 3.8 cm  LVPWd: 1.2 cm  FS: 22.2 %  LV mass(C)d: 264.6 grams  LV mass(C)dI: 116.7 grams/m2  Ao root diam: 3.4 cm  LA dimension: 3.7 cm  asc Aorta Diam: 3.6 cm  LA/Ao: 1.1     EF(MOD-bp): 38.8 %  LA Volume (BP): 77.0 ml  LA Volume Index (BP): 33.9 ml/m2  RWT: 0.50     Doppler Measurements & Calculations  MV E max nataliia: 123.7 cm/sec  MV max P.1 mmHg  MV mean P.0 mmHg  MV V2 VTI: 23.3 cm  MV dec time: 0.13 sec  MR ERO: 0.20 cm2  MR volume: 27.4 ml  PA acc time: 0.08 sec  TR max nataliia: 238.0 cm/sec  TR max P.7 mmHg  E/E' av.9  Lateral E/e': 9.0  Medial E/e': 8.8     ______________________________________________________________________________  Report approved by: Kellie Su 2021 09:31 AM         Transesophageal Echocardiogram   Result Value    LVEF  40-45%    [facility]    [id number]  [facility]  [id number]  [id number]^DOE^OPAL^PENNY     [facility]  Echocardiography Laboratory  [address] [place] [address]     Name: ALETHEAANU FUENTES SUNI  MRN: [id number]  : 1955  Study Date: 10/01/2021 10:19 AM  Age: 66 yrs  Gender: Male  Patient Location: [facility]  Reason For  Study: Afib  Ordering Physician: OPAL AZAR  Performed By: Willa Mathis     BSA: 2.2 m2  Height: 74 in  Weight: 215 lb  HR: 120  ______________________________________________________________________________  Procedure  Complete CAMI Adult. CAMI Probe serial #[id number] (R) was used during the  procedure. The heart rate, respiratory rate and response to care were  monitored throughout the procedure with the assistance of the nurse.  ______________________________________________________________________________  Interpretation Summary     The left ventricle is normal in size.  The visual ejection fraction is 40-45%.  No regional wall motion abnormalities noted.  Left to right atrial shunt, trivial  A contrast injection (Bubble Study) was performed that was very mildly  positive for flow across the interatrial septum with aggressive Valsalva.  No thrombus is detected in the left atrial appendage.  The rhythm was atrial flutter.  ______________________________________________________________________________  CAMI  I determined this patient to be an appropriate candidate for the planned  sedation and procedure and have reassessed the patient immediately prior to  sedation and procedure. Total sedation time: 12 mins minutes of continuous  bedside 1:1 monitoring. Versed (5mg) was given intravenously. Fentanyl  (125mcg) was given intravenously. Prior to the exam, the oral cavity was  checked and no overcrowding was noted. Consent to the procedure was obtained  prior to sedation. The transesophageal probe was passed without difficulty.  There were no complications associated with this procedure.     Left Ventricle  The left ventricle is normal in size. There is normal left ventricular wall  thickness. The visual ejection fraction is 40-45%. No regional wall motion  abnormalities noted.     Right Ventricle  The right ventricle is normal in size and function.     Atria  The left atrium is moderately dilated. Right atrial size is  normal. Left to  right atrial shunt, trivial. A contrast injection (Bubble Study) was performed  that was mildly positive for flow across the interatrial septum. No thrombus  is detected in the left atrial appendage.     Mitral Valve  The mitral valve leaflets are mildly thickened. There is mild (1+) mitral  regurgitation.     Tricuspid Valve  There is mild (1+) tricuspid regurgitation.     Aortic Valve  There is trivial trileaflet aortic sclerosis. There is trace aortic  regurgitation.     Pulmonic Valve  There is trace pulmonic valvular regurgitation.     Vessels  The aortic root is normal size. Normal size ascending aorta.     Pericardial/Pleural  There is no pericardial effusion.     Rhythm  The rhythm was atrial flutter.  ______________________________________________________________________________  Report approved by: Kellie Dunn 10/01/2021 11:41 AM     ______________________________________________________________________________      EP Cardioversion External    Narrative    Luzi Graham MD     10/1/2021 11:39 AM  [facility]    Procedure: EP Cardioversion External    Date/Time: 10/1/2021 11:38 AM  Performed by: Luiz Graham MD  Authorized by: Alexey Hudson MD     UNIVERSAL PROTOCOL   Site Marked: Yes  Prior Images Obtained and Reviewed:  Yes  Required items: Required blood products, implants, devices and special   equipment available    Patient identity confirmed:  Verbally with patient  Patient was reevaluated immediately before administering moderate or deep   sedation or anesthesia  Confirmation Checklist:  Patient's identity using two indicators  Time out: Immediately prior to the procedure a time out was called    Universal Protocol: the Joint Commission Universal Protocol was followed    Preparation: Patient was prepped and draped in usual sterile fashion           ANESTHESIA  Anesthesia was administered and monitored by anesthesiology.  See   anesthesia  documentation for details.    SEDATION    Patient Sedated: Yes    Vital signs: Vital signs monitored during sedation      PROCEDURE DETAILS  Cardioversion basis: elective  Indications: failure of anti-arrhythmic medications  Pre-procedure rhythm: atrial flutter  Patient position: patient was placed in a supine position  Chest area: chest area exposed  Electrodes: pads  Electrodes placed: anterior-posterior  Number of attempts: 1    Details of Attempts:  DC Cardioversion Procedure Note:    Informed consent obtained.  Pads placed in AP position.  Anesthesia used, please see their documentation.    Synchronized, biphasic 120J shock x 1 delivered and successful in   converting atrial flutter RVR to normal sinus rhythm without bradycardia   or pauses.    No apparent complications.    Post-procedure rhythm: normal sinus rhythm    PROCEDURE   Patient Tolerance:  Patient tolerated the procedure well with no immediate   complications              Consultations:  Consultation during this admission received from cardiology.       Recent Lab Results:  Recent Labs   Lab 09/30/21  0737 09/29/21  1315   WBC 5.5 8.9   HGB 17.3 18.4*   HCT 55.2* 57.0*   MCV 90 90    203          Lab Results   Component Value Date     09/30/2021     09/29/2021     03/15/2019     11/07/2018     11/06/2018    Lab Results   Component Value Date    CHLORIDE 104 09/30/2021    CHLORIDE 105 09/29/2021    CHLORIDE 106 03/15/2019    CHLORIDE 108 11/07/2018    CHLORIDE 108 11/06/2018    Lab Results   Component Value Date    BUN 17 09/30/2021    BUN 22 09/29/2021    BUN 20 03/15/2019    BUN 19 11/07/2018    BUN 18 11/06/2018      Lab Results   Component Value Date    POTASSIUM 4.3 10/01/2021    POTASSIUM 3.9 09/30/2021    POTASSIUM 4.0 09/29/2021    POTASSIUM 4.4 03/15/2019    POTASSIUM 3.9 11/10/2018    POTASSIUM 3.4 11/09/2018    Lab Results   Component Value Date    CO2 28 09/30/2021    CO2 26 09/29/2021    CO2 26  03/15/2019    CO2 21 11/07/2018    CO2 20 11/06/2018    Lab Results   Component Value Date    CR 1.15 09/30/2021    CR 1.23 09/29/2021    CR 1.10 03/15/2019    CR 0.91 11/07/2018    CR 0.92 11/06/2018        Recent Labs   Lab 09/29/21  1315   TROPONIN <0.015          Pending Results:    Unresulted Labs Ordered in the Past 30 Days of this Admission     No orders found from 8/30/2021 to 9/30/2021.           Discharge Instructions and Follow-Up:   Discharge Procedure Orders   Discharge Order: F/U with Cardiac  LUIS DANIEL   Standing Status: Future   Referral Priority: Routine Referral Type: Consultation     Follow-Up with Electrophysiologist   Standing Status: Future   Referral Priority: Routine Referral Type: Consultation     Follow-Up with Cardiologist   Standing Status: Future   Referral Priority: Routine Referral Type: Consultation     Reason for your hospital stay   Order Comments: You were admitted for atrial flutter     Activity   Order Comments: Your activity upon discharge: activity as tolerated     Order Specific Question Answer Comments   Is discharge order? Yes      Follow-up and recommended labs and tests    Order Comments: Follow up with your primary care doctor in one week or less for hospital follow up.  Please have an EKG checked at that time.  Also, your red blood cell count was somewhat elevated and should be rechecked.  If this is persistently elevated you should be evaluated by hematologist to see if this might be associated with another underlying condition.     Diet   Order Comments: Follow this diet upon discharge: Orders Placed This Encounter      NPO for Medical/Clinical Reasons Except for: Other; Specify: NPO for 2 hours after CAMI then Advance diet as tolerated to Adult Basic Diet     Order Specific Question Answer Comments   Is discharge order? Yes        Total time spent in face to face contact with the patient and coordinating discharge was:  50 Minutes.

## 2021-10-01 NOTE — ANESTHESIA POSTPROCEDURE EVALUATION
Patient: Derik E Vanvoaliya    Procedure(s):  ANESTHESIA, FOR CARDIOVERSION    Diagnosis:* No pre-op diagnosis entered *  Diagnosis Additional Information: No value filed.    Anesthesia Type:  No value filed.    Note:  Disposition: Inpatient   Postop Pain Control: Uneventful            Sign Out: Well controlled pain   PONV: No   Neuro/Psych: Uneventful            Sign Out: Acceptable/Baseline neuro status   Airway/Respiratory: Uneventful            Sign Out: Acceptable/Baseline resp. status; O2 supplementation               Oxygen: Face mask   CV/Hemodynamics: Uneventful            Sign Out: Acceptable CV status; No obvious hypovolemia; No obvious fluid overload   Other NRE: NONE   DID A NON-ROUTINE EVENT OCCUR?            Last vitals:  Vitals Value Taken Time   /82 10/01/21 1131   Temp     Pulse 100 10/01/21 1131   Resp 12 10/01/21 1131   SpO2 94 % 10/01/21 1131       Electronically Signed By: DEREK Chou CRNA  October 1, 2021  11:38 AM

## 2021-10-02 DIAGNOSIS — Z71.89 OTHER SPECIFIED COUNSELING: ICD-10-CM

## 2021-10-04 ENCOUNTER — PATIENT OUTREACH (OUTPATIENT)
Dept: CARE COORDINATION | Facility: CLINIC | Age: 66
End: 2021-10-04

## 2021-10-04 NOTE — PROGRESS NOTES
Clinic Care Coordination Contact  [facility]: Post-Discharge Note  SITUATION                                                      Admission:    Admission Date: 09/29/21   Reason for Admission: atrial flutter with RVR  Discharge:   Discharge Date: 10/01/21  Discharge Diagnosis: atrial flutter with RVR    BACKGROUND                                                      Derik Hamm is a 66 year old male former smoker with PMH including episode of atrial fibrillation during hospitalization for appendicitis in 2018, skin lesions, and right eye amoeba infection diagnosed this last year and has been on antibiotic eyedrops for this who presents to the ER after initially going to clinic for his annual visit and physical exam.  He was actually found to be tachycardic during his biopsy from a skin lesion on his chest last week and was referred to urgent care.  He went to 2 different urgent cares and the weight was over 3 hours so he did not wait.  He had an annual physical today and there he was found to be tachycardic as well.  An EKG showed SVT with heart rate 170 so he was referred to the ER.  He did have atrial fibrillation when he was hospitalized for appendicitis in 2018, but no other issues since.  Since his appendicitis he has felt generally fatigued and his mood is not as good.  He is less ambitious at work.  He does still go to the gym every day and exercises.  He denies any chest pain or pressure sensation with exertion or at rest.  He does not get short of breath unless exercising vigorously.  Sometimes he feels anxious, but he does not really have any palpitations or realize that his heart is beating fast.  Has not had any leg swelling or leg pain.  No recent fevers, chills, cough, nausea, vomiting, diarrhea, abdominal pain, dysuria.  He is vaccinated for COVID-19.  He smoked in the past, but quit in the 1980s.  No alcohol use.  Does drink 2 Mountain Dew energy drinks twice per day.    ASSESSMENT   "         Discharge Assessment  How are you doing now that you are home?: \"I'm doing great, I'm back at work\"  How are your symptoms? (Red Flag symptoms escalate to triage hotline per guidelines): Improved  Do you feel your condition is stable enough to be safe at home until your provider visit?: Yes  Does the patient have their discharge instructions? : Yes  Does the patient have questions regarding their discharge instructions? : No  Were you started on any new medications or were there changes to any of your previous medications? : Yes  Does the patient have all of their medications?: Yes  Do you have questions regarding any of your medications? : No  Do you have all of your needed medical supplies or equipment (DME)?  (i.e. oxygen tank, CPAP, cane, etc.): Yes                  PLAN                                                      Outpatient Plan:    Follow up with your primary care doctor in one week or less for hospital follow up.  Please have an EKG checked at that time.  Also, your red blood cell count was somewhat elevated and should be rechecked.  If this is persistently elevated you should be evaluated by hematologist to see if this might be associated with another underlying condition.         Future Appointments   Date Time Provider Department Center   10/21/2021  8:45 AM Anson Cheng MD [facility]   12/14/2021 12:15 PM Kristi Lyons, [facility]          For any urgent concerns, please contact our 24 hour nurse triage line: [phone] ([phone])         Flower Cottrell  Community Health Worker  [facility]  [phone]                  "

## 2021-10-06 ENCOUNTER — TELEPHONE (OUTPATIENT)
Dept: CARDIOLOGY | Facility: CLINIC | Age: 66
End: 2021-10-06

## 2021-10-06 NOTE — TELEPHONE ENCOUNTER
Patient was evaluated by cardiology while inpatient for new onset asymptomatic A. Flutter with RVR-chronicity unknown. Normal troponin. PMH: PAF 11/2018 after lap appy-no known recurrence, polycythemia. 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 10/1/21: CAMI with successful DCCV to NSR after shock x 1. Pt was started on Lisinopril, Xarelto and Metoprolol at time of discharge and wearing a 7 day Zio Patch monitor. Called patient to discuss any post hospital d/c questions he may have, review medication changes, and confirm f/u appts. Patient denied any questions regarding new medications or changes to PTA medications. Patient denied any SOB, chest pain, or light headedness. RN confirmed with patient that he needs to be scheduled for a cardiology ROBERTO, cardiologist and EP MD follow up appts-scheduling phone number provided. Patient advised to call clinic with any cardiac related questions or concerns prior to this roberto't. Patient verbalized understanding and agreed with plan. PANFILO Howell RN.

## 2021-10-21 ENCOUNTER — OFFICE VISIT (OUTPATIENT)
Dept: OPHTHALMOLOGY | Facility: CLINIC | Age: 66
End: 2021-10-21
Attending: OPHTHALMOLOGY
Payer: COMMERCIAL

## 2021-10-21 DIAGNOSIS — H17.9 CORNEAL SCAR, RIGHT EYE: ICD-10-CM

## 2021-10-21 DIAGNOSIS — B60.13 ACANTHAMOEBA KERATITIS: ICD-10-CM

## 2021-10-21 DIAGNOSIS — H26.131 TOTAL TRAUMATIC CATARACT OF RIGHT EYE: Primary | ICD-10-CM

## 2021-10-21 DIAGNOSIS — H04.121 DRY EYE SYNDROME OF RIGHT EYE: ICD-10-CM

## 2021-10-21 DIAGNOSIS — H16.401 CORNEAL NEOVASCULARIZATION OF RIGHT EYE: ICD-10-CM

## 2021-10-21 PROCEDURE — 99214 OFFICE O/P EST MOD 30 MIN: CPT | Mod: GC | Performed by: OPHTHALMOLOGY

## 2021-10-21 PROCEDURE — 76519 ECHO EXAM OF EYE: CPT | Performed by: OPHTHALMOLOGY

## 2021-10-21 PROCEDURE — 92285 EXTERNAL OCULAR PHOTOGRAPHY: CPT | Performed by: OPHTHALMOLOGY

## 2021-10-21 PROCEDURE — G0463 HOSPITAL OUTPT CLINIC VISIT: HCPCS

## 2021-10-21 ASSESSMENT — VISUAL ACUITY
OD_CC+: -1
OS_CC: 20/20
OS_CC+: -1
METHOD: SNELLEN - LINEAR
OD_CC: 20/80

## 2021-10-21 ASSESSMENT — CONF VISUAL FIELD
OD_INFERIOR_NASAL_RESTRICTION: 3
OD_SUPERIOR_NASAL_RESTRICTION: 3
OS_NORMAL: 1

## 2021-10-21 ASSESSMENT — SLIT LAMP EXAM - LIDS: COMMENTS: MGD

## 2021-10-21 ASSESSMENT — TONOMETRY
IOP_METHOD: ICARE
OD_IOP_MMHG: 12
OS_IOP_MMHG: 15

## 2021-10-21 ASSESSMENT — EXTERNAL EXAM - LEFT EYE: OS_EXAM: NORMAL

## 2021-10-21 ASSESSMENT — EXTERNAL EXAM - RIGHT EYE: OD_EXAM: NORMAL

## 2021-10-21 NOTE — NURSING NOTE
Chief Complaints and History of Present Illnesses   Patient presents with     Follow Up     Acanthamoeba keratitis, OD     Chief Complaint(s) and History of Present Illness(es)     Follow Up     Laterality: right eye    Course: stable    Associated symptoms: Negative for eye pain, burning, tearing and headache    Pain scale: 0/10    Comments: Acanthamoeba keratitis, OD              Comments     Using :  PFAT's BID right eye    Akanksha Benítez COT 8:28 AM October 21, 2021               .

## 2021-10-21 NOTE — PROGRESS NOTES
CC:  Acanthamoeba keratitis OD     Referring provider: Dr. Rosie Han     HPI:  Derik Hamm is a(n) 65 year old male who presents as follow up for Acanthamoeba Keratitis right eye.     Patient initially presented for evaluation of persistent keratoconjunctivitis, OD, since 10/5/2020. Initially seen by Dr. Ngo at [facility] and felt it was related to severe dryness vs bacterial keratitis, so he was started on PFATs and Vigamox Q2H WA. Patient was seen frequently for follow up; he was started on Valtrex 500 mg TID on 10/9/20 and has been on that since then. Despite this therapy, pt failed to improve so he was started prednisolone. He has had a waxing and waning course, but more recently has gradually declined. Stopped PF on 11/2 after running out. He was seen by Dr. Ngo on 11/2 who noted worsening VA and worsening exam, so the patient was referred to Dr. Han, whom he saw 11/4/20. Dr. Han referred to our clinic due to concerns for acanthamoeba keratitis. Culture positive for acanthamoeba on 11/05/20.     Interval History:  No changes over the interval, no pain, vision stable, no concerns. Drops as below.   Patient doing well.     POHx:  PVD OS  Hyperopia OU  Presbyopia OU     Glasses: Yes  CTL wearer: yes - none x1.5 months; wears while showering or in the sauna; he sleeps in them once every 3-4 weeks; never wears them in a lake or pool.      Family hx of eye disease: negative for glaucoma/macular degeneration     Social Hx: (-) smoking, (-) EtOH, (-) illicit drugs     Meds:  PFATS BID (supposed to be more frequent)       -      Surgical Hx:  No eye surgery     A/P:  # Acanthamoeba keratitis, OD              - Symptoms started 10/5/2020              - Acanthamoeba risks: sleeps in CTL, wears in shower, sauna              - Cultures obtained 11/05/20,   Culture positive for acanthamoeba.              - Confocal: many PMNs, no definite acanthamoeba (but could be masked by deeper infection, no fungal  elements)              - Discussed non-FDA use of PHMB     12/31: Pain worse likely 2/2 surface toxicity from PHMB, although difficult to know. Plan to increase lubrication.   1/07: Better overall.  pain better.  Stromal haze/KP better as well.  1/19: Stable from previous visit w/ continued diffuse haze,no epi defects.   1/28: Slightly improved corneal clarity.  2/4: Slightly improved corneal clarity  2/18: Exam largely stable, new central bulla  3/2: increased soreness and tearing past few days, exam stable.  Diffuse haze, edema but improved from last photos.  3/11: Worsening pain and soreness over the past week. 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 CL placed right eye  4/8: BCL really helped his pain. Much worse with it off. Epi defect larger with rolled edges. 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 Infiltrate improved. AmbioDisk 9.0 + Kontur lens 8.6/16.0 4/8/21 4/22: Pain improving. Ulcer looks stable to slightly improved. Prokera ring placed  4/29: right eye Much improved infiltrate. Epi defect resolving. Am dissolved.  5/6/21:  AM dissolved, ring removed.  Epi healed, small superocentral crescentic infiltrate remains w/ surrounding central scarring.    5/20: significant improvement in Snellen acuity today. Crescentic infiltrate smaller and less dense since 5/6 photo.  6/17: small central infiltrate present at inferior aspect of pupil - smaller than 5/20. Edema much improved. Vision stable.   7/16/21: Stable vision, pt comfortable.    Would like to see active infiltrate completely resolved before stopping / reducing PHMB further.  8/20/21: PHMB stopped.  Doing well.  10/21/21: Doing well.  Stable off the PHMB.  Disc right eye PKP with cat ext and IOL.  Disc R, B, PC and option. IOL calcs today.  Pt will decide about general anesthesia vs retrobulbar block. 3 hours for  triple.                PLAN:   Pt will  decide if he wants triple or will wait. He will call      RTC:    wks w/ VT, call if problems develop.    Nahum Kent,   Fellow, Cornea & External Disease  Department of Ophthalmology  [facility]    Attending Physician Attestation:  Complete documentation of historical and exam elements from today's encounter can be found in the full encounter summary report (not reduplicated in this progress note).  I personally obtained the chief complaint(s) and history of present illness.  I confirmed and edited as necessary the review of systems, past medical/surgical history, family history, social history, and examination findings as documented by others; and I examined the patient myself.  I personally reviewed the relevant tests, images, and reports as documented above.  I formulated and edited as necessary the assessment and plan and discussed the findings and management plan with the patient and family. - Anson Cheng MD   I personally reviewed the ophthalmic test(s) associated with this encounter, agree with the interpretation(s) as documented by the resident/fellow, and have edited the corresponding report(s) as necessary. Anson Cheng MD

## 2021-10-22 ENCOUNTER — OFFICE VISIT (OUTPATIENT)
Dept: CARDIOLOGY | Facility: CLINIC | Age: 66
End: 2021-10-22
Attending: INTERNAL MEDICINE
Payer: COMMERCIAL

## 2021-10-22 VITALS
BODY MASS INDEX: 27.46 KG/M2 | HEART RATE: 96 BPM | WEIGHT: 214 LBS | SYSTOLIC BLOOD PRESSURE: 123 MMHG | OXYGEN SATURATION: 96 % | DIASTOLIC BLOOD PRESSURE: 82 MMHG | HEIGHT: 74 IN

## 2021-10-22 DIAGNOSIS — I42.9 SECONDARY CARDIOMYOPATHY (H): ICD-10-CM

## 2021-10-22 DIAGNOSIS — I48.92 ATRIAL FLUTTER WITH RAPID VENTRICULAR RESPONSE (H): ICD-10-CM

## 2021-10-22 PROCEDURE — 99215 OFFICE O/P EST HI 40 MIN: CPT | Performed by: PHYSICIAN ASSISTANT

## 2021-10-22 RX ORDER — LISINOPRIL 5 MG/1
5 TABLET ORAL DAILY
Qty: 90 TABLET | Refills: 3 | Status: SHIPPED | OUTPATIENT
Start: 2021-10-22 | End: 2022-02-01

## 2021-10-22 RX ORDER — COVID-19 ANTIGEN TEST
220 KIT MISCELLANEOUS 2 TIMES DAILY WITH MEALS
COMMUNITY
End: 2021-10-22

## 2021-10-22 RX ORDER — COVID-19 ANTIGEN TEST
220 KIT MISCELLANEOUS 2 TIMES DAILY PRN
COMMUNITY
Start: 2021-10-22

## 2021-10-22 RX ORDER — METOPROLOL SUCCINATE 25 MG/1
25 TABLET, EXTENDED RELEASE ORAL 2 TIMES DAILY
Qty: 180 TABLET | Refills: 3 | Status: SHIPPED | OUTPATIENT
Start: 2021-10-22 | End: 2021-11-08

## 2021-10-22 ASSESSMENT — MIFFLIN-ST. JEOR: SCORE: 1830.45

## 2021-10-22 NOTE — LETTER
10/22/2021    Jennifer Rodas MD  [address] [facility] [address]    RE: Derik Hamm       Dear Colleague,    I had the pleasure of seeing Derik Hamm in the [facility].      Cardiology Clinic Progress Note    Derik Hamm MRN# [id number]   YOB: 1955 Age: 66 year old   Primary cardiologist: Dr. Hudson         Assessment and Plan:     In summary, Derik Hamm presents today for hospital follow-up visit.    1. Newly diagnosed atrial flutter with RVR, status post successful CAMI/DCCV on October 1, 2021.  Anticoagulated with Xarelto, with a [id number]-ZQJl score of 1 (2 when he turns 67 next year).  No recurrence on 7-day ZIO monitor.  2. History of postoperative PAF.  3. 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  On Toprol 25 + Lisinopril 5 for GDMT. Appears well-compensated.    Plan:  - Much education and counseling on his diagnoses today.   - Increase Toprol from 25 daily to BID for optimization of his GDMT.   - Continue Xarelto for anticoagulation post-cardioversion. Told him OK to hold Xarelto temporarily for procedures if needed after November 1st.   - He'll try and cut back on Naproxen use to PRN instead of daily.  - Discussed consideration for Kartia monitor, to evaluate for recurrence given his essentially asymptomatic status in AFL.   - Counseled on heart failure, signs of congestion to look out for, and when to notify clinic.     Follow-up:  -With Dr. Levin as scheduled in November.  -Repeat echocardiogram in 2 months.  If no significant improvement in ejection fraction, ischemic work-up should be pursued.  -Follow-up with Dr. Hudson in 3 months.        History of Presenting Illness:      Derik Hamm is a pleasant 66 year old patient who presents today for hospital follow-up visit.    The patient has a history of the following -   1.  Newly diagnosed atrial flutter  with RVR of unknown chronicity, diagnosed 9/2021.  [id number]-[id number] score is one (almost 2, for age).  Status post successful cardioversion.    2.  History of PAF postoperative following laparoscopic appendectomy for perforated appendicitis in 2018, with no known recurrences.  3.  Mild cardiomyopathy, EF 45-50% with global hypokinesis on echocardiogram.  Most likely tachycardia mediated, however ischemia has not been excluded.    I am meeting Derik today to follow-up on his recent admission from [facility], difficult to control rapid atrial flutter. He was sent from his PCP's office after an elevated HR was noted, asymptomatic.  He had an assumed tachy-mediated CMP with an EF of 45-50%, and moderate functional MR. Troponin was negative and he had no anginal symptoms, therefore ischemic work-up was deferred.  He underwent successful CAMI cardioversion on October 1, and a 7-day ZIO monitor placed upon discharge showed no evidence of recurrence.  Average heart rate on the monitor was 97 bpm. He is currently taking lisinopril 5 mg daily, Toprol 25 mg daily, newly introduced in the hospital for his cardiomyopathy.  He is on Xarelto for anticoagulation post cardioversion, EP will determine whether this should be continued long-term.    Today, Derik presents to clinic stating he's feeling very well. Looking back, he does recall feeling fatigued / generalized malaise prior to his cardioversion. Now is able to be more active at work at [employer] without issue. Patient denies chest pain, shortness of breath, PND, orthopnea, edema, claudication, palpitations, near syncope or syncope. No issues with bleeding. He does take Naproxen daily for joint discomfort. BP is well-controlled. HR 90's. He's been measuring his HR on his phone 4-5 x per day at home, and has not noticed significant tachycardia. We reviewed his zio monitor which he does state fell off multiple times. Weight is stable. Mentions that he is in need of a cornea transplant in  "the future, but needs to be off blood thinners for this.          Review of Systems:     12-pt ROS is negative except for as noted in the HPI.          Physical Exam:     Vitals: /82 (BP Location: Left arm, Cuff Size: Adult Large)   Pulse 96   Ht 1.88 m (6' 2\")   Wt 97.1 kg (214 lb)   SpO2 96%   BMI 27.48 kg/m    Wt Readings from Last 10 Encounters:   10/22/21 97.1 kg (214 lb)   10/01/21 98.2 kg (216 lb 9.6 oz)   09/29/21 97.9 kg (215 lb 12.8 oz)   03/14/19 102.1 kg (225 lb)   11/21/18 95.7 kg (211 lb)   11/15/18 95.7 kg (211 lb)   11/06/18 102.4 kg (225 lb 12.8 oz)       Constitutional:  Patient is pleasant, alert, cooperative, and in NAD.  HEENT:  NCAT. PERRLA. EOM's intact.   Neck:  CVP appears normal. No carotid bruits.   Pulmonary: Normal respiratory effort. CTAB.   Cardiac: RRR, normal S1/S2, no S3/S4, no murmur or rub.   Abdomen:  Non-tender abdomen, no hepatosplenomegaly appreciated.   Vascular: Pulses in the upper and lower extremities are 2+ and equal bilaterally.  Extremities: No edema, erythema, cyanosis or tenderness appreciated.  Skin:  No rashes or lesions appreciated.   Neurological:  No gross motor or sensory deficits.   Psych: Appropriate affect.        Data:     Labs reviewed:  Recent Labs   Lab Test 09/29/21  1315 03/15/19  0702 11/05/18  2156   LDL  --  68  --    HDL  --  30*  --    NHDL  --  80  --    CHOL  --  110  --    TRIG  --  61  --    TSH 1.99  --  1.10       Lab Results   Component Value Date    WBC 5.5 09/30/2021    WBC 5.2 03/15/2019    RBC 6.15 (H) 09/30/2021    RBC 5.70 03/15/2019    HGB 17.3 09/30/2021    HGB 17.4 03/15/2019    HCT 55.2 (H) 09/30/2021    HCT 51.6 03/15/2019    MCV 90 09/30/2021    MCV 91 03/15/2019    MCH 28.1 09/30/2021    MCH 30.5 03/15/2019    MCHC 31.3 (L) 09/30/2021    MCHC 33.7 03/15/2019    RDW 17.4 (H) 09/30/2021    RDW 14.4 03/15/2019     09/30/2021     03/15/2019       Lab Results   Component Value Date     09/30/2021    NA " 138 03/15/2019    POTASSIUM 4.3 10/01/2021    POTASSIUM 4.4 03/15/2019    CHLORIDE 104 09/30/2021    CHLORIDE 106 03/15/2019    CO2 28 09/30/2021    CO2 26 03/15/2019    ANIONGAP 4 09/30/2021    ANIONGAP 6 03/15/2019    GLC 96 09/30/2021    GLC 95 03/15/2019    BUN 17 09/30/2021    BUN 20 03/15/2019    CR 1.15 09/30/2021    CR 1.10 03/15/2019    GFRESTIMATED 67 09/30/2021    GFRESTIMATED 72 03/15/2019    GFRESTBLACK 83 03/15/2019    HENRY 8.4 (L) 09/30/2021    HENRY 8.8 03/15/2019      Lab Results   Component Value Date    AST 16 11/04/2018    ALT 31 11/04/2018       No results found for: A1C    Lab Results   Component Value Date    INR 1.03 10/01/2021    INR 1.02 03/15/2019           Problem List:     Patient Active Problem List   Diagnosis     Appendicitis with perforation     AK (actinic keratosis)     Atrial flutter with rapid ventricular response (H)     New onset atrial flutter (H)     Basal cell carcinoma     Paroxysmal atrial fibrillation (H)     Secondary cardiomyopathy (H)           Medications:     Current Outpatient Medications   Medication Sig Dispense Refill     lisinopril (ZESTRIL) 5 MG tablet Take 1 tablet (5 mg) by mouth daily 30 tablet 0     metoprolol succinate ER (TOPROL-XL) 25 MG 24 hr tablet Take 1 tablet (25 mg) by mouth daily 30 tablet 0     multivitamin, therapeutic (THERA-VIT) TABS tablet Take 1 tablet by mouth daily       naproxen sodium 220 MG capsule Take 220 mg by mouth 2 times daily (with meals)       rivaroxaban ANTICOAGULANT (XARELTO) 20 MG TABS tablet Take 1 tablet (20 mg) by mouth daily (with dinner) 30 tablet 0     acetaminophen (TYLENOL) 325 MG tablet Take 325-650 mg by mouth daily as needed for mild pain (Patient not taking: Reported on 10/22/2021)             Past Medical History:     Past Medical History:   Diagnosis Date     Acanthamoeba keratitis      Atrial flutter with rapid ventricular response (H) 09/29/2021     Basal cell carcinoma      Paroxysmal atrial fibrillation (H)       Presbyopia      PVD (posterior vitreous detachment), left      Secondary cardiomyopathy (H)      Past Surgical History:   Procedure Laterality Date     ANESTHESIA CARDIOVERSION N/A 10/1/2021    Procedure: ANESTHESIA, FOR CARDIOVERSION;  Surgeon: GENERIC ANESTHESIA PROVIDER;  Location: [facility] OR     LAPAROSCOPIC APPENDECTOMY N/A 2018    Procedure: LAPAROSCOPIC APPENDECTOMY;  Surgeon: Mackenzie Dale MD;  Location: [facility] OR     ORTHOPEDIC SURGERY       Family History   Problem Relation Age of Onset     Lung Cancer Father 85     Skin Cancer Mother      Skin Cancer Brother      Glaucoma No family hx of      Macular Degeneration No family hx of      Diabetes No family hx of      Hypertension No family hx of      Social History     Socioeconomic History     Marital status: Single     Spouse name: Not on file     Number of children: Not on file     Years of education: Not on file     Highest education level: Not on file   Occupational History     Not on file   Tobacco Use     Smoking status: Former Smoker     Quit date:      Years since quittin.8     Smokeless tobacco: Never Used   Substance and Sexual Activity     Alcohol use: No     Drug use: No     Sexual activity: Yes     Partners: Male     Comment: Monogmous relationship     Other Topics Concern     Parent/sibling w/ CABG, MI or angioplasty before 65F 55M? Not Asked   Social History Narrative     Not on file     Social Determinants of Health     Financial Resource Strain:      Difficulty of Paying Living Expenses:    Food Insecurity:      Worried About Running Out of Food in the Last Year:      Ran Out of Food in the Last Year:    Transportation Needs:      Lack of Transportation (Medical):      Lack of Transportation (Non-Medical):    Physical Activity:      Days of Exercise per Week:      Minutes of Exercise per Session:    Stress:      Feeling of Stress :    Social Connections:      Frequency of Communication with Friends and Family:       Frequency of Social Gatherings with Friends and Family:      Attends Sikhism Services:      Active Member of Clubs or Organizations:      Attends Club or Organization Meetings:      Marital Status:    Intimate Partner Violence:      Fear of Current or Ex-Partner:      Emotionally Abused:      Physically Abused:      Sexually Abused:            Allergies:   Patient has no known allergies.      DEVANTE Villafana [facility]  Pager: [phone]    A total of 45 minutes was spent with the patient, on chart review and documentation.        Thank you for allowing me to participate in the care of your patient.      Sincerely,     DEVANTE Villafana [facility]  cc:   Alexey Hudson MD  [address], [place] [address] [place] [id number]

## 2021-10-22 NOTE — PATIENT INSTRUCTIONS
Today's Plan:   - Derik, you have a weakened heart which is suspected to be from your rapid arrhythmia called atrial flutter. Now that that has resolved, we suspect your heart will return to normal. However, to give it the best chance for this, we want you on good doses of medications to help relax the heart. You are currently taking lisinopril and metoprolol for this reason. I'd like you to increase the metoprolol from 25 mg once daily, to twice daily now. You meet Dr. Levin in the EP clinic (heart rhythm specialist) on November 8th to follow up on this.  - The Xarelto is the blood thinner to help keep your heart from creating blood clots that could go to your brain. Don't miss a dose. Try to cut back on Naproxen to use only as needed.    -- It is OK to hold your Xarelto for a couple days for a procedure if needed, after November 1st.   - We will need to reassess the heart function with an echocardiogram in 2-3 months. Please schedule that, and a visit with Dr. Hudson (your cardiologist) afterward.   - In the meantime, while your heart is continuing to heal, it is a good idea to check your heart rate intermittently, either with your phone or you can get something called a Kardia monitor to ensure you are not going back into atrial flutter.   - Please keep an eye out for symptoms of fluid retention in your system, which you are at risk for while your heart heals (heart failure). This includes leg swelling, rapid weight gain, shortness of breath with exertion or when lying in bed at night. Notify us in this case.     If you have questions or concerns please call my nurse team at [phone].  Scheduling phone number: [phone]  For after hours urgent concerns call [phone] option 2.   Reminder: Please bring in all current medications, over the counter supplements and vitamin bottles to your next appointment.    It was a pleasure seeing you today!     Carla Gerber PA-C

## 2021-10-22 NOTE — PROGRESS NOTES
Cardiology Clinic Progress Note    Derik Hamm MRN# [id number]   YOB: 1955 Age: 66 year old   Primary cardiologist: Dr. Hudson         Assessment and Plan:     In summary, Derik Hamm presents today for hospital follow-up visit.    1. Newly diagnosed atrial flutter with RVR, status post successful CAMI/DCCV on October 1, 2021.  Anticoagulated with Xarelto, with a [id number] score of 1 (2 when he turns 67 next year).  No recurrence on 7-day ZIO monitor.  2. History of postoperative PAF.  3. 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  On Toprol 25 + Lisinopril 5 for GDMT. Appears well-compensated.    Plan:  - Much education and counseling on his diagnoses today.   - Increase Toprol from 25 daily to BID for optimization of his GDMT.   - Continue Xarelto for anticoagulation post-cardioversion. Told him OK to hold Xarelto temporarily for procedures if needed after November 1st.   - He'll try and cut back on Naproxen use to PRN instead of daily.  - Discussed consideration for Kartia monitor, to evaluate for recurrence given his essentially asymptomatic status in AFL.   - Counseled on heart failure, signs of congestion to look out for, and when to notify clinic.     Follow-up:  -With Dr. Levin as scheduled in November.  -Repeat echocardiogram in 2 months.  If no significant improvement in ejection fraction, ischemic work-up should be pursued.  -Follow-up with Dr. Hudson in 3 months.        History of Presenting Illness:      Derik Hamm is a pleasant 66 year old patient who presents today for hospital follow-up visit.    The patient has a history of the following -   1.  Newly diagnosed atrial flutter with RVR of unknown chronicity, diagnosed 9/2021.  [id number] score is one (almost 2, for age).  Status post successful cardioversion.    2.  History of PAF postoperative following laparoscopic appendectomy for perforated appendicitis in 2018, with no known  recurrences.  3.  Mild cardiomyopathy, EF 45-50% with global hypokinesis on echocardiogram.  Most likely tachycardia mediated, however ischemia has not been excluded.    I am meeting Derik today to follow-up on his recent admission from new, difficult to control rapid atrial flutter. He was sent from his PCP's office after an elevated HR was noted, asymptomatic.  He had an assumed tachy-mediated CMP with an EF of 45-50%, and moderate functional MR. Troponin was negative and he had no anginal symptoms, therefore ischemic work-up was deferred.  He underwent successful CAMI cardioversion on October 1, and a 7-day ZIO monitor placed upon discharge showed no evidence of recurrence.  Average heart rate on the monitor was 97 bpm. He is currently taking lisinopril 5 mg daily, Toprol 25 mg daily, newly introduced in the hospital for his cardiomyopathy.  He is on Xarelto for anticoagulation post cardioversion, EP will determine whether this should be continued long-term.    Today, Derik presents to clinic stating he's feeling very well. Looking back, he does recall feeling fatigued / generalized malaise prior to his cardioversion. Now is able to be more active at work at [employer] without issue. Patient denies chest pain, shortness of breath, PND, orthopnea, edema, claudication, palpitations, near syncope or syncope. No issues with bleeding. He does take Naproxen daily for joint discomfort. BP is well-controlled. HR 90's. He's been measuring his HR on his phone 4-5 x per day at home, and has not noticed significant tachycardia. We reviewed his zio monitor which he does state fell off multiple times. Weight is stable. Mentions that he is in need of a cornea transplant in the future, but needs to be off blood thinners for this.          Review of Systems:     12-pt ROS is negative except for as noted in the HPI.          Physical Exam:     Vitals: /82 (BP Location: Left arm, Cuff Size: Adult Large)   Pulse 96   Ht 1.88  "m (6' 2\")   Wt 97.1 kg (214 lb)   SpO2 96%   BMI 27.48 kg/m    Wt Readings from Last 10 Encounters:   10/22/21 97.1 kg (214 lb)   10/01/21 98.2 kg (216 lb 9.6 oz)   09/29/21 97.9 kg (215 lb 12.8 oz)   03/14/19 102.1 kg (225 lb)   11/21/18 95.7 kg (211 lb)   11/15/18 95.7 kg (211 lb)   11/06/18 102.4 kg (225 lb 12.8 oz)       Constitutional:  Patient is pleasant, alert, cooperative, and in NAD.  HEENT:  NCAT. PERRLA. EOM's intact.   Neck:  CVP appears normal. No carotid bruits.   Pulmonary: Normal respiratory effort. CTAB.   Cardiac: RRR, normal S1/S2, no S3/S4, no murmur or rub.   Abdomen:  Non-tender abdomen, no hepatosplenomegaly appreciated.   Vascular: Pulses in the upper and lower extremities are 2+ and equal bilaterally.  Extremities: No edema, erythema, cyanosis or tenderness appreciated.  Skin:  No rashes or lesions appreciated.   Neurological:  No gross motor or sensory deficits.   Psych: Appropriate affect.        Data:     Labs reviewed:  Recent Labs   Lab Test 09/29/21  1315 03/15/19  0702 11/05/18  2156   LDL  --  68  --    HDL  --  30*  --    NHDL  --  80  --    CHOL  --  110  --    TRIG  --  61  --    TSH 1.99  --  1.10       Lab Results   Component Value Date    WBC 5.5 09/30/2021    WBC 5.2 03/15/2019    RBC 6.15 (H) 09/30/2021    RBC 5.70 03/15/2019    HGB 17.3 09/30/2021    HGB 17.4 03/15/2019    HCT 55.2 (H) 09/30/2021    HCT 51.6 03/15/2019    MCV 90 09/30/2021    MCV 91 03/15/2019    MCH 28.1 09/30/2021    MCH 30.5 03/15/2019    MCHC 31.3 (L) 09/30/2021    MCHC 33.7 03/15/2019    RDW 17.4 (H) 09/30/2021    RDW 14.4 03/15/2019     09/30/2021     03/15/2019       Lab Results   Component Value Date     09/30/2021     03/15/2019    POTASSIUM 4.3 10/01/2021    POTASSIUM 4.4 03/15/2019    CHLORIDE 104 09/30/2021    CHLORIDE 106 03/15/2019    CO2 28 09/30/2021    CO2 26 03/15/2019    ANIONGAP 4 09/30/2021    ANIONGAP 6 03/15/2019    GLC 96 09/30/2021    GLC 95 03/15/2019 "    BUN 17 09/30/2021    BUN 20 03/15/2019    CR 1.15 09/30/2021    CR 1.10 03/15/2019    GFRESTIMATED 67 09/30/2021    GFRESTIMATED 72 03/15/2019    GFRESTBLACK 83 03/15/2019    HENRY 8.4 (L) 09/30/2021    HENRY 8.8 03/15/2019      Lab Results   Component Value Date    AST 16 11/04/2018    ALT 31 11/04/2018       No results found for: A1C    Lab Results   Component Value Date    INR 1.03 10/01/2021    INR 1.02 03/15/2019           Problem List:     Patient Active Problem List   Diagnosis     Appendicitis with perforation     AK (actinic keratosis)     Atrial flutter with rapid ventricular response (H)     New onset atrial flutter (H)     Basal cell carcinoma     Paroxysmal atrial fibrillation (H)     Secondary cardiomyopathy (H)           Medications:     Current Outpatient Medications   Medication Sig Dispense Refill     lisinopril (ZESTRIL) 5 MG tablet Take 1 tablet (5 mg) by mouth daily 30 tablet 0     metoprolol succinate ER (TOPROL-XL) 25 MG 24 hr tablet Take 1 tablet (25 mg) by mouth daily 30 tablet 0     multivitamin, therapeutic (THERA-VIT) TABS tablet Take 1 tablet by mouth daily       naproxen sodium 220 MG capsule Take 220 mg by mouth 2 times daily (with meals)       rivaroxaban ANTICOAGULANT (XARELTO) 20 MG TABS tablet Take 1 tablet (20 mg) by mouth daily (with dinner) 30 tablet 0     acetaminophen (TYLENOL) 325 MG tablet Take 325-650 mg by mouth daily as needed for mild pain (Patient not taking: Reported on 10/22/2021)             Past Medical History:     Past Medical History:   Diagnosis Date     Acanthamoeba keratitis      Atrial flutter with rapid ventricular response (H) 09/29/2021     Basal cell carcinoma      Paroxysmal atrial fibrillation (H)      Presbyopia      PVD (posterior vitreous detachment), left      Secondary cardiomyopathy (H)      Past Surgical History:   Procedure Laterality Date     ANESTHESIA CARDIOVERSION N/A 10/1/2021    Procedure: ANESTHESIA, FOR CARDIOVERSION;  Surgeon: GENERIC  ANESTHESIA PROVIDER;  Location: [facility] OR     LAPAROSCOPIC APPENDECTOMY N/A 2018    Procedure: LAPAROSCOPIC APPENDECTOMY;  Surgeon: Mackenzie Dale MD;  Location: [facility] OR     ORTHOPEDIC SURGERY       Family History   Problem Relation Age of Onset     Lung Cancer Father 85     Skin Cancer Mother      Skin Cancer Brother      Glaucoma No family hx of      Macular Degeneration No family hx of      Diabetes No family hx of      Hypertension No family hx of      Social History     Socioeconomic History     Marital status: Single     Spouse name: Not on file     Number of children: Not on file     Years of education: Not on file     Highest education level: Not on file   Occupational History     Not on file   Tobacco Use     Smoking status: Former Smoker     Quit date:      Years since quittin.8     Smokeless tobacco: Never Used   Substance and Sexual Activity     Alcohol use: No     Drug use: No     Sexual activity: Yes     Partners: Male     Comment: Monogmous relationship     Other Topics Concern     Parent/sibling w/ CABG, MI or angioplasty before 65F 55M? Not Asked   Social History Narrative     Not on file     Social Determinants of Health     Financial Resource Strain:      Difficulty of Paying Living Expenses:    Food Insecurity:      Worried About Running Out of Food in the Last Year:      Ran Out of Food in the Last Year:    Transportation Needs:      Lack of Transportation (Medical):      Lack of Transportation (Non-Medical):    Physical Activity:      Days of Exercise per Week:      Minutes of Exercise per Session:    Stress:      Feeling of Stress :    Social Connections:      Frequency of Communication with Friends and Family:      Frequency of Social Gatherings with Friends and Family:      Attends Islam Services:      Active Member of Clubs or Organizations:      Attends Club or Organization Meetings:      Marital Status:    Intimate Partner Violence:      Fear of Current or Ex-Partner:       Emotionally Abused:      Physically Abused:      Sexually Abused:            Allergies:   Patient has no known allergies.      Carla Gerber PA-C  [facility]  Pager: [phone]    A total of 45 minutes was spent with the patient, on chart review and documentation.

## 2021-10-24 ENCOUNTER — HEALTH MAINTENANCE LETTER (OUTPATIENT)
Age: 66
End: 2021-10-24

## 2021-11-08 ENCOUNTER — OFFICE VISIT (OUTPATIENT)
Dept: CARDIOLOGY | Facility: CLINIC | Age: 66
End: 2021-11-08
Payer: COMMERCIAL

## 2021-11-08 ENCOUNTER — TELEPHONE (OUTPATIENT)
Dept: OPHTHALMOLOGY | Facility: CLINIC | Age: 66
End: 2021-11-08

## 2021-11-08 VITALS
DIASTOLIC BLOOD PRESSURE: 81 MMHG | HEART RATE: 76 BPM | HEIGHT: 74 IN | BODY MASS INDEX: 27.46 KG/M2 | SYSTOLIC BLOOD PRESSURE: 131 MMHG | WEIGHT: 214 LBS

## 2021-11-08 DIAGNOSIS — I48.92 ATRIAL FLUTTER WITH RAPID VENTRICULAR RESPONSE (H): Primary | ICD-10-CM

## 2021-11-08 DIAGNOSIS — Z11.59 ENCOUNTER FOR SCREENING FOR OTHER VIRAL DISEASES: ICD-10-CM

## 2021-11-08 DIAGNOSIS — B60.10 ACANTHAMOEBA INFECTION: Primary | ICD-10-CM

## 2021-11-08 DIAGNOSIS — I42.9 SECONDARY CARDIOMYOPATHY (H): ICD-10-CM

## 2021-11-08 PROCEDURE — 99204 OFFICE O/P NEW MOD 45 MIN: CPT | Performed by: INTERNAL MEDICINE

## 2021-11-08 PROCEDURE — 93000 ELECTROCARDIOGRAM COMPLETE: CPT | Performed by: INTERNAL MEDICINE

## 2021-11-08 RX ORDER — METOPROLOL SUCCINATE 50 MG/1
50 TABLET, EXTENDED RELEASE ORAL DAILY
Qty: 90 TABLET | Refills: 3 | Status: SHIPPED | OUTPATIENT
Start: 2021-11-08 | End: 2022-02-01

## 2021-11-08 ASSESSMENT — MIFFLIN-ST. JEOR: SCORE: 1830.45

## 2021-11-08 NOTE — PROGRESS NOTES
HPI and Plan:   See dictation [id number]    Orders Placed This Encounter   Procedures     EKG 12-lead complete w/read - Clinics (performed today)       Orders Placed This Encounter   Medications     TURMERIC PO     metoprolol succinate ER (TOPROL-XL) 50 MG 24 hr tablet     Sig: Take 1 tablet (50 mg) by mouth daily     Dispense:  90 tablet     Refill:  3       Medications Discontinued During This Encounter   Medication Reason     acetaminophen (TYLENOL) 325 MG tablet      metoprolol succinate ER (TOPROL-XL) 25 MG 24 hr tablet Reorder         Encounter Diagnosis   Name Primary?     Atrial flutter with rapid ventricular response (H) Yes       CURRENT MEDICATIONS:  Current Outpatient Medications   Medication Sig Dispense Refill     lisinopril (ZESTRIL) 5 MG tablet Take 1 tablet (5 mg) by mouth daily 90 tablet 3     metoprolol succinate ER (TOPROL-XL) 50 MG 24 hr tablet Take 1 tablet (50 mg) by mouth daily 90 tablet 3     multivitamin, therapeutic (THERA-VIT) TABS tablet Take 1 tablet by mouth daily       naproxen sodium 220 MG capsule Take 220 mg by mouth 2 times daily as needed (pain)       rivaroxaban ANTICOAGULANT (XARELTO) 20 MG TABS tablet Take 1 tablet (20 mg) by mouth daily (with dinner) 90 tablet 3     TURMERIC PO          ALLERGIES   No Known Allergies    PAST MEDICAL HISTORY:  Past Medical History:   Diagnosis Date     Acanthamoeba keratitis      Atrial flutter with rapid ventricular response (H) 09/29/2021     Basal cell carcinoma      Paroxysmal atrial fibrillation (H)      Presbyopia      PVD (posterior vitreous detachment), left      Secondary cardiomyopathy (H)        PAST SURGICAL HISTORY:  Past Surgical History:   Procedure Laterality Date     ANESTHESIA CARDIOVERSION N/A 10/1/2021    Procedure: ANESTHESIA, FOR CARDIOVERSION;  Surgeon: GENERIC ANESTHESIA PROVIDER;  Location: [facility] OR     LAPAROSCOPIC APPENDECTOMY N/A 11/4/2018    Procedure: LAPAROSCOPIC APPENDECTOMY;  Surgeon: Mackenzie Dale MD;   Location: [facility] OR     ORTHOPEDIC SURGERY         FAMILY HISTORY:  Family History   Problem Relation Age of Onset     Lung Cancer Father 85     Skin Cancer Mother      Skin Cancer Brother      Glaucoma No family hx of      Macular Degeneration No family hx of      Diabetes No family hx of      Hypertension No family hx of        SOCIAL HISTORY:  Social History     Socioeconomic History     Marital status: Single     Spouse name: None     Number of children: None     Years of education: None     Highest education level: None   Occupational History     None   Tobacco Use     Smoking status: Former Smoker     Quit date:      Years since quittin.8     Smokeless tobacco: Never Used   Substance and Sexual Activity     Alcohol use: No     Drug use: No     Sexual activity: Yes     Partners: Male     Comment: Monogmous relationship     Other Topics Concern     Parent/sibling w/ CABG, MI or angioplasty before 65F 55M? Not Asked   Social History Narrative     None     Social Determinants of Health     Financial Resource Strain: Not on file   Food Insecurity: Not on file   Transportation Needs: Not on file   Physical Activity: Not on file   Stress: Not on file   Social Connections: Not on file   Intimate Partner Violence: Not on file   Housing Stability: Not on file       Review of Systems:  Skin:  Negative       Eyes:  Negative      ENT:  Negative      Respiratory:  Negative       Cardiovascular:  Negative      Gastroenterology: Negative      Genitourinary:  Negative      Musculoskeletal:  Negative      Neurologic:  Negative      Psychiatric:  Negative      Heme/Lymph/Imm:  Negative      Endocrine:  Negative

## 2021-11-08 NOTE — PROGRESS NOTES
Service Date: 11/08/2021    CONSULTATION     HISTORY OF PRESENT ILLNESS:    I had the pleasure seeing Mr. Derik Hamm, a delightful 66-year-old gentleman who was referred by Dr. Alexey Hudson and TIAN Glaser, for atrial fibrillation and atrial flutter.    The patient was admitted at the hospital on 09/29/2021 after he was found to have a fast heart rate during an outpatient clinic appointment.  In retrospect, he had not been feeling well for several weeks prior.  He was found to be in atrial flutter with RVR.  He was seen in consultation by Dr. Hudson.  He underwent CAMI-guided cardioversion.  CAMI showed mildly reduced LVEF of 40%-45% with no regional wall motion abnormalities and no atrial thrombus.  He was successfully cardioverted.  He was placed on rivaroxaban, metoprolol XL and lisinopril and was sent home on 10/01/2021.    His past medical history is significant for an episode of atrial fibrillation that occurred after acute appendicitis and laparoscopic appendectomy in 2018.  He had acute perforated appendix.  He has history of basal cell cancer and prediabetes.    The patient does not experience chest pain or unusual dyspnea with even strenuous exertion.  He tells me he has felt better after his cardioversion.  He has no orthopnea or PND.    SOCIAL HISTORY:  He lives with roommates in [place].  He works for [employer].  He is a nonsmoker, nondrinker.    FAMILY HISTORY:  Negative for premature heart disease.      PHYSICAL EXAMINATION:    VITAL SIGNS:  Blood pressure 131/81, heart rate 76 and regular, weight 97 kg, height 188 cm.  GENERAL:  He is a very pleasant gentleman in no distress.  HEAD:  Normocephalic.  NECK:  Supple with normal JVP.  LUNGS:  Clear.  CARDIOVASCULAR:  Regular rhythm without gallop, murmur or rub.  ABDOMEN:  Soft, nontender.  EXTREMITIES:  No edema.      DIAGNOSTIC STUDIES:    - Recent labs:  Sodium 136, potassium 4.3, creatinine 1.15, calcium 8.4, troponin <0.015, TSH 1.99.  - His 12 lead  "ECG today showed sinus rhythm, atrial conduction abnormality, otherwise normal.  - For results of CAMI, see HPI.  The patient had a transthoracic echo on 09/29/2021 which showed EF around 45%, normal RV, mildly dilated left atrium with normal right atrium, 2+ MR.    - The patient wore a leadless ECG monitor upon discharge from the hospital.  He does not believe the patch stayed on more than 3-4 days.  There were rare PACs and PVCs.  No atrial fibrillation or atrial flutter.      IMPRESSION:    1.  Typical atrial flutter with RVR.  2.  Cardiomyopathy, likely tachycardia-mediated.  Mr. Hamm' ECG from 09/29/2021 suggests typical counterclockwise atrial flutter with RVR.  We suspect the arrhythmia started several weeks and possibly months earlier.  It led to cardiomyopathy.  The arrhythmia was interrupted with CAMI-guided cardioversion.  The patient had self-terminating atrial fibrillation following perforated appendicitis back in 2018.    I discussed with Mr. Hamm the pathophysiology of atrial flutter. This was an \"unprovoked\" arrhythmia and likely to recur in the future.  Because the arrhythmia persisted and led to cardiomyopathy, it is best to eliminate it with catheter ablation.  The alternative would be to have him monitor his heart rate regularly and call should he experience a recurrence.    I went over the technical aspects, risks and benefits of atrial flutter ablation.  I quoted a 95% likelihood of permanent cure from this arrhythmia with a single procedure.  The risk related to the procedure is 1%-2%.  Potential risks include, but are not limited to, vascular injury, bleeding, cardiac perforation, damage to the conduction system, pericarditis and other unforeseen complications.    After a good discussion, the patient has agreed to proceed with ablation.  He will continue Xarelto up to the day of the procedure.  Xarelto will be discontinued later.    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  This will likely be done after atrial flutter ablation.    RECOMMENDATIONS:    a.  Schedule atrial flutter ablation.  b.  Continue Xarelto without interruption before the procedure.  We plan to discontinue the medication later given that his CHADS2-VASc score is at most 1 (cardiomyopathy, which I believe will be transient).    I appreciate the opportunity to be involved in his care.      Nayla Levin MD, [facility]        cc:  Jennifer Rodas MD  [facility] Internal Medicine   [address] [place] [id number]    D: 2021   T: 2021   MT: [email or web address]    Name:     ANU GARZA  MRN:      [id number]        Account:      [id number]   :      1955           Service Date: 2021       Document: [id number]

## 2021-11-08 NOTE — LETTER
11/8/2021    Jennifer Rodas MD  [address] [facility] [address]    RE: Derik Hamm       Dear Colleague,    I had the pleasure of seeing Derik Hamm in the [facility].    HPI and Plan:   See dictation [id number]    Orders Placed This Encounter   Procedures     EKG 12-lead complete w/read - Clinics (performed today)       Orders Placed This Encounter   Medications     TURMERIC PO     metoprolol succinate ER (TOPROL-XL) 50 MG 24 hr tablet     Sig: Take 1 tablet (50 mg) by mouth daily     Dispense:  90 tablet     Refill:  3       Medications Discontinued During This Encounter   Medication Reason     acetaminophen (TYLENOL) 325 MG tablet      metoprolol succinate ER (TOPROL-XL) 25 MG 24 hr tablet Reorder         Encounter Diagnosis   Name Primary?     Atrial flutter with rapid ventricular response (H) Yes       CURRENT MEDICATIONS:  Current Outpatient Medications   Medication Sig Dispense Refill     lisinopril (ZESTRIL) 5 MG tablet Take 1 tablet (5 mg) by mouth daily 90 tablet 3     metoprolol succinate ER (TOPROL-XL) 50 MG 24 hr tablet Take 1 tablet (50 mg) by mouth daily 90 tablet 3     multivitamin, therapeutic (THERA-VIT) TABS tablet Take 1 tablet by mouth daily       naproxen sodium 220 MG capsule Take 220 mg by mouth 2 times daily as needed (pain)       rivaroxaban ANTICOAGULANT (XARELTO) 20 MG TABS tablet Take 1 tablet (20 mg) by mouth daily (with dinner) 90 tablet 3     TURMERIC PO          ALLERGIES   No Known Allergies    PAST MEDICAL HISTORY:  Past Medical History:   Diagnosis Date     Acanthamoeba keratitis      Atrial flutter with rapid ventricular response (H) 09/29/2021     Basal cell carcinoma      Paroxysmal atrial fibrillation (H)      Presbyopia      PVD (posterior vitreous detachment), left      Secondary cardiomyopathy (H)        PAST SURGICAL HISTORY:  Past Surgical History:   Procedure Laterality Date      ANESTHESIA CARDIOVERSION N/A 10/1/2021    Procedure: ANESTHESIA, FOR CARDIOVERSION;  Surgeon: GENERIC ANESTHESIA PROVIDER;  Location: [facility] OR     LAPAROSCOPIC APPENDECTOMY N/A 2018    Procedure: LAPAROSCOPIC APPENDECTOMY;  Surgeon: Mackenzie Dale MD;  Location: [facility] OR     ORTHOPEDIC SURGERY         FAMILY HISTORY:  Family History   Problem Relation Age of Onset     Lung Cancer Father 85     Skin Cancer Mother      Skin Cancer Brother      Glaucoma No family hx of      Macular Degeneration No family hx of      Diabetes No family hx of      Hypertension No family hx of        SOCIAL HISTORY:  Social History     Socioeconomic History     Marital status: Single     Spouse name: None     Number of children: None     Years of education: None     Highest education level: None   Occupational History     None   Tobacco Use     Smoking status: Former Smoker     Quit date:      Years since quittin.8     Smokeless tobacco: Never Used   Substance and Sexual Activity     Alcohol use: No     Drug use: No     Sexual activity: Yes     Partners: Male     Comment: Monogmous relationship     Other Topics Concern     Parent/sibling w/ CABG, MI or angioplasty before 65F 55M? Not Asked   Social History Narrative     None     Social Determinants of Health     Financial Resource Strain: Not on file   Food Insecurity: Not on file   Transportation Needs: Not on file   Physical Activity: Not on file   Stress: Not on file   Social Connections: Not on file   Intimate Partner Violence: Not on file   Housing Stability: Not on file       Review of Systems:  Skin:  Negative       Eyes:  Negative      ENT:  Negative      Respiratory:  Negative       Cardiovascular:  Negative      Gastroenterology: Negative      Genitourinary:  Negative      Musculoskeletal:  Negative      Neurologic:  Negative      Psychiatric:  Negative      Heme/Lymph/Imm:  Negative      Endocrine:  Negative                    Thank you for allowing me to  participate in the care of your patient.      Sincerely,     Nayla Levin MD     [facility]  cc:   Alexey Hudson MD  [address]  [place] [address]

## 2021-11-08 NOTE — LETTER
11/8/2021       RE: Derik Hamm  [address] [place] [address]     Dear Colleague,    Thank you for referring your patient, Derik Hamm, to the [facility] at [facility]. Please see a copy of my visit note below.    HPI and Plan:   See dictation [id number]    Orders Placed This Encounter   Procedures     EKG 12-lead complete w/read - Clinics (performed today)       Orders Placed This Encounter   Medications     TURMERIC PO     metoprolol succinate ER (TOPROL-XL) 50 MG 24 hr tablet     Sig: Take 1 tablet (50 mg) by mouth daily     Dispense:  90 tablet     Refill:  3       Medications Discontinued During This Encounter   Medication Reason     acetaminophen (TYLENOL) 325 MG tablet      metoprolol succinate ER (TOPROL-XL) 25 MG 24 hr tablet Reorder         Encounter Diagnosis   Name Primary?     Atrial flutter with rapid ventricular response (H) Yes       CURRENT MEDICATIONS:  Current Outpatient Medications   Medication Sig Dispense Refill     lisinopril (ZESTRIL) 5 MG tablet Take 1 tablet (5 mg) by mouth daily 90 tablet 3     metoprolol succinate ER (TOPROL-XL) 50 MG 24 hr tablet Take 1 tablet (50 mg) by mouth daily 90 tablet 3     multivitamin, therapeutic (THERA-VIT) TABS tablet Take 1 tablet by mouth daily       naproxen sodium 220 MG capsule Take 220 mg by mouth 2 times daily as needed (pain)       rivaroxaban ANTICOAGULANT (XARELTO) 20 MG TABS tablet Take 1 tablet (20 mg) by mouth daily (with dinner) 90 tablet 3     TURMERIC PO          ALLERGIES   No Known Allergies    PAST MEDICAL HISTORY:  Past Medical History:   Diagnosis Date     Acanthamoeba keratitis      Atrial flutter with rapid ventricular response (H) 09/29/2021     Basal cell carcinoma      Paroxysmal atrial fibrillation (H)      Presbyopia      PVD (posterior vitreous detachment), left      Secondary cardiomyopathy (H)        PAST SURGICAL HISTORY:  Past Surgical  History:   Procedure Laterality Date     ANESTHESIA CARDIOVERSION N/A 10/1/2021    Procedure: ANESTHESIA, FOR CARDIOVERSION;  Surgeon: GENERIC ANESTHESIA PROVIDER;  Location: [facility] OR     LAPAROSCOPIC APPENDECTOMY N/A 2018    Procedure: LAPAROSCOPIC APPENDECTOMY;  Surgeon: Mackenzie Dale MD;  Location: [facility] OR     ORTHOPEDIC SURGERY         FAMILY HISTORY:  Family History   Problem Relation Age of Onset     Lung Cancer Father 85     Skin Cancer Mother      Skin Cancer Brother      Glaucoma No family hx of      Macular Degeneration No family hx of      Diabetes No family hx of      Hypertension No family hx of        SOCIAL HISTORY:  Social History     Socioeconomic History     Marital status: Single     Spouse name: None     Number of children: None     Years of education: None     Highest education level: None   Occupational History     None   Tobacco Use     Smoking status: Former Smoker     Quit date:      Years since quittin.8     Smokeless tobacco: Never Used   Substance and Sexual Activity     Alcohol use: No     Drug use: No     Sexual activity: Yes     Partners: Male     Comment: Monogmous relationship     Other Topics Concern     Parent/sibling w/ CABG, MI or angioplasty before 65F 55M? Not Asked   Social History Narrative     None     Social Determinants of Health     Financial Resource Strain: Not on file   Food Insecurity: Not on file   Transportation Needs: Not on file   Physical Activity: Not on file   Stress: Not on file   Social Connections: Not on file   Intimate Partner Violence: Not on file   Housing Stability: Not on file       Review of Systems:  Skin:  Negative       Eyes:  Negative      ENT:  Negative      Respiratory:  Negative       Cardiovascular:  Negative      Gastroenterology: Negative      Genitourinary:  Negative      Musculoskeletal:  Negative      Neurologic:  Negative      Psychiatric:  Negative      Heme/Lymph/Imm:  Negative      Endocrine:  Negative                   Service Date: 11/08/2021    CONSULTATION     HISTORY OF PRESENT ILLNESS:    I had the pleasure seeing Mr. Derik Hamm, a delightful 66-year-old gentleman who was referred by Dr. Alexey Hudson and TIAN Glaser, for atrial fibrillation and atrial flutter.    The patient was admitted at the hospital on 09/29/2021 after he was found to have a fast heart rate during an outpatient clinic appointment.  In retrospect, he had not been feeling well for several weeks prior.  He was found to be in atrial flutter with RVR.  He was seen in consultation by Dr. Hudson.  He underwent CAMI-guided cardioversion.  CAMI showed mildly reduced LVEF of 40%-45% with no regional wall motion abnormalities and no atrial thrombus.  He was successfully cardioverted.  He was placed on rivaroxaban, metoprolol XL and lisinopril and was sent home on 10/01/2021.    His past medical history is significant for an episode of atrial fibrillation that occurred after acute appendicitis and laparoscopic appendectomy in 2018.  He had acute perforated appendix.  He has history of basal cell cancer and prediabetes.    The patient does not experience chest pain or unusual dyspnea with even strenuous exertion.  He tells me he has felt better after his cardioversion.  He has no orthopnea or PND.    SOCIAL HISTORY:  He lives with roommates in [place].  He works for [employer].  He is a nonsmoker, nondrinker.    FAMILY HISTORY:  Negative for premature heart disease.      PHYSICAL EXAMINATION:    VITAL SIGNS:  Blood pressure 131/81, heart rate 76 and regular, weight 97 kg, height 188 cm.  GENERAL:  He is a very pleasant gentleman in no distress.  HEAD:  Normocephalic.  NECK:  Supple with normal JVP.  LUNGS:  Clear.  CARDIOVASCULAR:  Regular rhythm without gallop, murmur or rub.  ABDOMEN:  Soft, nontender.  EXTREMITIES:  No edema.      DIAGNOSTIC STUDIES:    - Recent labs:  Sodium 136, potassium 4.3, creatinine 1.15, calcium 8.4, troponin <0.015, TSH  "1.99.  - His 12 lead ECG today showed sinus rhythm, atrial conduction abnormality, otherwise normal.  - For results of CAMI, see HPI.  The patient had a transthoracic echo on 09/29/2021 which showed EF around 45%, normal RV, mildly dilated left atrium with normal right atrium, 2+ MR.    - The patient wore a leadless ECG monitor upon discharge from the hospital.  He does not believe the patch stayed on more than 3-4 days.  There were rare PACs and PVCs.  No atrial fibrillation or atrial flutter.      IMPRESSION:    1.  Typical atrial flutter with RVR.  2.  Cardiomyopathy, likely tachycardia-mediated.  Mr. Hamm' ECG from 09/29/2021 suggests typical counterclockwise atrial flutter with RVR.  We suspect the arrhythmia started several weeks and possibly months earlier.  It led to cardiomyopathy.  The arrhythmia was interrupted with CAMI-guided cardioversion.  The patient had self-terminating atrial fibrillation following perforated appendicitis back in 2018.    I discussed with Mr. Hamm the pathophysiology of atrial flutter. This was an \"unprovoked\" arrhythmia and likely to recur in the future.  Because the arrhythmia persisted and led to cardiomyopathy, it is best to eliminate it with catheter ablation.  The alternative would be to have him monitor his heart rate regularly and call should he experience a recurrence.    I went over the technical aspects, risks and benefits of atrial flutter ablation.  I quoted a 95% likelihood of permanent cure from this arrhythmia with a single procedure.  The risk related to the procedure is 1%-2%.  Potential risks include, but are not limited to, vascular injury, bleeding, cardiac perforation, damage to the conduction system, pericarditis and other unforeseen complications.    After a good discussion, the patient has agreed to proceed with ablation.  He will continue Xarelto up to the day of the procedure.  Xarelto will be discontinued later.    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  This will likely be done after atrial flutter ablation.    RECOMMENDATIONS:    a.  Schedule atrial flutter ablation.  b.  Continue Xarelto without interruption before the procedure.  We plan to discontinue the medication later given that his CHADS2-VASc score is at most 1 (cardiomyopathy, which I believe will be transient).    I appreciate the opportunity to be involved in his care.      Nayla Levin MD, FACC        cc:  Jennifer Rodas MD  [facility]   [address] [place] [address]    D: 2021   T: 2021   MT: [email or web address]    Name:     ANU GARZA  MRN:      [id number]        Account:      [id number]   :      1955           Service Date: 2021     Document: [id number]

## 2021-11-08 NOTE — TELEPHONE ENCOUNTER
Spoke with patient to schedule surgery with Dr. Cheng    Surgery was scheduled on 12/8 at [facility]  Patient will have be having local anesthesia so no H&P needed.     Patient is aware a COVID-19 test is needed before their procedure. The test should be with-in 4 days of their procedure.   Test Details: Date 12/7 Location  Urgent care    Post-Op visit was scheduled on 12/9  Patient is aware a / is needed day of surgery.   Surgery packet was mailed 11/8, patient has my direct contact information for any further questions.

## 2021-11-09 NOTE — PROGRESS NOTES
I have ordered this patient case request per pt and surgery scheduling for his Right Eye to be completed as schedule don 12/8/2021.    I also answered the patients further questions in detail about rides day of surgery and next day. (he whom provides rides for will need to find their own for this time and the next week, due to his vision being somewhat blurred and getting used ot with new lens implant and tissue implant.    I also went over the xerelto not being and stopping about 2-3 days prior to surgery; he mentioned that next week he is already having a procedure with cardiac to place valve/stent in heart and will be off this medication and other blood thinners that same day. I let him know the normal blood pressure meds are fine it is just anti coag meds that are not. He understood.    I have went ahead and scheduled his post op appts and he will get them titus dot him, if issues with them he will direct to me.    I have sent for a sign on case request to Dr Kent since Dr Cheng is currently out of office for a couple weeks.    Bethany Mullins, COA 6:52 PM November 8, 2021

## 2021-11-11 ENCOUNTER — MYC MEDICAL ADVICE (OUTPATIENT)
Dept: OPHTHALMOLOGY | Facility: CLINIC | Age: 66
End: 2021-11-11
Payer: COMMERCIAL

## 2021-11-12 DIAGNOSIS — Z11.59 ENCOUNTER FOR SCREENING FOR OTHER VIRAL DISEASES: ICD-10-CM

## 2021-11-15 ENCOUNTER — LAB (OUTPATIENT)
Dept: URGENT CARE | Facility: URGENT CARE | Age: 66
End: 2021-11-15
Attending: INTERNAL MEDICINE
Payer: COMMERCIAL

## 2021-11-15 DIAGNOSIS — Z11.59 ENCOUNTER FOR SCREENING FOR OTHER VIRAL DISEASES: ICD-10-CM

## 2021-11-15 PROCEDURE — U0003 INFECTIOUS AGENT DETECTION BY NUCLEIC ACID (DNA OR RNA); SEVERE ACUTE RESPIRATORY SYNDROME CORONAVIRUS 2 (SARS-COV-2) (CORONAVIRUS DISEASE [COVID-19]), AMPLIFIED PROBE TECHNIQUE, MAKING USE OF HIGH THROUGHPUT TECHNOLOGIES AS DESCRIBED BY CMS-2020-01-R: HCPCS

## 2021-11-15 PROCEDURE — U0005 INFEC AGEN DETEC AMPLI PROBE: HCPCS

## 2021-11-16 ENCOUNTER — TELEPHONE (OUTPATIENT)
Dept: CARDIOLOGY | Facility: CLINIC | Age: 66
End: 2021-11-16
Payer: COMMERCIAL

## 2021-11-16 DIAGNOSIS — I48.3 TYPICAL ATRIAL FLUTTER (H): Primary | ICD-10-CM

## 2021-11-16 LAB — SARS-COV-2 RNA RESP QL NAA+PROBE: NEGATIVE

## 2021-11-16 RX ORDER — SODIUM CHLORIDE, SODIUM LACTATE, POTASSIUM CHLORIDE, CALCIUM CHLORIDE 600; 310; 30; 20 MG/100ML; MG/100ML; MG/100ML; MG/100ML
INJECTION, SOLUTION INTRAVENOUS CONTINUOUS
Status: CANCELLED | OUTPATIENT
Start: 2021-11-16

## 2021-11-16 RX ORDER — LIDOCAINE 40 MG/G
CREAM TOPICAL
Status: CANCELLED | OUTPATIENT
Start: 2021-11-16

## 2021-11-16 NOTE — TELEPHONE ENCOUNTER
Spoke to patient to review instructions for aflutter ablation scheduled for 11/17.  Patient aware that he/ is to be NPO after midnight and may take sips of water with am medications. Due to later procedure he can have clear liquids until 8am.  Patient to arrive at 12:00pm.  Patient aware that he/ will be staying overnight after procedure unless there are complications.  Patent has arranged ride and someone to be with him for first 24 hour.  COVID test PENDING.  Patient provided verbal understanding regarding above.  FRANCIA Hdez

## 2021-11-16 NOTE — TELEPHONE ENCOUNTER
Message left for patient to review instructions for scheduled aflutter ablation on 11/17.  Awaiting return call.  FRANCIA Hdez

## 2021-11-17 ENCOUNTER — HOSPITAL ENCOUNTER (OUTPATIENT)
Facility: CLINIC | Age: 66
Discharge: HOME | End: 2021-11-17
Admitting: INTERNAL MEDICINE
Payer: COMMERCIAL

## 2021-11-17 VITALS
OXYGEN SATURATION: 97 % | TEMPERATURE: 97.7 F | HEIGHT: 74 IN | SYSTOLIC BLOOD PRESSURE: 119 MMHG | WEIGHT: 214 LBS | HEART RATE: 94 BPM | RESPIRATION RATE: 18 BRPM | DIASTOLIC BLOOD PRESSURE: 71 MMHG | BODY MASS INDEX: 27.46 KG/M2

## 2021-11-17 DIAGNOSIS — I48.3 TYPICAL ATRIAL FLUTTER (H): Primary | ICD-10-CM

## 2021-11-17 DIAGNOSIS — I48.92 ATRIAL FLUTTER WITH RAPID VENTRICULAR RESPONSE (H): ICD-10-CM

## 2021-11-17 LAB
ANION GAP SERPL CALCULATED.3IONS-SCNC: 4 MMOL/L (ref 3–14)
BUN SERPL-MCNC: 22 MG/DL (ref 7–30)
CALCIUM SERPL-MCNC: 8.9 MG/DL (ref 8.5–10.1)
CHLORIDE BLD-SCNC: 106 MMOL/L (ref 94–109)
CO2 SERPL-SCNC: 29 MMOL/L (ref 20–32)
CREAT SERPL-MCNC: 1.16 MG/DL (ref 0.66–1.25)
ERYTHROCYTE [DISTWIDTH] IN BLOOD BY AUTOMATED COUNT: 14.7 % (ref 10–15)
GFR SERPL CREATININE-BSD FRML MDRD: 66 ML/MIN/1.73M2
GLUCOSE BLD-MCNC: 95 MG/DL (ref 70–99)
HCT VFR BLD AUTO: 55.7 % (ref 40–53)
HGB BLD-MCNC: 17.8 G/DL (ref 13.3–17.7)
MCH RBC QN AUTO: 28.2 PG (ref 26.5–33)
MCHC RBC AUTO-ENTMCNC: 32 G/DL (ref 31.5–36.5)
MCV RBC AUTO: 88 FL (ref 78–100)
PLATELET # BLD AUTO: 230 10E3/UL (ref 150–450)
POTASSIUM BLD-SCNC: 4.3 MMOL/L (ref 3.4–5.3)
RBC # BLD AUTO: 6.32 10E6/UL (ref 4.4–5.9)
SODIUM SERPL-SCNC: 139 MMOL/L (ref 133–144)
WBC # BLD AUTO: 5.5 10E3/UL (ref 4–11)

## 2021-11-17 PROCEDURE — C1894 INTRO/SHEATH, NON-LASER: HCPCS | Performed by: INTERNAL MEDICINE

## 2021-11-17 PROCEDURE — 36415 COLL VENOUS BLD VENIPUNCTURE: CPT | Performed by: INTERNAL MEDICINE

## 2021-11-17 PROCEDURE — 99152 MOD SED SAME PHYS/QHP 5/>YRS: CPT | Performed by: INTERNAL MEDICINE

## 2021-11-17 PROCEDURE — 93005 ELECTROCARDIOGRAM TRACING: CPT

## 2021-11-17 PROCEDURE — 93653 COMPRE EP EVAL TX SVT: CPT | Performed by: INTERNAL MEDICINE

## 2021-11-17 PROCEDURE — 999N000054 HC STATISTIC EKG NON-CHARGEABLE

## 2021-11-17 PROCEDURE — 999N000071 HC STATISTIC HEART CATH LAB OR EP LAB

## 2021-11-17 PROCEDURE — C1733 CATH, EP, OTHR THAN COOL-TIP: HCPCS | Performed by: INTERNAL MEDICINE

## 2021-11-17 PROCEDURE — 93621 COMP EP EVL L PAC&REC C SINS: CPT | Performed by: INTERNAL MEDICINE

## 2021-11-17 PROCEDURE — C1732 CATH, EP, DIAG/ABL, 3D/VECT: HCPCS | Performed by: INTERNAL MEDICINE

## 2021-11-17 PROCEDURE — 80048 BASIC METABOLIC PNL TOTAL CA: CPT | Performed by: INTERNAL MEDICINE

## 2021-11-17 PROCEDURE — 99153 MOD SED SAME PHYS/QHP EA: CPT | Performed by: INTERNAL MEDICINE

## 2021-11-17 PROCEDURE — 93613 INTRACARDIAC EPHYS 3D MAPG: CPT | Performed by: INTERNAL MEDICINE

## 2021-11-17 PROCEDURE — C1731 CATH, EP, 20 OR MORE ELEC: HCPCS | Performed by: INTERNAL MEDICINE

## 2021-11-17 PROCEDURE — 272N000001 HC OR GENERAL SUPPLY STERILE: Performed by: INTERNAL MEDICINE

## 2021-11-17 PROCEDURE — C1730 CATH, EP, 19 OR FEW ELECT: HCPCS | Performed by: INTERNAL MEDICINE

## 2021-11-17 PROCEDURE — 93621 COMP EP EVL L PAC&REC C SINS: CPT | Mod: 26 | Performed by: INTERNAL MEDICINE

## 2021-11-17 PROCEDURE — 250N000011 HC RX IP 250 OP 636: Performed by: INTERNAL MEDICINE

## 2021-11-17 PROCEDURE — 258N000003 HC RX IP 258 OP 636: Performed by: INTERNAL MEDICINE

## 2021-11-17 PROCEDURE — 85014 HEMATOCRIT: CPT | Performed by: INTERNAL MEDICINE

## 2021-11-17 PROCEDURE — 250N000009 HC RX 250: Performed by: INTERNAL MEDICINE

## 2021-11-17 RX ORDER — NALOXONE HYDROCHLORIDE 0.4 MG/ML
0.2 INJECTION, SOLUTION INTRAMUSCULAR; INTRAVENOUS; SUBCUTANEOUS
Status: DISCONTINUED | OUTPATIENT
Start: 2021-11-17 | End: 2021-11-17 | Stop reason: HOSPADM

## 2021-11-17 RX ORDER — NALOXONE HYDROCHLORIDE 0.4 MG/ML
0.4 INJECTION, SOLUTION INTRAMUSCULAR; INTRAVENOUS; SUBCUTANEOUS
Status: DISCONTINUED | OUTPATIENT
Start: 2021-11-17 | End: 2021-11-17 | Stop reason: HOSPADM

## 2021-11-17 RX ORDER — FENTANYL CITRATE 50 UG/ML
INJECTION, SOLUTION INTRAMUSCULAR; INTRAVENOUS
Status: DISCONTINUED | OUTPATIENT
Start: 2021-11-17 | End: 2021-11-17 | Stop reason: HOSPADM

## 2021-11-17 RX ORDER — CEFAZOLIN SODIUM 1 G/3ML
1 INJECTION, POWDER, FOR SOLUTION INTRAMUSCULAR; INTRAVENOUS
Status: DISCONTINUED | OUTPATIENT
Start: 2021-11-17 | End: 2021-11-17 | Stop reason: HOSPADM

## 2021-11-17 RX ORDER — SODIUM CHLORIDE, SODIUM LACTATE, POTASSIUM CHLORIDE, CALCIUM CHLORIDE 600; 310; 30; 20 MG/100ML; MG/100ML; MG/100ML; MG/100ML
INJECTION, SOLUTION INTRAVENOUS CONTINUOUS
Status: DISCONTINUED | OUTPATIENT
Start: 2021-11-17 | End: 2021-11-17 | Stop reason: HOSPADM

## 2021-11-17 RX ORDER — LIDOCAINE 40 MG/G
CREAM TOPICAL
Status: DISCONTINUED | OUTPATIENT
Start: 2021-11-17 | End: 2021-11-17 | Stop reason: HOSPADM

## 2021-11-17 RX ORDER — MIDAZOLAM HCL IN 0.9 % NACL/PF 1 MG/ML
.5-6 PLASTIC BAG, INJECTION (ML) INTRAVENOUS CONTINUOUS PRN
Status: DISCONTINUED | OUTPATIENT
Start: 2021-11-17 | End: 2021-11-17 | Stop reason: HOSPADM

## 2021-11-17 RX ORDER — ACETAMINOPHEN 325 MG/1
650 TABLET ORAL EVERY 4 HOURS PRN
Status: DISCONTINUED | OUTPATIENT
Start: 2021-11-17 | End: 2021-11-17 | Stop reason: HOSPADM

## 2021-11-17 RX ADMIN — SODIUM CHLORIDE, POTASSIUM CHLORIDE, SODIUM LACTATE AND CALCIUM CHLORIDE: 600; 310; 30; 20 INJECTION, SOLUTION INTRAVENOUS at 12:18

## 2021-11-17 ASSESSMENT — MIFFLIN-ST. JEOR: SCORE: 1830.45

## 2021-11-17 NOTE — PRE-PROCEDURE
GENERAL PRE-PROCEDURE:   Procedure:  Aflutter  Date/Time:  11/17/2021 2:25 PM    Written consent obtained?: Yes    Risks and benefits: Risks, benefits and alternatives were discussed    Consent given by:  Patient  Patient states understanding of procedure being performed: Yes    Patient's understanding of procedure matches consent: Yes    Procedure consent matches procedure scheduled: Yes    Expected level of sedation:  Moderate  Appropriately NPO:  Yes  ASA Class:  3  Mallampati  :  Grade 2- soft palate, base of uvula, tonsillar pillars, and portion of posterior pharyngeal wall visible  Lungs:  Lungs clear with good breath sounds bilaterally  Heart:  Normal heart sounds and rate  History & Physical reviewed:  History and physical reviewed and no updates needed  Statement of review:  I have reviewed the lab findings, diagnostic data, medications, and the plan for sedation

## 2021-11-17 NOTE — PROGRESS NOTES
See dictation.    Successful CTI ablation (ECG of clinical atrial flutter strongly suggested counterclockwise CTI flutter).    No apparent complication.    EBL 20 mL.      Plan:  -He may stop anticoagulation now  -Follow-up in the clinic in 1-2 months.

## 2021-11-17 NOTE — PROGRESS NOTES
Care Suites Admission Nursing Note    Patient Information  Name: Derik Hamm  Age: 66 year old  Reason for admission: A flutter Ablation  Care Suites arrival time: 1145    Patient Admission/Assessment   Pre-procedure assessment complete: Yes  If abnormal assessment/labs, provider notified: N/A  NPO: Yes  Medications held per instructions/orders: N/A  Consent: deferred  If applicable, pregnancy test status: deferred  Patient oriented to room: Yes  Education/questions answered: Yes  Plan/other: A flutter ablation.  NSR today, no pain.    Discharge Planning  Discharge name/phone number: [facility] [phone]    Overnight post sedation caregiver: 2 room mates will be home, Anthony chad Robles  Discharge location: home    Kaylah Rivas RN     PATIENT/VISITOR WELLNESS SCREENING    Step 1 Patient Screening    1. In the last month, have you been in contact with someone who was confirmed or suspected to have Coronavirus/COVID-19? no    2. Do you have the following symptoms?  Fever/Chills? No   Cough? No   Shortness of breath? No   New loss of taste or smell? No  Sore throat? No  Muscle or body aches? No  Headaches? No  Fatigue? No  Vomiting or diarrhea? No

## 2021-11-17 NOTE — PROGRESS NOTES
Care Suites Post Procedure Note    Patient Information  Name: Dreik Hamm  Age: 66 year old    Post Procedure  Time patient returned to Care Suites: 1540  Concerns/abnormal assessment: None  If abnormal assessment, provider notified: N/A  Plan/Other: rest and recover til discharge    Gladys Cabrera RN

## 2021-11-17 NOTE — DISCHARGE INSTRUCTIONS
A FLUTTER ABLATION DISCHARGE INSTRUCTIONS    After you go home:      Have an adult stay with you until tomorrow.    You may resume your normal diet.       For 24 hours - due to the sedation you received:    Relax and take it easy.    Do NOT make any important or legal decisions.    Do NOT drive or operate machines at home or at work.    Do NOT drink alcohol.    Care of Groin Puncture Site:      For the first 24 hrs - check the puncture site every 1-2 hours while awake.    For 2 days, when you cough, sneeze, laugh or move your bowels, hold your hand over the puncture site and press firmly.    Remove the dressing after 24 hours. If there is minor oozing, apply another bandaid and remove it after 12 hours.    It is normal to have a small bruise or pea size lump at the site.    You may shower tomorrow.  Do NOT take a bath, or use a hot tub or pool for at least 3 days. Do NOT scrub the site. Do not use lotion or powder near the puncture site.    Activity:            For 3 days:    No stooping or squatting    Do NOT do any heavy activity such as exercise, lifting, or straining.     No housework, yard work or any activity that make you sweat    Do NOT lift more than 10 pounds    Bleeding:      If you start bleeding from the site in your groin, lie down flat and press firmly on the site for 10 minutes.     Once bleeding stops, lay flat for 2 hours.    Call [facility] nurse line as soon as you can.       Call 911 right away if you have heavy bleeding or bleeding that does not stop.      Medicines:      Take your medications, including blood thinners, unless your provider tells you not to.    If you have stopped any medicines, check with your provider about when to restart them.    If you have pain or shortness of breath, you may take Advil (ibuprofen) or Tylenol (acetaminophen).    Follow Up Appointments:      12/20/21 at 12:30 with Marlena Arroyo NP    You will receive a phone call tomorrow  morning from Dr Ollie FELDMAN - Caroline Ham or Meena.    Call the clinic if:      You have increased pain or a large or growing hard lump around the site.    The site is red, swollen, hot or tender.    Blood or fluid is draining from the site.    You have chills or a fever greater than 101 F (38 C).    Your leg feels numb, cool or changes color.    Increased pain in the chest and/or groin.    Increased shortness of breath    Chest pain not relieved by Tylenol or Advil/Ibuprofen    New pain in the back or belly that you cannot control with Tylenol.    Recurrent irregular or fast heart rate (AFlutter) lasting over 2 hours.    Any questions or concerns.    Heart rhythms:    You may have some irregular heartbeats. These feel very strong. They may make you feel that the fast heart rhythm is going to start again.  Give it time. The irregular beats should occur less often.      [facility]:    [phone] ( 8am-4:30pm M-F)  [facility] Ham    [phone] Option 2  On Call Cardiologist (7 days a week)

## 2021-11-18 ENCOUNTER — TELEPHONE (OUTPATIENT)
Dept: CARDIOLOGY | Facility: CLINIC | Age: 66
End: 2021-11-18
Payer: COMMERCIAL

## 2021-11-18 LAB
ATRIAL RATE - MUSE: 84 BPM
DIASTOLIC BLOOD PRESSURE - MUSE: NORMAL MMHG
INTERPRETATION ECG - MUSE: NORMAL
P AXIS - MUSE: 66 DEGREES
PR INTERVAL - MUSE: 162 MS
QRS DURATION - MUSE: 86 MS
QT - MUSE: 362 MS
QTC - MUSE: 427 MS
R AXIS - MUSE: 78 DEGREES
SYSTOLIC BLOOD PRESSURE - MUSE: NORMAL MMHG
T AXIS - MUSE: 63 DEGREES
VENTRICULAR RATE- MUSE: 84 BPM

## 2021-11-18 NOTE — TELEPHONE ENCOUNTER
Spoke to patient in follow up to aflutter ablation.  Patient reports feel good.  Denies CP, SOB, lightheadedness or dizziness.  Dressing intact on groin site.  Instructed patient to remove dressing and monitor.  Reviewed discharge instructions and follow up appt with patient.  Patient provided verbal understanding regarding above.  FRANCIA Hdez

## 2021-11-18 NOTE — PROGRESS NOTES
Care Suites Discharge Nursing Note    Patient Information  Name: Derik Hamm  Age: 66 year old    Discharge Education:  Discharge instructions reviewed: Yes- Cooper LUNDY  Additional education/resources provided: no  Patient/patient representative verbalizes understanding: Yes  Patient discharging on new medications: No  Medication education completed: N?A    Discharge Plans:   Discharge location: home  Discharge ride contacted: Yes  Approximate discharge time: 1955    Discharge Criteria:  Discharge criteria met and vital signs stable: Yes    Patient Belongs:  Patient belongings returned to patient: Yes    Kaylah Bragg RN

## 2021-11-18 NOTE — PROGRESS NOTES
Care Suites Discharge Nursing Note    Patient Information  Name: Derik Hamm  Age: 66 year old    Discharge Education:  Discharge instructions reviewed: Yes  Additional education/resources provided: all questions answered  Patient/patient representative verbalizes understanding: Yes  Patient discharging on new medications: No  Medication education completed: N/A    Discharge Plans:   Discharge location: home  Discharge ride contacted: Yes  Approximate discharge time: 1950    Discharge Criteria:  Discharge criteria met and vital signs stable: Yes    Patient Belongs:  Patient belongings returned to patient: Yes    Gladys Cabrera RN

## 2021-11-30 DIAGNOSIS — H17.9 CORNEAL SCAR, RIGHT EYE: Primary | ICD-10-CM

## 2021-11-30 RX ORDER — OFLOXACIN 3 MG/ML
1 SOLUTION/ DROPS OPHTHALMIC 4 TIMES DAILY
Qty: 10 ML | Refills: 1 | Status: SHIPPED | OUTPATIENT
Start: 2021-11-30 | End: 2021-12-06

## 2021-11-30 RX ORDER — ERYTHROMYCIN 5 MG/G
0.5 OINTMENT OPHTHALMIC 4 TIMES DAILY
Qty: 3.5 G | Refills: 11 | Status: SHIPPED | OUTPATIENT
Start: 2021-11-30 | End: 2021-12-06

## 2021-11-30 RX ORDER — PREDNISOLONE ACETATE 10 MG/ML
1 SUSPENSION/ DROPS OPHTHALMIC 4 TIMES DAILY
Qty: 10 ML | Refills: 1 | Status: SHIPPED | OUTPATIENT
Start: 2021-11-30 | End: 2021-12-06

## 2021-12-02 ENCOUNTER — NURSE TRIAGE (OUTPATIENT)
Dept: CARDIOLOGY | Facility: CLINIC | Age: 66
End: 2021-12-02
Payer: COMMERCIAL

## 2021-12-02 NOTE — TELEPHONE ENCOUNTER
Derik called wanting to clarify the metoprolol dose and directions. He said he had previously been taking metoprolol succinate 25 mg twice daily. He said his pharmacy gave him a bottle of 50 mg tablets, so he was splitting them in half.  LOV 11/8/21 with Dr. Levin, a new order was placed for metoprolol succinate 50 mg once daily.  Derik verbalized that he will take 2 of the 1/2 tablets he split to equal 50 mg once daily.  Advised he call to report any new symptoms after starting this new dose, or any questions or concerns. He verbalized agreement and understanding.

## 2021-12-04 ENCOUNTER — LAB (OUTPATIENT)
Dept: URGENT CARE | Facility: URGENT CARE | Age: 66
End: 2021-12-04
Payer: COMMERCIAL

## 2021-12-04 DIAGNOSIS — Z11.59 ENCOUNTER FOR SCREENING FOR OTHER VIRAL DISEASES: ICD-10-CM

## 2021-12-04 PROCEDURE — U0003 INFECTIOUS AGENT DETECTION BY NUCLEIC ACID (DNA OR RNA); SEVERE ACUTE RESPIRATORY SYNDROME CORONAVIRUS 2 (SARS-COV-2) (CORONAVIRUS DISEASE [COVID-19]), AMPLIFIED PROBE TECHNIQUE, MAKING USE OF HIGH THROUGHPUT TECHNOLOGIES AS DESCRIBED BY CMS-2020-01-R: HCPCS

## 2021-12-04 PROCEDURE — U0005 INFEC AGEN DETEC AMPLI PROBE: HCPCS

## 2021-12-06 ENCOUNTER — OFFICE VISIT (OUTPATIENT)
Dept: INTERNAL MEDICINE | Facility: CLINIC | Age: 66
End: 2021-12-06
Payer: COMMERCIAL

## 2021-12-06 VITALS
DIASTOLIC BLOOD PRESSURE: 72 MMHG | BODY MASS INDEX: 28.14 KG/M2 | OXYGEN SATURATION: 99 % | TEMPERATURE: 97.8 F | HEIGHT: 74 IN | WEIGHT: 219.3 LBS | HEART RATE: 63 BPM | SYSTOLIC BLOOD PRESSURE: 110 MMHG

## 2021-12-06 DIAGNOSIS — I48.0 PAROXYSMAL ATRIAL FIBRILLATION (H): ICD-10-CM

## 2021-12-06 DIAGNOSIS — B60.10 ACANTHAMOEBA INFECTION: ICD-10-CM

## 2021-12-06 DIAGNOSIS — Z01.818 PREOP GENERAL PHYSICAL EXAM: Primary | ICD-10-CM

## 2021-12-06 PROBLEM — K35.32 APPENDICITIS WITH PERFORATION: Status: RESOLVED | Noted: 2018-11-05 | Resolved: 2021-12-06

## 2021-12-06 LAB — SARS-COV-2 RNA RESP QL NAA+PROBE: NEGATIVE

## 2021-12-06 PROCEDURE — 99214 OFFICE O/P EST MOD 30 MIN: CPT | Performed by: PHYSICIAN ASSISTANT

## 2021-12-06 ASSESSMENT — MIFFLIN-ST. JEOR: SCORE: 1854.49

## 2021-12-06 NOTE — PATIENT INSTRUCTIONS
Hold all supplements or vitamins morning of procedure  Take other usual morning medications with a sip of water  Stop Naprosyn - may resume after surgery per surgeon    Preparing for Your Surgery  Getting started  A nurse will call you to review your health history and instructions. They will give you an arrival time based on your scheduled surgery time.  Please be ready to share the following:    Your doctor's clinic name and phone number    Your medical, surgical and anesthesia history    A list of allergies and sensitivities    A list of medicines, including herbal treatments and over-the-counter drugs    Whether the patient has a legal guardian (ask how to send us the papers in advance)  If you have a child who's having surgery, please ask for a copy of Preparing for Your Child's Surgery.    Preparing for surgery    Within 30 days of surgery: Have a pre-op exam (sometimes called an H&P, or History and Physical). This can be done at a clinic or pre-operative center.  ? If you're having a , you may not need this exam. Talk to your care team    At your pre-op exam, talk to your care team about all medicines you take. If you need to stop any medicines before surgery, ask when to start taking them again.  ? We do this for your safety. Many medicines can make you bleed too much during surgery. Some change how well surgery (anesthesia) drugs work.    Call your insurance company to let them know you're having surgery. (If you don't have insurance, call [phone].)    Call your clinic if there's any change in your health. This includes signs of a cold or flu (sore throat, runny nose, cough, rash, fever). It also includes a scrape or scratch near the surgery site.    If you have questions on the day of surgery, call your hospital or surgery center.  Eating and drinking guidelines  For your safety: Unless your surgeon tells you otherwise, follow the guidelines below.    Eat and drink as usual until 8 hours  before surgery. After that, no food or milk.    Drink clear liquids until 2 hours before surgery. These are liquids you can see through, like water, Gatorade and Propel Water. You may also have black coffee and tea (no cream or milk).    Nothing by mouth within 2 hours of surgery. This includes gum, candy and breath mints.    If you drink, stop drinking alcohol the night before surgery.    If your care team tells you to take medicine on the morning of surgery, it's okay to take it with a sip of water.  Preventing infection    Shower or bathe the night before and morning of your surgery. Follow the instructions your clinic gave you. (If no instructions, use regular soap.)    Don't shave or clip hair near your surgery site. We'll remove the hair if needed.    Don't smoke or vape the morning of surgery. You may chew nicotine gum up to 2 hours before surgery. A nicotine patch is okay.  ? Note: Some surgeries require you to completely quit smoking and nicotine. Check with your surgeon.    Your care team will make every effort to keep you safe from infection. We will:  ? Clean our hands often with soap and water (or an alcohol-based hand rub).  ? Clean the skin at your surgery site with a special soap that kills germs.  ? Give you a special gown to keep you warm. (Cold raises the risk of infection.)  ? Wear special hair covers, masks, gowns and gloves during surgery.  ? Give antibiotic medicine, if prescribed. Not all surgeries need antibiotics.  What to bring on the day of surgery    Photo ID and insurance card    Copy of your health care directive, if you have one    Glasses and hearing aides (bring cases)  ? You can't wear contacts during surgery    Inhaler and eye drops, if you use them (tell us about these when you arrive)    CPAP machine or breathing device, if you use them    A few personal items, if spending the night    If you have . . .  ? A pacemaker or ICD (cardiac defibrillator): Bring the ID card.  ? An  implanted stimulator: Bring the remote control.  ? A legal guardian: Bring a copy of the certified (court-stamped) guardianship papers.  Please remove any jewelry, including body piercings. Leave jewelry and other valuables at home.  If you're going home the day of surgery  Important: If you don't follow the rules below, we must cancel your surgery.     Arrange for someone to drive you home after surgery. You may not drive, take a taxi or take public transportation by yourself (unless you'll have local anesthesia only).    Arrange for a responsible adult to stay with you overnight. If you don't, we may keep you in the hospital overnight, and you may need to pay the costs yourself.  Questions?   If you have any questions for your care team, list them here: _________________________________________________________________________________________________________________________________________________________________________________________________________________________________________________________________________________________________________________________  For informational purposes only. Not to replace the advice of your health care provider. Copyright   2003, 2019 [facility]. All rights reserved. Clinically reviewed by Merlyn Patel MD. [employer] [id number] - REV 4/20.

## 2021-12-06 NOTE — PROGRESS NOTES
[address] [address]  Phone: [phone]  Primary Provider: Jennifer Rodas        PREOPERATIVE EVALUATION:  Today's date: 12/6/2021    Derik Hamm is a 66 year old male who presents for a preoperative evaluation.    Surgical Information:  Surgery/Procedure: KERATOPLASTY, PENETRATING, Extracapsular cataract extraction with possible intraocular lens implantation  [place] [facility] [place]  Surgeon: Dr. Cheng  Surgery Date: 12/08/2021  Time of Surgery: 09:10am  Where patient plans to recover: At home with family  Fax number for surgical facility: Note does not need to be faxed, will be available electronically in Epic.    Type of Anesthesia Anticipated: Combined MAC with retrobulbar    Assessment & Plan     The proposed surgical procedure is considered LOW risk.    Preop general physical exam      Acanthamoeba infection      Paroxysmal atrial fibrillation (H)  - recent ablation - doing well   Off xarelto              Risks and Recommendations:  The patient has the following additional risks and recommendations for perioperative complications:   - No identified additional risk factors other than previously addressed    Medication Instructions:  Patient is to take all scheduled medications on the day of surgery EXCEPT for modifications listed below:   - ACE/ARB: May be continued on the day of surgery.    - Calcium Channel Blockers: May be continued on the day of surgery.   - naproxen (Aleve, Naprosyn): HOLD 4 days before surgery.     RECOMMENDATION:  APPROVAL GIVEN to proceed with proposed procedure, without further diagnostic evaluation.                      Subjective     HPI related to upcoming procedure: infection/eye    Preop Questions 12/1/2021   1. Have you ever had a heart attack or stroke? No   2. Have you ever had surgery on your heart or blood vessels, such as a stent placement, a coronary artery bypass, or surgery on an artery in  your head, neck, heart, or legs? YES - a fib    3. Do you have chest pain with activity? No   4. Do you have a history of  heart failure? YES - a fib - secondary  Followed by Cardiology - recent visit doing well    5. Do you currently have a cold, bronchitis or symptoms of other infection? No   6. Do you have a cough, shortness of breath, or wheezing? No   7. Do you or anyone in your family have previous history of blood clots? No   8. Do you or does anyone in your family have a serious bleeding problem such as prolonged bleeding following surgeries or cuts? No   9. Have you ever had problems with anemia or been told to take iron pills? No   10. Have you had any abnormal blood loss such as black, tarry or bloody stools? No   11. Have you ever had a blood transfusion? No   12. Are you willing to have a blood transfusion if it is medically needed before, during, or after your surgery? Yes   13. Have you or any of your relatives ever had problems with anesthesia? No   14. Do you have sleep apnea, excessive snoring or daytime drowsiness? UNKNOWN -no dx    15. Do you have any artifical heart valves or other implanted medical devices like a pacemaker, defibrillator, or continuous glucose monitor? No   16. Do you have artificial joints? No   17. Are you allergic to latex? No       Health Care Directive:  Patient does not have a Health Care Directive or Living Will:     Preoperative Review of :   reviewed - no record of controlled substances prescribed.      Status of Chronic Conditions:  See problem list for active medical problems.  Problems all longstanding and stable, except as noted/documented.  See ROS for pertinent symptoms related to these conditions.    A-FIB - Patient has a hx of a fib currently s/p ablation. Doing well.  Current treatment regimen includes lisinopril and amlodipine denies significant symptoms of lightheadedness, palpitations or dyspnea. No anticoagulation needed     HYPERTENSION - Patient  has longstanding history of HTN , currently denies any symptoms referable to elevated blood pressure. Specifically denies chest pain, palpitations, dyspnea, orthopnea, PND or peripheral edema. Blood pressure readings have been in normal range. Current medication regimen is as listed below. Patient denies any side effects of medication.       Review of Systems  CONSTITUTIONAL: NEGATIVE for fever, chills, change in weight  ENT/MOUTH: NEGATIVE for ear, mouth and throat problems  RESP: NEGATIVE for significant cough or SOB  CV: NEGATIVE for chest pain, palpitations or peripheral edema  GI: NEGATIVE for nausea, abdominal pain, heartburn, or change in bowel habits  HEME/ALLERGY/IMMUNE: NEGATIVE for bleeding problems  PSYCHIATRIC: NEGATIVE for changes in mood or affect  ROS otherwise negative    Patient Active Problem List    Diagnosis Date Noted     Acanthamoeba infection 11/08/2021     Priority: Medium     Added automatically from request for surgery [id number]       Basal cell carcinoma      Priority: Medium     Paroxysmal atrial fibrillation (H)      Priority: Medium     Secondary cardiomyopathy (H)      Priority: Medium     Atrial flutter with rapid ventricular response (H) 09/29/2021     Priority: Medium     New onset atrial flutter (H) 09/29/2021     Priority: Medium     AK (actinic keratosis) 11/15/2018     Priority: Medium      Past Medical History:   Diagnosis Date     Acanthamoeba keratitis      Appendicitis with perforation 11/5/2018     Atrial flutter with rapid ventricular response (H) 09/29/2021     Basal cell carcinoma      Paroxysmal atrial fibrillation (H)      Presbyopia      PVD (posterior vitreous detachment), left      Secondary cardiomyopathy (H)      Past Surgical History:   Procedure Laterality Date     ANESTHESIA CARDIOVERSION N/A 10/1/2021    Procedure: ANESTHESIA, FOR CARDIOVERSION;  Surgeon: GENERIC ANESTHESIA PROVIDER;  Location: [facility] OR     EP ABLATION ATRIAL FLUTTER N/A 11/17/2021    Procedure:  "EP Ablation Atrial Flutter;  Surgeon: Nayla Levin MD;  Location:  [facility]     LAPAROSCOPIC APPENDECTOMY N/A 2018    Procedure: LAPAROSCOPIC APPENDECTOMY;  Surgeon: Mackenzie Dale MD;  Location: [facility] OR     ORTHOPEDIC SURGERY       Current Outpatient Medications   Medication Sig Dispense Refill     lisinopril (ZESTRIL) 5 MG tablet Take 1 tablet (5 mg) by mouth daily 90 tablet 3     metoprolol succinate ER (TOPROL-XL) 50 MG 24 hr tablet Take 1 tablet (50 mg) by mouth daily 90 tablet 3     multivitamin, therapeutic (THERA-VIT) TABS tablet Take 1 tablet by mouth daily       naproxen sodium 220 MG capsule Take 220 mg by mouth 2 times daily as needed (pain)       TURMERIC PO          No Known Allergies     Social History     Tobacco Use     Smoking status: Former Smoker     Quit date:      Years since quittin.9     Smokeless tobacco: Never Used   Substance Use Topics     Alcohol use: No       History   Drug Use No         Objective     /72   Pulse 63   Temp 97.8  F (36.6  C) (Temporal)   Ht 1.88 m (6' 2\")   Wt 99.5 kg (219 lb 4.8 oz)   SpO2 99%   BMI 28.16 kg/m      Physical Exam  GENERAL APPEARANCE: healthy, alert and no distress  EYES: Eyes grossly normal to inspection, PERRL and conjunctivae and sclerae normal  HENT: ear canals and TM's normal and nose and mouth without ulcers or lesions  RESP: lungs clear to auscultation - no rales, rhonchi or wheezes  CV: regular rate and rhythm, normal S1 S2, no S3 or S4 and no murmur, click or rub   ABDOMEN: soft, nontender, no HSM or masses and bowel sounds normal  MS: extremities normal- no gross deformities noted  NEURO: Normal strength and tone, sensory exam grossly normal, mentation intact and speech normal  PSYCH: mentation appears normal and affect normal/bright    Recent Labs   Lab Test 21  1208 10/01/21  0731 21  0737   HGB 17.8*  --  17.3     --  210   INR  --  1.03  --      --  " 136   POTASSIUM 4.3 4.3 3.9   CR 1.16  --  1.15        Diagnostics:  No labs were ordered during this visit.   No EKG required for low risk surgery (cataract, skin procedure, breast biopsy, etc).    Revised Cardiac Risk Index (RCRI):  The patient has the following serious cardiovascular risks for perioperative complications:   - No serious cardiac risks = 0 points     RCRI Interpretation: 0 points: Class I (very low risk - 0.4% complication rate)           Signed Electronically by: Viviana Booker PA-C  Copy of this evaluation report is provided to requesting physician.

## 2021-12-07 ENCOUNTER — ANESTHESIA EVENT (OUTPATIENT)
Dept: SURGERY | Facility: AMBULATORY SURGERY CENTER | Age: 66
End: 2021-12-07
Payer: COMMERCIAL

## 2021-12-07 RX ORDER — FENTANYL CITRATE 50 UG/ML
25 INJECTION, SOLUTION INTRAMUSCULAR; INTRAVENOUS
Status: CANCELLED | OUTPATIENT
Start: 2021-12-07

## 2021-12-08 ENCOUNTER — ANESTHESIA (OUTPATIENT)
Dept: SURGERY | Facility: AMBULATORY SURGERY CENTER | Age: 66
End: 2021-12-08
Payer: COMMERCIAL

## 2021-12-08 ENCOUNTER — HOSPITAL ENCOUNTER (OUTPATIENT)
Facility: AMBULATORY SURGERY CENTER | Age: 66
End: 2021-12-08
Attending: OPHTHALMOLOGY
Payer: COMMERCIAL

## 2021-12-08 VITALS
TEMPERATURE: 97.7 F | RESPIRATION RATE: 16 BRPM | OXYGEN SATURATION: 98 % | HEIGHT: 74 IN | HEART RATE: 72 BPM | BODY MASS INDEX: 27.46 KG/M2 | DIASTOLIC BLOOD PRESSURE: 78 MMHG | SYSTOLIC BLOOD PRESSURE: 121 MMHG | WEIGHT: 214 LBS

## 2021-12-08 DIAGNOSIS — B60.10 ACANTHAMOEBA INFECTION: Primary | ICD-10-CM

## 2021-12-08 PROCEDURE — 65875 INCISE INNER EYE ADHESIONS: CPT | Mod: 59 | Performed by: OPHTHALMOLOGY

## 2021-12-08 PROCEDURE — 65730 CORNEAL TRANSPLANT: CPT | Mod: RT

## 2021-12-08 PROCEDURE — 65875 INCISE INNER EYE ADHESIONS: CPT | Mod: RT

## 2021-12-08 PROCEDURE — 65730 CORNEAL TRANSPLANT: CPT | Mod: RT | Performed by: OPHTHALMOLOGY

## 2021-12-08 PROCEDURE — 87102 FUNGUS ISOLATION CULTURE: CPT | Mod: 90 | Performed by: PATHOLOGY

## 2021-12-08 PROCEDURE — 99000 SPECIMEN HANDLING OFFICE-LAB: CPT | Performed by: PATHOLOGY

## 2021-12-08 PROCEDURE — 87070 CULTURE OTHR SPECIMN AEROBIC: CPT | Mod: 90 | Performed by: PATHOLOGY

## 2021-12-08 PROCEDURE — V2785 CORNEAL TISSUE PROCESSING: HCPCS | Mod: RT

## 2021-12-08 PROCEDURE — 87075 CULTR BACTERIA EXCEPT BLOOD: CPT | Mod: 90 | Performed by: PATHOLOGY

## 2021-12-08 DEVICE — EYE CORNEA PROCESS FEE FOR MN LIONS BANK: Type: IMPLANTABLE DEVICE | Site: EYE | Status: FUNCTIONAL

## 2021-12-08 RX ORDER — BALANCED SALT SOLUTION 6.4; .75; .48; .3; 3.9; 1.7 MG/ML; MG/ML; MG/ML; MG/ML; MG/ML; MG/ML
SOLUTION OPHTHALMIC PRN
Status: DISCONTINUED | OUTPATIENT
Start: 2021-12-08 | End: 2021-12-08 | Stop reason: HOSPADM

## 2021-12-08 RX ORDER — ACETAMINOPHEN 325 MG/1
975 TABLET ORAL ONCE
Status: COMPLETED | OUTPATIENT
Start: 2021-12-08 | End: 2021-12-08

## 2021-12-08 RX ORDER — CYCLOPENTOLAT/TROPIC/PHENYLEPH 1%-1%-2.5%
1 DROPS (EA) OPHTHALMIC (EYE)
Status: COMPLETED | OUTPATIENT
Start: 2021-12-08 | End: 2021-12-08

## 2021-12-08 RX ORDER — HYDROMORPHONE HYDROCHLORIDE 1 MG/ML
0.2 INJECTION, SOLUTION INTRAMUSCULAR; INTRAVENOUS; SUBCUTANEOUS EVERY 5 MIN PRN
Status: DISCONTINUED | OUTPATIENT
Start: 2021-12-08 | End: 2021-12-09 | Stop reason: HOSPADM

## 2021-12-08 RX ORDER — ONDANSETRON 2 MG/ML
4 INJECTION INTRAMUSCULAR; INTRAVENOUS EVERY 30 MIN PRN
Status: DISCONTINUED | OUTPATIENT
Start: 2021-12-08 | End: 2021-12-09 | Stop reason: HOSPADM

## 2021-12-08 RX ORDER — MEPERIDINE HYDROCHLORIDE 25 MG/ML
12.5 INJECTION INTRAMUSCULAR; INTRAVENOUS; SUBCUTANEOUS
Status: DISCONTINUED | OUTPATIENT
Start: 2021-12-08 | End: 2021-12-09 | Stop reason: HOSPADM

## 2021-12-08 RX ORDER — KETOROLAC TROMETHAMINE 30 MG/ML
15 INJECTION, SOLUTION INTRAMUSCULAR; INTRAVENOUS EVERY 6 HOURS PRN
Status: DISCONTINUED | OUTPATIENT
Start: 2021-12-08 | End: 2021-12-09 | Stop reason: HOSPADM

## 2021-12-08 RX ORDER — ONDANSETRON 4 MG/1
4 TABLET, ORALLY DISINTEGRATING ORAL EVERY 30 MIN PRN
Status: DISCONTINUED | OUTPATIENT
Start: 2021-12-08 | End: 2021-12-09 | Stop reason: HOSPADM

## 2021-12-08 RX ORDER — LABETALOL HYDROCHLORIDE 5 MG/ML
10 INJECTION, SOLUTION INTRAVENOUS
Status: DISCONTINUED | OUTPATIENT
Start: 2021-12-08 | End: 2021-12-09 | Stop reason: HOSPADM

## 2021-12-08 RX ORDER — SODIUM CHLORIDE, SODIUM LACTATE, POTASSIUM CHLORIDE, CALCIUM CHLORIDE 600; 310; 30; 20 MG/100ML; MG/100ML; MG/100ML; MG/100ML
INJECTION, SOLUTION INTRAVENOUS CONTINUOUS
Status: DISCONTINUED | OUTPATIENT
Start: 2021-12-08 | End: 2021-12-09 | Stop reason: HOSPADM

## 2021-12-08 RX ORDER — DEXAMETHASONE SODIUM PHOSPHATE 4 MG/ML
INJECTION, SOLUTION INTRA-ARTICULAR; INTRALESIONAL; INTRAMUSCULAR; INTRAVENOUS; SOFT TISSUE PRN
Status: DISCONTINUED | OUTPATIENT
Start: 2021-12-08 | End: 2021-12-08 | Stop reason: HOSPADM

## 2021-12-08 RX ORDER — HYDRALAZINE HYDROCHLORIDE 20 MG/ML
2.5-5 INJECTION INTRAMUSCULAR; INTRAVENOUS EVERY 10 MIN PRN
Status: DISCONTINUED | OUTPATIENT
Start: 2021-12-08 | End: 2021-12-09 | Stop reason: HOSPADM

## 2021-12-08 RX ORDER — OXYCODONE HYDROCHLORIDE 5 MG/1
5 TABLET ORAL EVERY 4 HOURS PRN
Status: DISCONTINUED | OUTPATIENT
Start: 2021-12-08 | End: 2021-12-09 | Stop reason: HOSPADM

## 2021-12-08 RX ORDER — TROPICAMIDE 10 MG/ML
SOLUTION/ DROPS OPHTHALMIC PRN
Status: DISCONTINUED | OUTPATIENT
Start: 2021-12-08 | End: 2021-12-08 | Stop reason: HOSPADM

## 2021-12-08 RX ORDER — LIDOCAINE HYDROCHLORIDE 20 MG/ML
INJECTION, SOLUTION INFILTRATION; PERINEURAL PRN
Status: DISCONTINUED | OUTPATIENT
Start: 2021-12-08 | End: 2021-12-08

## 2021-12-08 RX ORDER — LORAZEPAM 2 MG/ML
.5-1 INJECTION INTRAMUSCULAR
Status: DISCONTINUED | OUTPATIENT
Start: 2021-12-08 | End: 2021-12-09 | Stop reason: HOSPADM

## 2021-12-08 RX ORDER — PROPOFOL 10 MG/ML
INJECTION, EMULSION INTRAVENOUS PRN
Status: DISCONTINUED | OUTPATIENT
Start: 2021-12-08 | End: 2021-12-08

## 2021-12-08 RX ORDER — PHENYLEPHRINE HYDROCHLORIDE 25 MG/ML
SOLUTION/ DROPS OPHTHALMIC PRN
Status: DISCONTINUED | OUTPATIENT
Start: 2021-12-08 | End: 2021-12-08 | Stop reason: HOSPADM

## 2021-12-08 RX ORDER — FENTANYL CITRATE 50 UG/ML
25 INJECTION, SOLUTION INTRAMUSCULAR; INTRAVENOUS EVERY 5 MIN PRN
Status: DISCONTINUED | OUTPATIENT
Start: 2021-12-08 | End: 2021-12-09 | Stop reason: HOSPADM

## 2021-12-08 RX ORDER — PROPARACAINE HYDROCHLORIDE 5 MG/ML
1 SOLUTION/ DROPS OPHTHALMIC ONCE
Status: COMPLETED | OUTPATIENT
Start: 2021-12-08 | End: 2021-12-08

## 2021-12-08 RX ORDER — ALBUTEROL SULFATE 0.83 MG/ML
2.5 SOLUTION RESPIRATORY (INHALATION) EVERY 4 HOURS PRN
Status: DISCONTINUED | OUTPATIENT
Start: 2021-12-08 | End: 2021-12-09 | Stop reason: HOSPADM

## 2021-12-08 RX ORDER — LIDOCAINE 40 MG/G
CREAM TOPICAL
Status: DISCONTINUED | OUTPATIENT
Start: 2021-12-08 | End: 2021-12-09 | Stop reason: HOSPADM

## 2021-12-08 RX ORDER — MOXIFLOXACIN 5 MG/ML
1 SOLUTION/ DROPS OPHTHALMIC
Status: COMPLETED | OUTPATIENT
Start: 2021-12-08 | End: 2021-12-08

## 2021-12-08 RX ADMIN — SODIUM CHLORIDE, SODIUM LACTATE, POTASSIUM CHLORIDE, CALCIUM CHLORIDE: 600; 310; 30; 20 INJECTION, SOLUTION INTRAVENOUS at 08:01

## 2021-12-08 RX ADMIN — MOXIFLOXACIN 1 DROP: 5 SOLUTION/ DROPS OPHTHALMIC at 08:06

## 2021-12-08 RX ADMIN — ACETAMINOPHEN 975 MG: 325 TABLET ORAL at 08:01

## 2021-12-08 RX ADMIN — MOXIFLOXACIN 1 DROP: 5 SOLUTION/ DROPS OPHTHALMIC at 08:00

## 2021-12-08 RX ADMIN — Medication 1 DROP: at 08:01

## 2021-12-08 RX ADMIN — Medication 1 DROP: at 08:06

## 2021-12-08 RX ADMIN — MOXIFLOXACIN 1 DROP: 5 SOLUTION/ DROPS OPHTHALMIC at 08:11

## 2021-12-08 RX ADMIN — PROPARACAINE HYDROCHLORIDE 1 DROP: 5 SOLUTION/ DROPS OPHTHALMIC at 08:00

## 2021-12-08 RX ADMIN — PROPOFOL 20 MG: 10 INJECTION, EMULSION INTRAVENOUS at 09:23

## 2021-12-08 RX ADMIN — PROPOFOL 50 MG: 10 INJECTION, EMULSION INTRAVENOUS at 09:22

## 2021-12-08 RX ADMIN — Medication 1 DROP: at 08:11

## 2021-12-08 RX ADMIN — LIDOCAINE HYDROCHLORIDE 80 MG: 20 INJECTION, SOLUTION INFILTRATION; PERINEURAL at 09:22

## 2021-12-08 ASSESSMENT — ENCOUNTER SYMPTOMS: DYSRHYTHMIAS: 1

## 2021-12-08 ASSESSMENT — MIFFLIN-ST. JEOR: SCORE: 1830.45

## 2021-12-08 NOTE — ANESTHESIA POSTPROCEDURE EVALUATION
Patient: Derik Hamm    Procedure: Procedure(s):  KERATOPLASTY, PENETRATING, posteror synechiolysis       Diagnosis:Acanthamoeba infection [B60.10]  Diagnosis Additional Information: No value filed.    Anesthesia Type:  MAC    Note:  Disposition: Outpatient   Postop Pain Control: Uneventful            Sign Out: Well controlled pain   PONV:    Neuro/Psych: Uneventful            Sign Out: Acceptable/Baseline neuro status   Airway/Respiratory: Uneventful            Sign Out: Acceptable/Baseline resp. status   CV/Hemodynamics: Uneventful            Sign Out: Acceptable CV status; No obvious hypovolemia; No obvious fluid overload   Other NRE:    DID A NON-ROUTINE EVENT OCCUR?            Last vitals:  Vitals Value Taken Time   /78 12/08/21 1200   Temp 36.5  C (97.7  F) 12/08/21 1100   Pulse 72 12/08/21 1200   Resp 16 12/08/21 1125   SpO2 98 % 12/08/21 1200       Electronically Signed By: Torres Mendez MD  December 8, 2021  2:44 PM

## 2021-12-08 NOTE — ANESTHESIA CARE TRANSFER NOTE
Patient: Derik Hamm    Procedure: Procedure(s):  KERATOPLASTY, PENETRATING, posteror synechiolysis       Diagnosis: Acanthamoeba infection [B60.10]  Diagnosis Additional Information: No value filed.    Anesthesia Type:   MAC     Note:    Oropharynx: oropharynx clear of all foreign objects and spontaneously breathing  Level of Consciousness: awake  Oxygen Supplementation: room air    Independent Airway: airway patency satisfactory and stable  Dentition: dentition unchanged  Vital Signs Stable: post-procedure vital signs reviewed and stable  Report to RN Given: handoff report given  Patient transferred to: Phase II    Handoff Report: Identifed the Patient, Identified the Reponsible Provider, Reviewed the pertinent medical history, Discussed the surgical course, Reviewed Intra-OP anesthesia mangement and issues during anesthesia, Set expectations for post-procedure period and Allowed opportunity for questions and acknowledgement of understanding      Vitals:  Vitals Value Taken Time   /81 12/08/21 1125   Temp 36.5  C (97.7  F) 12/08/21 1100   Pulse 68 12/08/21 1125   Resp 16 12/08/21 1125   SpO2 97 % 12/08/21 1125       Electronically Signed By: DEREK Blas CRNA  December 8, 2021  11:28 AM

## 2021-12-08 NOTE — DISCHARGE INSTRUCTIONS
[facility]  Home Care Following Anesthesia  For 24 hours after surgery:  1. Get plenty of rest.  A responsible adult must stay with you for at least 24 hours after you leave the surgery center.  2. Do not drive or use heavy equipment.  If you have weakness or tingling, don't drive or use heavy equipment until this feeling goes away.   3. Do not drink alcohol.   4. Avoid strenuous or risky activities.  Ask for help when climbing stairs.  5. You may feel lightheaded.  IF so, sit for a few minutes before standing.  Have someone help you get up.   6. If you have nausea (feel sick to your stomach): Drink only clear liquids such as apple juice, ginger ale, broth or 7-Up.  Rest may also help.  Be sure to drink enough fluids.  Move to a regular diet as you feel able.   7. You may have a slight fever.  Call the doctor if your fever is over 100 F (37.7 C) (taken under the tongue) or lasts longer than 24 hours.  8. You may have a dry mouth, a sore throat, muscle aches or trouble sleeping. These should go away after 24 hours.  9. Do not make important or legal decisions.   10. It is recommended to avoid smoking.         Tips for taking pain medications  To get the best pain relief possible, remember these points:    Take pain medications as directed, before pain becomes severe.    Pain medication can upset your stomach: taking it with food may help.    Constipation is a common side effect of pain medication. Drink plenty of  fluids.    Eat foods high in fiber. Take a stool softener if recommended by your doctor or pharmacist.    Do not drink alcohol, drive or operate machinery while taking pain medications.    Ask about other ways to control pain, such as with heat, ice or relaxation.    Tylenol/Acetaminophen Consumption  To help encourage the safe use of acetaminophen, the makers of TYLENOL  have lowered the maximum daily dose for single-ingredient Extra Strength TYLENOL  (acetaminophen)  products sold in the [place] from 8 pills per day (4,000 mg) to 6 pills per day (3,000 mg). The dosing interval has also changed from 2 pills every 4-6 hours to 2 pills every 6 hours.    If you feel your pain relief is insufficient, you may take Tylenol/Acetaminophen in addition to your narcotic pain medication.     Be careful not to exceed 3,000 mg of Tylenol/Acetaminophen in a 24 hour period from all sources.    If you are taking extra strength Tylenol/acetaminophen (500 mg), the maximum dose is 6 tablets in 24 hours.    If you are taking regular strength acetaminophen (325 mg), the maximum dose is 9 tablets in 24 hours.    Call a doctor for any of the followin. Signs of infection (fever, growing tenderness at the surgery site, a large amount of drainage or bleeding, severe pain, foul-smelling drainage, redness, swelling).  2. It has been over 8 to 10 hours since surgery and you are still not able to urinate (pass water).  3. Headache for over 24 hours.  4. Numbness, tingling or weakness the day after surgery (if you had spinal anesthesia).  5. Signs of Covid-19 infection (temperature over 100 degrees, shortness of breath, cough, loss of taste/smell, generalized body aches, persistent headache, chills, sore throat, nausea/vomiting/diarrhea)  Your doctor is:  Dr. Anson Cheng, Ophthalmology: [phone]                 Or dial [phone] and ask for the resident on call for:  Ophthalmology  For emergency care, call the:  [facility]:  [phone] (TTY for hearing impaired: [phone])    975 mg Tylenol given at 8AM ok to take again 2PM today

## 2021-12-08 NOTE — ANESTHESIA PREPROCEDURE EVALUATION
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]    Procedure : Procedure(s):  KERATOPLASTY, PENETRATING, posteror synechiolysis          Past Medical History:   Diagnosis Date     Acanthamoeba keratitis      Appendicitis with perforation 2018     Atrial flutter with rapid ventricular response (H) 2021     Basal cell carcinoma      Paroxysmal atrial fibrillation (H)      Presbyopia      PVD (posterior vitreous detachment), left      Secondary cardiomyopathy (H)       Past Surgical History:   Procedure Laterality Date     ANESTHESIA CARDIOVERSION N/A 10/1/2021    Procedure: ANESTHESIA, FOR CARDIOVERSION;  Surgeon: GENERIC ANESTHESIA PROVIDER;  Location: [facility] OR     EP ABLATION ATRIAL FLUTTER N/A 2021    Procedure: EP Ablation Atrial Flutter;  Surgeon: Nayla Levin MD;  Location: [facility] CARDIAC CATH LAB     LAPAROSCOPIC APPENDECTOMY N/A 2018    Procedure: LAPAROSCOPIC APPENDECTOMY;  Surgeon: Mackenzie Dale MD;  Location: [facility] OR     ORTHOPEDIC SURGERY        No Known Allergies   Social History     Tobacco Use     Smoking status: Former Smoker     Quit date:      Years since quittin.9     Smokeless tobacco: Never Used   Substance Use Topics     Alcohol use: No      Wt Readings from Last 1 Encounters:   21 97.1 kg (214 lb)        Anesthesia Evaluation            ROS/MED HX  ENT/Pulmonary:       Neurologic:       Cardiovascular:     (+) -----dysrhythmias, a-flutter,     METS/Exercise Tolerance:     Hematologic:       Musculoskeletal:       GI/Hepatic:       Renal/Genitourinary:       Endo:       Psychiatric/Substance Use:       Infectious Disease:       Malignancy:       Other:            Physical Exam    Airway        Mallampati: II   TM distance: > 3 FB   Neck ROM: full   Mouth opening: > 3 cm    Respiratory Devices and Support    2  l/min.      Dental  no notable dental history         Cardiovascular             Pulmonary                   OUTSIDE LABS:  CBC:   Lab Results   Component Value Date    WBC 5.5 11/17/2021    WBC 5.5 09/30/2021    HGB 17.8 (H) 11/17/2021    HGB 17.3 09/30/2021    HCT 55.7 (H) 11/17/2021    HCT 55.2 (H) 09/30/2021     11/17/2021     09/30/2021     BMP:   Lab Results   Component Value Date     11/17/2021     09/30/2021    POTASSIUM 4.3 11/17/2021    POTASSIUM 4.3 10/01/2021    CHLORIDE 106 11/17/2021    CHLORIDE 104 09/30/2021    CO2 29 11/17/2021    CO2 28 09/30/2021    BUN 22 11/17/2021    BUN 17 09/30/2021    CR 1.16 11/17/2021    CR 1.15 09/30/2021    GLC 95 11/17/2021    GLC 96 09/30/2021     COAGS:   Lab Results   Component Value Date    INR 1.03 10/01/2021     POC: No results found for: BGM, HCG, HCGS  HEPATIC:   Lab Results   Component Value Date    ALBUMIN 4.1 11/04/2018    PROTTOTAL 7.6 11/04/2018    ALT 31 11/04/2018    AST 16 11/04/2018    ALKPHOS 68 11/04/2018    BILITOTAL 1.3 11/04/2018     OTHER:   Lab Results   Component Value Date    HENRY 8.9 11/17/2021    MAG 2.0 10/01/2021    LIPASE 69 (L) 11/04/2018    TSH 1.99 09/29/2021       Anesthesia Plan    ASA Status:  3   NPO Status:  NPO Appropriate    Anesthesia Type: MAC.     - Reason for MAC: straight local not clinically adequate   Induction: Intravenous, Propofol.   Maintenance: TIVA.        Consents    Anesthesia Plan(s) and associated risks, benefits, and realistic alternatives discussed. Questions answered and patient/representative(s) expressed understanding.    - Discussed:     - Discussed with:  Patient      - Extended Intubation/Ventilatory Support Discussed: No.      - Patient is DNR/DNI Status: No    Use of blood products discussed: No .     Postoperative Care    Pain management: Multi-modal analgesia.   PONV prophylaxis: Ondansetron (or other 5HT-3)     Comments:                Torres Mendez MD   Quality 431: Preventive Care And Screening: Unhealthy Alcohol Use - Screening: Patient screened for unhealthy alcohol use using a single question and scores less than 2 times per year Quality 154 Part A: Falls: Risk Assessment (Should Be Reported With Measure 155.): Falls risk assessment completed and documented in the past 12 months. Quality 110: Preventive Care And Screening: Influenza Immunization: Influenza Immunization Administered during Influenza season Detail Level: Detailed Quality 134: Screening For Clinical Depression And Follow-Up Plan: Depression Screening not documented, reason not given Quality 111:Pneumonia Vaccination Status For Older Adults: Pneumococcal Vaccination not Administered or Previously Received, Reason not Otherwise Specified Quality 130: Documentation Of Current Medications In The Medical Record: Current Medications Documented Quality 226: Preventive Care And Screening: Tobacco Use: Screening And Cessation Intervention: Patient screened for tobacco and never smoked

## 2021-12-08 NOTE — OP NOTE
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 Penetrating keratoplasty, right eye (7.5 mm)    Attending: Anson Cheng MD, PhD  Fellow: Nahum Kent DO    Anesthesia: MAC/RBB  Findings: None   Blood Loss: None  Complications: None   Implant Name Type Inv. Item Serial No.  Lot No. LRB No. Used Action   [employer]YE [employer] PROCESS FEE FOR [place] [employer] - [id number] OS-C Lens/Eye Implant [employer] F[employer] FOR [place] [employer]  OS-C [place] [employer]  Right 1 Implanted        DESCRIPTION OF PROCEDURE   In the preoperative suite, the patient was identified, the surgical site marked and informed consent was obtained. The patient was the brought back to the operative suite where the appropriate anesthesia monitors were connected. A routine time-out was performed and retrobulbar block consisting of Lidocaine, Marcaine, and Wydase was administered to the right eye. The patient's operative eye was then prepped and draped in the usual sterile fashion for ophthalmic surgery.  Following draping, the eye was inspected and found to have a central corneal scar with inferior neovascularization as well as posterior synechiae with a mildly dense cataract. A Harry-Chappell fixation ring was placed for scleral support and sutured to the eye using 6-0 Vicryl suture. Calipers were used to measure the cornea and the decision was made to proceed with an 7.5 mm corneal trephination and an 8 mm donor transplant. A marking pen was used to identify the center of the cornea.   Attention was then directed towards the donor cornea. A donor suction trephine was then used to trephinate the cornea. The donor rim and media were sent for routine aerobic, anaerobic, and fungal cultures.   Attention was then directed back to the patient's cornea. A Fernandez-Hessberg corneal trephine was first marked with  marking pen, taking care to avoid damaging the blade. This was then used to trephinate the patient's cornea to 80% depth and place 16 evenly spaced marks for suture alignment. A pair of 0.12 forceps and a supersharp blade were used to enter the AC in a controlled fashion at 8 o'clock within the groove of the trephination. Healon was injected to fill the anterior chamber. Corneal scissors to the left and right were then used to completed excise the cornea along the trephination groove. Healon was then used to perform a synechiolysis.  It was determined that the cataract had a very good red reflex and so the decision was made not to perform cataract extraction at this time.  The Healon was used to coat the open terri and ocular surface. The donor cornea was then placed endothelial side down over the open-terri. The cornea was sutured in place using 16 interrupted 10-0 nylon sutures. BSS on a 30G cannula was used to irrigate the anterior chamber and burp out residual Healon. The sutures were rotated and buried. The wound was checked with a fluorescein strip and found to be watertight. The AC was noted to be formed and the pressure was noted to be adequate. A super sharp blade and 0.12 forceps were used to cut and remove the Vicryl sutures, and the Harry-Chappell ring was removed.  Subconjunctival injections of Ancef and Dexamethasone were administered to the inferior fornix. The drapes were carefully removed.  Maxitrol ointment was administered to the operative eye. A patch and shield were taped over the eye. The patient was then taken to the recovery room in stable condition having tolerated the procedure well. The patient was discharged home in good condition.   Dr. Anson Cheng was scrubbed and present for the entire surgery.    Nahum Kent DO  Cornea & External Disease Fellow    I was present for the entire procedure(s). - Anson Cheng MD

## 2021-12-09 ENCOUNTER — OFFICE VISIT (OUTPATIENT)
Dept: OPHTHALMOLOGY | Facility: CLINIC | Age: 66
End: 2021-12-09
Attending: OPHTHALMOLOGY
Payer: COMMERCIAL

## 2021-12-09 DIAGNOSIS — H16.401 CORNEAL NEOVASCULARIZATION OF RIGHT EYE: ICD-10-CM

## 2021-12-09 DIAGNOSIS — Z94.7 S/P PKP (PENETRATING KERATOPLASTY): ICD-10-CM

## 2021-12-09 DIAGNOSIS — H04.121 DRY EYE SYNDROME OF RIGHT EYE: ICD-10-CM

## 2021-12-09 DIAGNOSIS — Z98.890 S/P EYE SURGERY: Primary | ICD-10-CM

## 2021-12-09 PROCEDURE — 99024 POSTOP FOLLOW-UP VISIT: CPT | Mod: GC | Performed by: OPHTHALMOLOGY

## 2021-12-09 PROCEDURE — G0463 HOSPITAL OUTPT CLINIC VISIT: HCPCS

## 2021-12-09 ASSESSMENT — VISUAL ACUITY
METHOD: SNELLEN - LINEAR
OD_CC: 20/200
CORRECTION_TYPE: GLASSES
OD_PH_CC: 20/150

## 2021-12-09 ASSESSMENT — SLIT LAMP EXAM - LIDS: COMMENTS: MGD

## 2021-12-09 ASSESSMENT — EXTERNAL EXAM - LEFT EYE: OS_EXAM: NORMAL

## 2021-12-09 ASSESSMENT — TONOMETRY
OD_IOP_MMHG: 11
IOP_METHOD: ICARE

## 2021-12-09 ASSESSMENT — EXTERNAL EXAM - RIGHT EYE: OD_EXAM: NORMAL

## 2021-12-09 NOTE — PATIENT INSTRUCTIONS
Medication Instructions:    Right eye-  - Pred 1% 4 times a day   - Oflox 4 times a day  - Erythromycin ointment 4 times a day

## 2021-12-09 NOTE — PROGRESS NOTES
CC:  Acanthamoeba keratitis OD     Referring provider: Dr. Rosie Han     HPI:  Derik Hamm is a(n) 65 year old male who presents as follow up for Acanthamoeba Keratitis right eye.     Patient initially presented for evaluation of persistent keratoconjunctivitis, OD, since 10/5/2020. Initially seen by Dr. Ngo at [facility] and felt it was related to severe dryness vs bacterial keratitis, so he was started on PFATs and Vigamox Q2H WA. Patient was seen frequently for follow up; he was started on Valtrex 500 mg TID on 10/9/20 and has been on that since then. Despite this therapy, pt failed to improve so he was started prednisolone. He has had a waxing and waning course, but more recently has gradually declined. Stopped PF on 11/2 after running out. He was seen by Dr. Ngo on 11/2 who noted worsening VA and worsening exam, so the patient was referred to Dr. Han, whom he saw 11/4/20. Dr. Han referred to our clinic due to concerns for acanthamoeba keratitis. Culture positive for acanthamoeba on 11/05/20.     Interval History:  S/p PKP and posterior synechiolysis right eye (12/8/21).  Pt is comfortable, no pain, + tearing.       POHx:  PVD OS  Hyperopia OU  Presbyopia OU     Glasses: Yes  CTL wearer: yes - none x1.5 months; wears while showering or in the sauna; he sleeps in them once every 3-4 weeks; never wears them in a lake or pool.      Family hx of eye disease: negative for glaucoma/macular degeneration     Social Hx: (-) smoking, (-) EtOH, (-) illicit drugs     Meds:  PFATS BID (supposed to be more frequent)       -      Surgical Hx:  S/p PKP with posterior synechiolysis right eye (12/8/21), did not perform ECCE at this time     A/P:  # Acanthamoeba keratitis, OD              - Symptoms started 10/5/2020              - Acanthamoeba risks: sleeps in CTL, wears in shower, sauna              - Cultures obtained 11/05/20,   Culture positive for acanthamoeba.              - Confocal: many PMNs, no  definite acanthamoeba (but could be masked by deeper infection, no fungal elements)              - Discussed non-FDA use of PHMB     12/31: Pain worse likely 2/2 surface toxicity from PHMB, although difficult to know. Plan to increase lubrication.   1/07: Better overall.  pain better.  Stromal haze/KP better as well.  1/19: Stable from previous visit w/ continued diffuse haze,no epi defects.   1/28: Slightly improved corneal clarity.  2/4: Slightly improved corneal clarity  2/18: Exam largely stable, new central bulla  3/2: increased soreness and tearing past few days, exam stable.  Diffuse haze, edema but improved from last photos.  3/11: Worsening pain and soreness over the past week. 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 CL placed right eye  4/8: BCL really helped his pain. Much worse with it off. Epi defect larger with rolled edges. 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 Infiltrate improved. AmbioDisk 9.0 + Kontur lens 8.6/16.0 4/8/21 4/22: Pain improving. Ulcer looks stable to slightly improved. Prokera ring placed  4/29: right eye Much improved infiltrate. Epi defect resolving. Am dissolved.  5/6/21:  AM dissolved, ring removed.  Epi healed, small superocentral crescentic infiltrate remains w/ surrounding central scarring.    5/20: significant improvement in Snellen acuity today. Crescentic infiltrate smaller and less dense since 5/6 photo.  6/17: small central infiltrate present at inferior aspect of pupil - smaller than 5/20. Edema much improved. Vision stable.   7/16/21: Stable vision, pt comfortable.    Would like to see active infiltrate completely resolved before stopping / reducing PHMB further.  8/20/21: PHMB stopped.  Doing well.  10/21/21: Doing well.  Stable off the PHMB.  Disc right eye PKP with cat ext and IOL.  Disc R, B, PC and option. IOL calcs today.  Pt will decide about general anesthesia vs  retrobulbar block. 3 hours for  Triple.  12/9/21: POD#1 s/p PKP.  Doing well.  Epi intact, cornea quite clear.                  PLAN:   - Pred 1% 4 times a francesco right eye   - Oflox 4 times a day right eye   - Erythromycin ointment 4 times day  - PFATS 4-6 times a day each eye  - disc RTN precautions post surgery.  - Shield day and night.  - NO water direct into surgery eye, NO heavy lifting over 2lbs, NO swimming.  Reviewed instructions      RTC: POW1, POM1, POM2 w/ VT, call if sx worsen to clinic.    Nahum Kent, DO  Fellow, Cornea & External Disease  Department of Ophthalmology  [facility]    Attending Physician Attestation:  Complete documentation of historical and exam elements from today's encounter can be found in the full encounter summary report (not reduplicated in this progress note).  I personally obtained the chief complaint(s) and history of present illness.  I confirmed and edited as necessary the review of systems, past medical/surgical history, family history, social history, and examination findings as documented by others; and I examined the patient myself.  I personally reviewed the relevant tests, images, and reports as documented above.  I formulated and edited as necessary the assessment and plan and discussed the findings and management plan with the patient and family. - Anson Cheng MD

## 2021-12-09 NOTE — NURSING NOTE
Chief Complaints and History of Present Illnesses   Patient presents with     Post Op (Ophthalmology) Right Eye     Chief Complaint(s) and History of Present Illness(es)     Post Op (Ophthalmology) Right Eye     Laterality: right eye    Onset: 1 day ago              Comments     1 day s/p PK/posteror synechiolysis RE 12/08/2121-Pt. States that he is doing well. Was able to sleep well last night. No pain BE.     Rohini Bertrand COT 9:20 AM December 9, 2021

## 2021-12-14 ENCOUNTER — OFFICE VISIT (OUTPATIENT)
Dept: DERMATOLOGY | Facility: CLINIC | Age: 66
End: 2021-12-14
Payer: COMMERCIAL

## 2021-12-14 VITALS — OXYGEN SATURATION: 98 % | DIASTOLIC BLOOD PRESSURE: 78 MMHG | HEART RATE: 73 BPM | SYSTOLIC BLOOD PRESSURE: 134 MMHG

## 2021-12-14 DIAGNOSIS — L57.0 ACTINIC KERATOSIS: Primary | ICD-10-CM

## 2021-12-14 PROCEDURE — 99207 PR DROP WITH A PROCEDURE: CPT | Performed by: PHYSICIAN ASSISTANT

## 2021-12-14 PROCEDURE — 17000 DESTRUCT PREMALG LESION: CPT | Performed by: PHYSICIAN ASSISTANT

## 2021-12-14 NOTE — PROGRESS NOTES
HPI:   Chief complaints: Derik Hamm is a 66 year old male who presents for treatment of a biopsy proven actinic keratosis on the right chest        PHYSICAL EXAM:    /78   Pulse 73   SpO2 98%   Skin exam performed as follows: Type 2 skin. Mood appropriate  Alert and Oriented X 3. Well developed, well nourished in no distress.  General appearance: Normal  Head including face: Normal  Eyes: conjunctiva and lids: Normal  Mouth: Lips, teeth, gums: Normal  Neck: Normal  Chest-breast/axillae: Normal  Back: Normal  Spleen and liver: Normal  Cardiovascular: Exam of peripheral vascular system by observation for swelling, varicosities, edema: Normal  Genitalia: groin, buttocks: Normal  Extremities: digits/nails (clubbing): Normal  Eccrine and Apocrine glands: Normal  Right upper extremity: Normal  Left upper extremity: Normal  Right lower extremity: Normal  Left lower extremity: Normal  Skin: Scalp and body hair: See below    1. Pink gritty papule on the right chest    ASSESSMENT/PLAN:     1. Biopsy proven actinic keratosis on right chest. As precancerous, cryosurgery performed. Advised on blistering and post-op care. Advised if not resolved in 1-2 months to return for evaluation        Follow-up: FSE/PRN sooner  CC:   Scribed By: Kristi Lyons MS, PA-C

## 2021-12-14 NOTE — NURSING NOTE
"Initial /78   Pulse 73   SpO2 98%  Estimated body mass index is 27.48 kg/m  as calculated from the following:    Height as of 12/8/21: 1.88 m (6' 2\").    Weight as of 12/8/21: 97.1 kg (214 lb).     FRANCIA Shelton-BSN-N  [facility]  [phone]  "

## 2021-12-14 NOTE — LETTER
12/14/2021         RE: Derik Hamm  [address] [place] [address]        Dear Colleague,    Thank you for referring your patient, Derik Hamm, to the [facility]. Please see a copy of my visit note below.    HPI:   Chief complaints: Derik Hamm is a 66 year old male who presents for treatment of a biopsy proven actinic keratosis on the right chest        PHYSICAL EXAM:    /78   Pulse 73   SpO2 98%   Skin exam performed as follows: Type 2 skin. Mood appropriate  Alert and Oriented X 3. Well developed, well nourished in no distress.  General appearance: Normal  Head including face: Normal  Eyes: conjunctiva and lids: Normal  Mouth: Lips, teeth, gums: Normal  Neck: Normal  Chest-breast/axillae: Normal  Back: Normal  Spleen and liver: Normal  Cardiovascular: Exam of peripheral vascular system by observation for swelling, varicosities, edema: Normal  Genitalia: groin, buttocks: Normal  Extremities: digits/nails (clubbing): Normal  Eccrine and Apocrine glands: Normal  Right upper extremity: Normal  Left upper extremity: Normal  Right lower extremity: Normal  Left lower extremity: Normal  Skin: Scalp and body hair: See below    1. Pink gritty papule on the right chest    ASSESSMENT/PLAN:     1. Biopsy proven actinic keratosis on right chest. As precancerous, cryosurgery performed. Advised on blistering and post-op care. Advised if not resolved in 1-2 months to return for evaluation        Follow-up: FSE/PRN sooner  CC:   Scribed By: Kristi Lyons MS, PAKarenC          Again, thank you for allowing me to participate in the care of your patient.        Sincerely,        Kristi Lyons PA-C

## 2021-12-15 LAB
BACTERIA TISS BX CULT: NO GROWTH
BACTERIA TISS BX CULT: NORMAL

## 2021-12-16 ENCOUNTER — OFFICE VISIT (OUTPATIENT)
Dept: OPHTHALMOLOGY | Facility: CLINIC | Age: 66
End: 2021-12-16
Attending: OPHTHALMOLOGY
Payer: COMMERCIAL

## 2021-12-16 DIAGNOSIS — B60.13 ACANTHAMOEBA KERATITIS: ICD-10-CM

## 2021-12-16 DIAGNOSIS — Z98.890 S/P EYE SURGERY: Primary | ICD-10-CM

## 2021-12-16 DIAGNOSIS — Z94.7 S/P PKP (PENETRATING KERATOPLASTY): ICD-10-CM

## 2021-12-16 PROCEDURE — G0463 HOSPITAL OUTPT CLINIC VISIT: HCPCS

## 2021-12-16 PROCEDURE — 99207 PR NO CHARGE LOS: CPT | Performed by: OPHTHALMOLOGY

## 2021-12-16 RX ORDER — OFLOXACIN 3 MG/ML
1-2 SOLUTION/ DROPS OPHTHALMIC 4 TIMES DAILY
COMMUNITY

## 2021-12-16 RX ORDER — PREDNISOLONE ACETATE 10 MG/ML
SUSPENSION/ DROPS OPHTHALMIC
COMMUNITY
Start: 2021-12-06 | End: 2022-02-04

## 2021-12-16 ASSESSMENT — VISUAL ACUITY
OS_CC: 20/25
OD_PH_CC: 20/100
OD_CC: 20/150
METHOD: SNELLEN - LINEAR
OS_CC+: -2

## 2021-12-16 ASSESSMENT — EXTERNAL EXAM - RIGHT EYE: OD_EXAM: NORMAL

## 2021-12-16 ASSESSMENT — TONOMETRY
OS_IOP_MMHG: 14
OD_IOP_MMHG: 09
IOP_METHOD: ICARE

## 2021-12-16 ASSESSMENT — SLIT LAMP EXAM - LIDS: COMMENTS: MGD

## 2021-12-16 ASSESSMENT — EXTERNAL EXAM - LEFT EYE: OS_EXAM: NORMAL

## 2021-12-16 NOTE — PROGRESS NOTES
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 Rosie Han     HPI:  Derik Hamm is a(n) 65 year old male who presents as follow up for Acanthamoeba Keratitis right eye.     Patient initially presented for evaluation of persistent keratoconjunctivitis, OD, since 10/5/2020. Initially seen by Dr. Ngo at [facility] and felt it was related to severe dryness vs bacterial keratitis, so he was started on PFATs and Vigamox Q2H WA. Patient was seen frequently for follow up; he was started on Valtrex 500 mg TID on 10/9/20 and has been on that since then. Despite this therapy, pt failed to improve so he was started prednisolone. He has had a waxing and waning course, but more recently has gradually declined. Stopped PF on 11/2 after running out. He was seen by Dr. Ngo on 11/2 who noted worsening VA and worsening exam, so the patient was referred to Dr. Han, whom he saw 11/4/20. Dr. Han referred to our clinic due to concerns for acanthamoeba keratitis. Culture positive for acanthamoeba on 11/05/20.     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  Pt is comfortable, no pain, + tearing. Reports that vision is improving. Has not been using PFAT.      POHx:  PVD OS  Hyperopia OU  Presbyopia OU     Glasses: Yes  CTL wearer: yes - none x1.5 months; wears while showering or in the sauna; he sleeps in them once every 3-4 weeks; never wears them in a lake or pool.      Family hx of eye disease: negative for glaucoma/macular degeneration     Social Hx: (-) smoking, (-) EtOH, (-) illicit drugs     Meds:  - Pred 1% 4 times a day right eye   - Oflox 4 times a day right eye   - Erythromycin ointment 4 times day       -      Surgical Hx:  S/p PKP with posterior synechiolysis right eye (12/8/21), did not perform ECCE at this time     A/P:  # Acanthamoeba keratitis,  OD              - Symptoms started 10/5/2020              - Acanthamoeba risks: sleeps in CTL, wears in shower, sauna              - Cultures obtained 11/05/20,   Culture positive for acanthamoeba.              - Confocal: many PMNs, no definite acanthamoeba (but could be masked by deeper infection, no fungal elements)              - Discussed non-FDA use of PHMB     12/31: Pain worse likely 2/2 surface toxicity from PHMB, although difficult to know. Plan to increase lubrication.   1/07: Better overall.  pain better.  Stromal haze/KP better as well.  1/19: Stable from previous visit w/ continued diffuse haze,no epi defects.   1/28: Slightly improved corneal clarity.  2/4: Slightly improved corneal clarity  2/18: Exam largely stable, new central bulla  3/2: increased soreness and tearing past few days, exam stable.  Diffuse haze, edema but improved from last photos.  3/11: Worsening pain and soreness over the past week. 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 CL placed right eye  4/8: BCL really helped his pain. Much worse with it off. Epi defect larger with rolled edges. 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 Infiltrate improved. AmbioDisk 9.0 + Kontur lens 8.6/16.0 4/8/21 4/22: Pain improving. Ulcer looks stable to slightly improved. Prokera ring placed  4/29: right eye Much improved infiltrate. Epi defect resolving. Am dissolved.  5/6/21:  AM dissolved, ring removed.  Epi healed, small superocentral crescentic infiltrate remains w/ surrounding central scarring.    5/20: significant improvement in Snellen acuity today. Crescentic infiltrate smaller and less dense since 5/6 photo.  6/17: small central infiltrate present at inferior aspect of pupil - smaller than 5/20. Edema much improved. Vision stable.   7/16/21: Stable vision, pt comfortable.    Would like to see active infiltrate completely resolved before stopping  / reducing PHMB further.  8/20/21: PHMB stopped.  Doing well.  10/21/21: Doing well.  Stable off the PHMB.  Disc right eye PKP with cat ext and IOL.  Disc R, B, PC and option. IOL calcs today.  Pt will decide about general anesthesia vs retrobulbar block. 3 hours for  Triple.  12/9/21: POD#1 s/p PKP.  Doing well.  Epi intact, cornea quite clear.     12/16/21: POW#1 s/p PKP. Doing well - no signs of graft failure, cornea clear               PLAN:   - Continue Pred 1% 4 times a day right eye   - Continue Oflox 4 times a day right eye    - Continue  Erythromycin ointment 4 times day   - Start PRN PFATS 4-6 times a day each eye  - Discussed RTN precautions post surgery.  - Shield day and night.  - NO water direct into surgery eye, NO heavy lifting over 2lbs, NO swimming.  Reviewed instructions    RTC: POM1, POM2 w/ VT, call if sx worsen to clinic.    Reginald Glez MD - PGY3  Department of Ophthalmology  Pager: [phone]

## 2021-12-16 NOTE — PATIENT INSTRUCTIONS
- Continue Pred 1% 4 times a day right eye   - Continue Oflox 4 times a day right eye    - Continue  Erythromycin ointment 4 times day   - Start ( Optional)  PFATS 4-6 times a day each eye

## 2021-12-19 NOTE — PROGRESS NOTES
Cardiology Clinic Progress Note    Service Date: 12/20/21    Primary Cardiologist:  Dr. Levin      Reason for Visit:  S/p atrial flutter ablation     HPI:   I had the pleasure of seeing Mr. Derik Hamm in the clinic today and he is a very pleasant 66 year old male with a past medical history notable for    1. Atrial flutter. S/p CTI ablation (11/2021)  2. Atrial fibrillation.  One episode occurring after acute appendicitis and laparoscopic appendectomy in 2018.  3. Cardiomyopathy      Diagnostics  I have personally reviewed the following reports    Echo (10/2021) The left ventricle is normal in size. The visual ejection fraction is 40-45%. No regional wall motion abnormalities noted. Left to right atrial shunt, trivial A contrast injection (Bubble Study) was performed that was very mildly positive for flow across the interatrial septum with aggressive Valsalva. No thrombus is detected in the left atrial appendage. The rhythm was atrial flutter.    Patient was found to be in atrial flutter and underwent a CAMI guided cardioversion.  His CAMI showed LVEF 40-45% with no wall motion abnormalities.  He was successfully cardioverted.  He then met with Dr. Levin who recommended an atrial flutter ablation as patient went into atrial flutter unprovoked caused cardiomyopathy and would likely come back if not terminated by an ablation.  He underwent a CTI ablation on 11/17/2021 his anticoagulation was discontinued and he was asked to follow in clinic.      Today, he reports no concerns and denies chest pain, shortness of breath, dyspnea on exertion, orthopnea, PND, lower extremity edema, palpitations, dizziness, presyncope, or syncope.   Reports taking medications as prescribed. He uses his heart rate monitor daily.      ASSESSMENT AND PLAN:    Typical atrial flutter/atrial fibrillation    Atrial fibrillation noted during a appendectomy (2018)    In 9/2021, found to be in typical atrial flutter underwent a CAMI guided  cardioversion.    CTI ablation (11/17/2021) and today's ECG shows sinus rhythm with ventricular rate of 71 bpm.   For rate control he is currently taking metoprolol XL 50 mg daily     For anticoagulation for [id number] score is 0 or 1 (possible 1 point for cardiomyopathy) he is currently not taking oral anticoagulation     Cardiomyopathy    Echo (10/2021) revealed EF 40-45%    Currently taking metoprolol succinate 50 mg daily and lisinopril 5 mg daily    He is euvolemic on exam today     Will need a repeat echo      Plan:    Discussed pt will likely have atrial fibrillation his lifetime, recommended surveillance by using heart rate monitor at least weekly and if heart rates are abnormally elevated call the clinic for ECG or monitor     Repeat ECHO prior to 2022    If his echo is normal could consider discontinuing office visit with Dr. Hudson   And patient return to cardiology as needed    Please call the clinic if questions or problems arise      Thank you for the opportunity to participate in this pleasant patient's care. We would be happy to see him sooner if needed for any concerns in the meantime.         DEREK Artis [facility]  Text Page  (M-F 8:00 am - 4:30 pm)    Orders this Visit:  Orders Placed This Encounter   Procedures     EKG 12-lead complete w/read - Clinics (performed today)     No orders of the defined types were placed in this encounter.    There are no discontinued medications.  Encounter Diagnosis   Name Primary?     Typical atrial flutter (H)        CURRENT MEDICATIONS:  Current Outpatient Medications   Medication Sig Dispense Refill     lisinopril (ZESTRIL) 5 MG tablet Take 1 tablet (5 mg) by mouth daily 90 tablet 3     metoprolol succinate ER (TOPROL-XL) 50 MG 24 hr tablet Take 1 tablet (50 mg) by mouth daily 90 tablet 3     multivitamin, therapeutic (THERA-VIT) TABS tablet Take 1 tablet by mouth daily       naproxen sodium 220 MG capsule Take 220 mg by mouth 2 times daily as  "needed (pain)       ofloxacin (OCUFLOX) 0.3 % ophthalmic solution 1-2 drops 4 times daily       prednisoLONE acetate (PRED FORTE) 1 % ophthalmic suspension        TURMERIC PO          ALLERGIES  No Known Allergies    PAST MEDICAL, SURGICAL, SOCIAL FAMILY HISTORY:  History was reviewed and updated as needed, see medical record.    Review of Systems:  Skin:  Negative     Eyes:  Negative glasses  ENT:  Negative    Respiratory:  Negative    Cardiovascular:  Negative    Gastroenterology: Negative reflux;heartburn  Genitourinary:  Negative    Musculoskeletal:  Negative    Neurologic:  Negative    Psychiatric:  Negative    Heme/Lymph/Imm:  Negative    Endocrine:  Negative       Physical Exam:  Vitals: /64   Pulse 71   Ht 1.88 m (6' 2\")   Wt 93 kg (205 lb)   SpO2 100%   BMI 26.32 kg/m     Wt Readings from Last 4 Encounters:   12/20/21 93 kg (205 lb)   12/08/21 97.1 kg (214 lb)   12/06/21 99.5 kg (219 lb 4.8 oz)   11/17/21 97.1 kg (214 lb)     CONSTITUTIONAL: Appears his stated age, well nourished, and in no acute distress.  HEENT: Pupils equal, round. Sclerae nonicteric.    NECK: Supple, no masses appreciated.   C/V:  Regular rate and rhythm, normal S1 and S2, no S3 or S4, no murmur, rub or gallop.   RESP: Respirations are unlabored. Lungs are clear to auscultation bilaterally without wheezing, rales, or rhonchi.  GI: Abdomen soft, non-tender, non-distended.  EXTREM:  No clubbing, cyanosis, or lower extremity edema bilaterally.   NEURO: Alert and oriented, cooperative. Gait steady. No gross focal deficits.   PSYCH: Affect appropriate. Mentation normal. Responds to questions appropriately.  SKIN: Warm and dry. No apparent rashes or bruising.     Recent Lab Results:    CBC RESULTS:  Lab Results   Component Value Date    WBC 5.5 11/17/2021    WBC 5.2 03/15/2019    RBC 6.32 (H) 11/17/2021    RBC 5.70 03/15/2019    HGB 17.8 (H) 11/17/2021    HGB 17.4 03/15/2019    HCT 55.7 (H) 11/17/2021    HCT 51.6 03/15/2019    MCV " 88 11/17/2021    MCV 91 03/15/2019    MCH 28.2 11/17/2021    MCH 30.5 03/15/2019    MCHC 32.0 11/17/2021    MCHC 33.7 03/15/2019    RDW 14.7 11/17/2021    RDW 14.4 03/15/2019     11/17/2021     03/15/2019     BMP RESULTS:  Lab Results   Component Value Date     11/17/2021     03/15/2019    POTASSIUM 4.3 11/17/2021    POTASSIUM 4.4 03/15/2019    CHLORIDE 106 11/17/2021    CHLORIDE 106 03/15/2019    CO2 29 11/17/2021    CO2 26 03/15/2019    ANIONGAP 4 11/17/2021    ANIONGAP 6 03/15/2019    GLC 95 11/17/2021    GLC 95 03/15/2019    BUN 22 11/17/2021    BUN 20 03/15/2019    CR 1.16 11/17/2021    CR 1.10 03/15/2019    GFRESTIMATED 66 11/17/2021    GFRESTIMATED 72 03/15/2019    GFRESTBLACK 83 03/15/2019    HENRY 8.9 11/17/2021    HENRY 8.8 03/15/2019      CC  Nayla Levin MD  [id number]  [place] [id number]    This note was completed in part using Dragon voice recognition software. Although reviewed after completion, some word and grammatical errors may occur.

## 2021-12-20 ENCOUNTER — OFFICE VISIT (OUTPATIENT)
Dept: CARDIOLOGY | Facility: CLINIC | Age: 66
End: 2021-12-20
Attending: INTERNAL MEDICINE
Payer: COMMERCIAL

## 2021-12-20 VITALS
WEIGHT: 205 LBS | DIASTOLIC BLOOD PRESSURE: 64 MMHG | OXYGEN SATURATION: 100 % | SYSTOLIC BLOOD PRESSURE: 100 MMHG | HEART RATE: 71 BPM | HEIGHT: 74 IN | BODY MASS INDEX: 26.31 KG/M2

## 2021-12-20 DIAGNOSIS — I48.3 TYPICAL ATRIAL FLUTTER (H): Primary | ICD-10-CM

## 2021-12-20 DIAGNOSIS — I50.20 SYSTOLIC HEART FAILURE, UNSPECIFIED HF CHRONICITY (H): ICD-10-CM

## 2021-12-20 PROCEDURE — 99214 OFFICE O/P EST MOD 30 MIN: CPT | Mod: 25 | Performed by: NURSE PRACTITIONER

## 2021-12-20 PROCEDURE — 93000 ELECTROCARDIOGRAM COMPLETE: CPT | Performed by: NURSE PRACTITIONER

## 2021-12-20 ASSESSMENT — MIFFLIN-ST. JEOR: SCORE: 1789.62

## 2021-12-20 NOTE — PATIENT INSTRUCTIONS
Move the ECHO to before the new year.   Cancel the labs  Once we get your ECHO then we can decide if we should cancel the appointment.     Please use your LUIS DANIEL for your heart rate monitor on at least a weekly basis.      If you think your in afib please call for a monitor or ECG

## 2021-12-20 NOTE — LETTER
12/20/2021    Jennifer Rodas MD  [address] [facility] [address]    RE: Derik Hamm       Dear Colleague,     I had the pleasure of seeing Derik Hamm in the [facility].      Cardiology Clinic Progress Note    Service Date: 12/20/21    Primary Cardiologist:  Dr. Levin      Reason for Visit:  S/p atrial flutter ablation     HPI:   I had the pleasure of seeing Mr. Derik Hamm in the clinic today and he is a very pleasant 66 year old male with a past medical history notable for    1. Atrial flutter. S/p CTI ablation (11/2021)  2. Atrial fibrillation.  One episode occurring after acute appendicitis and laparoscopic appendectomy in 2018.  3. Cardiomyopathy      Diagnostics  I have personally reviewed the following reports    Echo (10/2021) The left ventricle is normal in size. The visual ejection fraction is 40-45%. No regional wall motion abnormalities noted. Left to right atrial shunt, trivial A contrast injection (Bubble Study) was performed that was very mildly positive for flow across the interatrial septum with aggressive Valsalva. No thrombus is detected in the left atrial appendage. The rhythm was atrial flutter.    Patient was found to be in atrial flutter and underwent a CAMI guided cardioversion.  His CAMI showed LVEF 40-45% with no wall motion abnormalities.  He was successfully cardioverted.  He then met with Dr. Levin who recommended an atrial flutter ablation as patient went into atrial flutter unprovoked caused cardiomyopathy and would likely come back if not terminated by an ablation.  He underwent a CTI ablation on 11/17/2021 his anticoagulation was discontinued and he was asked to follow in clinic.      Today, he reports no concerns and denies chest pain, shortness of breath, dyspnea on exertion, orthopnea, PND, lower extremity edema, palpitations, dizziness, presyncope, or syncope.   Reports taking medications as prescribed. He uses his  heart rate monitor daily.      ASSESSMENT AND PLAN:    Typical atrial flutter/atrial fibrillation    Atrial fibrillation noted during a appendectomy (2018)    In 9/2021, found to be in typical atrial flutter underwent a CAMI guided cardioversion.    CTI ablation (11/17/2021) and today's ECG shows sinus rhythm with ventricular rate of 71 bpm.   For rate control he is currently taking metoprolol XL 50 mg daily     For anticoagulation for [id number] score is 0 or 1 (possible 1 point for cardiomyopathy) he is currently not taking oral anticoagulation     Cardiomyopathy    Echo (10/2021) revealed EF 40-45%    Currently taking metoprolol succinate 50 mg daily and lisinopril 5 mg daily    He is euvolemic on exam today     Will need a repeat echo      Plan:    Discussed pt will likely have atrial fibrillation his lifetime, recommended surveillance by using heart rate monitor at least weekly and if heart rates are abnormally elevated call the clinic for ECG or monitor     Repeat ECHO prior to 2022    If his echo is normal could consider discontinuing office visit with Dr. Hudson   And patient return to cardiology as needed    Please call the clinic if questions or problems arise      Thank you for the opportunity to participate in this pleasant patient's care. We would be happy to see him sooner if needed for any concerns in the meantime.         DEREK Artis [facility]  Text Page  (M-F 8:00 am - 4:30 pm)    Orders this Visit:  Orders Placed This Encounter   Procedures     EKG 12-lead complete w/read - Clinics (performed today)     No orders of the defined types were placed in this encounter.    There are no discontinued medications.  Encounter Diagnosis   Name Primary?     Typical atrial flutter (H)        CURRENT MEDICATIONS:  Current Outpatient Medications   Medication Sig Dispense Refill     lisinopril (ZESTRIL) 5 MG tablet Take 1 tablet (5 mg) by mouth daily 90 tablet 3     metoprolol succinate ER  "(TOPROL-XL) 50 MG 24 hr tablet Take 1 tablet (50 mg) by mouth daily 90 tablet 3     multivitamin, therapeutic (THERA-VIT) TABS tablet Take 1 tablet by mouth daily       naproxen sodium 220 MG capsule Take 220 mg by mouth 2 times daily as needed (pain)       ofloxacin (OCUFLOX) 0.3 % ophthalmic solution 1-2 drops 4 times daily       prednisoLONE acetate (PRED FORTE) 1 % ophthalmic suspension        TURMERIC PO          ALLERGIES  No Known Allergies    PAST MEDICAL, SURGICAL, SOCIAL FAMILY HISTORY:  History was reviewed and updated as needed, see medical record.    Review of Systems:  Skin:  Negative     Eyes:  Negative glasses  ENT:  Negative    Respiratory:  Negative    Cardiovascular:  Negative    Gastroenterology: Negative reflux;heartburn  Genitourinary:  Negative    Musculoskeletal:  Negative    Neurologic:  Negative    Psychiatric:  Negative    Heme/Lymph/Imm:  Negative    Endocrine:  Negative       Physical Exam:  Vitals: /64   Pulse 71   Ht 1.88 m (6' 2\")   Wt 93 kg (205 lb)   SpO2 100%   BMI 26.32 kg/m     Wt Readings from Last 4 Encounters:   12/20/21 93 kg (205 lb)   12/08/21 97.1 kg (214 lb)   12/06/21 99.5 kg (219 lb 4.8 oz)   11/17/21 97.1 kg (214 lb)     CONSTITUTIONAL: Appears his stated age, well nourished, and in no acute distress.  HEENT: Pupils equal, round. Sclerae nonicteric.    NECK: Supple, no masses appreciated.   C/V:  Regular rate and rhythm, normal S1 and S2, no S3 or S4, no murmur, rub or gallop.   RESP: Respirations are unlabored. Lungs are clear to auscultation bilaterally without wheezing, rales, or rhonchi.  GI: Abdomen soft, non-tender, non-distended.  EXTREM:  No clubbing, cyanosis, or lower extremity edema bilaterally.   NEURO: Alert and oriented, cooperative. Gait steady. No gross focal deficits.   PSYCH: Affect appropriate. Mentation normal. Responds to questions appropriately.  SKIN: Warm and dry. No apparent rashes or bruising.     Recent Lab Results:    CBC " RESULTS:  Lab Results   Component Value Date    WBC 5.5 11/17/2021    WBC 5.2 03/15/2019    RBC 6.32 (H) 11/17/2021    RBC 5.70 03/15/2019    HGB 17.8 (H) 11/17/2021    HGB 17.4 03/15/2019    HCT 55.7 (H) 11/17/2021    HCT 51.6 03/15/2019    MCV 88 11/17/2021    MCV 91 03/15/2019    MCH 28.2 11/17/2021    MCH 30.5 03/15/2019    MCHC 32.0 11/17/2021    MCHC 33.7 03/15/2019    RDW 14.7 11/17/2021    RDW 14.4 03/15/2019     11/17/2021     03/15/2019     BMP RESULTS:  Lab Results   Component Value Date     11/17/2021     03/15/2019    POTASSIUM 4.3 11/17/2021    POTASSIUM 4.4 03/15/2019    CHLORIDE 106 11/17/2021    CHLORIDE 106 03/15/2019    CO2 29 11/17/2021    CO2 26 03/15/2019    ANIONGAP 4 11/17/2021    ANIONGAP 6 03/15/2019    GLC 95 11/17/2021    GLC 95 03/15/2019    BUN 22 11/17/2021    BUN 20 03/15/2019    CR 1.16 11/17/2021    CR 1.10 03/15/2019    GFRESTIMATED 66 11/17/2021    GFRESTIMATED 72 03/15/2019    GFRESTBLACK 83 03/15/2019    HENRY 8.9 11/17/2021    HENRY 8.8 03/15/2019      CC  Nayla Levin MD  [id number]  [place] [id number]    This note was completed in part using Dragon voice recognition software. Although reviewed after completion, some word and grammatical errors may occur.    Thank you for allowing me to participate in the care of your patient.      Sincerely,     DEREK Artis [facility]

## 2021-12-27 ENCOUNTER — TELEPHONE (OUTPATIENT)
Dept: OPHTHALMOLOGY | Facility: CLINIC | Age: 66
End: 2021-12-27
Payer: COMMERCIAL

## 2022-01-04 ENCOUNTER — OFFICE VISIT (OUTPATIENT)
Dept: OPHTHALMOLOGY | Facility: CLINIC | Age: 67
End: 2022-01-04
Attending: OPHTHALMOLOGY
Payer: COMMERCIAL

## 2022-01-04 DIAGNOSIS — Z98.890 POSTOPERATIVE STATE: Primary | ICD-10-CM

## 2022-01-04 DIAGNOSIS — Z94.7 S/P PKP (PENETRATING KERATOPLASTY): ICD-10-CM

## 2022-01-04 PROCEDURE — G0463 HOSPITAL OUTPT CLINIC VISIT: HCPCS

## 2022-01-04 PROCEDURE — 99024 POSTOP FOLLOW-UP VISIT: CPT | Performed by: OPHTHALMOLOGY

## 2022-01-04 ASSESSMENT — SLIT LAMP EXAM - LIDS: COMMENTS: MGD

## 2022-01-04 ASSESSMENT — VISUAL ACUITY
METHOD: SNELLEN - LINEAR
OS_CC+: -1
OS_CC: 20/20
OD_CC: 20/200

## 2022-01-04 ASSESSMENT — TONOMETRY
OD_IOP_MMHG: 13
IOP_METHOD: ICARE
OS_IOP_MMHG: 12

## 2022-01-04 ASSESSMENT — REFRACTION_WEARINGRX
OD_SPHERE: +1.00
OD_ADD: +2.00
SPECS_TYPE: PAL
OD_AXIS: 134
OS_AXIS: 162
OS_ADD: +2.00
OS_CYLINDER: +0.25
OS_SPHERE: +1.25
OD_CYLINDER: +0.50

## 2022-01-04 ASSESSMENT — EXTERNAL EXAM - LEFT EYE: OS_EXAM: NORMAL

## 2022-01-04 ASSESSMENT — PACHYMETRY
OD_CT(UM): 658
OS_CT(UM): 508

## 2022-01-04 ASSESSMENT — EXTERNAL EXAM - RIGHT EYE: OD_EXAM: NORMAL

## 2022-01-04 NOTE — PROGRESS NOTES
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 oRsie Han     HPI:  Derik Hamm is male who presents as follow up for Acanthamoeba Keratitis right eye.     Patient initially presented for evaluation of persistent keratoconjunctivitis, OD, since 10/5/2020. Initially seen by Dr. Ngo at [facility] and felt it was related to severe dryness vs bacterial keratitis, so he was started on PFATs and Vigamox Q2H WA. Patient was seen frequently for follow up; he was started on Valtrex 500 mg TID on 10/9/20 and has been on that since then. Despite this therapy, pt failed to improve so he was started prednisolone. He has had a waxing and waning course, but more recently has gradually declined. Stopped PF on 11/2 after running out. He was seen by Dr. Ngo on 11/2 who noted worsening VA and worsening exam, so the patient was referred to Dr. Han, whom he saw 11/4/20. Dr. Han referred to our clinic due to concerns for acanthamoeba keratitis. Culture positive for acanthamoeba on 11/05/20.     Interval History:  LV was 12/16/21. He notes the right eye is comfortable, no pain or redness or tearing.   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  Pt is comfortable, no pain, + tearing. Reports that vision is perhaps a bit worse than last visit. Has not been using PFAT.      POHx:  PVD OS  Hyperopia OU  Presbyopia OU     Glasses: Yes  CTL wearer: yes - none x1.5 months; wears while showering or in the sauna; he sleeps in them once every 3-4 weeks; never wears them in a lake or pool.      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              - Confocal: many PMNs, no definite acanthamoeba (but could be masked by deeper infection, no fungal elements)              - Discussed non-FDA use of PHMB     12/31: Pain worse likely 2/2 surface toxicity from PHMB, although difficult to know. Plan to increase lubrication.   1/07: Better overall.  pain better.  Stromal haze/KP better as well.  1/19: Stable from previous visit w/ continued diffuse haze,no epi defects.   1/28: Slightly improved corneal clarity.  2/4: Slightly improved corneal clarity  2/18: Exam largely stable, new central bulla  3/2: increased soreness and tearing past few days, exam stable.  Diffuse haze, edema but improved from last photos.  3/11: Worsening pain and soreness over the past week. 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 CL placed right eye  4/8: BCL really helped his pain. Much worse with it off. Epi defect larger with rolled edges. 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 Infiltrate improved. AmbioDisk 9.0 + Kontur lens 8.6/16.0 4/8/21 4/22: Pain improving. Ulcer looks stable to slightly improved. Prokera ring placed  4/29: right eye Much improved infiltrate. Epi defect resolving. Am dissolved.  5/6/21:  AM dissolved, ring removed.  Epi healed, small superocentral crescentic infiltrate remains w/ surrounding central scarring.    5/20: significant improvement in Snellen acuity today. Crescentic infiltrate smaller and less dense since 5/6 photo.  6/17: small central infiltrate present at inferior aspect of pupil - smaller than 5/20. Edema much improved. Vision stable.   7/16/21: Stable vision, pt comfortable.    Would like to see  active infiltrate completely resolved before stopping / reducing PHMB further.  8/20/21: PHMB stopped.  Doing well.  10/21/21: Doing well.  Stable off the PHMB.  Disc right eye PKP with cat ext and IOL.  Disc R, B, PC and option. IOL calcs today.  Pt will decide about general anesthesia vs retrobulbar block. 3 hours for  Triple.  12/9/21: POD#1 s/p PKP.  Doing well.  Epi intact, cornea quite clear.     12/16/21: POW#1 s/p PKP. Doing well - no signs of graft failure, cornea clear  1/4/22: POM#1 s/p PKP.  Doing well, cornea is clear.                 PLAN:   - Continue Pred 1% 4 times a day right eye    - Continue  Erythromycin ointment 4 times day   - Start PRN PFATS 4-6 times a day each eye  - Discussed RTN precautions post surgery.  - Shield day and night.  - NO water direct into surgery eye, NO heavy lifting over 10 lbs, NO swimming.  Reviewed instructions    RTC: 1 month w/ VT, call if sx worsen to clinic.    Nahum Kent,   Fellow, Cornea & External Disease  Department of Ophthalmology  [facility]    Attending Physician Attestation:  Complete documentation of historical and exam elements from today's encounter can be found in the full encounter summary report (not reduplicated in this progress note).  I personally obtained the chief complaint(s) and history of present illness.  I confirmed and edited as necessary the review of systems, past medical/surgical history, family history, social history, and examination findings as documented by others; and I examined the patient myself.  I personally reviewed the relevant tests, images, and reports as documented above.  I formulated and edited as necessary the assessment and plan and discussed the findings and management plan with the patient and family. - Anson Cheng MD

## 2022-01-04 NOTE — NURSING NOTE
"Chief Complaints and History of Present Illnesses   Patient presents with     Post Op (Ophthalmology) Right Eye     Chief Complaint(s) and History of Present Illness(es)     Post Op (Ophthalmology) Right Eye     Laterality: right eye    Onset: sudden    Associated symptoms: Negative for eye pain, dryness, itching, burning, floaters and flashes    Response to treatment: mild improvement    Pain scale: 0/10              Comments     Derik is here one month post PKP. He states RE is feeling good although he mentioned some \"burning sensation \" yesterday.     Daniel Fernando COT 8:29 AM January 4, 2022                   "

## 2022-01-17 ENCOUNTER — HOSPITAL ENCOUNTER (OUTPATIENT)
Dept: CARDIOLOGY | Facility: CLINIC | Age: 67
Discharge: HOME | End: 2022-01-17
Attending: PHYSICIAN ASSISTANT | Admitting: PHYSICIAN ASSISTANT
Payer: COMMERCIAL

## 2022-01-17 DIAGNOSIS — I48.92 ATRIAL FLUTTER WITH RAPID VENTRICULAR RESPONSE (H): ICD-10-CM

## 2022-01-17 LAB — BI-PLANE LVEF ECHO: NORMAL

## 2022-01-17 PROCEDURE — 93325 DOPPLER ECHO COLOR FLOW MAPG: CPT

## 2022-01-17 PROCEDURE — 93321 DOPPLER ECHO F-UP/LMTD STD: CPT

## 2022-01-17 PROCEDURE — 93308 TTE F-UP OR LMTD: CPT | Mod: 26 | Performed by: INTERNAL MEDICINE

## 2022-01-17 PROCEDURE — 93321 DOPPLER ECHO F-UP/LMTD STD: CPT | Mod: 26 | Performed by: INTERNAL MEDICINE

## 2022-01-17 PROCEDURE — 93325 DOPPLER ECHO COLOR FLOW MAPG: CPT | Mod: 26 | Performed by: INTERNAL MEDICINE

## 2022-01-25 ENCOUNTER — OFFICE VISIT (OUTPATIENT)
Dept: OPHTHALMOLOGY | Facility: CLINIC | Age: 67
End: 2022-01-25
Attending: OPHTHALMOLOGY
Payer: COMMERCIAL

## 2022-01-25 DIAGNOSIS — Z94.7 S/P PKP (PENETRATING KERATOPLASTY): Primary | ICD-10-CM

## 2022-01-25 DIAGNOSIS — H16.401 CORNEAL NEOVASCULARIZATION OF RIGHT EYE: ICD-10-CM

## 2022-01-25 DIAGNOSIS — H04.121 DRY EYE SYNDROME OF RIGHT EYE: ICD-10-CM

## 2022-01-25 PROCEDURE — G0463 HOSPITAL OUTPT CLINIC VISIT: HCPCS

## 2022-01-25 PROCEDURE — 99024 POSTOP FOLLOW-UP VISIT: CPT | Performed by: OPHTHALMOLOGY

## 2022-01-25 ASSESSMENT — VISUAL ACUITY
OD_PH_SC: 20/60-/+
OD_SC: 20/150
OD_CC: 20/500
OS_CC: 20/25-/+2
METHOD: SNELLEN - LINEAR

## 2022-01-25 ASSESSMENT — REFRACTION_WEARINGRX
OD_AXIS: 045
OD_ADD: +2.00
OS_CYLINDER: SPHERE
OS_SPHERE: +1.00
OD_CYLINDER: +0.25
OD_SPHERE: +0.50
OS_ADD: +2.00
SPECS_TYPE: PAL

## 2022-01-25 ASSESSMENT — TONOMETRY
IOP_METHOD: ICARE
OD_IOP_MMHG: 14
OS_IOP_MMHG: 14

## 2022-01-25 ASSESSMENT — PACHYMETRY
OS_CT(UM): 508
OD_CT(UM): 658

## 2022-01-25 ASSESSMENT — EXTERNAL EXAM - RIGHT EYE: OD_EXAM: NORMAL

## 2022-01-25 ASSESSMENT — SLIT LAMP EXAM - LIDS: COMMENTS: MGD

## 2022-01-25 ASSESSMENT — EXTERNAL EXAM - LEFT EYE: OS_EXAM: NORMAL

## 2022-01-25 NOTE — PROGRESS NOTES
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 Rosie Han     HPI:  Derik Hamm is male who presents as follow up for Acanthamoeba Keratitis right eye.     Patient initially presented for evaluation of persistent keratoconjunctivitis, OD, since 10/5/2020. Initially seen by Dr. Ngo at [facility] and felt it was related to severe dryness vs bacterial keratitis, so he was started on PFATs and Vigamox Q2H WA. Patient was seen frequently for follow up; he was started on Valtrex 500 mg TID on 10/9/20 and has been on that since then. Despite this therapy, pt failed to improve so he was started prednisolone. He has had a waxing and waning course, but more recently has gradually declined. Stopped PF on 11/2 after running out. He was seen by Dr. Ngo on 11/2 who noted worsening VA and worsening exam, so the patient was referred to Dr. Han, whom he saw 11/4/20. Dr. Han referred to our clinic due to concerns for acanthamoeba keratitis. Culture positive for acanthamoeba on 11/05/20.     Interval History:  LV was 12/16/21. He notes the right eye is comfortable, no pain or redness or tearing.   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  Pt is comfortable, no pain, + tearing. Reports that vision is perhaps a bit worse than last visit. Has not been using PFAT.      POHx:  PVD OS  Hyperopia OU  Presbyopia OU     Glasses: Yes  CTL wearer: yes - none x1.5 months; wears while showering or in the sauna; he sleeps in them once every 3-4 weeks; never wears them in a lake or pool.      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              - Confocal: many PMNs, no definite acanthamoeba (but could be masked by deeper infection, no fungal elements)              - Discussed non-FDA use of PHMB     12/31: Pain worse likely 2/2 surface toxicity from PHMB, although difficult to know. Plan to increase lubrication.   1/07: Better overall.  pain better.  Stromal haze/KP better as well.  1/19: Stable from previous visit w/ continued diffuse haze,no epi defects.   1/28: Slightly improved corneal clarity.  2/4: Slightly improved corneal clarity  2/18: Exam largely stable, new central bulla  3/2: increased soreness and tearing past few days, exam stable.  Diffuse haze, edema but improved from last photos.  3/11: Worsening pain and soreness over the past week. 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 CL placed right eye  4/8: BCL really helped his pain. Much worse with it off. Epi defect larger with rolled edges. 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 Infiltrate improved. AmbioDisk 9.0 + Kontur lens 8.6/16.0 4/8/21 4/22: Pain improving. Ulcer looks stable to slightly improved. Prokera ring placed  4/29: right eye Much improved infiltrate. Epi defect resolving. Am dissolved.  5/6/21:  AM dissolved, ring removed.  Epi healed, small superocentral crescentic infiltrate remains w/ surrounding central scarring.    5/20: significant improvement in Snellen acuity today. Crescentic infiltrate smaller and less dense since 5/6 photo.  6/17: small central infiltrate present at inferior aspect of pupil - smaller than 5/20. Edema much improved. Vision stable.   7/16/21: Stable vision, pt comfortable.    Would like to see  active infiltrate completely resolved before stopping / reducing PHMB further.  8/20/21: PHMB stopped.  Doing well.  10/21/21: Doing well.  Stable off the PHMB.  Disc right eye PKP with cat ext and IOL.  Disc R, B, PC and option. IOL calcs today.  Pt will decide about general anesthesia vs retrobulbar block. 3 hours for  Triple.  12/9/21: POD#1 s/p PKP.  Doing well.  Epi intact, cornea quite clear.     12/16/21: POW#1 s/p PKP. Doing well - no signs of graft failure, cornea clear  1/4/22: POM#1 s/p PKP.  Doing well, cornea is clear.    1/26/22: Approx post op M 2. Pt doing well               PLAN:   - Continue Pred 1% 4 times a day right eye    - Continue  Erythromycin ointment 4 times day   - Start PRN PFATS 4-6 times a day each eye  - Discussed RTN precautions post surgery.  - NO water direct into surgery eye, NO heavy lifting over 10 lbs, NO swimming.  Reviewed instructions    RTC: 2 month w/ VT, call if sx worsen to clinic.    Anson Cheng MD    Attending Physician Attestation:  Complete documentation of historical and exam elements from today's encounter can be found in the full encounter summary report (not reduplicated in this progress note).  I personally obtained the chief complaint(s) and history of present illness.  I confirmed and edited as necessary the review of systems, past medical/surgical history, family history, social history, and examination findings as documented by others; and I examined the patient myself.  I personally reviewed the relevant tests, images, and reports as documented above.  I formulated and edited as necessary the assessment and plan and discussed the findings and management plan with the patient and family. - Anson Cheng MD

## 2022-02-01 ENCOUNTER — OFFICE VISIT (OUTPATIENT)
Dept: CARDIOLOGY | Facility: CLINIC | Age: 67
End: 2022-02-01
Attending: PHYSICIAN ASSISTANT
Payer: COMMERCIAL

## 2022-02-01 VITALS
SYSTOLIC BLOOD PRESSURE: 104 MMHG | DIASTOLIC BLOOD PRESSURE: 70 MMHG | HEIGHT: 74 IN | WEIGHT: 213 LBS | BODY MASS INDEX: 27.34 KG/M2 | HEART RATE: 99 BPM

## 2022-02-01 DIAGNOSIS — R10.31 RIGHT GROIN PAIN: Primary | ICD-10-CM

## 2022-02-01 DIAGNOSIS — I48.92 ATRIAL FLUTTER WITH RAPID VENTRICULAR RESPONSE (H): ICD-10-CM

## 2022-02-01 DIAGNOSIS — I42.9 SECONDARY CARDIOMYOPATHY (H): ICD-10-CM

## 2022-02-01 DIAGNOSIS — G47.10 EXCESSIVE SLEEPINESS: ICD-10-CM

## 2022-02-01 PROCEDURE — 99215 OFFICE O/P EST HI 40 MIN: CPT | Performed by: INTERNAL MEDICINE

## 2022-02-01 RX ORDER — LISINOPRIL 2.5 MG/1
2.5 TABLET ORAL DAILY
Qty: 90 TABLET | Refills: 3 | Status: SHIPPED | OUTPATIENT
Start: 2022-02-01 | End: 2023-02-20

## 2022-02-01 RX ORDER — METOPROLOL SUCCINATE 25 MG/1
25 TABLET, EXTENDED RELEASE ORAL DAILY
Qty: 90 TABLET | Refills: 3 | Status: SHIPPED | OUTPATIENT
Start: 2022-02-01 | End: 2023-02-20

## 2022-02-01 RX ORDER — ERYTHROMYCIN 5 MG/G
OINTMENT OPHTHALMIC
COMMUNITY
Start: 2021-12-28 | End: 2022-04-19

## 2022-02-01 ASSESSMENT — MIFFLIN-ST. JEOR: SCORE: 1825.91

## 2022-02-01 NOTE — PROGRESS NOTES
Cardiology Clinic Progress Note:    February 1, 2022   Patient Name: Derik Hamm  Patient MRN: [id number]     Consult indication: recovered HFrEF    HPI:    I had the opportunity to see patient Derik Hamm in cardiology clinic for a follow up visit.     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 Levin), who presents for follow-up.    I had initially met patient at [facility] for a cardiology consultation on 9/30/2021.  At that time, he was found to be in atrial flutter with RVR, TTE demonstrated LVEF 45 to 50%, with global hypokinesis.  Patient denied any history of chest pain, troponin was normal, clinical presentation was most consistent with tachycardia induced cardiomyopathy.  Patient underwent CAMI/DCCV, and was discharged on metoprolol succinate, lisinopril, and rivaroxaban.    Subsequently, patient was seen in consultation by my colleague Dr. Levin with cardiology EP, and ultimately patient underwent atrial flutter ablation on 11/17/2021.    Since then, he has been seen by my colleague Flor Alfaro NP.  Follow-up TTE 1/17/2022 demonstrates LVEF 55 to 60%, no significant valvular abnormalities.    Overall patient reports that his been doing well.  He does note fatigue, but denies any chest pain, abnormal shortness of breath.  He endorses nonrestorative sleep, and utilizes energy drinks/supplements for alertness.  He has not been tested for sleep apnea previously.    Patient also has been experiencing some right groin discomfort since the atrial flutter ablation, described as an occasional pain when bending over, denies any bruising, swelling, redness.    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  # Atrial flutter with RVR s/p CTI ablation with Dr. Levin 11/17/2021.  # Paroxysmal atrial fibrillation postoperative from laparoscopic appendectomy for perforated appendicitis 11/4/2018  # Probable JOAQUIN  # Right groin pain since atrial flutter ablation.  No overlying skin changes, soft, tender, no masses, etiology unclear.    Overall patient has been doing reasonably well since his atrial flutter ablation.  Unfortunately, he does endorse some erectile dysfunction after starting metoprolol succinate 50 mg daily.  Patient also notes some right groin pain after the procedure.  Clinical history and physical exam findings are reassuring, however will investigate further with an ultrasound.  Will decrease metoprolol succinate to 25 mg daily and lisinopril to 2.5 mg daily.  Discussed anticoagulation for the primary prevention of stroke, as patient is almost 67 years old, GBA6NS3-OKKa risk score would be 1.  Discussed risks of bleeding weighed against the benefits of stroke prevention.  Patient like to hold off for now which I feel is reasonable, advised that if he develops other risk factors including turning 77 years old, this will need to be readdressed.  Will refer to Sleep Medicine.  Scheduled follow-up in cardiology clinic is not warranted at this time, however we remain available as needed in the future.    Thank you for allowing our team to participate in the care of Derik Hamm.  Please do not hesitate to call or page me with any questions or concerns.    Sincerely,     Alexey Hudson MD, [facility]  Cardiology  Text Page   February 1, 2022      Carla Gerber PA-C  [address]  [place] [address]    Voice recognition software utilized.     Total time spent on this encounter: 45 minutes, providing care in this encounter including, but not limited to, reviewing prior medical records, laboratory data, imaging studies,  diagnostic studies, procedure notes, formulating an assessment and plan, recommendations, discussion and counseling with patient face to face, dictation.    Past Medical History:   The ASCVD Risk score (Randall DC Jr., et al., 2013) failed to calculate for the following reasons:    The valid total cholesterol range is 130 to 320 mg/dL  Past Medical History:   Diagnosis Date     Acanthamoeba keratitis      Appendicitis with perforation 11/5/2018     Atrial flutter with rapid ventricular response (H) 09/29/2021     Basal cell carcinoma      Paroxysmal atrial fibrillation (H)      Presbyopia      PVD (posterior vitreous detachment), left      Secondary cardiomyopathy (H)         Past Surgical History:   Past Surgical History:   Procedure Laterality Date     ANESTHESIA CARDIOVERSION N/A 10/1/2021    Procedure: ANESTHESIA, FOR CARDIOVERSION;  Surgeon: GENERIC ANESTHESIA PROVIDER;  Location: [facility] OR     EP ABLATION ATRIAL FLUTTER N/A 11/17/2021    Procedure: EP Ablation Atrial Flutter;  Surgeon: Nayla Levin MD;  Location: [facility] CARDIAC CATH LAB     KERATOPLASTY PENETRATING Right 12/8/2021    Procedure: KERATOPLASTY, PENETRATING, posteror synechiolysis;  Surgeon: Anson Cheng MD;  Location: [facility] OR     LAPAROSCOPIC APPENDECTOMY N/A 11/4/2018    Procedure: LAPAROSCOPIC APPENDECTOMY;  Surgeon: Mackenzie Dale MD;  Location: [facility] OR     ORTHOPEDIC SURGERY         Medications (outpatient):  Current Outpatient Medications   Medication Sig Dispense Refill     erythromycin (ROMYCIN) 5 MG/GM ophthalmic ointment        lisinopril (ZESTRIL) 2.5 MG tablet Take 1 tablet (2.5 mg) by mouth daily 90 tablet 3     metoprolol succinate ER (TOPROL-XL) 25 MG 24 hr tablet Take 1 tablet (25 mg) by mouth daily 90 tablet 3     multivitamin, therapeutic (THERA-VIT) TABS tablet Take 1 tablet by mouth daily       naproxen sodium 220 MG capsule Take 220 mg by mouth 2 times daily as needed (pain)       ofloxacin  "(OCUFLOX) 0.3 % ophthalmic solution 1-2 drops 4 times daily       prednisoLONE acetate (PRED FORTE) 1 % ophthalmic suspension        TURMERIC PO          Allergies:  No Known Allergies    Social History:   History   Drug Use No      History   Smoking Status     Former Smoker     Quit date: 1982   Smokeless Tobacco     Never Used     Social History    Substance and Sexual Activity      Alcohol use: No       Family History:  Family History   Problem Relation Age of Onset     Lung Cancer Father 85     Skin Cancer Mother      Skin Cancer Brother      Glaucoma No family hx of      Macular Degeneration No family hx of      Diabetes No family hx of      Hypertension No family hx of        Review of Systems:   A complete review of systems was negative except as mentioned in the History of Present Illness.     Objective & Physical Exam:  /70   Pulse 99   Ht 1.88 m (6' 2\")   Wt 96.6 kg (213 lb)   BMI 27.35 kg/m    Wt Readings from Last 2 Encounters:   02/01/22 96.6 kg (213 lb)   12/20/21 93 kg (205 lb)     Body mass index is 27.35 kg/m .   Body surface area is 2.25 meters squared.    Constitutional: appears stated age, in no apparent distress, appears to be well nourished  Head: normocephalic, atraumatic  Neck: supple, trachea midline, no bruit bilaterally   Pulmonary: clear to auscultation bilaterally, no wheezes, no rales, no increased work of breathing  Cardiovascular: JVP normal, regular rate, regular rhythm, normal S1 and S2, no S3, S4, no murmur appreciated, no lower extremity edema  Gastrointestinal: no guarding, non-rigid   Neurologic: awake, alert, moves all extremities  Skin: no jaundice, warm on limited exam, Right groin without erythema or ecchymosis, no masses palpated, non-tender  Psychiatric: affect is normal, answers questions appropriately, oriented to self and place    Data reviewed:  Lab Results   Component Value Date    WBC 5.5 11/17/2021    WBC 5.2 03/15/2019    RBC 6.32 (H) 11/17/2021    RBC " 5.70 03/15/2019    HGB 17.8 (H) 11/17/2021    HGB 17.4 03/15/2019    HCT 55.7 (H) 11/17/2021    HCT 51.6 03/15/2019    MCV 88 11/17/2021    MCV 91 03/15/2019    MCH 28.2 11/17/2021    MCH 30.5 03/15/2019    MCHC 32.0 11/17/2021    MCHC 33.7 03/15/2019    RDW 14.7 11/17/2021    RDW 14.4 03/15/2019     11/17/2021     03/15/2019     Sodium   Date Value Ref Range Status   11/17/2021 139 133 - 144 mmol/L Final   03/15/2019 138 133 - 144 mmol/L Final     Potassium   Date Value Ref Range Status   11/17/2021 4.3 3.4 - 5.3 mmol/L Final   03/15/2019 4.4 3.4 - 5.3 mmol/L Final     Chloride   Date Value Ref Range Status   11/17/2021 106 94 - 109 mmol/L Final   03/15/2019 106 94 - 109 mmol/L Final     Carbon Dioxide   Date Value Ref Range Status   03/15/2019 26 20 - 32 mmol/L Final     Carbon Dioxide (CO2)   Date Value Ref Range Status   11/17/2021 29 20 - 32 mmol/L Final     Anion Gap   Date Value Ref Range Status   11/17/2021 4 3 - 14 mmol/L Final   03/15/2019 6 3 - 14 mmol/L Final     Glucose   Date Value Ref Range Status   11/17/2021 95 70 - 99 mg/dL Final   03/15/2019 95 70 - 99 mg/dL Final     Urea Nitrogen   Date Value Ref Range Status   11/17/2021 22 7 - 30 mg/dL Final   03/15/2019 20 7 - 30 mg/dL Final     Creatinine   Date Value Ref Range Status   11/17/2021 1.16 0.66 - 1.25 mg/dL Final   03/15/2019 1.10 0.66 - 1.25 mg/dL Final     GFR Estimate   Date Value Ref Range Status   11/17/2021 66 >60 mL/min/1.73m2 Final     Comment:     As of July 11, 2021, eGFR is calculated by the CKD-EPI creatinine equation, without race adjustment. eGFR can be influenced by muscle mass, exercise, and diet. The reported eGFR is an estimation only and is only applicable if the renal function is stable.   03/15/2019 72 >60 mL/min/[1.73_m2] Final     Comment:     Non  GFR Calc  Starting 12/18/2018, serum creatinine based estimated GFR (eGFR) will be   calculated using the Chronic Kidney Disease Epidemiology  Collaboration   (CKD-EPI) equation.       Calcium   Date Value Ref Range Status   2021 8.9 8.5 - 10.1 mg/dL Final   03/15/2019 8.8 8.5 - 10.1 mg/dL Final     Bilirubin Total   Date Value Ref Range Status   2018 1.3 0.2 - 1.3 mg/dL Final     Alkaline Phosphatase   Date Value Ref Range Status   2018 68 40 - 150 U/L Final     ALT   Date Value Ref Range Status   2018 31 0 - 70 U/L Final     AST   Date Value Ref Range Status   2018 16 0 - 45 U/L Final     Recent Labs   Lab Test 03/15/19  0702   CHOL 110   HDL 30*   LDL 68   TRIG 61      No results found for: A1C     Recent Results (from the past 4320 hour(s))   Echocardiogram Limited   Result Value    Biplane LVEF 56%    Narrative    [id number]  [id number]  [id number]  [id number]^RADHA^CELINE^DUSTIN     [facility]  Echocardiography Laboratory  [facility]  [place] [address]  Phone [phone]  Fax [phone]     Name: ANU GARZA  MRN: [id number]  : 1955  Study Date: 2022 03:08 PM  Age: 66 yrs  Gender: Male  Patient Location: [facility]  Reason For Study: Atrial flutter with rapid ventricular response (H)  Ordering Physician: CELINE GARCIA  Referring Physician: CELINE GARCIA  Performed By: Karishma Majano  HR: 61     ______________________________________________________________________________  Procedure  Limited Echo Adult.     ______________________________________________________________________________  Interpretation Summary     Left ventricular systolic function is normal.  Biplane LVEF is 56%.  Left ventricular diastolic function is normal.  No regional wall motion abnormalities noted.  Normal left atrial size.  No valve disease.     Compared to the CAMI dated 10/1/2021, LVEF has improved from 40-45% to 56%,  left atrial size has normalized, mitral and tricuspid valve regurgitation has  decreased from mild to trace.      ______________________________________________________________________________  Left Ventricle  The left ventricle is normal in size. There is normal left ventricular wall  thickness. Left ventricular systolic function is normal. Biplane LVEF is 56%.  Left ventricular diastolic function is normal. No regional wall motion  abnormalities noted.     Right Ventricle  The right ventricle is normal in size and function.     Atria  Normal left atrial size. Right atrial size is normal.     Mitral Valve  The mitral valve leaflets appear normal. There is no evidence of stenosis,  fluttering, or prolapse. There is trace mitral regurgitation.     Tricuspid Valve  Normal tricuspid valve. There is trace tricuspid regurgitation. Right  ventricular systolic pressure could not be approximated due to inadequate  tricuspid regurgitation.     Aortic Valve  The aortic valve is trileaflet. No aortic regurgitation is present. No aortic  stenosis is present.     Pulmonic Valve  The pulmonic valve is not well seen, but is grossly normal. This degree of  valvular regurgitation is within normal limits.     Vessels  The aortic root is normal size. The inferior vena cava is normal.     Pericardium  There is no pericardial effusion.     Rhythm  Sinus rhythm was noted.     ______________________________________________________________________________  MMode/2D Measurements & Calculations  asc Aorta Diam: 3.2 cm     Doppler Measurements & Calculations  MV E max nataliia: 58.7 cm/sec  MV A max nataliia: 79.7 cm/sec  MV E/A: 0.74  MV dec slope: 214.2 cm/sec2  E/E' av.5  Lateral E/e': 5.7  Medial E/e': 11.2     ______________________________________________________________________________  Report approved by: Dr Mary Gentile 2022 03:39 PM         Transesophageal Echocardiogram   Result Value    LVEF  40-45%    Narrative    [id number]  [facility]  [id number]  [id number]^DOE^OPAL^PENNY     [facility]  Echocardiography Laboratory  [address]  [place] [address]     Name: ANU GARZA  MRN: [id number]  : 1955  Study Date: 10/01/2021 10:19 AM  Age: 66 yrs  Gender: Male  Patient Location: [facility]  Reason For Study: Afib  Ordering Physician: OPAL AZAR  Performed By: Willa Mathis     BSA: 2.2 m2  Height: 74 in  Weight: 215 lb  HR: 120  ______________________________________________________________________________  Procedure  Complete CAMI Adult. CAMI Probe serial #[id number] (R) was used during the  procedure. The heart rate, respiratory rate and response to care were  monitored throughout the procedure with the assistance of the nurse.  ______________________________________________________________________________  Interpretation Summary     The left ventricle is normal in size.  The visual ejection fraction is 40-45%.  No regional wall motion abnormalities noted.  Left to right atrial shunt, trivial  A contrast injection (Bubble Study) was performed that was very mildly  positive for flow across the interatrial septum with aggressive Valsalva.  No thrombus is detected in the left atrial appendage.  The rhythm was atrial flutter.  ______________________________________________________________________________  CAMI  I determined this patient to be an appropriate candidate for the planned  sedation and procedure and have reassessed the patient immediately prior to  sedation and procedure. Total sedation time: 12 mins minutes of continuous  bedside 1:1 monitoring. Versed (5mg) was given intravenously. Fentanyl  (125mcg) was given intravenously. Prior to the exam, the oral cavity was  checked and no overcrowding was noted. Consent to the procedure was obtained  prior to sedation. The transesophageal probe was passed without difficulty.  There were no complications associated with this procedure.     Left Ventricle  The left ventricle is normal in size. There is normal left ventricular wall  thickness. The visual ejection fraction is 40-45%. No  regional wall motion  abnormalities noted.     Right Ventricle  The right ventricle is normal in size and function.     Atria  The left atrium is moderately dilated. Right atrial size is normal. Left to  right atrial shunt, trivial. A contrast injection (Bubble Study) was performed  that was mildly positive for flow across the interatrial septum. No thrombus  is detected in the left atrial appendage.     Mitral Valve  The mitral valve leaflets are mildly thickened. There is mild (1+) mitral  regurgitation.     Tricuspid Valve  There is mild (1+) tricuspid regurgitation.     Aortic Valve  There is trivial trileaflet aortic sclerosis. There is trace aortic  regurgitation.     Pulmonic Valve  There is trace pulmonic valvular regurgitation.     Vessels  The aortic root is normal size. Normal size ascending aorta.     Pericardial/Pleural  There is no pericardial effusion.     Rhythm  The rhythm was atrial flutter.  ______________________________________________________________________________  Report approved by: Kellie Dunn 10/01/2021 11:41 AM     ______________________________________________________________________________      Echocardiogram Complete   Result Value    LVEF  45-50%    [facility]    [id number]  [id number]  [id number]  [id number]^KAREY^PAULO^MARY ANN     [facility]  Echocardiography Laboratory  [address] [place] [address]     Name: ANU GARZA  MRN: [id number]  : 1955  Study Date: 2021 08:24 AM  Age: 66 yrs  Gender: Male  Patient Location: [facility]  Reason For Study: Atrial Flutter  Ordering Physician: PAULO EDEN  Referring Physician: Murali De León MD  Performed By: Cony Dowd RDCS     BSA: 2.3 m2  Height: 74 in  Weight: 221 lb  HR: 71  BP: 110/73 mmHg  ______________________________________________________________________________  Procedure  Complete Portable Echo  Adult.  ______________________________________________________________________________  Interpretation Summary     Left ventricular systolic function is mildly reduced.The visual ejection  fraction is 45-50%.  The right ventricular systolic function is normal.  There is moderate (2+) mitral regurgitation.  Dilation of the inferior vena cava is present with abnormal respiratory  variation in diameter.  Rhythm appears atrial flutter.     Echo dated 11/06/2018 LVEF 60-65% , trace MR, sinus rhythm.  ______________________________________________________________________________  Left Ventricle  The left ventricle is normal in size. There is mild concentric left  ventricular hypertrophy. Left ventricular systolic function is mildly reduced.  Diastolic Doppler findings (E/E' ratio and/or other parameters) suggest left  ventricular filling pressures are indeterminate. The visual ejection fraction  is 45-50%. There is mild global hypokinesia of the left ventricle.     Right Ventricle  The right ventricle is normal size. The right ventricular systolic function is  normal.     Atria  The left atrium is mildly dilated. Right atrial size is normal. There is no  color Doppler evidence of an atrial shunt.     Mitral Valve  There is moderate (2+) mitral regurgitation. There is no mitral valve  stenosis.     Tricuspid Valve  There is trace tricuspid regurgitation. The right ventricular systolic  pressure is approximated at 22.7 mmHg plus the right atrial pressure.     Aortic Valve  The aortic valve is trileaflet. No aortic regurgitation is present. No aortic  stenosis is present.     Pulmonic Valve  There is trace pulmonic valvular regurgitation. There is no pulmonic valvular  stenosis.     Vessels  The aortic root is normal size. Normal size ascending aorta. Dilation of the  inferior vena cava is present with abnormal respiratory variation in diameter.     Pericardium  There is no pericardial effusion.     Rhythm  The rhythm was  atrial flutter.  ______________________________________________________________________________  MMode/2D Measurements & Calculations  IVSd: 1.5 cm     LVIDd: 4.9 cm  LVIDs: 3.8 cm  LVPWd: 1.2 cm  FS: 22.2 %  LV mass(C)d: 264.6 grams  LV mass(C)dI: 116.7 grams/m2  Ao root diam: 3.4 cm  LA dimension: 3.7 cm  asc Aorta Diam: 3.6 cm  LA/Ao: 1.1     EF(MOD-bp): 38.8 %  LA Volume (BP): 77.0 ml  LA Volume Index (BP): 33.9 ml/m2  RWT: 0.50     Doppler Measurements & Calculations  MV E max nataliia: 123.7 cm/sec  MV max P.1 mmHg  MV mean P.0 mmHg  MV V2 VTI: 23.3 cm  MV dec time: 0.13 sec  MR ERO: 0.20 cm2  MR volume: 27.4 ml  PA acc time: 0.08 sec  TR max nataliia: 238.0 cm/sec  TR max P.7 mmHg  E/E' av.9  Lateral E/e': 9.0  Medial E/e': 8.8     ______________________________________________________________________________  Report approved by: Kellie Su 2021 09:31 AM

## 2022-02-01 NOTE — PATIENT INSTRUCTIONS
February 1, 2022    Thank you for allowing our Cardiology team to participate in your care.     Please note the following changes to your heart treatment plan:     Medication changes:   - decrease lisinopril 5mg daily to 2.5mg daily  - decrease metoprolol succinate 50mg daily to 25mg daily     Tests to be done:  - Right groin ultrasound    Follow up:  - Follow up with Sleep Medicine (referral placed)  - Follow up with cardiology as needed    Please contact our team at [phone] or [phone] for any questions or concerns.   For scheduling, please call [phone].  If you are having a medical emergency, please call [phone].     Sincerely,    Alexey Hudson MD, FACC  Cardiology    [facility] - [facility] and [facility] - [facility] - [place]

## 2022-02-01 NOTE — LETTER
2/1/2022    Physician No Ref-Primary  No address on file    RE: Derik Hamm       Dear Colleague,     I had the pleasure of seeing [facility] Hamm in the [facility].      Cardiology Clinic Progress Note:    February 1, 2022   Patient Name: Derik Hamm  Patient MRN: [id number]     Consult indication: recovered HFrEF    HPI:    I had the opportunity to see patient Derik Hamm in cardiology clinic for a follow up visit.     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 Levin), who presents for follow-up.    I had initially met patient at [facility] for a cardiology consultation on 9/30/2021.  At that time, he was found to be in atrial flutter with RVR, TTE demonstrated LVEF 45 to 50%, with global hypokinesis.  Patient denied any history of chest pain, troponin was normal, clinical presentation was most consistent with tachycardia induced cardiomyopathy.  Patient underwent CAMI/DCCV, and was discharged on metoprolol succinate, lisinopril, and rivaroxaban.    Subsequently, patient was seen in consultation by my colleague Dr. Levin with cardiology EP, and ultimately patient underwent atrial flutter ablation on 11/17/2021.    Since then, he has been seen by my colleague Flor Alfaro NP.  Follow-up TTE 1/17/2022 demonstrates LVEF 55 to 60%, no significant valvular abnormalities.    Overall patient reports that his been doing well.  He does note fatigue, but denies any chest pain, abnormal shortness of breath.  He endorses nonrestorative sleep, and utilizes energy drinks/supplements for alertness.  He has not been tested for sleep apnea previously.    Patient also has been experiencing some right groin discomfort  since the atrial flutter ablation, described as an occasional pain when bending over, denies any bruising, swelling, redness.    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  # Atrial flutter with RVR s/p CTI ablation with Dr. Levin 11/17/2021.  # Paroxysmal atrial fibrillation postoperative from laparoscopic appendectomy for perforated appendicitis 11/4/2018  # Probable JOAQUIN  # Right groin pain since atrial flutter ablation.  No overlying skin changes, soft, tender, no masses, etiology unclear.    Overall patient has been doing reasonably well since his atrial flutter ablation.  Unfortunately, he does endorse some erectile dysfunction after starting metoprolol succinate 50 mg daily.  Patient also notes some right groin pain after the procedure.  Clinical history and physical exam findings are reassuring, however will investigate further with an ultrasound.  Will decrease metoprolol succinate to 25 mg daily and lisinopril to 2.5 mg daily.  Discussed anticoagulation for the primary prevention of stroke, as patient is almost 67 years old, [id number] risk score would be 1.  Discussed risks of bleeding weighed against the benefits of stroke prevention.  Patient like to hold off for now which I feel is reasonable, advised that if he develops other risk factors including turning 77 years old, this will need to be readdressed.  Will refer to Sleep Medicine.  Scheduled follow-up in cardiology clinic is not warranted at this time, however we remain available as needed in the future.    Thank you for allowing our team to participate in the care of Derik Hamm.  Please do not hesitate to call or page me with any questions or concerns.    Sincerely,     Alexey Hudson MD, [facility]  Cardiology  Text Page   February 1, 2022    cc  Carla Gerber PA-C  [address]  [place] [address]    Voice recognition  software utilized.     Total time spent on this encounter: 45 minutes, providing care in this encounter including, but not limited to, reviewing prior medical records, laboratory data, imaging studies, diagnostic studies, procedure notes, formulating an assessment and plan, recommendations, discussion and counseling with patient face to face, dictation.    Past Medical History:   The ASCVD Risk score (Wheatleyeduardo AGUILAR Jr., et al., 2013) failed to calculate for the following reasons:    The valid total cholesterol range is 130 to 320 mg/dL  Past Medical History:   Diagnosis Date     Acanthamoeba keratitis      Appendicitis with perforation 11/5/2018     Atrial flutter with rapid ventricular response (H) 09/29/2021     Basal cell carcinoma      Paroxysmal atrial fibrillation (H)      Presbyopia      PVD (posterior vitreous detachment), left      Secondary cardiomyopathy (H)         Past Surgical History:   Past Surgical History:   Procedure Laterality Date     ANESTHESIA CARDIOVERSION N/A 10/1/2021    Procedure: ANESTHESIA, FOR CARDIOVERSION;  Surgeon: GENERIC ANESTHESIA PROVIDER;  Location: [facility] OR     EP ABLATION ATRIAL FLUTTER N/A 11/17/2021    Procedure: EP Ablation Atrial Flutter;  Surgeon: Nayla Levin MD;  Location: [facility] CARDIAC CATH LAB     KERATOPLASTY PENETRATING Right 12/8/2021    Procedure: KERATOPLASTY, PENETRATING, posteror synechiolysis;  Surgeon: Anson Cheng MD;  Location: [facility] OR     LAPAROSCOPIC APPENDECTOMY N/A 11/4/2018    Procedure: LAPAROSCOPIC APPENDECTOMY;  Surgeon: Mackenzie Dale MD;  Location: [facility] OR     ORTHOPEDIC SURGERY         Medications (outpatient):  Current Outpatient Medications   Medication Sig Dispense Refill     erythromycin (ROMYCIN) 5 MG/GM ophthalmic ointment        lisinopril (ZESTRIL) 2.5 MG tablet Take 1 tablet (2.5 mg) by mouth daily 90 tablet 3     metoprolol succinate ER (TOPROL-XL) 25 MG 24 hr tablet Take 1 tablet (25 mg) by mouth daily 90  "tablet 3     multivitamin, therapeutic (THERA-VIT) TABS tablet Take 1 tablet by mouth daily       naproxen sodium 220 MG capsule Take 220 mg by mouth 2 times daily as needed (pain)       ofloxacin (OCUFLOX) 0.3 % ophthalmic solution 1-2 drops 4 times daily       prednisoLONE acetate (PRED FORTE) 1 % ophthalmic suspension        TURMERIC PO          Allergies:  No Known Allergies    Social History:   History   Drug Use No      History   Smoking Status     Former Smoker     Quit date: 1982   Smokeless Tobacco     Never Used     Social History    Substance and Sexual Activity      Alcohol use: No       Family History:  Family History   Problem Relation Age of Onset     Lung Cancer Father 85     Skin Cancer Mother      Skin Cancer Brother      Glaucoma No family hx of      Macular Degeneration No family hx of      Diabetes No family hx of      Hypertension No family hx of        Review of Systems:   A complete review of systems was negative except as mentioned in the History of Present Illness.     Objective & Physical Exam:  /70   Pulse 99   Ht 1.88 m (6' 2\")   Wt 96.6 kg (213 lb)   BMI 27.35 kg/m    Wt Readings from Last 2 Encounters:   02/01/22 96.6 kg (213 lb)   12/20/21 93 kg (205 lb)     Body mass index is 27.35 kg/m .   Body surface area is 2.25 meters squared.    Constitutional: appears stated age, in no apparent distress, appears to be well nourished  Head: normocephalic, atraumatic  Neck: supple, trachea midline, no bruit bilaterally   Pulmonary: clear to auscultation bilaterally, no wheezes, no rales, no increased work of breathing  Cardiovascular: JVP normal, regular rate, regular rhythm, normal S1 and S2, no S3, S4, no murmur appreciated, no lower extremity edema  Gastrointestinal: no guarding, non-rigid   Neurologic: awake, alert, moves all extremities  Skin: no jaundice, warm on limited exam, Right groin without erythema or ecchymosis, no masses palpated, non-tender  Psychiatric: affect is " normal, answers questions appropriately, oriented to self and place    Data reviewed:  Lab Results   Component Value Date    WBC 5.5 11/17/2021    WBC 5.2 03/15/2019    RBC 6.32 (H) 11/17/2021    RBC 5.70 03/15/2019    HGB 17.8 (H) 11/17/2021    HGB 17.4 03/15/2019    HCT 55.7 (H) 11/17/2021    HCT 51.6 03/15/2019    MCV 88 11/17/2021    MCV 91 03/15/2019    MCH 28.2 11/17/2021    MCH 30.5 03/15/2019    MCHC 32.0 11/17/2021    MCHC 33.7 03/15/2019    RDW 14.7 11/17/2021    RDW 14.4 03/15/2019     11/17/2021     03/15/2019     Sodium   Date Value Ref Range Status   11/17/2021 139 133 - 144 mmol/L Final   03/15/2019 138 133 - 144 mmol/L Final     Potassium   Date Value Ref Range Status   11/17/2021 4.3 3.4 - 5.3 mmol/L Final   03/15/2019 4.4 3.4 - 5.3 mmol/L Final     Chloride   Date Value Ref Range Status   11/17/2021 106 94 - 109 mmol/L Final   03/15/2019 106 94 - 109 mmol/L Final     Carbon Dioxide   Date Value Ref Range Status   03/15/2019 26 20 - 32 mmol/L Final     Carbon Dioxide (CO2)   Date Value Ref Range Status   11/17/2021 29 20 - 32 mmol/L Final     Anion Gap   Date Value Ref Range Status   11/17/2021 4 3 - 14 mmol/L Final   03/15/2019 6 3 - 14 mmol/L Final     Glucose   Date Value Ref Range Status   11/17/2021 95 70 - 99 mg/dL Final   03/15/2019 95 70 - 99 mg/dL Final     Urea Nitrogen   Date Value Ref Range Status   11/17/2021 22 7 - 30 mg/dL Final   03/15/2019 20 7 - 30 mg/dL Final     Creatinine   Date Value Ref Range Status   11/17/2021 1.16 0.66 - 1.25 mg/dL Final   03/15/2019 1.10 0.66 - 1.25 mg/dL Final     GFR Estimate   Date Value Ref Range Status   11/17/2021 66 >60 mL/min/1.73m2 Final     Comment:     As of July 11, 2021, eGFR is calculated by the CKD-EPI creatinine equation, without race adjustment. eGFR can be influenced by muscle mass, exercise, and diet. The reported eGFR is an estimation only and is only applicable if the renal function is stable.   03/15/2019 72 >60  mL/min/[1.73_m2] Final     Comment:     Non  GFR Calc  Starting 2018, serum creatinine based estimated GFR (eGFR) will be   calculated using the Chronic Kidney Disease Epidemiology Collaboration   (CKD-EPI) equation.       Calcium   Date Value Ref Range Status   2021 8.9 8.5 - 10.1 mg/dL Final   03/15/2019 8.8 8.5 - 10.1 mg/dL Final     Bilirubin Total   Date Value Ref Range Status   2018 1.3 0.2 - 1.3 mg/dL Final     Alkaline Phosphatase   Date Value Ref Range Status   2018 68 40 - 150 U/L Final     ALT   Date Value Ref Range Status   2018 31 0 - 70 U/L Final     AST   Date Value Ref Range Status   2018 16 0 - 45 U/L Final     Recent Labs   Lab Test 03/15/19  0702   CHOL 110   HDL 30*   LDL 68   TRIG 61      No results found for: A1C     Recent Results (from the past 4320 hour(s))   Echocardiogram Limited   Result Value    Biplane LVEF 56%    [facility]    [id number]  [id number]  [id number]^RADHA^CELINE^DUSTIN     [facility]  Echocardiography Laboratory  [address]  [place] [address]  Phone [phone]  Fax [phone]     Name: ANU GARZA  MRN: [id number]  : 1955  Study Date: 2022 03:08 PM  Age: 66 yrs  Gender: Male  Patient Location: [facility]  Reason For Study: Atrial flutter with rapid ventricular response (H)  Ordering Physician: CELINE GARCIA  Referring Physician: CELINE GARCIA  Performed By: Karishma Majano  HR: 61     ______________________________________________________________________________  Procedure  Limited Echo Adult.     ______________________________________________________________________________  Interpretation Summary     Left ventricular systolic function is normal.  Biplane LVEF is 56%.  Left ventricular diastolic function is normal.  No regional wall motion abnormalities noted.  Normal left atrial size.  No valve disease.      Compared to the CAMI dated 10/1/2021, LVEF has improved from 40-45% to 56%,  left atrial size has normalized, mitral and tricuspid valve regurgitation has  decreased from mild to trace.     ______________________________________________________________________________  Left Ventricle  The left ventricle is normal in size. There is normal left ventricular wall  thickness. Left ventricular systolic function is normal. Biplane LVEF is 56%.  Left ventricular diastolic function is normal. No regional wall motion  abnormalities noted.     Right Ventricle  The right ventricle is normal in size and function.     Atria  Normal left atrial size. Right atrial size is normal.     Mitral Valve  The mitral valve leaflets appear normal. There is no evidence of stenosis,  fluttering, or prolapse. There is trace mitral regurgitation.     Tricuspid Valve  Normal tricuspid valve. There is trace tricuspid regurgitation. Right  ventricular systolic pressure could not be approximated due to inadequate  tricuspid regurgitation.     Aortic Valve  The aortic valve is trileaflet. No aortic regurgitation is present. No aortic  stenosis is present.     Pulmonic Valve  The pulmonic valve is not well seen, but is grossly normal. This degree of  valvular regurgitation is within normal limits.     Vessels  The aortic root is normal size. The inferior vena cava is normal.     Pericardium  There is no pericardial effusion.     Rhythm  Sinus rhythm was noted.     ______________________________________________________________________________  MMode/2D Measurements & Calculations  asc Aorta Diam: 3.2 cm     Doppler Measurements & Calculations  MV E max nataliia: 58.7 cm/sec  MV A max nataliia: 79.7 cm/sec  MV E/A: 0.74  MV dec slope: 214.2 cm/sec2  E/E' av.5  Lateral E/e': 5.7  Medial E/e': 11.2     ______________________________________________________________________________  Report approved by: Dr Mary Gentile 2022 03:39 PM          Transesophageal Echocardiogram   Result Value    LVEF  40-45%    [facility]    [id number]  [id number]  [id number]  [id number]^DOE^OPAL^PENNY     [facility]  Echocardiography Laboratory  [address] [place] [address]     Name: ANU GARZA  MRN: [id number]  : 1955  Study Date: 10/01/2021 10:19 AM  Age: 66 yrs  Gender: Male  Patient Location: [facility]  Reason For Study: Afib  Ordering Physician: OPAL AZAR  Performed By: Willa Mathis     BSA: 2.2 m2  Height: 74 in  Weight: 215 lb  HR: 120  ______________________________________________________________________________  Procedure  Complete CAMI Adult. CAMI Probe serial #[id number] (R) was used during the  procedure. The heart rate, respiratory rate and response to care were  monitored throughout the procedure with the assistance of the nurse.  ______________________________________________________________________________  Interpretation Summary     The left ventricle is normal in size.  The visual ejection fraction is 40-45%.  No regional wall motion abnormalities noted.  Left to right atrial shunt, trivial  A contrast injection (Bubble Study) was performed that was very mildly  positive for flow across the interatrial septum with aggressive Valsalva.  No thrombus is detected in the left atrial appendage.  The rhythm was atrial flutter.  ______________________________________________________________________________  CAMI  I determined this patient to be an appropriate candidate for the planned  sedation and procedure and have reassessed the patient immediately prior to  sedation and procedure. Total sedation time: 12 mins minutes of continuous  bedside 1:1 monitoring. Versed (5mg) was given intravenously. Fentanyl  (125mcg) was given intravenously. Prior to the exam, the oral cavity was  checked and no overcrowding was noted. Consent to the procedure was obtained  prior to sedation. The transesophageal probe was passed without difficulty.  There  were no complications associated with this procedure.     Left Ventricle  The left ventricle is normal in size. There is normal left ventricular wall  thickness. The visual ejection fraction is 40-45%. No regional wall motion  abnormalities noted.     Right Ventricle  The right ventricle is normal in size and function.     Atria  The left atrium is moderately dilated. Right atrial size is normal. Left to  right atrial shunt, trivial. A contrast injection (Bubble Study) was performed  that was mildly positive for flow across the interatrial septum. No thrombus  is detected in the left atrial appendage.     Mitral Valve  The mitral valve leaflets are mildly thickened. There is mild (1+) mitral  regurgitation.     Tricuspid Valve  There is mild (1+) tricuspid regurgitation.     Aortic Valve  There is trivial trileaflet aortic sclerosis. There is trace aortic  regurgitation.     Pulmonic Valve  There is trace pulmonic valvular regurgitation.     Vessels  The aortic root is normal size. Normal size ascending aorta.     Pericardial/Pleural  There is no pericardial effusion.     Rhythm  The rhythm was atrial flutter.  ______________________________________________________________________________  Report approved by: Kellie Dunn 10/01/2021 11:41 AM     ______________________________________________________________________________      Echocardiogram Complete   Result Value    LVEF  45-50%    [facility]    [id number]  [id number]  [id number]  [id number]^KAREY^PAULO^MARY ANN     [facility]  Echocardiography Laboratory  [address] [place] [address]     Name: ANU GARZA  MRN: [id number]  : 1955  Study Date: 2021 08:24 AM  Age: 66 yrs  Gender: Male  Patient Location: [facility]  Reason For Study: Atrial Flutter  Ordering Physician: PAULO EDEN  Referring Physician: Murali De León MD  Performed By: Cony Dowd RDCS     BSA: 2.3 m2  Height: 74 in  Weight: 221 lb  HR:  71  BP: 110/73 mmHg  ______________________________________________________________________________  Procedure  Complete Portable Echo Adult.  ______________________________________________________________________________  Interpretation Summary     Left ventricular systolic function is mildly reduced.The visual ejection  fraction is 45-50%.  The right ventricular systolic function is normal.  There is moderate (2+) mitral regurgitation.  Dilation of the inferior vena cava is present with abnormal respiratory  variation in diameter.  Rhythm appears atrial flutter.     Echo dated 11/06/2018 LVEF 60-65% , trace MR, sinus rhythm.  ______________________________________________________________________________  Left Ventricle  The left ventricle is normal in size. There is mild concentric left  ventricular hypertrophy. Left ventricular systolic function is mildly reduced.  Diastolic Doppler findings (E/E' ratio and/or other parameters) suggest left  ventricular filling pressures are indeterminate. The visual ejection fraction  is 45-50%. There is mild global hypokinesia of the left ventricle.     Right Ventricle  The right ventricle is normal size. The right ventricular systolic function is  normal.     Atria  The left atrium is mildly dilated. Right atrial size is normal. There is no  color Doppler evidence of an atrial shunt.     Mitral Valve  There is moderate (2+) mitral regurgitation. There is no mitral valve  stenosis.     Tricuspid Valve  There is trace tricuspid regurgitation. The right ventricular systolic  pressure is approximated at 22.7 mmHg plus the right atrial pressure.     Aortic Valve  The aortic valve is trileaflet. No aortic regurgitation is present. No aortic  stenosis is present.     Pulmonic Valve  There is trace pulmonic valvular regurgitation. There is no pulmonic valvular  stenosis.     Vessels  The aortic root is normal size. Normal size ascending aorta. Dilation of the  inferior vena cava is  present with abnormal respiratory variation in diameter.     Pericardium  There is no pericardial effusion.     Rhythm  The rhythm was atrial flutter.  ______________________________________________________________________________  MMode/2D Measurements & Calculations  IVSd: 1.5 cm     LVIDd: 4.9 cm  LVIDs: 3.8 cm  LVPWd: 1.2 cm  FS: 22.2 %  LV mass(C)d: 264.6 grams  LV mass(C)dI: 116.7 grams/m2  Ao root diam: 3.4 cm  LA dimension: 3.7 cm  asc Aorta Diam: 3.6 cm  LA/Ao: 1.1     EF(MOD-bp): 38.8 %  LA Volume (BP): 77.0 ml  LA Volume Index (BP): 33.9 ml/m2  RWT: 0.50     Doppler Measurements & Calculations  MV E max nataliia: 123.7 cm/sec  MV max P.1 mmHg  MV mean P.0 mmHg  MV V2 VTI: 23.3 cm  MV dec time: 0.13 sec  MR ERO: 0.20 cm2  MR volume: 27.4 ml  PA acc time: 0.08 sec  TR max nataliia: 238.0 cm/sec  TR max P.7 mmHg  E/E' av.9  Lateral E/e': 9.0  Medial E/e': 8.8     ______________________________________________________________________________  Report approved by: Kellie Su 2021 09:31 AM            Thank you for allowing me to participate in the care of your patient.      Sincerely,   Alexey Hudson MD     [facility]  cc:   Carla Gerber PA-C  [address]  [place] [address]

## 2022-02-03 ENCOUNTER — TELEPHONE (OUTPATIENT)
Dept: OPHTHALMOLOGY | Facility: CLINIC | Age: 67
End: 2022-02-03
Payer: COMMERCIAL

## 2022-02-03 NOTE — TELEPHONE ENCOUNTER
[facility] Call Center    Phone Message    May a detailed message be left on voicemail: yes     Reason for Call: Other:  Insurance won't cover prednisoLONE acetate (PRED FORTE) 1 % ophthalmic suspension and Pt is wondering if he can get another prescription to replace it or if Dr. Cheng can write a letter saying the prednisoLONE acetate is necessary, per Pt.  Please call Pt back to discuss.    Thank you.    Action Taken: Message routed to:  [facility] EYE    Travel Screening: Not Applicable

## 2022-02-04 RX ORDER — PREDNISOLONE ACETATE 10 MG/ML
1 SUSPENSION/ DROPS OPHTHALMIC 4 TIMES DAILY
Qty: 10 ML | Refills: 7 | Status: SHIPPED | OUTPATIENT
Start: 2022-02-04

## 2022-02-04 NOTE — TELEPHONE ENCOUNTER
Called and LVM for Derik     Per Dr. Kent     I saw his prednisolone was prescribed as brand Pred Forte when it should have been generic prednisolone.  I've amended the encounter and re-ordered as generic prednisolone.  If his insurance won't cover that, I've provided dexamethasone 0.1% as an alternative for him    I provided this information to Derik and my number if he has any other questions     Thank You,     Betty

## 2022-02-07 ENCOUNTER — ANCILLARY PROCEDURE (OUTPATIENT)
Dept: VASCULAR ULTRASOUND | Facility: CLINIC | Age: 67
End: 2022-02-07
Attending: INTERNAL MEDICINE
Payer: COMMERCIAL

## 2022-02-07 DIAGNOSIS — R10.31 RIGHT GROIN PAIN: ICD-10-CM

## 2022-02-07 PROCEDURE — 93971 EXTREMITY STUDY: CPT | Mod: RT | Performed by: INTERNAL MEDICINE

## 2022-02-15 NOTE — LETTER
2018    RE: Derik Hamm, : 1955      [facility]   Subjective:  Derik Hamm is here for postoperative visit.  He underwent laparoscopic appendectomy by Dr. Dale.  He was noted to have perforation and abscess (confirmed on pathology) and remained inpt for 6 days postop.  He had transient aFib with his illness and has followed up with his PCP; now weaning off metoprolol.  He was also started on pepcid and sent home with this; taking until he runs out.  He is now over 2 weeks postop, and feels his recovery has been progressing nicely tho still fairly low energy.  Rx/OTC pain medication was used appropriately postop and he has finished his course of Augmentin and Cipro given at [facility].  Postop recovery complications: None.     Today he has minimal discomfort at the umbilical site with activities, tolerating a regular diet, and having regular bowel activity tho notes this is more frequent than pre-op.  Low energy is main concern.  He has returned to work at half days only for now.  Current pain management: none.  Following restrictions outlined by surgeon.     Additional findings on CT in ED:  Mild colonic diverticulosis, probable small hiatal hernia.  Pt made aware of these findings.  No additional imaging needed at this time.  He admits to having some reflux symptoms pre-op and will monitor for symptoms after discontinuance of pepcid to see if these return.  We also discussed consideration of increased fiber in diet due to diverticulosis finding.       Objective:  Abd - soft, non-tender, non-distended  Laparoscopic incisions - healing well, no erythema/bruising, +normal healing ridges/density, no seroma/hematoma noted, no hernia noted     Assessment:  S/p laparoscopic appendectomy. Pathology showed acute appendicitis with perforation and abscess.  CT findings of: Probably small hiatal hernia, mild colonic diverticulosis     Plan:  Derik may continue to slowly  Detail Level: Detailed advance his activities at this time.  Adding protein supplement may be helpful to boost energy during his recovery.  General recommendation is to remain at a 30 lbs weight restriction until 3 weeks after surgery.  After that time, he may increase activity as tolerated.  He may continue to utilize OTC pain management options as well as use of ice/heat to sites for comfort.  He should expect progressive resolution of the healing ridge along the incisional sites over the following 2-3 months.  DANNY More is recommended to contact the office if worsening pain, onset of fever/redness at any inc site, or new drainage from the area.  Pt also recommended to call office at any time if ongoing questions/concerns during recovery, but otherwise may follow-up on a prn basis.  Pt is in agreement with this plan.     Nathalia Workman PA-C         Add 43657 Cpt? (Important Note: In 2017 The Use Of 20481 Is Being Tracked By Cms To Determine Future Global Period Reimbursement For Global Periods): yes Wound Evaluated By: Robby Additional Comments: The area of surgery has opened due to tension.  The patient will continue to apply Mupirocin ointment to the area.  Then use tan tape to bandage the area.  He finished Cephalexin.

## 2022-03-21 ENCOUNTER — TELEPHONE (OUTPATIENT)
Dept: CARDIOLOGY | Facility: CLINIC | Age: 67
End: 2022-03-21
Payer: COMMERCIAL

## 2022-03-21 NOTE — TELEPHONE ENCOUNTER
[facility] Call Center    Phone Message    May a detailed message be left on voicemail: no     Reason for Call: Other: Elaina called to offer her information in the event the Pt needs any future resources     Action Taken: Other: [facility]    Travel Screening: Not Applicable

## 2022-03-22 NOTE — TELEPHONE ENCOUNTER
Elaina  [phone]  Amy Dickinson 18 hours ago (3:25 PM)         EXT# [id number]   [facility]     Incoming call          Contact information for [employer] rep noted.   JAREK Levine RN, BSN.

## 2022-04-10 ENCOUNTER — HEALTH MAINTENANCE LETTER (OUTPATIENT)
Age: 67
End: 2022-04-10

## 2022-04-19 ENCOUNTER — OFFICE VISIT (OUTPATIENT)
Dept: OPHTHALMOLOGY | Facility: CLINIC | Age: 67
End: 2022-04-19
Attending: OPHTHALMOLOGY
Payer: COMMERCIAL

## 2022-04-19 DIAGNOSIS — H04.121 DRY EYE SYNDROME OF RIGHT EYE: ICD-10-CM

## 2022-04-19 DIAGNOSIS — H16.401 CORNEAL NEOVASCULARIZATION OF RIGHT EYE: ICD-10-CM

## 2022-04-19 DIAGNOSIS — Z94.7 S/P PKP (PENETRATING KERATOPLASTY): Primary | ICD-10-CM

## 2022-04-19 PROCEDURE — G0463 HOSPITAL OUTPT CLINIC VISIT: HCPCS

## 2022-04-19 PROCEDURE — 99214 OFFICE O/P EST MOD 30 MIN: CPT | Performed by: OPHTHALMOLOGY

## 2022-04-19 ASSESSMENT — TONOMETRY
OD_IOP_MMHG: 26
IOP_METHOD: ICARE
OS_IOP_MMHG: 15
OD_IOP_MMHG: 24
IOP_METHOD: ICARE

## 2022-04-19 ASSESSMENT — VISUAL ACUITY
OS_CC+: +2
OS_CC: 20/30
METHOD: SNELLEN - LINEAR
OD_CC: 20/300

## 2022-04-19 ASSESSMENT — SLIT LAMP EXAM - LIDS: COMMENTS: MGD

## 2022-04-19 ASSESSMENT — CONF VISUAL FIELD
METHOD: COUNTING FINGERS
OD_SUPERIOR_NASAL_RESTRICTION: 3
OS_NORMAL: 1

## 2022-04-19 ASSESSMENT — EXTERNAL EXAM - LEFT EYE: OS_EXAM: NORMAL

## 2022-04-19 ASSESSMENT — EXTERNAL EXAM - RIGHT EYE: OD_EXAM: NORMAL

## 2022-04-19 NOTE — NURSING NOTE
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 Today he states RE feeling pretty good.Sometimes sees floaters LE  Denies flashes or eye pain

## 2022-04-19 NOTE — PROGRESS NOTES
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 Rosie Han     HPI:  Derik Hamm is male who presents as follow up for Acanthamoeba Keratitis right eye.     Patient initially presented for evaluation of persistent keratoconjunctivitis, OD, since 10/5/2020. Initially seen by Dr. Ngo at [facility] and felt it was related to severe dryness vs bacterial keratitis, so he was started on PFATs and Vigamox Q2H WA. Patient was seen frequently for follow up; he was started on Valtrex 500 mg TID on 10/9/20 and has been on that since then. Despite this therapy, pt failed to improve so he was started prednisolone. He has had a waxing and waning course, but more recently has gradually declined. Stopped PF on 11/2 after running out. He was seen by Dr. Ngo on 11/2 who noted worsening VA and worsening exam, so the patient was referred to Dr. Han, whom he saw 11/4/20. Dr. Han referred to our clinic due to concerns for acanthamoeba keratitis. Culture positive for acanthamoeba on 11/05/20.     Interval History:  LV was 12/16/21. He notes the right eye is comfortable, no pain or redness or tearing.   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  Pt is comfortable, no pain, + tearing. Reports that vision is perhaps a bit worse than last visit. Has not been using PFAT.      POHx:  PVD OS  Hyperopia OU  Presbyopia OU     Glasses: Yes  CTL wearer: yes - none x1.5 months; wears while showering or in the sauna; he sleeps in them once every 3-4 weeks; never wears them in a lake or pool.      Family hx of eye disease: negative for glaucoma/macular degeneration     Social Hx: (-) smoking, (-) EtOH, (-) illicit drugs     Meds:  - Pred 1% 4 times a day right eye   - Erythromycin ointment 4 times day right eye   - Ofloxacin QID right eye       -      Surgical Hx:  S/p PKP with posterior synechiolysis right eye (12/8/21), did not perform ECCE at this  time     Assessment:  # Acanthamoeba keratitis, OD              - Symptoms started 10/5/2020              - Acanthamoeba risks: sleeps in CTL, wears in shower, sauna              - Cultures obtained 11/05/20,   Culture positive for acanthamoeba.              - Confocal: many PMNs, no definite acanthamoeba (but could be masked by deeper infection, no fungal elements)              - Discussed non-FDA use of PHMB     12/31: Pain worse likely 2/2 surface toxicity from PHMB, although difficult to know. Plan to increase lubrication.   1/07: Better overall.  pain better.  Stromal haze/KP better as well.  1/19: Stable from previous visit w/ continued diffuse haze,no epi defects.   1/28: Slightly improved corneal clarity.  2/4: Slightly improved corneal clarity  2/18: Exam largely stable, new central bulla  3/2: increased soreness and tearing past few days, exam stable.  Diffuse haze, edema but improved from last photos.  3/11: Worsening pain and soreness over the past week. 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 CL placed right eye  4/8: BCL really helped his pain. Much worse with it off. Epi defect larger with rolled edges. 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 Infiltrate improved. AmbioDisk 9.0 + Kontur lens 8.6/16.0 4/8/21 4/22: Pain improving. Ulcer looks stable to slightly improved. Prokera ring placed  4/29: right eye Much improved infiltrate. Epi defect resolving. Am dissolved.  5/6/21:  AM dissolved, ring removed.  Epi healed, small superocentral crescentic infiltrate remains w/ surrounding central scarring.    5/20: significant improvement in Snellen acuity today. Crescentic infiltrate smaller and less dense since 5/6 photo.  6/17: small central infiltrate present at inferior aspect of pupil - smaller than 5/20. Edema much improved. Vision stable.   7/16/21: Stable vision, pt comfortable.    Would like to see  active infiltrate completely resolved before stopping / reducing PHMB further.  8/20/21: PHMB stopped.  Doing well.  10/21/21: Doing well.  Stable off the PHMB.  Disc right eye PKP with cat ext and IOL.  Disc R, B, PC and option. IOL calcs today.  Pt will decide about general anesthesia vs retrobulbar block. 3 hours for  Triple.  12/9/21: POD#1 s/p PKP.  Doing well.  Epi intact, cornea quite clear.     12/16/21: POW#1 s/p PKP. Doing well - no signs of graft failure, cornea clear  1/4/22: POM#1 s/p PKP.  Doing well, cornea is clear.    1/26/22: Approx post op M 2. Pt doing well  4/19/22: Pt doing well with right eye PKP. Cont meds.               PLAN:   - Pred 1% 4 times a day right eye    - Continue  Erythromycin ointment 4 times day right eye   - PRN PFATS 2-3 times a day each eye      RTC: 3-4 month w/ VT, call if sx worsen to clinic.    Anson Cheng MD    Attending Physician Attestation:  Complete documentation of historical and exam elements from today's encounter can be found in the full encounter summary report (not reduplicated in this progress note).  I personally obtained the chief complaint(s) and history of present illness.  I confirmed and edited as necessary the review of systems, past medical/surgical history, family history, social history, and examination findings as documented by others; and I examined the patient myself.  I personally reviewed the relevant tests, images, and reports as documented above.  I formulated and edited as necessary the assessment and plan and discussed the findings and management plan with the patient and family. - Anson Cheng MD

## 2022-08-16 ENCOUNTER — OFFICE VISIT (OUTPATIENT)
Dept: OPHTHALMOLOGY | Facility: CLINIC | Age: 67
End: 2022-08-16
Attending: OPHTHALMOLOGY
Payer: COMMERCIAL

## 2022-08-16 DIAGNOSIS — Z94.7 S/P PKP (PENETRATING KERATOPLASTY): Primary | ICD-10-CM

## 2022-08-16 DIAGNOSIS — H04.121 DRY EYE SYNDROME OF RIGHT EYE: ICD-10-CM

## 2022-08-16 DIAGNOSIS — H16.401 CORNEAL NEOVASCULARIZATION OF RIGHT EYE: ICD-10-CM

## 2022-08-16 PROCEDURE — G0463 HOSPITAL OUTPT CLINIC VISIT: HCPCS

## 2022-08-16 PROCEDURE — 99214 OFFICE O/P EST MOD 30 MIN: CPT | Mod: GC | Performed by: OPHTHALMOLOGY

## 2022-08-16 ASSESSMENT — CONF VISUAL FIELD
OD_NORMAL: 1
OS_NORMAL: 1
METHOD: COUNTING FINGERS

## 2022-08-16 ASSESSMENT — TONOMETRY
OD_IOP_MMHG: 16
IOP_METHOD: ICARE
OS_IOP_MMHG: 14

## 2022-08-16 ASSESSMENT — REFRACTION_WEARINGRX
OD_SPHERE: +0.50
OD_AXIS: 045
OD_CYLINDER: +0.25
OD_ADD: +2.00
OS_CYLINDER: SPHERE
SPECS_TYPE: PAL
OS_ADD: +2.00
OS_SPHERE: +1.00

## 2022-08-16 ASSESSMENT — EXTERNAL EXAM - LEFT EYE: OS_EXAM: NORMAL

## 2022-08-16 ASSESSMENT — VISUAL ACUITY
CORRECTION_TYPE: GLASSES
OD_CC: 3/200 E
METHOD: SNELLEN - LINEAR
OS_CC: 20/30
OS_CC+: +1

## 2022-08-16 ASSESSMENT — SLIT LAMP EXAM - LIDS: COMMENTS: MGD

## 2022-08-16 ASSESSMENT — EXTERNAL EXAM - RIGHT EYE: OD_EXAM: NORMAL

## 2022-08-16 NOTE — NURSING NOTE
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  Patient denies having any eye discomfort.    He is using Pred 1% 4 times a day right eye and infrequently artificial tears.    Lisa Moe, COT 8:37 AM  August 16, 2022

## 2022-08-16 NOTE — PROGRESS NOTES
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 Rosie Han     HPI:  Derik Hamm is male who presents as follow up for Acanthamoeba Keratitis right eye.     Patient initially presented for evaluation of persistent keratoconjunctivitis, OD, since 10/5/2020. Initially seen by Dr. Ngo at [facility] and felt it was related to severe dryness vs bacterial keratitis, so he was started on PFATs and Vigamox Q2H WA. Patient was seen frequently for follow up; he was started on Valtrex 500 mg TID on 10/9/20 and has been on that since then. Despite this therapy, pt failed to improve so he was started prednisolone. He has had a waxing and waning course, but more recently has gradually declined. Stopped PF on 11/2 after running out. He was seen by Dr. Ngo on 11/2 who noted worsening VA and worsening exam, so the patient was referred to Dr. Han, whom he saw 11/4/20. Dr. Han referred to our clinic due to concerns for acanthamoeba keratitis. Culture positive for acanthamoeba on 11/05/20.     Interval History:  LV was 4/19/22. He notes the right eye is comfortable, no pain or redness or tearing. 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     Glasses: Yes  CTL wearer: yes - none x1.5 months; wears while showering or in the sauna; he sleeps in them once every 3-4 weeks; never wears them in a lake or pool     Family hx of eye disease: negative for glaucoma/macular degeneration     Social Hx: (-) smoking, (-) EtOH, (-) illicit drugs     Meds:  Pred 1% 4 times a day right eye - Using ~3 times per day  Erythromycin ointment 4 times day right eye - Has not been using     Surgical Hx:  S/p PKP with posterior  synechiolysis right eye (12/8/21), did not perform ECCE at this time     Assessment:  # Acanthamoeba keratitis, OD              - Symptoms started 10/5/2020              - Acanthamoeba risks: sleeps in CTL, wears in shower, sauna              - Cultures obtained 11/05/20,   Culture positive for acanthamoeba.              - Confocal: many PMNs, no definite acanthamoeba (but could be masked by deeper infection, no fungal elements)              - Discussed non-FDA use of PHMB     12/31: Pain worse likely 2/2 surface toxicity from PHMB, although difficult to know. Plan to increase lubrication.   1/07: Better overall.  pain better.  Stromal haze/KP better as well.  1/19: Stable from previous visit w/ continued diffuse haze,no epi defects.   1/28: Slightly improved corneal clarity.  2/4: Slightly improved corneal clarity  2/18: Exam largely stable, new central bulla  3/2: increased soreness and tearing past few days, exam stable.  Diffuse haze, edema but improved from last photos.  3/11: Worsening pain and soreness over the past week. 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 CL placed right eye  4/8: BCL really helped his pain. Much worse with it off. Epi defect larger with rolled edges. 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 Infiltrate improved. AmbioDisk 9.0 + Kontur lens 8.6/16.0 4/8/21 4/22: Pain improving. Ulcer looks stable to slightly improved. Prokera ring placed  4/29: right eye Much improved infiltrate. Epi defect resolving. Am dissolved.  5/6/21:  AM dissolved, ring removed.  Epi healed, small superocentral crescentic infiltrate remains w/ surrounding central scarring.    5/20: significant improvement in Snellen acuity today. Crescentic infiltrate smaller and less dense since 5/6 photo.  6/17: small central infiltrate present at inferior aspect of pupil - smaller than 5/20. Edema much improved. Vision stable.    7/16/21: Stable vision, pt comfortable.    Would like to see active infiltrate completely resolved before stopping / reducing PHMB further.  8/20/21: PHMB stopped.  Doing well.  10/21/21: Doing well.  Stable off the PHMB.  Disc right eye PKP with cat ext and IOL.  Disc R, B, PC and option. IOL calcs today.  Pt will decide about general anesthesia vs retrobulbar block. 3 hours for  Triple.  12/9/21: POD#1 s/p PKP.  Doing well.  Epi intact, cornea quite clear.     12/16/21: POW#1 s/p PKP. Doing well - no signs of graft failure, cornea clear  1/4/22: POM#1 s/p PKP.  Doing well, cornea is clear.    1/26/22: Approx post op M 2. Pt doing well  4/19/22: Pt doing well with right eye PKP. Cont meds.  8/16/22: Doing well, no pain, stable poor vision right eye, no changes.          PLAN:   - Pred 1% 4 times a day right eye    -     RTC: 3-4 month w/ VT, call if sx worsen to clinic.    Gustavo Baker MD  Fellow, Cornea, External Disease and Refractive Surgery  Department of Ophthalmology  [facility]    Attending Physician Attestation:  Complete documentation of historical and exam elements from today's encounter can be found in the full encounter summary report (not reduplicated in this progress note).  I personally obtained the chief complaint(s) and history of present illness.  I confirmed and edited as necessary the review of systems, past medical/surgical history, family history, social history, and examination findings as documented by others; and I examined the patient myself.  I personally reviewed the relevant tests, images, and reports as documented above.  I formulated and edited as necessary the assessment and plan and discussed the findings and management plan with the patient and family. - Anson Cheng MD

## 2022-09-26 SDOH — ECONOMIC STABILITY: INCOME INSECURITY: IN THE LAST 12 MONTHS, WAS THERE A TIME WHEN YOU WERE NOT ABLE TO PAY THE MORTGAGE OR RENT ON TIME?: NO

## 2022-09-26 SDOH — ECONOMIC STABILITY: INCOME INSECURITY: HOW HARD IS IT FOR YOU TO PAY FOR THE VERY BASICS LIKE FOOD, HOUSING, MEDICAL CARE, AND HEATING?: NOT VERY HARD

## 2022-09-26 SDOH — HEALTH STABILITY: PHYSICAL HEALTH: ON AVERAGE, HOW MANY DAYS PER WEEK DO YOU ENGAGE IN MODERATE TO STRENUOUS EXERCISE (LIKE A BRISK WALK)?: 6 DAYS

## 2022-09-26 SDOH — ECONOMIC STABILITY: FOOD INSECURITY: WITHIN THE PAST 12 MONTHS, YOU WORRIED THAT YOUR FOOD WOULD RUN OUT BEFORE YOU GOT MONEY TO BUY MORE.: NEVER TRUE

## 2022-09-26 SDOH — ECONOMIC STABILITY: TRANSPORTATION INSECURITY
IN THE PAST 12 MONTHS, HAS THE LACK OF TRANSPORTATION KEPT YOU FROM MEDICAL APPOINTMENTS OR FROM GETTING MEDICATIONS?: NO

## 2022-09-26 SDOH — ECONOMIC STABILITY: FOOD INSECURITY: WITHIN THE PAST 12 MONTHS, THE FOOD YOU BOUGHT JUST DIDN'T LAST AND YOU DIDN'T HAVE MONEY TO GET MORE.: OFTEN TRUE

## 2022-09-26 SDOH — HEALTH STABILITY: PHYSICAL HEALTH: ON AVERAGE, HOW MANY MINUTES DO YOU ENGAGE IN EXERCISE AT THIS LEVEL?: 50 MIN

## 2022-09-26 SDOH — ECONOMIC STABILITY: TRANSPORTATION INSECURITY
IN THE PAST 12 MONTHS, HAS LACK OF TRANSPORTATION KEPT YOU FROM MEETINGS, WORK, OR FROM GETTING THINGS NEEDED FOR DAILY LIVING?: NO

## 2022-09-26 ASSESSMENT — LIFESTYLE VARIABLES
HOW OFTEN DO YOU HAVE A DRINK CONTAINING ALCOHOL: NEVER
HOW OFTEN DO YOU HAVE SIX OR MORE DRINKS ON ONE OCCASION: NEVER
HOW MANY STANDARD DRINKS CONTAINING ALCOHOL DO YOU HAVE ON A TYPICAL DAY: PATIENT DOES NOT DRINK
AUDIT-C TOTAL SCORE: 0
SKIP TO QUESTIONS 9-10: 1

## 2022-09-26 ASSESSMENT — ENCOUNTER SYMPTOMS
FREQUENCY: 0
ARTHRALGIAS: 1
HEADACHES: 0
EYE PAIN: 0
HEARTBURN: 1
SHORTNESS OF BREATH: 0
MYALGIAS: 0
PARESTHESIAS: 0
DIARRHEA: 0
ABDOMINAL PAIN: 0
CHILLS: 0
COUGH: 1
HEMATOCHEZIA: 0
DYSURIA: 0
HEMATURIA: 0
CONSTIPATION: 0
JOINT SWELLING: 0
DIZZINESS: 0
NERVOUS/ANXIOUS: 1
FEVER: 0
SORE THROAT: 0
WEAKNESS: 0
NAUSEA: 0
PALPITATIONS: 0

## 2022-09-26 ASSESSMENT — ACTIVITIES OF DAILY LIVING (ADL): CURRENT_FUNCTION: NO ASSISTANCE NEEDED

## 2022-09-26 ASSESSMENT — SOCIAL DETERMINANTS OF HEALTH (SDOH)
HOW OFTEN DO YOU GET TOGETHER WITH FRIENDS OR RELATIVES?: ONCE A WEEK
HOW OFTEN DO YOU ATTEND CHURCH OR RELIGIOUS SERVICES?: NEVER
IN A TYPICAL WEEK, HOW MANY TIMES DO YOU TALK ON THE PHONE WITH FAMILY, FRIENDS, OR NEIGHBORS?: MORE THAN THREE TIMES A WEEK
DO YOU BELONG TO ANY CLUBS OR ORGANIZATIONS SUCH AS CHURCH GROUPS UNIONS, FRATERNAL OR ATHLETIC GROUPS, OR SCHOOL GROUPS?: NO

## 2022-10-03 ENCOUNTER — OFFICE VISIT (OUTPATIENT)
Dept: PEDIATRICS | Facility: CLINIC | Age: 67
End: 2022-10-03
Payer: COMMERCIAL

## 2022-10-03 VITALS
WEIGHT: 225.8 LBS | OXYGEN SATURATION: 98 % | HEIGHT: 73 IN | HEART RATE: 84 BPM | SYSTOLIC BLOOD PRESSURE: 110 MMHG | TEMPERATURE: 97.8 F | DIASTOLIC BLOOD PRESSURE: 60 MMHG | BODY MASS INDEX: 29.93 KG/M2

## 2022-10-03 DIAGNOSIS — Z12.5 SCREENING FOR PROSTATE CANCER: ICD-10-CM

## 2022-10-03 DIAGNOSIS — I48.0 PAROXYSMAL ATRIAL FIBRILLATION (H): ICD-10-CM

## 2022-10-03 DIAGNOSIS — Z11.3 ROUTINE SCREENING FOR STI (SEXUALLY TRANSMITTED INFECTION): ICD-10-CM

## 2022-10-03 DIAGNOSIS — Z13.1 SCREENING FOR DIABETES MELLITUS: ICD-10-CM

## 2022-10-03 DIAGNOSIS — Z11.59 NEED FOR HEPATITIS C SCREENING TEST: ICD-10-CM

## 2022-10-03 DIAGNOSIS — Z00.00 ENCOUNTER FOR MEDICARE ANNUAL WELLNESS EXAM: Primary | ICD-10-CM

## 2022-10-03 DIAGNOSIS — I42.9 SECONDARY CARDIOMYOPATHY (H): ICD-10-CM

## 2022-10-03 DIAGNOSIS — Z23 NEED FOR PNEUMOCOCCAL VACCINATION: ICD-10-CM

## 2022-10-03 DIAGNOSIS — Z11.4 SCREENING FOR HIV (HUMAN IMMUNODEFICIENCY VIRUS): ICD-10-CM

## 2022-10-03 PROBLEM — B60.10: Status: ACTIVE | Noted: 2021-11-08

## 2022-10-03 PROBLEM — I48.92 ATRIAL FLUTTER WITH RAPID VENTRICULAR RESPONSE (H): Status: RESOLVED | Noted: 2021-09-29 | Resolved: 2022-10-03

## 2022-10-03 PROBLEM — I48.92 NEW ONSET ATRIAL FLUTTER (H): Status: RESOLVED | Noted: 2021-09-29 | Resolved: 2022-10-03

## 2022-10-03 LAB
CHOLEST SERPL-MCNC: 147 MG/DL
FASTING STATUS PATIENT QL REPORTED: YES
HBA1C MFR BLD: 5.3 % (ref 0–5.6)
HCV AB SERPL QL IA: NONREACTIVE
HDLC SERPL-MCNC: 17 MG/DL
HIV 1+2 AB+HIV1 P24 AG SERPL QL IA: NONREACTIVE
LDLC SERPL CALC-MCNC: 110 MG/DL
NONHDLC SERPL-MCNC: 130 MG/DL
PSA SERPL-MCNC: 6.53 UG/L (ref 0–4)
TRIGL SERPL-MCNC: 102 MG/DL

## 2022-10-03 PROCEDURE — 86803 HEPATITIS C AB TEST: CPT | Performed by: STUDENT IN AN ORGANIZED HEALTH CARE EDUCATION/TRAINING PROGRAM

## 2022-10-03 PROCEDURE — 80061 LIPID PANEL: CPT | Performed by: STUDENT IN AN ORGANIZED HEALTH CARE EDUCATION/TRAINING PROGRAM

## 2022-10-03 PROCEDURE — 90677 PCV20 VACCINE IM: CPT | Performed by: STUDENT IN AN ORGANIZED HEALTH CARE EDUCATION/TRAINING PROGRAM

## 2022-10-03 PROCEDURE — 99397 PER PM REEVAL EST PAT 65+ YR: CPT | Mod: 25 | Performed by: STUDENT IN AN ORGANIZED HEALTH CARE EDUCATION/TRAINING PROGRAM

## 2022-10-03 PROCEDURE — 83036 HEMOGLOBIN GLYCOSYLATED A1C: CPT | Performed by: STUDENT IN AN ORGANIZED HEALTH CARE EDUCATION/TRAINING PROGRAM

## 2022-10-03 PROCEDURE — 87591 N.GONORRHOEAE DNA AMP PROB: CPT | Performed by: STUDENT IN AN ORGANIZED HEALTH CARE EDUCATION/TRAINING PROGRAM

## 2022-10-03 PROCEDURE — G0103 PSA SCREENING: HCPCS | Performed by: STUDENT IN AN ORGANIZED HEALTH CARE EDUCATION/TRAINING PROGRAM

## 2022-10-03 PROCEDURE — 36415 COLL VENOUS BLD VENIPUNCTURE: CPT | Performed by: STUDENT IN AN ORGANIZED HEALTH CARE EDUCATION/TRAINING PROGRAM

## 2022-10-03 PROCEDURE — 90471 IMMUNIZATION ADMIN: CPT | Performed by: STUDENT IN AN ORGANIZED HEALTH CARE EDUCATION/TRAINING PROGRAM

## 2022-10-03 PROCEDURE — 87389 HIV-1 AG W/HIV-1&-2 AB AG IA: CPT | Performed by: STUDENT IN AN ORGANIZED HEALTH CARE EDUCATION/TRAINING PROGRAM

## 2022-10-03 PROCEDURE — 87491 CHLMYD TRACH DNA AMP PROBE: CPT | Performed by: STUDENT IN AN ORGANIZED HEALTH CARE EDUCATION/TRAINING PROGRAM

## 2022-10-03 ASSESSMENT — ENCOUNTER SYMPTOMS
DIZZINESS: 0
JOINT SWELLING: 0
PARESTHESIAS: 0
HEMATOCHEZIA: 0
DIARRHEA: 0
DYSURIA: 0
HEADACHES: 0
FREQUENCY: 0
HEMATURIA: 0
SORE THROAT: 0
SHORTNESS OF BREATH: 0
HEARTBURN: 1
NAUSEA: 0
MYALGIAS: 0
ABDOMINAL PAIN: 0
ARTHRALGIAS: 1
CHILLS: 0
EYE PAIN: 0
FEVER: 0
PALPITATIONS: 0
COUGH: 1
NERVOUS/ANXIOUS: 1
WEAKNESS: 0
CONSTIPATION: 0

## 2022-10-03 ASSESSMENT — ACTIVITIES OF DAILY LIVING (ADL): CURRENT_FUNCTION: NO ASSISTANCE NEEDED

## 2022-10-03 NOTE — PATIENT INSTRUCTIONS
Call dermatology to schedule the appointment!        Patient Education   Personalized Prevention Plan  You are due for the preventive services outlined below.  Your care team is available to assist you in scheduling these services.  If you have already completed any of these items, please share that information with your care team to update in your medical record.  Health Maintenance Due   Topic Date Due    Pneumococcal Vaccine (1 - PCV) Never done    HIV Screening  Never done    Hepatitis C Screening  Never done    COVID-19 Vaccine (4 - Booster for Pfizer series) 03/12/2022    Flu Vaccine (1) 09/01/2022    ANNUAL REVIEW OF HM ORDERS  09/29/2022

## 2022-10-03 NOTE — PROGRESS NOTES
"SUBJECTIVE:   CC: Derik is an 67 year old who presents for preventative health visit.     Patient has been advised of split billing requirements and indicates understanding: Yes  Healthy Habits:     In general, how would you rate your overall health?  Good    Frequency of exercise:  6-7 days/week    Duration of exercise:  45-60 minutes    Do you usually eat at least 4 servings of fruit and vegetables a day, include whole grains    & fiber and avoid regularly eating high fat or \"junk\" foods?  Yes    Taking medications regularly:  Yes    Medication side effects:  None    Ability to successfully perform activities of daily living:  No assistance needed    Home Safety:  No safety concerns identified    Hearing Impairment:  No hearing concerns    In the past 6 months, have you been bothered by leaking of urine?  No    In general, how would you rate your overall mental or emotional health?  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    Abuse: Current or Past(Physical, Sexual or Emotional)- No  Do you feel safe in your environment? Yes        Social History     Tobacco Use     Smoking status: Former Smoker     Packs/day: 1.50     Years: 10.00     Pack years: 15.00     Types: Cigarettes     Start date: 1975     Quit date: 1980     Years since quittin.8     Smokeless tobacco: Never Used   Substance Use Topics     Alcohol " "use: No         Alcohol Use 9/26/2022   Prescreen: >3 drinks/day or >7 drinks/week? No   Prescreen: >3 drinks/day or >7 drinks/week? -       Last PSA: No results found for: PSA    Reviewed orders with patient. Reviewed health maintenance and updated orders accordingly - Yes  Lab work is in process    Reviewed and updated as needed this visit by clinical staff   Tobacco  Allergies  Meds  Problems  Med Hx  Surg Hx  Fam Hx          Jaki Villagran MD on 10/3/2022 at 7:21 AM    Reviewed and updated as needed this visit by Provider     Meds  Problems              Review of Systems   Constitutional: Negative for chills and fever.   HENT: Negative for congestion, ear pain, hearing loss and sore throat.    Eyes: Positive for visual disturbance. Negative for pain.   Respiratory: Positive for cough. Negative for shortness of breath.    Cardiovascular: Negative for chest pain, palpitations and peripheral edema.   Gastrointestinal: Positive for heartburn. Negative for abdominal pain, constipation, diarrhea, hematochezia and nausea.   Genitourinary: Positive for impotence. Negative for dysuria, frequency, genital sores, hematuria, penile discharge and urgency.   Musculoskeletal: Positive for arthralgias. Negative for joint swelling and myalgias.   Skin: Negative for rash.   Neurological: Negative for dizziness, weakness, headaches and paresthesias.   Psychiatric/Behavioral: Negative for mood changes. The patient is nervous/anxious.      OBJECTIVE:   /60 (BP Location: Right arm, Patient Position: Sitting, Cuff Size: Adult Large)   Pulse 84   Temp 97.8  F (36.6  C) (Temporal)   Ht 1.842 m (6' 0.5\")   Wt 102.4 kg (225 lb 12.8 oz)   SpO2 98%   BMI 30.20 kg/m      Physical Exam  GENERAL: healthy, alert and no distress  EYES: Eyes grossly normal to inspection, and conjunctivae and sclerae normal  HENT: ear canals and TM's normal  NECK: no adenopathy, no asymmetry, masses, or scars and thyroid normal to " "palpation  RESP: lungs clear to auscultation - no rales, rhonchi or wheezes  CV: regular rate and rhythm, normal S1 S2, no S3 or S4, no murmur, click or rub, no peripheral edema and peripheral pulses strong  ABDOMEN: soft, nontender, no hepatosplenomegaly, no masses   MS: no gross musculoskeletal defects noted, no edema  SKIN: no suspicious lesions or rashes  NEURO: Normal strength and tone, mentation intact and speech normal  PSYCH: mentation appears normal, affect normal/bright    ASSESSMENT/PLAN:       ICD-10-CM    1. Encounter for Medicare annual wellness exam  Z00.00    2. Paroxysmal atrial fibrillation (H)  I48.0    3. 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 Screening for diabetes mellitus  Z13.1 Lipid panel reflex to direct LDL Fasting     Hemoglobin A1c     Lipid panel reflex to direct LDL Fasting     Hemoglobin A1c   5. Screening for HIV (human immunodeficiency virus)  Z11.4 HIV Antigen Antibody Combo     HIV Antigen Antibody Combo   6. Need for hepatitis C screening test  Z11.59 Hepatitis C Screen Reflex to HCV RNA Quant and Genotype     Hepatitis C Screen Reflex to HCV RNA Quant and Genotype   7. Need for pneumococcal vaccination  Z23 Pneumococcal 20 Valent Conjugate (PCV20)   8. Screening for prostate cancer  Z12.5 PSA, screen     PSA, screen   9. Routine screening for STI (sexually transmitted infection)  Z11.3 NEISSERIA GONORRHOEA PCR     CHLAMYDIA TRACHOMATIS PCR     NEISSERIA GONORRHOEA PCR     CHLAMYDIA TRACHOMATIS PCR     1. Getting flu and COVID19 booster at commercial pharmacy.  2, 3. Follows with cardiology. On low dose ACE-I and beta blocker.        COUNSELING:   Reviewed preventive health counseling, as reflected in patient instructions    Estimated body mass index is 30.2 kg/m  as calculated from the following:    Height as of this encounter: 1.842 m (6' 0.5\").    Weight as of this encounter: 102.4 kg (225 lb 12.8 oz).       He reports that he quit smoking about 41 years ago. His smoking " use included cigarettes. He started smoking about 46 years ago. He has a 15.00 pack-year smoking history. He has never used smokeless tobacco.      Counseling Resources:  ATP IV Guidelines  Pooled Cohorts Equation Calculator  FRAX Risk Assessment  ICSI Preventive Guidelines  Dietary Guidelines for Americans, 2010  USDA's MyPlate  ASA Prophylaxis  Lung CA Screening    Jaki Villagran MD  [facility]

## 2022-10-04 LAB
C TRACH DNA SPEC QL NAA+PROBE: NEGATIVE
N GONORRHOEA DNA SPEC QL NAA+PROBE: NEGATIVE

## 2022-12-10 ENCOUNTER — MYC MEDICAL ADVICE (OUTPATIENT)
Dept: INTERNAL MEDICINE | Facility: CLINIC | Age: 67
End: 2022-12-10

## 2022-12-13 ENCOUNTER — OFFICE VISIT (OUTPATIENT)
Dept: OPHTHALMOLOGY | Facility: CLINIC | Age: 67
End: 2022-12-13
Attending: OPHTHALMOLOGY
Payer: COMMERCIAL

## 2022-12-13 DIAGNOSIS — Z94.7 S/P PKP (PENETRATING KERATOPLASTY): ICD-10-CM

## 2022-12-13 DIAGNOSIS — H52.211 IRREGULAR ASTIGMATISM OF RIGHT EYE: Primary | ICD-10-CM

## 2022-12-13 PROCEDURE — 99214 OFFICE O/P EST MOD 30 MIN: CPT | Performed by: OPHTHALMOLOGY

## 2022-12-13 PROCEDURE — G0463 HOSPITAL OUTPT CLINIC VISIT: HCPCS

## 2022-12-13 PROCEDURE — 92025 CPTRIZED CORNEAL TOPOGRAPHY: CPT | Performed by: OPHTHALMOLOGY

## 2022-12-13 ASSESSMENT — REFRACTION_WEARINGRX
OS_ADD: +2.00
OD_SPHERE: +0.50
OD_AXIS: 045
OD_CYLINDER: +0.25
OS_CYLINDER: SPHERE
SPECS_TYPE: PAL
OD_ADD: +2.00
OS_SPHERE: +1.00

## 2022-12-13 ASSESSMENT — VISUAL ACUITY
METHOD: SNELLEN - LINEAR
OS_CC+: -2
OD_CC: 20/200
OD_PH_CC: 20/100
OS_CC: 20/20

## 2022-12-13 ASSESSMENT — CONF VISUAL FIELD
OD_INFERIOR_NASAL_RESTRICTION: 0
OD_SUPERIOR_NASAL_RESTRICTION: 0
OS_SUPERIOR_TEMPORAL_RESTRICTION: 0
OS_INFERIOR_NASAL_RESTRICTION: 0
METHOD: COUNTING FINGERS
OS_INFERIOR_TEMPORAL_RESTRICTION: 0
OS_NORMAL: 1
OS_SUPERIOR_NASAL_RESTRICTION: 0
OD_INFERIOR_TEMPORAL_RESTRICTION: 0
OD_SUPERIOR_TEMPORAL_RESTRICTION: 0
OD_NORMAL: 1

## 2022-12-13 ASSESSMENT — TONOMETRY
IOP_METHOD: ICARE
OD_IOP_MMHG: 19
OS_IOP_MMHG: 13

## 2022-12-13 ASSESSMENT — EXTERNAL EXAM - LEFT EYE: OS_EXAM: NORMAL

## 2022-12-13 ASSESSMENT — EXTERNAL EXAM - RIGHT EYE: OD_EXAM: NORMAL

## 2022-12-13 ASSESSMENT — SLIT LAMP EXAM - LIDS: COMMENTS: MGD

## 2022-12-13 NOTE — PROGRESS NOTES
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 Rosie Han     HPI:  Derik Hamm is male who presents as follow up for Acanthamoeba Keratitis right eye.     Patient initially presented for evaluation of persistent keratoconjunctivitis, OD, since 10/5/2020. Initially seen by Dr. Ngo at [facility] and felt it was related to severe dryness vs bacterial keratitis, so he was started on PFATs and Vigamox Q2H WA. Patient was seen frequently for follow up; he was started on Valtrex 500 mg TID on 10/9/20 and has been on that since then. Despite this therapy, pt failed to improve so he was started prednisolone. He has had a waxing and waning course, but more recently has gradually declined. Stopped PF on 11/2 after running out. He was seen by Dr. Ngo on 11/2 who noted worsening VA and worsening exam, so the patient was referred to Dr. Han, whom he saw 11/4/20. Dr. Han referred to our clinic due to concerns for acanthamoeba keratitis. Culture positive for acanthamoeba on 11/05/20.     Interval History:  LV was 4/19/22. He notes the right eye is comfortable, no pain or redness or tearing. 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     Glasses: Yes  CTL wearer: yes - none x1.5 months; wears while showering or in the sauna; he sleeps in them once every 3-4 weeks; never wears them in a lake or pool     Family hx of eye disease: negative for glaucoma/macular degeneration     Social Hx: (-) smoking, (-) EtOH, (-) illicit drugs     Meds:  Pred 1% 4 times a day right eye - Using ~3 times per day  Erythromycin ointment 4 times day right eye - Has not been using     Surgical Hx:  S/p PKP with posterior  synechiolysis right eye (12/8/21), did not perform ECCE at this time     Assessment:  # Acanthamoeba keratitis, OD              - Symptoms started 10/5/2020              - Acanthamoeba risks: sleeps in CTL, wears in shower, sauna              - Cultures obtained 11/05/20,   Culture positive for acanthamoeba.              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 Plan to increase lubrication.   1/07: Better overall.  pain better.  Stromal haze/KP better as well.  1/19: Stable from previous visit w/ continued diffuse haze,no epi defects.   1/28: Slightly improved corneal clarity.  2/4: Slightly improved corneal clarity  2/18: Exam largely stable, new central bulla  3/2: increased soreness and tearing past few days, exam stable.  Diffuse haze, edema but improved from last photos.  3/11: Worsening pain and soreness over the past week. 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 CL placed right eye  4/8: BCL really helped his pain. Much worse with it off. Epi defect larger with rolled edges. 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 Infiltrate improved. AmbioDisk 9.0 + Kontur lens 8.6/16.0 4/8/21 4/22: Pain improving. Ulcer looks stable to slightly improved. Prokera ring placed  4/29: right eye Much improved infiltrate. Epi defect resolving. Am dissolved.  5/6/21:  AM dissolved, ring removed.  Epi healed, small superocentral crescentic infiltrate remains w/ surrounding central scarring.    5/20: significant improvement in Snellen acuity today. Crescentic infiltrate smaller and less dense since 5/6 photo.  6/17: small central infiltrate present at inferior aspect of pupil -  smaller than 5/20. Edema much improved. Vision stable.   7/16/21: Stable vision, pt comfortable.    Would like to see active infiltrate completely resolved before stopping / reducing PHMB further.  8/20/21: PHMB stopped.  Doing well.  10/21/21: Doing well.  Stable off the PHMB.  Disc right eye PKP with cat ext and IOL.  Disc R, B, PC and option. IOL calcs today.  Pt will decide about general anesthesia vs retrobulbar block. 3 hours for  Triple.  12/9/21: POD#1 s/p PKP.  Doing well.  Epi intact, cornea quite clear.     12/16/21: POW#1 s/p PKP. Doing well - no signs of graft failure, cornea clear  1/4/22: POM#1 s/p PKP.  Doing well, cornea is clear.    1/26/22: Approx post op M 2. Pt doing well  4/19/22: Pt doing well with right eye PKP. Cont meds.  8/16/22: Doing well, no pain, stable poor vision right eye, no changes.  12/13/22: PKP looks great. Nir with 2 tight sutures based on todays nir.          PLAN:   - Pred 1% 3 times a day right eye   -nir today with selecitve suture removal.   - Oflox right eye qid x 4 days.    RTC: 1 month w/ VT, call if sx worsen to clinic.    Anson Cheng MD    Attending Physician Attestation:  Complete documentation of historical and exam elements from today's encounter can be found in the full encounter summary report (not reduplicated in this progress note).  I personally obtained the chief complaint(s) and history of present illness.  I confirmed and edited as necessary the review of systems, past medical/surgical history, family history, social history, and examination findings as documented by others; and I examined the patient myself.  I personally reviewed the relevant tests, images, and reports as documented above.  I formulated and edited as necessary the assessment and plan and discussed the findings and management plan with the patient and family. - Anson Cheng MD

## 2022-12-13 NOTE — NURSING NOTE
Chief Complaints and History of Present Illnesses   Patient presents with     Keratitis Follow Up     Chief Complaint(s) and History of Present Illness(es)     Keratitis Follow Up            Laterality: right eye    Associated symptoms: Negative for eye pain and dryness    Pain scale: 0/10          Comments    Derik is here to continue care for Acanthamoeba Keratitis RE. History of PKP (penetrating keratoplasty). Derik states RE feels about the same as last visit    Daniel Fernando COT 8:11 AM December 13, 2022

## 2023-01-17 ENCOUNTER — OFFICE VISIT (OUTPATIENT)
Dept: OPHTHALMOLOGY | Facility: CLINIC | Age: 68
End: 2023-01-17
Attending: OPHTHALMOLOGY
Payer: COMMERCIAL

## 2023-01-17 DIAGNOSIS — H52.211 IRREGULAR ASTIGMATISM OF RIGHT EYE: Primary | ICD-10-CM

## 2023-01-17 DIAGNOSIS — H04.121 DRY EYE SYNDROME OF RIGHT EYE: ICD-10-CM

## 2023-01-17 DIAGNOSIS — H17.9 CORNEAL SCAR, RIGHT EYE: ICD-10-CM

## 2023-01-17 DIAGNOSIS — H16.401 CORNEAL NEOVASCULARIZATION OF RIGHT EYE: ICD-10-CM

## 2023-01-17 DIAGNOSIS — Z98.890 POSTOPERATIVE STATE: ICD-10-CM

## 2023-01-17 DIAGNOSIS — Z94.7 S/P PKP (PENETRATING KERATOPLASTY): ICD-10-CM

## 2023-01-17 PROCEDURE — 99214 OFFICE O/P EST MOD 30 MIN: CPT | Mod: GC | Performed by: OPHTHALMOLOGY

## 2023-01-17 PROCEDURE — G0463 HOSPITAL OUTPT CLINIC VISIT: HCPCS | Mod: 25

## 2023-01-17 PROCEDURE — 92025 CPTRIZED CORNEAL TOPOGRAPHY: CPT | Performed by: OPHTHALMOLOGY

## 2023-01-17 ASSESSMENT — TONOMETRY
OS_IOP_MMHG: 18
OD_IOP_MMHG: 20
IOP_METHOD: ICARE

## 2023-01-17 ASSESSMENT — VISUAL ACUITY
CORRECTION_TYPE: GLASSES
METHOD: SNELLEN - LINEAR
OD_CC: 20/150
OD_PH_CC: 20/100
OS_CC: 20/25
OS_CC+: -2
OD_PH_CC+: -1

## 2023-01-17 ASSESSMENT — CONF VISUAL FIELD
OD_INFERIOR_TEMPORAL_RESTRICTION: 0
OS_INFERIOR_TEMPORAL_RESTRICTION: 0
OS_SUPERIOR_NASAL_RESTRICTION: 0
METHOD: COUNTING FINGERS
OD_NORMAL: 1
OD_INFERIOR_NASAL_RESTRICTION: 0
OS_NORMAL: 1
OS_INFERIOR_NASAL_RESTRICTION: 0
OD_SUPERIOR_TEMPORAL_RESTRICTION: 0
OS_SUPERIOR_TEMPORAL_RESTRICTION: 0
OD_SUPERIOR_NASAL_RESTRICTION: 0

## 2023-01-17 ASSESSMENT — EXTERNAL EXAM - LEFT EYE: OS_EXAM: NORMAL

## 2023-01-17 ASSESSMENT — REFRACTION_WEARINGRX
OD_SPHERE: +0.50
OD_ADD: +2.00
OS_CYLINDER: SPHERE
OD_CYLINDER: +0.25
SPECS_TYPE: PAL
OS_SPHERE: +1.00
OD_AXIS: 045
OS_ADD: +2.00

## 2023-01-17 ASSESSMENT — SLIT LAMP EXAM - LIDS: COMMENTS: MGD

## 2023-01-17 ASSESSMENT — EXTERNAL EXAM - RIGHT EYE: OD_EXAM: NORMAL

## 2023-01-17 NOTE — NURSING NOTE
Chief Complaints and History of Present Illnesses   Patient presents with     Follow Up     Chief Complaint(s) and History of Present Illness(es)     Follow Up           Comments    Patient states that his vision is the same since he was here last. No pain and irritation. No flashes of light. No floaters.     Ocular Meds:Pred 1% TID in the RE.     Titus Rhodes COT, January 17, 2023 9:59 AM

## 2023-01-17 NOTE — PROGRESS NOTES
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 Rosie Han     HPI:  Derik Hamm is male who presents as follow up for Acanthamoeba Keratitis right eye.     Patient initially presented for evaluation of persistent keratoconjunctivitis, OD, since 10/5/2020. Initially seen by Dr. Ngo at [facility] and felt it was related to severe dryness vs bacterial keratitis, so he was started on PFATs and Vigamox Q2H WA. Patient was seen frequently for follow up; he was started on Valtrex 500 mg TID on 10/9/20 and has been on that since then. Despite this therapy, pt failed to improve so he was started prednisolone. He has had a waxing and waning course, but more recently has gradually declined. Stopped PF on 11/2 after running out. He was seen by Dr. Ngo on 11/2 who noted worsening VA and worsening exam, so the patient was referred to Dr. Han, whom he saw 11/4/20. Dr. Han referred to our clinic due to concerns for acanthamoeba keratitis. Culture positive for acanthamoeba on 11/05/20.     Interval History: Last visit had a few tight sutures removed, does not feel that this changed his vision too much. No pain in the eye. He is happy with his vision in the left eye.     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     Glasses: Yes  CTL wearer: yes - none x1.5 months; wears while showering or in the sauna; he sleeps in them once every 3-4 weeks; never wears them in a lake or pool     Family hx of eye disease: negative for glaucoma/macular degeneration     Social Hx: (-) smoking, (-) EtOH, (-) illicit drugs     Meds:  Pred 1% 3 times per day     Surgical Hx:  S/p PKP with posterior synechiolysis right eye (12/8/21), did not perform ECCE at this time     Assessment:  # Acanthamoeba keratitis, OD              - Symptoms started 10/5/2020              -  Acanthamoeba risks: sleeps in CTL, wears in shower, sauna              - Cultures obtained 11/05/20,   Culture positive for acanthamoeba.              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 Plan to increase lubrication.   1/07: Better overall.  pain better.  Stromal haze/KP better as well.  1/19: Stable from previous visit w/ continued diffuse haze,no epi defects.   1/28: Slightly improved corneal clarity.  2/4: Slightly improved corneal clarity  2/18: Exam largely stable, new central bulla  3/2: increased soreness and tearing past few days, exam stable.  Diffuse haze, edema but improved from last photos.  3/11: Worsening pain and soreness over the past week. 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 CL placed right eye  4/8: BCL really helped his pain. Much worse with it off. Epi defect larger with rolled edges. 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 Infiltrate improved. AmbioDisk 9.0 + Kontur lens 8.6/16.0 4/8/21 4/22: Pain improving. Ulcer looks stable to slightly improved. Prokera ring placed  4/29: right eye Much improved infiltrate. Epi defect resolving. Am dissolved.  5/6/21:  AM dissolved, ring removed.  Epi healed, small superocentral crescentic infiltrate remains w/ surrounding central scarring.    5/20: significant improvement in Snellen acuity today. Crescentic infiltrate smaller and less dense since 5/6 photo.  6/17: small central infiltrate present at inferior aspect of pupil - smaller than 5/20. Edema much improved. Vision stable.   7/16/21: Stable vision, pt comfortable.    Would like to see active infiltrate completely resolved before stopping  / reducing PHMB further.  8/20/21: PHMB stopped.  Doing well.  10/21/21: Doing well.  Stable off the PHMB.  Disc right eye PKP with cat ext and IOL.  Disc R, B, PC and option. IOL calcs today.  Pt will decide about general anesthesia vs retrobulbar block. 3 hours for  Triple.  12/9/21: POD#1 s/p PKP.  Doing well.  Epi intact, cornea quite clear.     12/16/21: POW#1 s/p PKP. Doing well - no signs of graft failure, cornea clear  1/4/22: POM#1 s/p PKP.  Doing well, cornea is clear.    1/26/22: Approx post op M 2. Pt doing well  4/19/22: Pt doing well with right eye PKP. Cont meds.  8/16/22: Doing well, no pain, stable poor vision right eye, no changes.  12/13/22: PKP looks great. Nir with 2 tight sutures based on todays nir.  1/17/23: PKP clear and compact. Nir with irreg ular cyk noted see nir. Will remove 2 sutures          PLAN:   - Continue Pred 1% 3 times a day right eye   - selecitve suture removal based on nir today   - Oflox right eye qid x 4 days.    RTC: 3 to 6 weeks w/ VT, NIR right eye only, call if sx worsen to clinic.    Gustavo Baker MD  Fellow, Cornea, External Disease and Refractive Surgery  Department of Ophthalmology  [facility]    Attending Physician Attestation:  Complete documentation of historical and exam elements from today's encounter can be found in the full encounter summary report (not reduplicated in this progress note).  I personally obtained the chief complaint(s) and history of present illness.  I confirmed and edited as necessary the review of systems, past medical/surgical history, family history, social history, and examination findings as documented by others; and I examined the patient myself.  I personally reviewed the relevant tests, images, and reports as documented above.  I formulated and edited as necessary the assessment and plan and discussed the findings and management plan with the patient and family. - Anson Cheng MD

## 2023-02-20 DIAGNOSIS — I48.92 ATRIAL FLUTTER WITH RAPID VENTRICULAR RESPONSE (H): ICD-10-CM

## 2023-02-20 DIAGNOSIS — I42.9 SECONDARY CARDIOMYOPATHY (H): ICD-10-CM

## 2023-02-20 RX ORDER — METOPROLOL SUCCINATE 25 MG/1
25 TABLET, EXTENDED RELEASE ORAL DAILY
Qty: 90 TABLET | Refills: 3 | Status: SHIPPED | OUTPATIENT
Start: 2023-02-20 | End: 2024-03-04

## 2023-02-20 RX ORDER — LISINOPRIL 2.5 MG/1
2.5 TABLET ORAL DAILY
Qty: 90 TABLET | Refills: 3 | Status: SHIPPED | OUTPATIENT
Start: 2023-02-20 | End: 2024-03-04

## 2023-03-02 ENCOUNTER — TELEPHONE (OUTPATIENT)
Dept: OPHTHALMOLOGY | Facility: CLINIC | Age: 68
End: 2023-03-02
Payer: COMMERCIAL

## 2023-04-04 ENCOUNTER — OFFICE VISIT (OUTPATIENT)
Dept: OPHTHALMOLOGY | Facility: CLINIC | Age: 68
End: 2023-04-04
Attending: OPHTHALMOLOGY
Payer: COMMERCIAL

## 2023-04-04 DIAGNOSIS — Z94.7 S/P PKP (PENETRATING KERATOPLASTY): ICD-10-CM

## 2023-04-04 DIAGNOSIS — H04.121 DRY EYE SYNDROME OF RIGHT EYE: ICD-10-CM

## 2023-04-04 DIAGNOSIS — Z98.890 POSTOPERATIVE STATE: ICD-10-CM

## 2023-04-04 DIAGNOSIS — H52.211 IRREGULAR ASTIGMATISM OF RIGHT EYE: Primary | ICD-10-CM

## 2023-04-04 DIAGNOSIS — H16.401 CORNEAL NEOVASCULARIZATION OF RIGHT EYE: ICD-10-CM

## 2023-04-04 PROCEDURE — 99212 OFFICE O/P EST SF 10 MIN: CPT | Performed by: OPHTHALMOLOGY

## 2023-04-04 PROCEDURE — 99214 OFFICE O/P EST MOD 30 MIN: CPT | Mod: GC | Performed by: OPHTHALMOLOGY

## 2023-04-04 ASSESSMENT — CONF VISUAL FIELD
OS_SUPERIOR_TEMPORAL_RESTRICTION: 0
OD_SUPERIOR_NASAL_RESTRICTION: 0
OS_INFERIOR_TEMPORAL_RESTRICTION: 0
METHOD: COUNTING FINGERS
OS_SUPERIOR_NASAL_RESTRICTION: 0
OD_SUPERIOR_TEMPORAL_RESTRICTION: 0
OD_INFERIOR_TEMPORAL_RESTRICTION: 0
OD_NORMAL: 1
OS_INFERIOR_NASAL_RESTRICTION: 0
OD_INFERIOR_NASAL_RESTRICTION: 0
OS_NORMAL: 1

## 2023-04-04 ASSESSMENT — REFRACTION_WEARINGRX
OD_CYLINDER: +0.25
OD_ADD: +2.00
OS_SPHERE: +1.00
OD_SPHERE: +0.50
OS_CYLINDER: SPHERE
SPECS_TYPE: PAL
OD_AXIS: 045
OS_ADD: +2.00

## 2023-04-04 ASSESSMENT — VISUAL ACUITY
OD_CC: 20/250
OS_CC: 20/25-
CORRECTION_TYPE: GLASSES
METHOD: SNELLEN - LINEAR
OD_PH_CC: 20/150

## 2023-04-04 ASSESSMENT — SLIT LAMP EXAM - LIDS: COMMENTS: MGD

## 2023-04-04 ASSESSMENT — PACHYMETRY
OD_CT(UM): 658
OS_CT(UM): 508

## 2023-04-04 ASSESSMENT — TONOMETRY
OS_IOP_MMHG: 14
OD_IOP_MMHG: 13
IOP_METHOD: ICARE

## 2023-04-04 ASSESSMENT — EXTERNAL EXAM - RIGHT EYE: OD_EXAM: NORMAL

## 2023-04-04 ASSESSMENT — EXTERNAL EXAM - LEFT EYE: OS_EXAM: NORMAL

## 2023-04-04 NOTE — PROGRESS NOTES
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 Rosie Han     HPI:  Derik Hamm is male who presents as follow up for Acanthamoeba Keratitis right eye.     Patient initially presented for evaluation of persistent keratoconjunctivitis, OD, since 10/5/2020. Initially seen by Dr. Ngo at [facility] and felt it was related to severe dryness vs bacterial keratitis, so he was started on PFATs and Vigamox Q2H [place]. Patient was seen frequently for follow up; he was started on Valtrex 500 mg TID on 10/9/20 and has been on that since then. Despite this therapy, pt failed to improve so he was started prednisolone. He has had a waxing and waning course, but more recently has gradually declined. Stopped PF on 11/2 after running out. He was seen by Dr. Ngo on 11/2 who noted worsening VA and worsening exam, so the patient was referred to Dr. Han, whom he saw 11/4/20. Dr. Han referred to our clinic due to concerns for acanthamoeba keratitis. Culture positive for acanthamoeba on 11/05/20.     Interval History 4/4/23: Last visit in January 2023 had 2 sutures removed. Feels he has been doing well overall. Vision feels about the same as last time. No pain. Taking prednisolone as directed.        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     Glasses: Yes  CTL wearer: yes - none x1.5 months; wears while showering or in the sauna; he sleeps in them once every 3-4 weeks; never wears them in a lake or pool     Family hx of eye disease: negative for glaucoma/macular degeneration     Social Hx: (-) smoking, (-) EtOH, (-) illicit drugs     Meds:  Pred 1% 3 times per day     Surgical Hx:  S/p PKP with posterior synechiolysis right eye (12/8/21), did not perform ECCE at this time     Assessment:  # Acanthamoeba keratitis, OD              - Symptoms started  10/5/2020              - Acanthamoeba risks: sleeps in CTL, wears in shower, sauna              - Cultures obtained 11/05/20,   Culture positive for acanthamoeba.              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 Plan to increase lubrication.   1/07: Better overall.  pain better.  Stromal haze/KP better as well.  1/19: Stable from previous visit w/ continued diffuse haze,no epi defects.   1/28: Slightly improved corneal clarity.  2/4: Slightly improved corneal clarity  2/18: Exam largely stable, new central bulla  3/2: increased soreness and tearing past few days, exam stable.  Diffuse haze, edema but improved from last photos.  3/11: Worsening pain and soreness over the past week. 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 CL placed right eye  4/8: BCL really helped his pain. Much worse with it off. Epi defect larger with rolled edges. 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 Infiltrate improved. AmbioDisk 9.0 + Kontur lens 8.6/16.0 4/8/21 4/22: Pain improving. Ulcer looks stable to slightly improved. Prokera ring placed  4/29: right eye Much improved infiltrate. Epi defect resolving. Am dissolved.  5/6/21:  AM dissolved, ring removed.  Epi healed, small superocentral crescentic infiltrate remains w/ surrounding central scarring.    5/20: significant improvement in Snellen acuity today. Crescentic infiltrate smaller and less dense since 5/6 photo.  6/17: small central infiltrate present at inferior aspect of pupil - smaller than 5/20. Edema much improved. Vision stable.   7/16/21: Stable vision, pt comfortable.    Would like to see active infiltrate completely  resolved before stopping / reducing PHMB further.  8/20/21: PHMB stopped.  Doing well.  10/21/21: Doing well.  Stable off the PHMB.  Disc right eye PKP with cat ext and IOL.  Disc R, B, PC and option. IOL calcs today.  Pt will decide about general anesthesia vs retrobulbar block. 3 hours for  Triple.  12/9/21: POD#1 s/p PKP.  Doing well.  Epi intact, cornea quite clear.     12/16/21: POW#1 s/p PKP. Doing well - no signs of graft failure, cornea clear  1/4/22: POM#1 s/p PKP.  Doing well, cornea is clear.    1/26/22: Approx post op M 2. Pt doing well  4/19/22: Pt doing well with right eye PKP. Cont meds.  8/16/22: Doing well, no pain, stable poor vision right eye, no changes.  12/13/22: PKP looks great. Nir with 2 tight sutures based on todays nir.  1/17/23: PKP clear and compact. Nir with irreg ular cyk noted see nir. Will remove 2 sutures.  4/4/23: Pentacam shows about 10 diopters of astigmatism in right eye. Removed 3 sutures at slit lamp (at 12, 6, and 9 -o-clock).           PLAN:   - Removed 3 sutures at slit lamp today based on nir to address astigmatism.   - Continue Pred 1% 3 times a day right eye.   - Oflox right eye qid x 4 days, then stop.       RTC: 4 to 6 weeks w/ VT, NIR right eye only, call if sx worsen to clinic.      Dequan Keenan MD  Ophthalmology, PGY-3    Attending Physician Attestation:  Complete documentation of historical and exam elements from today's encounter can be found in the full encounter summary report (not reduplicated in this progress note).  I personally obtained the chief complaint(s) and history of present illness.  I confirmed and edited as necessary the review of systems, past medical/surgical history, family history, social history, and examination findings as documented by others; and I examined the patient myself.  I personally reviewed the relevant tests, images, and reports as documented above.  I formulated and edited as necessary the assessment and plan and  discussed the findings and management plan with the patient and family. - Anson Cheng MD

## 2023-05-09 ENCOUNTER — OFFICE VISIT (OUTPATIENT)
Dept: OPHTHALMOLOGY | Facility: CLINIC | Age: 68
End: 2023-05-09
Attending: OPHTHALMOLOGY
Payer: COMMERCIAL

## 2023-05-09 DIAGNOSIS — H52.211 IRREGULAR ASTIGMATISM OF RIGHT EYE: Primary | ICD-10-CM

## 2023-05-09 DIAGNOSIS — H04.121 DRY EYE SYNDROME OF RIGHT EYE: ICD-10-CM

## 2023-05-09 DIAGNOSIS — H16.401 CORNEAL NEOVASCULARIZATION OF RIGHT EYE: ICD-10-CM

## 2023-05-09 DIAGNOSIS — Z94.7 S/P PKP (PENETRATING KERATOPLASTY): ICD-10-CM

## 2023-05-09 PROCEDURE — 92025 CPTRIZED CORNEAL TOPOGRAPHY: CPT | Performed by: OPHTHALMOLOGY

## 2023-05-09 PROCEDURE — 99213 OFFICE O/P EST LOW 20 MIN: CPT | Performed by: OPHTHALMOLOGY

## 2023-05-09 PROCEDURE — 99214 OFFICE O/P EST MOD 30 MIN: CPT | Performed by: OPHTHALMOLOGY

## 2023-05-09 ASSESSMENT — REFRACTION_WEARINGRX
OS_ADD: +2.00
OD_ADD: +2.00
OS_CYLINDER: SPHERE
OD_CYLINDER: +0.25
SPECS_TYPE: PAL
OD_AXIS: 045
OD_SPHERE: +0.50
OS_SPHERE: +1.00

## 2023-05-09 ASSESSMENT — TONOMETRY
OD_IOP_MMHG: 13
OS_IOP_MMHG: 13
IOP_METHOD: ICARE

## 2023-05-09 ASSESSMENT — VISUAL ACUITY
CORRECTION_TYPE: GLASSES
OD_CC: 20/300
OD_PH_CC: 20/80
OS_CC: 20/25
OD_PH_CC+: +2
OS_CC+: -2
METHOD: SNELLEN - LINEAR

## 2023-05-09 ASSESSMENT — EXTERNAL EXAM - RIGHT EYE: OD_EXAM: NORMAL

## 2023-05-09 ASSESSMENT — CONF VISUAL FIELD
OS_INFERIOR_NASAL_RESTRICTION: 0
OD_NORMAL: 1
METHOD: COUNTING FINGERS
OS_INFERIOR_TEMPORAL_RESTRICTION: 0
OD_SUPERIOR_TEMPORAL_RESTRICTION: 0
OD_INFERIOR_NASAL_RESTRICTION: 0
OD_INFERIOR_TEMPORAL_RESTRICTION: 0
OS_NORMAL: 1
OD_SUPERIOR_NASAL_RESTRICTION: 0
OS_SUPERIOR_TEMPORAL_RESTRICTION: 0
OS_SUPERIOR_NASAL_RESTRICTION: 0

## 2023-05-09 ASSESSMENT — EXTERNAL EXAM - LEFT EYE: OS_EXAM: NORMAL

## 2023-05-09 ASSESSMENT — SLIT LAMP EXAM - LIDS: COMMENTS: MGD, BLEPHARITIS

## 2023-05-09 NOTE — PROGRESS NOTES
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 Rosie Han     HPI:  Derik Hamm is male who presents as follow up for Acanthamoeba Keratitis right eye.     Patient initially presented for evaluation of persistent keratoconjunctivitis, OD, since 10/5/2020. Initially seen by Dr. Ngo at [facility] and felt it was related to severe dryness vs bacterial keratitis, so he was started on PFATs and Vigamox Q2H WA. Patient was seen frequently for follow up; he was started on Valtrex 500 mg TID on 10/9/20 and has been on that since then. Despite this therapy, pt failed to improve so he was started prednisolone. He has had a waxing and waning course, but more recently has gradually declined. Stopped PF on 11/2 after running out. He was seen by Dr. Ngo on 11/2 who noted worsening VA and worsening exam, so the patient was referred to Dr. Han, whom he saw 11/4/20. Dr. Han referred to our clinic due to concerns for acanthamoeba keratitis. Culture positive for acanthamoeba on 11/05/20.     Interval History 5/9/23: Feels well without change in vision following suture removal at last visit. No flashes, floaters or curtains. No eye pain.     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     Glasses: Yes  CTL wearer: yes - none x1.5 months; wears while showering or in the sauna; he sleeps in them once every 3-4 weeks; never wears them in a lake or pool     Family hx of eye disease: negative for glaucoma/macular degeneration     Social Hx: (-) smoking, (-) EtOH, (-) illicit drugs     Meds:  Pred 1% 3 times per day     Surgical Hx:  S/p PKP with posterior synechiolysis right eye (12/8/21), did not perform ECCE at this time     Assessment:  # Acanthamoeba keratitis, OD              - Symptoms started 10/5/2020              - Acanthamoeba risks: sleeps in CTL, wears  in shower, sauna              - Cultures obtained 11/05/20,   Culture positive for acanthamoeba.              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 Plan to increase lubrication.   1/07: Better overall.  pain better.  Stromal haze/KP better as well.  1/19: Stable from previous visit w/ continued diffuse haze,no epi defects.   1/28: Slightly improved corneal clarity.  2/4: Slightly improved corneal clarity  2/18: Exam largely stable, new central bulla  3/2: increased soreness and tearing past few days, exam stable.  Diffuse haze, edema but improved from last photos.  3/11: Worsening pain and soreness over the past week. 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 CL placed right eye  4/8: BCL really helped his pain. Much worse with it off. Epi defect larger with rolled edges. 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 Infiltrate improved. AmbioDisk 9.0 + Kontur lens 8.6/16.0 4/8/21 4/22: Pain improving. Ulcer looks stable to slightly improved. Prokera ring placed  4/29: right eye Much improved infiltrate. Epi defect resolving. Am dissolved.  5/6/21:  AM dissolved, ring removed.  Epi healed, small superocentral crescentic infiltrate remains w/ surrounding central scarring.    5/20: significant improvement in Snellen acuity today. Crescentic infiltrate smaller and less dense since 5/6 photo.  6/17: small central infiltrate present at inferior aspect of pupil - smaller than 5/20. Edema much improved. Vision stable.   7/16/21: Stable vision, pt comfortable.    Would like to see active infiltrate completely resolved before stopping / reducing PHMB further.  8/20/21: PHMB  stopped.  Doing well.  10/21/21: Doing well.  Stable off the PHMB.  Disc right eye PKP with cat ext and IOL.  Disc R, B, PC and option. IOL calcs today.  Pt will decide about general anesthesia vs retrobulbar block. 3 hours for  Triple.  12/9/21: POD#1 s/p PKP.  Doing well.  Epi intact, cornea quite clear.     12/16/21: POW#1 s/p PKP. Doing well - no signs of graft failure, cornea clear  1/4/22: POM#1 s/p PKP.  Doing well, cornea is clear.    1/26/22: Approx post op M 2. Pt doing well  4/19/22: Pt doing well with right eye PKP. Cont meds.  8/16/22: Doing well, no pain, stable poor vision right eye, no changes.  12/13/22: PKP looks great. Nir with 2 tight sutures based on todays nir.  1/17/23: PKP clear and compact. Nir with irreg ular cyk noted see nir. Will remove 2 sutures.  4/4/23: Pentacam shows about 10 diopters of astigmatism in right eye. Removed 3 sutures at slit lamp (at 12, 6, and 9 -o-clock).   5/9/23: Pentacam with 6.7D astigmatism (less than last visit)          PLAN:   - Remove sutures at 12 OC and 7 OC  - Continue Pred 1% 3 times a day right eye.   - Oflox right eye qid x 4 days, then stop.       RTC: 4 - 6 weeksw/ VT, NIR right eye only, call if sx worsen to clinic.      Anson Cheng MD    Attending Physician Attestation:  Complete documentation of historical and exam elements from today's encounter can be found in the full encounter summary report (not reduplicated in this progress note).  I personally obtained the chief complaint(s) and history of present illness.  I confirmed and edited as necessary the review of systems, past medical/surgical history, family history, social history, and examination findings as documented by others; and I examined the patient myself.  I personally reviewed the relevant tests, images, and reports as documented above.  I formulated and edited as necessary the assessment and plan and discussed the findings and management plan with the patient and family. -  Anson Cheng MD

## 2023-05-09 NOTE — NURSING NOTE
Chief Complaints and History of Present Illnesses   Patient presents with     Follow Up     Irregular astigmatism of right eye     Chief Complaint(s) and History of Present Illness(es)     Follow Up            Laterality: right eye    Course: stable    Associated symptoms: Negative for dryness, eye pain, redness and tearing    Treatments tried: eye drops    Pain scale: 0/10    Comments: Irregular astigmatism of right eye          Comments    He states that his vision has seemed stable in both eyes, since his last eye exam.  Patient denies having any eye discomfort.     He is using:  Pred 1% 3 times a day right eye    TANNER Gomez 10:11 AM  May 9, 2023

## 2023-06-08 ENCOUNTER — OFFICE VISIT (OUTPATIENT)
Dept: OPHTHALMOLOGY | Facility: CLINIC | Age: 68
End: 2023-06-08
Attending: OPHTHALMOLOGY
Payer: COMMERCIAL

## 2023-06-08 DIAGNOSIS — Z94.7 S/P PKP (PENETRATING KERATOPLASTY): ICD-10-CM

## 2023-06-08 DIAGNOSIS — H52.211 IRREGULAR ASTIGMATISM OF RIGHT EYE: Primary | ICD-10-CM

## 2023-06-08 PROCEDURE — 99214 OFFICE O/P EST MOD 30 MIN: CPT | Performed by: OPHTHALMOLOGY

## 2023-06-08 PROCEDURE — 92025 CPTRIZED CORNEAL TOPOGRAPHY: CPT | Performed by: OPHTHALMOLOGY

## 2023-06-08 ASSESSMENT — TONOMETRY
OD_IOP_MMHG: 15
OS_IOP_MMHG: 14
IOP_METHOD: ICARE

## 2023-06-08 ASSESSMENT — CONF VISUAL FIELD
OS_SUPERIOR_TEMPORAL_RESTRICTION: 0
OD_SUPERIOR_TEMPORAL_RESTRICTION: 0
OS_SUPERIOR_NASAL_RESTRICTION: 0
OS_NORMAL: 1
OS_INFERIOR_TEMPORAL_RESTRICTION: 0
OS_INFERIOR_NASAL_RESTRICTION: 0
OD_SUPERIOR_NASAL_RESTRICTION: 0
OD_INFERIOR_NASAL_RESTRICTION: 0
OD_NORMAL: 1
METHOD: COUNTING FINGERS
OD_INFERIOR_TEMPORAL_RESTRICTION: 0

## 2023-06-08 ASSESSMENT — PACHYMETRY
OD_CT(UM): 658
OS_CT(UM): 508

## 2023-06-08 ASSESSMENT — VISUAL ACUITY
METHOD: SNELLEN - LINEAR
OD_CC: 20/300
OD_PH_CC+: +1
OS_CC+: +2
OS_CC: 20/25
OD_PH_CC: 20/80
CORRECTION_TYPE: GLASSES

## 2023-06-08 ASSESSMENT — EXTERNAL EXAM - RIGHT EYE: OD_EXAM: NORMAL

## 2023-06-08 ASSESSMENT — SLIT LAMP EXAM - LIDS: COMMENTS: MGD, BLEPHARITIS

## 2023-06-08 ASSESSMENT — REFRACTION_WEARINGRX
OD_ADD: +2.00
OS_CYLINDER: SPHERE
OS_SPHERE: +1.00
SPECS_TYPE: PAL
OD_CYLINDER: +0.25
OD_AXIS: 045
OS_ADD: +2.00
OD_SPHERE: +0.50

## 2023-06-08 ASSESSMENT — EXTERNAL EXAM - LEFT EYE: OS_EXAM: NORMAL

## 2023-06-08 NOTE — PROGRESS NOTES
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 Rosie Han     HPI:  Derik Hamm is male who presents as follow up for Acanthamoeba Keratitis right eye.     Patient initially presented for evaluation of persistent keratoconjunctivitis, OD, since 10/5/2020. Initially seen by Dr. Ngo at [facility] and felt it was related to severe dryness vs bacterial keratitis, so he was started on PFATs and Vigamox Q2H WA. Patient was seen frequently for follow up; he was started on Valtrex 500 mg TID on 10/9/20 and has been on that since then. Despite this therapy, pt failed to improve so he was started prednisolone. He has had a waxing and waning course, but more recently has gradually declined. Stopped PF on 11/2 after running out. He was seen by Dr. Ngo on 11/2 who noted worsening VA and worsening exam, so the patient was referred to Dr. Han, whom he saw 11/4/20. Dr. Han referred to our clinic due to concerns for acanthamoeba keratitis. Culture positive for acanthamoeba on 11/05/20.     Interval History 5/9/23: Feels well without change in vision following suture removal at last visit. No flashes, floaters or curtains. No eye pain.     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     Glasses: Yes  CTL wearer: yes - none x1.5 months; wears while showering or in the sauna; he sleeps in them once every 3-4 weeks; never wears them in a lake or pool     Family hx of eye disease: negative for glaucoma/macular degeneration     Social Hx: (-) smoking, (-) EtOH, (-) illicit drugs     Meds:  Pred 1% 3 times per day     Surgical Hx:  S/p PKP with posterior synechiolysis right eye (12/8/21), did not perform ECCE at this time     Assessment:  # Acanthamoeba keratitis, OD              - Symptoms started 10/5/2020              - Acanthamoeba risks: sleeps in CTL, wears  in shower, sauna              - Cultures obtained 11/05/20,   Culture positive for acanthamoeba.              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 Plan to increase lubrication.   1/07: Better overall.  pain better.  Stromal haze/KP better as well.  1/19: Stable from previous visit w/ continued diffuse haze,no epi defects.   1/28: Slightly improved corneal clarity.  2/4: Slightly improved corneal clarity  2/18: Exam largely stable, new central bulla  3/2: increased soreness and tearing past few days, exam stable.  Diffuse haze, edema but improved from last photos.  3/11: Worsening pain and soreness over the past week. 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 CL placed right eye  4/8: BCL really helped his pain. Much worse with it off. Epi defect larger with rolled edges. 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 Infiltrate improved. AmbioDisk 9.0 + Kontur lens 8.6/16.0 4/8/21 4/22: Pain improving. Ulcer looks stable to slightly improved. Prokera ring placed  4/29: right eye Much improved infiltrate. Epi defect resolving. Am dissolved.  5/6/21:  AM dissolved, ring removed.  Epi healed, small superocentral crescentic infiltrate remains w/ surrounding central scarring.    5/20: significant improvement in Snellen acuity today. Crescentic infiltrate smaller and less dense since 5/6 photo.  6/17: small central infiltrate present at inferior aspect of pupil - smaller than 5/20. Edema much improved. Vision stable.   7/16/21: Stable vision, pt comfortable.    Would like to see active infiltrate completely resolved before stopping / reducing PHMB further.  8/20/21: PHMB  stopped.  Doing well.  10/21/21: Doing well.  Stable off the PHMB.  Disc right eye PKP with cat ext and IOL.  Disc R, B, PC and option. IOL calcs today.  Pt will decide about general anesthesia vs retrobulbar block. 3 hours for  Triple.  12/9/21: POD#1 s/p PKP.  Doing well.  Epi intact, cornea quite clear.     12/16/21: POW#1 s/p PKP. Doing well - no signs of graft failure, cornea clear  1/4/22: POM#1 s/p PKP.  Doing well, cornea is clear.    1/26/22: Approx post op M 2. Pt doing well  4/19/22: Pt doing well with right eye PKP. Cont meds.  8/16/22: Doing well, no pain, stable poor vision right eye, no changes.  12/13/22: PKP looks great. Nir with 2 tight sutures based on todays nir.  1/17/23: PKP clear and compact. Nir with irreg ular cyk noted see nir. Will remove 2 sutures.  4/4/23: Pentacam shows about 10 diopters of astigmatism in right eye. Removed 3 sutures at slit lamp (at 12, 6, and 9 -o-clock).   5/9/23: Pentacam with 6.7D astigmatism (less than last visit)  6/8/23: Penta cam astig at 4.7 D.           PLAN:   - Remove sutures at about 9 OC today  - Continue Pred 1% 3 times a day right eye.   - Oflox right eye qid x 4 days, then stop.       RTC: 4 - 6 weeksw/ VT, NIR right eye only, call if sx worsen to clinic.    Anson Cheng MD, PhD    Attending Physician Attestation:  Complete documentation of historical and exam elements from today's encounter can be found in the full encounter summary report (not reduplicated in this progress note).  I personally obtained the chief complaint(s) and history of present illness.  I confirmed and edited as necessary the review of systems, past medical/surgical history, family history, social history, and examination findings as documented by others; and I examined the patient myself.  I personally reviewed the relevant tests, images, and reports as documented above.  I formulated and edited as necessary the assessment and plan and discussed the findings and  management plan with the patient and family. - Anson Cheng MD

## 2023-06-08 NOTE — NURSING NOTE
Chief Complaints and History of Present Illnesses   Patient presents with     Follow Up     1 month follow up Irregular astigmatism of right eye      Chief Complaint(s) and History of Present Illness(es)     Follow Up            Laterality: right eye    Comments: 1 month follow up Irregular astigmatism of right eye           Comments    Pt states no concerns. No changes in vision, pain, flashes, or floaters.    Regina SANCHEZ 10:22 AM June 8, 2023  AUSTYN Olvera June 8, 2023 10:28 AM

## 2023-06-27 ENCOUNTER — OFFICE VISIT (OUTPATIENT)
Dept: DERMATOLOGY | Facility: CLINIC | Age: 68
End: 2023-06-27
Payer: COMMERCIAL

## 2023-06-27 DIAGNOSIS — D48.5 NEOPLASM OF UNCERTAIN BEHAVIOR OF SKIN: ICD-10-CM

## 2023-06-27 DIAGNOSIS — L57.0 ACTINIC KERATOSIS: Primary | ICD-10-CM

## 2023-06-27 PROCEDURE — 88305 TISSUE EXAM BY PATHOLOGIST: CPT | Performed by: DERMATOLOGY

## 2023-06-27 PROCEDURE — 11103 TANGNTL BX SKIN EA SEP/ADDL: CPT | Performed by: PHYSICIAN ASSISTANT

## 2023-06-27 PROCEDURE — 99213 OFFICE O/P EST LOW 20 MIN: CPT | Mod: 25 | Performed by: PHYSICIAN ASSISTANT

## 2023-06-27 PROCEDURE — 17000 DESTRUCT PREMALG LESION: CPT | Mod: 59 | Performed by: PHYSICIAN ASSISTANT

## 2023-06-27 PROCEDURE — 11102 TANGNTL BX SKIN SINGLE LES: CPT | Performed by: PHYSICIAN ASSISTANT

## 2023-06-27 ASSESSMENT — PAIN SCALES - GENERAL: PAINLEVEL: NO PAIN (0)

## 2023-06-27 NOTE — LETTER
6/27/2023         RE: Derik Hamm  [address] [place] [address]        Dear Colleague,    Thank you for referring your patient, Derik Hamm, to the [facility]. Please see a copy of my visit note below.    HPI:   Chief complaints: Derik Hamm is a 67 year old male who presents for Full skin cancer screening to rule out skin cancer   Last Skin Exam: 1 year ago      1st Baseline: no  Personal HX of Skin Cancer: Yes BCC on the neck and chest  Personal HX of Malignant Melanoma: no   Family HX of Skin Cancer / Malignant Melanoma: no  Personal HX of Atypical Moles:   no  Risk factors: history of sun exposure and burns  New / Changing lesions: yes scaly spot on the ear. Has had intermittent pain on the scalp for years.   Social History: had an amoeba infection in the right eye - vision is still cloudy but he had a corneal transplant. Had an ablation for a fib recently.   On review of systems, there are no further skin complaints, patient is feeling otherwise well.  See patient intake sheet.  ROS of the following were done and are negative: Constitutional, Eyes, Ears, Nose,   Mouth, Throat, Cardiovascular, Respiratory, GI, Genitourinary, Musculoskeletal,   Psychiatric, Endocrine, Allergic/Immunologic.    PHYSICAL EXAM:   There were no vitals taken for this visit.  Skin exam performed as follows: Type 2 skin. Mood appropriate  Alert and Oriented X 3. Well developed, well nourished in no distress.  General appearance: Normal  Head including face: Normal  Eyes: conjunctiva and lids: Normal  Mouth: Lips, teeth, gums: Normal  Neck: Normal  Chest-breast/axillae: Normal  Back: Normal  Spleen and liver: Normal  Cardiovascular: Exam of peripheral vascular system by observation for swelling, varicosities, edema: Normal  Genitalia: groin, buttocks: Normal  Extremities: digits/nails (clubbing): Normal  Eccrine and Apocrine glands: Normal  Right upper extremity: Normal  Left  upper extremity: Normal  Right lower extremity: Normal  Left lower extremity: Normal  Skin: Scalp and body hair: See below    Pt deferred exam of breasts, groin, buttocks: No    Other physical findings:  1. Multiple pigmented macules on extremities and trunk  2. Multiple pigmented macules on face, trunk and extremities  3. Multiple vascular papules on trunk, arms and legs  4. Multiple scattered keratotic plaques  5. Pink gritty papule on the left helix x 1  6. 5 mm pink macule on the right upper arm  7. 5 mm pink papule on the right lower leg         Except as noted above, no other signs of skin cancer or melanoma.     ASSESSMENT/PLAN:   Benign Full skin cancer screening today. . Patient with history of BCC  Advised on monthly self exams and 1 year  Patient Education: Appropriate brochures given.    1. Multiple benign appearing nevi on arms, legs and trunk. Discussed ABCDEs of melanoma and sunscreen.   2. Multiple lentigos on arms, legs and trunk. Advised benign, no treatment needed.  3. Multiple scattered angiomas. Advised benign, no treatment needed.   4. Seborrheic keratosis on arms, legs and trunk. Advised benign, no treatment needed.  5. Actinic keratosis on the left helix x 1. As precancerous, cryosurgery performed. Advised on blistering and post-op care. Advised if not resolved in 1-2 months to return for evaluation  6. R/o BCC on the right upper arm, right lower leg. Shave biopsy performed.  Area cleaned and anesthetized with 1% lidocaine with epinephrine.  Dermablade used to remove the lesion and sent to pathology. Bleeding was cauterized. Pt tolerated procedure well with no complications.   7. Scalp pain - no skin abnormalities today. Recommend follow-up with PCP.                   Follow-up: yearly FSE/PRN sooner    1.) Patient was asked about new and changing moles. YES  2.) Patient received a complete physical skin examination: YES  3.) Patient was counseled to perform a monthly self skin examination:  YES  Scribed By: Kristi Lyons, MS, PAKarenC          Again, thank you for allowing me to participate in the care of your patient.        Sincerely,        Kristi Lyons PA-C

## 2023-06-27 NOTE — PROGRESS NOTES
HPI:   Chief complaints: Derik Hamm is a 67 year old male who presents for Full skin cancer screening to rule out skin cancer   Last Skin Exam: 1 year ago      1st Baseline: no  Personal HX of Skin Cancer: Yes BCC on the neck and chest  Personal HX of Malignant Melanoma: no   Family HX of Skin Cancer / Malignant Melanoma: no  Personal HX of Atypical Moles:   no  Risk factors: history of sun exposure and burns  New / Changing lesions: yes scaly spot on the ear. Has had intermittent pain on the scalp for years.   Social History: had an amoeba infection in the right eye - vision is still cloudy but he had a corneal transplant. Had an ablation for a fib recently.   On review of systems, there are no further skin complaints, patient is feeling otherwise well.  See patient intake sheet.  ROS of the following were done and are negative: Constitutional, Eyes, Ears, Nose,   Mouth, Throat, Cardiovascular, Respiratory, GI, Genitourinary, Musculoskeletal,   Psychiatric, Endocrine, Allergic/Immunologic.    PHYSICAL EXAM:   There were no vitals taken for this visit.  Skin exam performed as follows: Type 2 skin. Mood appropriate  Alert and Oriented X 3. Well developed, well nourished in no distress.  General appearance: Normal  Head including face: Normal  Eyes: conjunctiva and lids: Normal  Mouth: Lips, teeth, gums: Normal  Neck: Normal  Chest-breast/axillae: Normal  Back: Normal  Spleen and liver: Normal  Cardiovascular: Exam of peripheral vascular system by observation for swelling, varicosities, edema: Normal  Genitalia: groin, buttocks: Normal  Extremities: digits/nails (clubbing): Normal  Eccrine and Apocrine glands: Normal  Right upper extremity: Normal  Left upper extremity: Normal  Right lower extremity: Normal  Left lower extremity: Normal  Skin: Scalp and body hair: See below    Pt deferred exam of breasts, groin, buttocks: No    Other physical findings:  1. Multiple pigmented macules on extremities and  trunk  2. Multiple pigmented macules on face, trunk and extremities  3. Multiple vascular papules on trunk, arms and legs  4. Multiple scattered keratotic plaques  5. Pink gritty papule on the left helix x 1  6. 5 mm pink macule on the right upper arm  7. 5 mm pink papule on the right lower leg         Except as noted above, no other signs of skin cancer or melanoma.     ASSESSMENT/PLAN:   Benign Full skin cancer screening today. . Patient with history of BCC  Advised on monthly self exams and 1 year  Patient Education: Appropriate brochures given.    1. Multiple benign appearing nevi on arms, legs and trunk. Discussed ABCDEs of melanoma and sunscreen.   2. Multiple lentigos on arms, legs and trunk. Advised benign, no treatment needed.  3. Multiple scattered angiomas. Advised benign, no treatment needed.   4. Seborrheic keratosis on arms, legs and trunk. Advised benign, no treatment needed.  5. Actinic keratosis on the left helix x 1. As precancerous, cryosurgery performed. Advised on blistering and post-op care. Advised if not resolved in 1-2 months to return for evaluation  6. R/o BCC on the right upper arm, right lower leg. Shave biopsy performed.  Area cleaned and anesthetized with 1% lidocaine with epinephrine.  Dermablade used to remove the lesion and sent to pathology. Bleeding was cauterized. Pt tolerated procedure well with no complications.   7. Scalp pain - no skin abnormalities today. Recommend follow-up with PCP.                   Follow-up: yearly FSE/PRN sooner    1.) Patient was asked about new and changing moles. YES  2.) Patient received a complete physical skin examination: YES  3.) Patient was counseled to perform a monthly self skin examination: YES  Scribed By: Kristi Lyons, MS, PA-C

## 2023-06-27 NOTE — PATIENT INSTRUCTIONS
Wound Care Instructions     FOR SUPERFICIAL WOUNDS     [facility]  [phone]                 AFTER 24 HOURS YOU SHOULD REMOVE THE BANDAGE AND BEGIN DAILY DRESSING CHANGES AS FOLLOWS:     1) Remove Dressing.     2) Clean and dry the area with tap water using a Q-tip or sterile gauze pad.     3) Apply Vaseline, Aquaphor, Polysporin ointment or Bacitracin ointment over entire wound.  Do NOT use Neosporin ointment.     4) Cover the wound with a band-aid, or a sterile non-stick gauze pad and micropore paper tape    REPEAT THESE INSTRUCTIONS AT LEAST ONCE A DAY UNTIL THE WOUND HAS COMPLETELY HEALED.    It is an old wives tale that a wound heals better when it is exposed to air and allowed to dry out. The wound will heal faster with a better cosmetic result if it is kept moist with ointment and covered with a bandage.    **Do not let the wound dry out.**    Supplies Needed:      *Cotton tipped applicators (Q-tips)    *Vaseline, Aquaphor, Polysporin or Bacitracin Ointment (NOT NEOSPORIN)    *Band-aids or non-stick gauze pads and micropore paper tape.      PATIENT INFORMATION:    During the healing process you will notice a number of changes. All wounds develop a small halo of redness surrounding the wound.  This means healing is occurring. Severe itching with extensive redness usually indicates sensitivity to the ointment or bandage tape used to dress the wound.  You should call our office if this develops.      Swelling  and/or discoloration around your surgical site is common, particularly when performed around the eye.    All wounds normally drain.  The larger the wound the more drainage there will be.  After 7-10 days, you will notice the wound beginning to shrink and new skin will begin to grow.  The wound is healed when you can see skin has formed over the entire area.  A healed wound has a healthy, shiny look to the surface and is red to dark pink in color to normalize.  Wounds may  take approximately 4-6 weeks to heal.  Larger wounds may take 6-8 weeks.  After the wound is healed you may discontinue dressing changes.    You may experience a sensation of tightness as your wound heals. This is normal and will gradually subside.    Your healed wound may be sensitive to temperature changes. This sensitivity improves with time, but if you re having a lot of discomfort, try to avoid temperature extremes.    Patients frequently experience itching after their wound appears to have healed because of the continue healing under the skin.  Plain Vaseline will help relieve the itching.      POSSIBLE COMPLICATIONS    BLEEDING:    Leave the bandage in place.  Use tightly rolled up gauze or a cloth to apply direct pressure over the bandage for 30  minutes.  Reapply pressure for an additional 30 minutes if necessary  Use additional gauze and tape to maintain pressure once the bleeding has stopped.

## 2023-06-30 LAB
PATH REPORT.COMMENTS IMP SPEC: ABNORMAL
PATH REPORT.COMMENTS IMP SPEC: ABNORMAL
PATH REPORT.COMMENTS IMP SPEC: YES
PATH REPORT.FINAL DX SPEC: ABNORMAL
PATH REPORT.GROSS SPEC: ABNORMAL
PATH REPORT.MICROSCOPIC SPEC OTHER STN: ABNORMAL
PATH REPORT.RELEVANT HX SPEC: ABNORMAL

## 2023-07-03 ENCOUNTER — TELEPHONE (OUTPATIENT)
Dept: DERMATOLOGY | Facility: CLINIC | Age: 68
End: 2023-07-03
Payer: COMMERCIAL

## 2023-07-03 DIAGNOSIS — C44.91 SUPERFICIAL BASAL CELL CARCINOMA (BCC): Primary | ICD-10-CM

## 2023-07-03 RX ORDER — IMIQUIMOD 12.5 MG/.25G
CREAM TOPICAL
Qty: 20 PACKET | Refills: 3 | Status: SHIPPED | OUTPATIENT
Start: 2023-07-03

## 2023-07-03 NOTE — TELEPHONE ENCOUNTER
----- Message from Kristi Lyons PA-C sent at 7/3/2023  8:57 AM CDT -----  Right upper arm, right lower leg both superficial BCC. He can have excision or use imiquimod cream M-F at night for 6 weeks.

## 2023-07-03 NOTE — CONFIDENTIAL NOTE
Called patient and dicussed the treatment options below- he would like to try the cream.     Pharmacy pended-   advised to notify us if insurance is not covering.  Thank you,    Caroline LAZCANORN BSN  [facility]- [phone]

## 2023-07-11 ENCOUNTER — OFFICE VISIT (OUTPATIENT)
Dept: OPHTHALMOLOGY | Facility: CLINIC | Age: 68
End: 2023-07-11
Attending: OPHTHALMOLOGY
Payer: COMMERCIAL

## 2023-07-11 DIAGNOSIS — Z94.7 S/P PKP (PENETRATING KERATOPLASTY): Primary | ICD-10-CM

## 2023-07-11 DIAGNOSIS — H04.121 DRY EYE SYNDROME OF RIGHT EYE: ICD-10-CM

## 2023-07-11 PROCEDURE — 99214 OFFICE O/P EST MOD 30 MIN: CPT | Performed by: OPHTHALMOLOGY

## 2023-07-11 PROCEDURE — 99213 OFFICE O/P EST LOW 20 MIN: CPT | Mod: GC | Performed by: OPHTHALMOLOGY

## 2023-07-11 ASSESSMENT — REFRACTION_WEARINGRX
SPECS_TYPE: PAL
OD_CYLINDER: +0.25
OS_ADD: +2.00
OD_AXIS: 045
OS_SPHERE: +1.00
OS_CYLINDER: SPHERE
OD_ADD: +2.00
OD_SPHERE: +0.50

## 2023-07-11 ASSESSMENT — VISUAL ACUITY
OD_PH_CC+: -1
METHOD: SNELLEN - LINEAR
OD_CC: 20/400
OS_CC: 20/25
CORRECTION_TYPE: GLASSES
OD_PH_CC: 20/80

## 2023-07-11 ASSESSMENT — TONOMETRY
OD_IOP_MMHG: 17
OS_IOP_MMHG: 17
IOP_METHOD: ICARE

## 2023-07-11 ASSESSMENT — CONF VISUAL FIELD
OD_SUPERIOR_TEMPORAL_RESTRICTION: 0
OS_SUPERIOR_NASAL_RESTRICTION: 0
OD_INFERIOR_TEMPORAL_RESTRICTION: 0
OS_SUPERIOR_TEMPORAL_RESTRICTION: 0
OD_NORMAL: 1
OD_INFERIOR_NASAL_RESTRICTION: 0
OS_INFERIOR_NASAL_RESTRICTION: 0
OS_INFERIOR_TEMPORAL_RESTRICTION: 0
OS_NORMAL: 1
OD_SUPERIOR_NASAL_RESTRICTION: 0

## 2023-07-11 ASSESSMENT — SLIT LAMP EXAM - LIDS: COMMENTS: MGD, BLEPHARITIS

## 2023-07-11 ASSESSMENT — EXTERNAL EXAM - LEFT EYE: OS_EXAM: NORMAL

## 2023-07-11 ASSESSMENT — EXTERNAL EXAM - RIGHT EYE: OD_EXAM: NORMAL

## 2023-07-11 NOTE — NURSING NOTE
Chief Complaints and History of Present Illnesses   Patient presents with     Irregular Astigmatism Follow Up     Chief Complaint(s) and History of Present Illness(es)     Irregular Astigmatism Follow Up            Laterality: right eye    Onset: 4 weeks ago          Comments    Pt. States that he is doing well. No change in VA BE. No pain BE. No flashes or floaters BE.  Rohini Bertrand COT 10:21 AM July 11, 2023

## 2023-07-11 NOTE — PROGRESS NOTES
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 Rosie Han     HPI:  Derik Hamm is male who presents as follow up for Acanthamoeba Keratitis right eye.     Patient initially presented for evaluation of persistent keratoconjunctivitis, OD, since 10/5/2020. Initially seen by Dr. Ngo at [facility] and felt it was related to severe dryness vs bacterial keratitis, so he was started on PFATs and Vigamox Q2H WA. Patient was seen frequently for follow up; he was started on Valtrex 500 mg TID on 10/9/20 and has been on that since then. Despite this therapy, pt failed to improve so he was started prednisolone. He has had a waxing and waning course, but more recently has gradually declined. Stopped PF on 11/2 after running out. He was seen by Dr. Ngo on 11/2 who noted worsening VA and worsening exam, so the patient was referred to Dr. Han, whom he saw 11/4/20. Dr. Han referred to our clinic due to concerns for acanthamoeba keratitis. Culture positive for acanthamoeba on 11/05/20.     Interval History 7/11/23: Feels well without change in vision following suture removal at last visit. No flashes, floaters or curtains. No eye pain. Using prednisolone about twice a day.     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     Glasses: Yes  CTL wearer: yes - none x1.5 months; wears while showering or in the sauna; he sleeps in them once every 3-4 weeks; never wears them in a lake or pool     Family hx of eye disease: negative for glaucoma/macular degeneration     Social Hx: (-) smoking, (-) EtOH, (-) illicit drugs     Meds:  Pred 1% 3 times per day     Surgical Hx:  S/p PKP with posterior synechiolysis right eye (12/8/21), did not perform ECCE at this time     Assessment:  # Acanthamoeba keratitis, OD              - Symptoms started 10/5/2020              -  Acanthamoeba risks: sleeps in CTL, wears in shower, sauna              - Cultures obtained 11/05/20,   Culture positive for acanthamoeba.              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 Plan to increase lubrication.   1/07: Better overall.  pain better.  Stromal haze/KP better as well.  1/19: Stable from previous visit w/ continued diffuse haze,no epi defects.   1/28: Slightly improved corneal clarity.  2/4: Slightly improved corneal clarity  2/18: Exam largely stable, new central bulla  3/2: increased soreness and tearing past few days, exam stable.  Diffuse haze, edema but improved from last photos.  3/11: Worsening pain and soreness over the past week. 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 CL placed right eye  4/8: BCL really helped his pain. Much worse with it off. Epi defect larger with rolled edges. 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 Infiltrate improved. AmbioDisk 9.0 + Kontur lens 8.6/16.0 4/8/21 4/22: Pain improving. Ulcer looks stable to slightly improved. Prokera ring placed  4/29: right eye Much improved infiltrate. Epi defect resolving. Am dissolved.  5/6/21:  AM dissolved, ring removed.  Epi healed, small superocentral crescentic infiltrate remains w/ surrounding central scarring.    5/20: significant improvement in Snellen acuity today. Crescentic infiltrate smaller and less dense since 5/6 photo.  6/17: small central infiltrate present at inferior aspect of pupil - smaller than 5/20. Edema much improved. Vision stable.   7/16/21: Stable vision, pt comfortable.    Would like to see active infiltrate completely resolved before stopping  / reducing PHMB further.  8/20/21: PHMB stopped.  Doing well.  10/21/21: Doing well.  Stable off the PHMB.  Disc right eye PKP with cat ext and IOL.  Disc R, B, PC and option. IOL calcs today.  Pt will decide about general anesthesia vs retrobulbar block. 3 hours for  Triple.  12/9/21: POD#1 s/p PKP.  Doing well.  Epi intact, cornea quite clear.     12/16/21: POW#1 s/p PKP. Doing well - no signs of graft failure, cornea clear  1/4/22: POM#1 s/p PKP.  Doing well, cornea is clear.    1/26/22: Approx post op M 2. Pt doing well  4/19/22: Pt doing well with right eye PKP. Cont meds.  8/16/22: Doing well, no pain, stable poor vision right eye, no changes.  12/13/22: PKP looks great. Nir with 2 tight sutures based on todays nir.  1/17/23: PKP clear and compact. Nir with irreg ular cyk noted see nir. Will remove 2 sutures.  4/4/23: Pentacam shows about 10 diopters of astigmatism in right eye. Removed 3 sutures at slit lamp (at 12, 6, and 9 -o-clock).   5/9/23: Pentacam with 6.7D astigmatism (less than last visit)  6/8/23: Penta cam astig at 4.7 D  7/11/23: Pentacam astig 7.3D            PLAN:   - Reduce Pred 1% 2 times a day right eye.   Disc No Tx for astigmatism, VS RGP CL or AK.      RTC:  1 year w/ VT, NIR right eye only, call if sx worsen to clinic.  - Appt with Dr Pearl   For right eye RGP or other CL fitting post - PKP    Drew Santamaria MD  PGY-3 Ophthalmology Resident    Attending Physician Attestation:  Complete documentation of historical and exam elements from today's encounter can be found in the full encounter summary report (not reduplicated in this progress note).  I personally obtained the chief complaint(s) and history of present illness.  I confirmed and edited as necessary the review of systems, past medical/surgical history, family history, social history, and examination findings as documented by others; and I examined the patient myself.  I personally reviewed the relevant tests, images, and  reports as documented above.  I formulated and edited as necessary the assessment and plan and discussed the findings and management plan with the patient and family. - Anson Cheng MD

## 2023-08-23 ENCOUNTER — OFFICE VISIT (OUTPATIENT)
Dept: OPTOMETRY | Facility: CLINIC | Age: 68
End: 2023-08-23
Payer: COMMERCIAL

## 2023-08-23 DIAGNOSIS — H52.211 IRREGULAR ASTIGMATISM OF RIGHT EYE: ICD-10-CM

## 2023-08-23 DIAGNOSIS — Z94.7 POST CORNEAL TRANSPLANT: Primary | ICD-10-CM

## 2023-08-23 ASSESSMENT — REFRACTION_WEARINGRX
OD_SPHERE: -3.00
OD_CYLINDER: +2.00
OD_AXIS: 120

## 2023-08-23 ASSESSMENT — VISUAL ACUITY
CORRECTION_TYPE: GLASSES
OD_CC: 20/300
METHOD: SNELLEN - LINEAR
OS_CC: 20/20

## 2023-08-23 ASSESSMENT — REFRACTION_CURRENTRX
OD_SPHERE: -2.00
OD_BASECURVE: 4.5/7.6
OD_DIAMETER: 10.4
OD_BRAND: ROSE K2 PG
OD_DIAMETER: 16.0
OD_BASECURVE: 6.6
OD_SPHERE: -14.00
OD_BRAND: ZENLENS PROLATE

## 2023-08-23 ASSESSMENT — EXTERNAL EXAM - RIGHT EYE: OD_EXAM: NORMAL

## 2023-08-23 ASSESSMENT — SLIT LAMP EXAM - LIDS: COMMENTS: MGD, BLEPHARITIS

## 2023-08-23 ASSESSMENT — EXTERNAL EXAM - LEFT EYE: OS_EXAM: NORMAL

## 2023-08-23 NOTE — PATIENT INSTRUCTIONS
I would recommend getting some Hydrogen Peroxide contact lens cleaning solution prior to our next appointment (Clearcare is the most common brand name). You do not need to bring this with to the appointment, but have it ready to use at home after you get the lens.

## 2023-08-23 NOTE — PROGRESS NOTES
A/P  1.) PKP right eye with irregular astigmatism  -s/p PKP 2' to acanthamoeba infection. Previous soft lens wearer  -BCVA with rigid lens today 20/50+, limited by cataract at this time  -Option for RGP vs scleral - he would prefer safest option and start with RGP. [employer]ation reviewed  -Will need new glasses Rx with plano on right side    Order RGP and hold for lens dispense, I&R. Refraction next exam for new glasses over CL.

## 2023-09-06 ENCOUNTER — MYC MEDICAL ADVICE (OUTPATIENT)
Dept: DERMATOLOGY | Facility: CLINIC | Age: 68
End: 2023-09-06
Payer: COMMERCIAL

## 2023-09-06 DIAGNOSIS — L40.8 SEBOPSORIASIS: ICD-10-CM

## 2023-09-06 RX ORDER — KETOCONAZOLE 20 MG/ML
SHAMPOO TOPICAL
Qty: 120 ML | Refills: 11 | Status: SHIPPED | OUTPATIENT
Start: 2023-09-06 | End: 2024-01-26

## 2023-09-12 ENCOUNTER — OFFICE VISIT (OUTPATIENT)
Dept: OPTOMETRY | Facility: CLINIC | Age: 68
End: 2023-09-12
Payer: COMMERCIAL

## 2023-09-12 DIAGNOSIS — Z94.7 POST CORNEAL TRANSPLANT: Primary | ICD-10-CM

## 2023-09-12 DIAGNOSIS — H52.02 HYPEROPIA WITH PRESBYOPIA OF LEFT EYE: ICD-10-CM

## 2023-09-12 DIAGNOSIS — H52.4 HYPEROPIA WITH PRESBYOPIA OF LEFT EYE: ICD-10-CM

## 2023-09-12 DIAGNOSIS — H52.211 IRREGULAR ASTIGMATISM OF RIGHT EYE: ICD-10-CM

## 2023-09-12 ASSESSMENT — REFRACTION_CURRENTRX
OD_BASECURVE: 6.6
OD_DIAMETER: 10.4
OD_SPHERE: -11.50
OD_ADDL_SPECS: BOSTON XO BLUE
OD_BRAND: ROSE K2 PG

## 2023-09-12 ASSESSMENT — EXTERNAL EXAM - LEFT EYE: OS_EXAM: NORMAL

## 2023-09-12 ASSESSMENT — VISUAL ACUITY
OD_CC: 20/200
CORRECTION_TYPE: GLASSES
OS_CC: 20/20
METHOD: SNELLEN - LINEAR

## 2023-09-12 ASSESSMENT — EXTERNAL EXAM - RIGHT EYE: OD_EXAM: NORMAL

## 2023-09-12 ASSESSMENT — TONOMETRY
IOP_METHOD: ICARE
OS_IOP_MMHG: 18
OD_IOP_MMHG: 18

## 2023-09-12 ASSESSMENT — REFRACTION_MANIFEST
OS_SPHERE: +1.50
OS_CYLINDER: SPHERE

## 2023-09-12 ASSESSMENT — REFRACTION_WEARINGRX
OD_AXIS: 120
OD_CYLINDER: +2.00
OD_SPHERE: -3.00

## 2023-09-12 ASSESSMENT — SLIT LAMP EXAM - LIDS: COMMENTS: MGD, BLEPHARITIS

## 2023-09-12 NOTE — PROGRESS NOTES
A/P  1.) PKP right eye with irregular astigmatism  -s/p PKP 2' to acanthamoeba infection. Previous soft lens wearer  -BCVA with rigid lens today 20/50+, limited by cataract at this time  -Option for RGP vs scleral - he would prefer safest option and start with RGP. Adaptation reviewed  -Good start with RGP lens today, excellent initial tolerance. Successful I&R, reviewed CL care and hygiene (Clearcare, [place] Simplus prn)  -Dispensed right lens, will order new lens with tighter edge and mail to pt (this one may pop out on its own  -Did update glasses for use over CL right eye, though he is also considering a RGP left eye as well. Plan for MULTIFOCAL RGP left eye if proceeding using K's/Rx empirical from today    Order new right lens and mail direct. Pt will MyChart if electing to do a left RGP lens. Follow-up 6 months recheck, sooner prn with lens concerns      Contact Lens Billing  V-Code:  - GP Spherical  Final Contact Lens Rx         Brand Base Curve Diameter Sphere Lens Addl. Specs    Right Therese K2 PG 6.6 10.6 -11.50 1.3 toric, 2 steep edge [place] XO blue    Left                 # of units: 1 right lens  Price per Unit: $165    This patient requires contact lenses that are medically necessary for either improvement in vision over spectacles, support of the ocular surface, or other therapeutic benefit. These are not cosmetic contact lenses.     Encounter Diagnoses   Name Primary?    Post corneal transplant Yes    Irregular astigmatism of right eye     Hyperopia with presbyopia of left eye

## 2023-09-18 ENCOUNTER — DOCUMENTATION ONLY (OUTPATIENT)
Dept: OPTOMETRY | Facility: CLINIC | Age: 68
End: 2023-09-18

## 2023-11-04 ENCOUNTER — HEALTH MAINTENANCE LETTER (OUTPATIENT)
Age: 68
End: 2023-11-04

## 2023-11-17 ASSESSMENT — ENCOUNTER SYMPTOMS
NERVOUS/ANXIOUS: 0
SHORTNESS OF BREATH: 0
NAUSEA: 0
CHILLS: 0
DIZZINESS: 0
ABDOMINAL PAIN: 0
FREQUENCY: 0
CONSTIPATION: 0
EYE PAIN: 0
ARTHRALGIAS: 1
HEMATURIA: 0
SORE THROAT: 0
COUGH: 1
FEVER: 0
WEAKNESS: 0
DYSURIA: 0
HEARTBURN: 1
MYALGIAS: 0
DIARRHEA: 0
JOINT SWELLING: 0
PALPITATIONS: 0
HEMATOCHEZIA: 0
HEADACHES: 0
PARESTHESIAS: 0

## 2023-11-17 ASSESSMENT — ACTIVITIES OF DAILY LIVING (ADL): CURRENT_FUNCTION: NO ASSISTANCE NEEDED

## 2023-11-21 ENCOUNTER — OFFICE VISIT (OUTPATIENT)
Dept: PEDIATRICS | Facility: CLINIC | Age: 68
End: 2023-11-21
Payer: COMMERCIAL

## 2023-11-21 VITALS
OXYGEN SATURATION: 99 % | TEMPERATURE: 97.4 F | BODY MASS INDEX: 28.3 KG/M2 | DIASTOLIC BLOOD PRESSURE: 76 MMHG | RESPIRATION RATE: 18 BRPM | WEIGHT: 208.9 LBS | SYSTOLIC BLOOD PRESSURE: 116 MMHG | HEART RATE: 80 BPM | HEIGHT: 72 IN

## 2023-11-21 DIAGNOSIS — Z79.899 ON PRE-EXPOSURE PROPHYLAXIS FOR HIV: ICD-10-CM

## 2023-11-21 DIAGNOSIS — Z00.00 ENCOUNTER FOR MEDICARE ANNUAL WELLNESS EXAM: Primary | ICD-10-CM

## 2023-11-21 DIAGNOSIS — I48.0 PAROXYSMAL ATRIAL FIBRILLATION (H): ICD-10-CM

## 2023-11-21 DIAGNOSIS — R97.20 ELEVATED PROSTATE SPECIFIC ANTIGEN (PSA): ICD-10-CM

## 2023-11-21 DIAGNOSIS — K21.00 GASTROESOPHAGEAL REFLUX DISEASE WITH ESOPHAGITIS, UNSPECIFIED WHETHER HEMORRHAGE: ICD-10-CM

## 2023-11-21 LAB
ALBUMIN SERPL BCG-MCNC: 3.9 G/DL (ref 3.5–5.2)
ALP SERPL-CCNC: 69 U/L (ref 40–150)
ALT SERPL W P-5'-P-CCNC: 36 U/L (ref 0–70)
ANION GAP SERPL CALCULATED.3IONS-SCNC: 9 MMOL/L (ref 7–15)
AST SERPL W P-5'-P-CCNC: 32 U/L (ref 0–45)
BILIRUB SERPL-MCNC: 0.5 MG/DL
BUN SERPL-MCNC: 20.6 MG/DL (ref 8–23)
C TRACH DNA SPEC QL PROBE+SIG AMP: NEGATIVE
CALCIUM SERPL-MCNC: 9.3 MG/DL (ref 8.8–10.2)
CHLORIDE SERPL-SCNC: 101 MMOL/L (ref 98–107)
CHOLEST SERPL-MCNC: 83 MG/DL
CREAT SERPL-MCNC: 1.2 MG/DL (ref 0.67–1.17)
DEPRECATED HCO3 PLAS-SCNC: 29 MMOL/L (ref 22–29)
EGFRCR SERPLBLD CKD-EPI 2021: 67 ML/MIN/1.73M2
ERYTHROCYTE [DISTWIDTH] IN BLOOD BY AUTOMATED COUNT: 17.3 % (ref 10–15)
GLUCOSE SERPL-MCNC: 95 MG/DL (ref 70–99)
HBV SURFACE AB SERPL IA-ACNC: 3.04 M[IU]/ML
HBV SURFACE AB SERPL IA-ACNC: NONREACTIVE M[IU]/ML
HCT VFR BLD AUTO: 56.9 % (ref 40–53)
HDLC SERPL-MCNC: 26 MG/DL
HGB BLD-MCNC: 17.4 G/DL (ref 13.3–17.7)
HIV 1+2 AB+HIV1 P24 AG SERPL QL IA: NONREACTIVE
LDLC SERPL CALC-MCNC: 49 MG/DL
MCH RBC QN AUTO: 27.3 PG (ref 26.5–33)
MCHC RBC AUTO-ENTMCNC: 30.6 G/DL (ref 31.5–36.5)
MCV RBC AUTO: 89 FL (ref 78–100)
N GONORRHOEA DNA SPEC QL NAA+PROBE: NEGATIVE
NONHDLC SERPL-MCNC: 57 MG/DL
PLATELET # BLD AUTO: 267 10E3/UL (ref 150–450)
POTASSIUM SERPL-SCNC: 5.2 MMOL/L (ref 3.4–5.3)
PROT SERPL-MCNC: 6.9 G/DL (ref 6.4–8.3)
PSA SERPL DL<=0.01 NG/ML-MCNC: 4.65 NG/ML (ref 0–4.5)
RBC # BLD AUTO: 6.38 10E6/UL (ref 4.4–5.9)
SODIUM SERPL-SCNC: 139 MMOL/L (ref 135–145)
TRIGL SERPL-MCNC: 41 MG/DL
WBC # BLD AUTO: 5.9 10E3/UL (ref 4–11)

## 2023-11-21 PROCEDURE — 91320 SARSCV2 VAC 30MCG TRS-SUC IM: CPT | Performed by: STUDENT IN AN ORGANIZED HEALTH CARE EDUCATION/TRAINING PROGRAM

## 2023-11-21 PROCEDURE — 99397 PER PM REEVAL EST PAT 65+ YR: CPT | Performed by: STUDENT IN AN ORGANIZED HEALTH CARE EDUCATION/TRAINING PROGRAM

## 2023-11-21 PROCEDURE — G0103 PSA SCREENING: HCPCS | Performed by: STUDENT IN AN ORGANIZED HEALTH CARE EDUCATION/TRAINING PROGRAM

## 2023-11-21 PROCEDURE — 36415 COLL VENOUS BLD VENIPUNCTURE: CPT | Performed by: STUDENT IN AN ORGANIZED HEALTH CARE EDUCATION/TRAINING PROGRAM

## 2023-11-21 PROCEDURE — 87389 HIV-1 AG W/HIV-1&-2 AB AG IA: CPT | Performed by: STUDENT IN AN ORGANIZED HEALTH CARE EDUCATION/TRAINING PROGRAM

## 2023-11-21 PROCEDURE — 87491 CHLMYD TRACH DNA AMP PROBE: CPT | Performed by: STUDENT IN AN ORGANIZED HEALTH CARE EDUCATION/TRAINING PROGRAM

## 2023-11-21 PROCEDURE — 87591 N.GONORRHOEAE DNA AMP PROB: CPT | Performed by: STUDENT IN AN ORGANIZED HEALTH CARE EDUCATION/TRAINING PROGRAM

## 2023-11-21 PROCEDURE — 80053 COMPREHEN METABOLIC PANEL: CPT | Performed by: STUDENT IN AN ORGANIZED HEALTH CARE EDUCATION/TRAINING PROGRAM

## 2023-11-21 PROCEDURE — 86780 TREPONEMA PALLIDUM: CPT | Performed by: STUDENT IN AN ORGANIZED HEALTH CARE EDUCATION/TRAINING PROGRAM

## 2023-11-21 PROCEDURE — 85027 COMPLETE CBC AUTOMATED: CPT | Performed by: STUDENT IN AN ORGANIZED HEALTH CARE EDUCATION/TRAINING PROGRAM

## 2023-11-21 PROCEDURE — 99214 OFFICE O/P EST MOD 30 MIN: CPT | Mod: 25 | Performed by: STUDENT IN AN ORGANIZED HEALTH CARE EDUCATION/TRAINING PROGRAM

## 2023-11-21 PROCEDURE — 86706 HEP B SURFACE ANTIBODY: CPT | Performed by: STUDENT IN AN ORGANIZED HEALTH CARE EDUCATION/TRAINING PROGRAM

## 2023-11-21 PROCEDURE — 80061 LIPID PANEL: CPT | Performed by: STUDENT IN AN ORGANIZED HEALTH CARE EDUCATION/TRAINING PROGRAM

## 2023-11-21 PROCEDURE — 90480 ADMN SARSCOV2 VAC 1/ONLY CMP: CPT | Performed by: STUDENT IN AN ORGANIZED HEALTH CARE EDUCATION/TRAINING PROGRAM

## 2023-11-21 RX ORDER — EMTRICITABINE AND TENOFOVIR DISOPROXIL FUMARATE 200; 300 MG/1; MG/1
1 TABLET, FILM COATED ORAL DAILY
Qty: 90 TABLET | Refills: 3 | Status: SHIPPED | OUTPATIENT
Start: 2023-11-21 | End: 2024-01-23

## 2023-11-21 ASSESSMENT — ENCOUNTER SYMPTOMS
FREQUENCY: 0
SORE THROAT: 0
ARTHRALGIAS: 1
WEAKNESS: 0
JOINT SWELLING: 0
DIZZINESS: 0
HEMATURIA: 0
PALPITATIONS: 0
CONSTIPATION: 0
DIARRHEA: 0
COUGH: 1
PARESTHESIAS: 0
DYSURIA: 0
SHORTNESS OF BREATH: 0
ABDOMINAL PAIN: 0
NAUSEA: 0
EYE PAIN: 0
MYALGIAS: 0
HEARTBURN: 1
HEADACHES: 0
NERVOUS/ANXIOUS: 0
CHILLS: 0
HEMATOCHEZIA: 0
FEVER: 0

## 2023-11-21 ASSESSMENT — PAIN SCALES - GENERAL: PAINLEVEL: NO PAIN (0)

## 2023-11-21 ASSESSMENT — ACTIVITIES OF DAILY LIVING (ADL): CURRENT_FUNCTION: NO ASSISTANCE NEEDED

## 2023-11-21 NOTE — PATIENT INSTRUCTIONS
"You can make the \"lab only\" appointment through Gist or by calling us at [phone].      Patient Education   Personalized Prevention Plan  You are due for the preventive services outlined below.  Your care team is available to assist you in scheduling these services.  If you have already completed any of these items, please share that information with your care team to update in your medical record.  Health Maintenance Due   Topic Date Due    Heart Failure Action Plan  Never done    Hepatitis A Vaccine (1 of 2 - Risk 2-dose series) Never done    Liver Monitoring Lab  11/04/2019    Basic Metabolic Panel  05/17/2022    ANNUAL REVIEW OF HM ORDERS  09/29/2022    Complete Blood Count  11/17/2022    COVID-19 Vaccine (5 - 2023-24 season) 09/01/2023    Cholesterol Lab  10/03/2023              "

## 2023-11-21 NOTE — PROGRESS NOTES
"SUBJECTIVE:   Derik is a 68 year old, presenting for the following:  Physical        11/21/2023     8:46 AM   Additional Questions   Roomed by Romy KEANE   Accompanied by ASHLEY         11/21/2023     8:46 AM   Patient Reported Additional Medications   Patient reports taking the following new medications None     Are you in the first 12 months of your Medicare coverage?  No    Healthy Habits:     In general, how would you rate your overall health?  Good    Frequency of exercise:  6-7 days/week    Duration of exercise:  45-60 minutes    Do you usually eat at least 4 servings of fruit and vegetables a day, include whole grains    & fiber and avoid regularly eating high fat or \"junk\" foods?  Yes    Taking medications regularly:  Yes    Medication side effects:  None    Ability to successfully perform activities of daily living:  No assistance needed    Home Safety:  No safety concerns identified    Hearing Impairment:  No hearing concerns    In the past 6 months, have you been bothered by leaking of urine?  No    In general, how would you rate your overall mental or emotional health?  Good    Additional concerns today:  Yes      Today's PHQ-2 Score:       11/20/2023     9:47 AM   PHQ-2 ( 1999 Pfizer)   Q1: Little interest or pleasure in doing things 0   Q2: Feeling down, depressed or hopeless 0   PHQ-2 Score 0   Q1: Little interest or pleasure in doing things Not at all   Q2: Feeling down, depressed or hopeless Not at all   PHQ-2 Score 0       Have you ever done Advance Care Planning? (For example, a Health Directive, POLST, or a discussion with a medical provider or your loved ones about your wishes): No, advance care planning information given to patient to review.  Patient declined advance care planning discussion at this time.      Fall risk  Fallen 2 or more times in the past year?: No  Any fall with injury in the past year?: No    Cognitive Screening   1) Repeat 3 items (Leader, Season, Table)    2) Clock " draw:NORMAL  3) 3 item recall: Recalls 1 object   Results: NORMAL clock, 1-2 items recalled: COGNITIVE IMPAIRMENT LESS LIKELY    Mini-CogTM Copyright S Nelson. Licensed by the author for use in [facility]; reprinted with permission (giuliana@[facility]). All rights reserved.      Do you have sleep apnea, excessive snoring or daytime drowsiness? : Patient has notice lots of coughing at night.    Reviewed and updated as needed this visit by clinical staff   Tobacco  Allergies  Meds  Problems             Reviewed and updated as needed this visit by Provider    Allergies  Meds  Problems            Social History     Tobacco Use     Smoking status: Former     Packs/day: 1.50     Years: 10.00     Additional pack years: 0.00     Total pack years: 15.00     Types: Cigarettes     Start date: 1975     Quit date: 1980     Years since quittin.0     Smokeless tobacco: Never   Substance Use Topics     Alcohol use: No           2023     8:24 AM   Alcohol Use   Prescreen: >3 drinks/day or >7 drinks/week? Not Applicable     Do you have a current opioid prescription? No  Do you use any other controlled substances or medications that are not prescribed by a provider? None          Current providers sharing in care for this patient include:   Patient Care Team:  Jaki Villagran MD as PCP - General (Internal Medicine - Pediatrics)  Anson Cheng MD as Assigned Surgical Provider  Kristi Lyons PA-C as Physician Assistant (Dermatology)  Kailash Jones MD as MD (Dermatology)  Jaki Villagran MD as Assigned PCP  Anson Cheng MD as MD (Ophthalmology)    The following health maintenance items are reviewed in Epic and correct as of today:  Health Maintenance   Topic Date Due     HF ACTION PLAN  Never done     ALT  2019     BMP  2022     CBC  2022     LIPID  10/03/2023     HEPATITIS A IMMUNIZATION (1 of 2 - Risk 2-dose series) 2025 (Originally  "8/20/1974)     COVID-19 Vaccine (6 - 2023-24 season) 01/16/2024     MEDICARE ANNUAL WELLNESS VISIT  11/21/2024     ANNUAL REVIEW OF HM ORDERS  11/21/2024     FALL RISK ASSESSMENT  11/21/2024     ADVANCE CARE PLANNING  11/21/2028     DTAP/TDAP/TD IMMUNIZATION (3 - Td or Tdap) 03/14/2029     COLORECTAL CANCER SCREENING  01/12/2031     TSH W/FREE T4 REFLEX  Completed     HEPATITIS C SCREENING  Completed     PHQ-2 (once per calendar year)  Completed     INFLUENZA VACCINE  Completed     Pneumococcal Vaccine: 65+ Years  Completed     ZOSTER IMMUNIZATION  Completed     HEPATITIS B IMMUNIZATION  Completed     RSV VACCINE (Pregnancy & 60+)  Completed     AORTIC ANEURYSM SCREENING (SYSTEM ASSIGNED)  Completed     IPV IMMUNIZATION  Aged Out     HPV IMMUNIZATION  Aged Out     MENINGITIS IMMUNIZATION  Aged Out     RSV MONOCLONAL ANTIBODY  Aged Out       Review of Systems   Constitutional:  Negative for chills and fever.   HENT:  Negative for congestion, ear pain, hearing loss and sore throat.    Eyes:  Negative for pain and visual disturbance.   Respiratory:  Positive for cough. Negative for shortness of breath.    Cardiovascular:  Negative for chest pain, palpitations and peripheral edema.   Gastrointestinal:  Positive for heartburn. Negative for abdominal pain, constipation, diarrhea, hematochezia and nausea.   Genitourinary:  Negative for dysuria, frequency, genital sores, hematuria, impotence, penile discharge and urgency.   Musculoskeletal:  Positive for arthralgias. Negative for joint swelling and myalgias.   Skin:  Negative for rash.   Neurological:  Negative for dizziness, weakness, headaches and paresthesias.   Psychiatric/Behavioral:  Negative for mood changes. The patient is not nervous/anxious.      OBJECTIVE:   /76 (BP Location: Right arm, Patient Position: Sitting, Cuff Size: Adult Large)   Pulse 80   Temp 97.4  F (36.3  C) (Tympanic)   Resp 18   Ht 1.83 m (6' 0.05\")   Wt 94.8 kg (208 lb 14.4 oz)   SpO2 " "99%   BMI 28.29 kg/m   Estimated body mass index is 28.29 kg/m  as calculated from the following:    Height as of this encounter: 1.83 m (6' 0.05\").    Weight as of this encounter: 94.8 kg (208 lb 14.4 oz).  Physical Exam  GENERAL: healthy, alert and no distress  EYES: Eyes grossly normal to inspection, and conjunctivae and sclerae normal  HENT: ear canals and TM's normal,  NECK: no adenopathy, no asymmetry, masses, or scars and thyroid normal to palpation  RESP: lungs clear to auscultation - no rales, rhonchi or wheezes  CV: regular rate and rhythm, normal S1 S2, no S3 or S4, no murmur, click or rub, no peripheral edema  ABDOMEN: soft, nontender, no hepatosplenomegaly, no masses   MS: no gross musculoskeletal defects noted, no edema  SKIN: no suspicious lesions or rashes  NEURO: Normal strength and tone, mentation intact and speech normal  PSYCH: mentation appears normal, affect normal/bright    ASSESSMENT / PLAN:       ICD-10-CM    1. Encounter for Medicare annual wellness exam  Z00.00 CBC with Platelets     Lipid panel reflex to direct LDL Non-fasting     CBC with Platelets     Lipid panel reflex to direct LDL Non-fasting     Treponema Abs w Reflex to RPR and Titer      2. Gastroesophageal reflux disease with esophagitis, unspecified whether hemorrhage  K21.00 omeprazole (PRILOSEC) 20 MG DR capsule      3. On pre-exposure prophylaxis for HIV  Z79.899 Comprehensive metabolic panel (BMP + Alb, Alk Phos, ALT, AST, Total. Bili, TP)     HIV Antigen Antibody Combo     Hepatitis B Surface Antibody     Chlamydia trachomatis/Neisseria gonorrhoeae by PCR - Clinic Collect     Comprehensive metabolic panel (BMP + Alb, Alk Phos, ALT, AST, Total. Bili, TP)     HIV Antigen Antibody Combo     Hepatitis B Surface Antibody     emtricitabine-tenofovir (TRUVADA) 200-300 MG per tablet     HIV Antigen Antibody Combo     NEISSERIA GONORRHOEA PCR     CHLAMYDIA TRACHOMATIS PCR     Treponema Abs w Reflex to RPR and Titer     Basic " metabolic panel  (Ca, Cl, CO2, Creat, Gluc, K, Na, BUN)      4. Elevated prostate specific antigen (PSA)  R97.20 PSA, screen     PSA, screen      5. Paroxysmal atrial fibrillation (H)  I48.0         3. Start prep. Every 3 month labs.  4. May need urology evaluation. Did not get repeat PSA last year as recommended.  5. 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 See cardiology not from 2022 - does not need follow up at this time and defer anticoagulation until age 77 or if other risk factors develop.    COUNSELING:  Reviewed preventive health counseling, as reflected in patient instructions      He reports that he quit smoking about 43 years ago. His smoking use included cigarettes. He started smoking about 48 years ago. He has a 15.00 pack-year smoking history. He has never used smokeless tobacco.      Appropriate preventive services were discussed with this patient, including applicable screening as appropriate for fall prevention, nutrition, physical activity, Tobacco-use cessation, weight loss and cognition.  Checklist reviewing preventive services available has been given to the patient.    Reviewed patients plan of care and provided an AVS. The Basic Care Plan (routine screening as documented in Health Maintenance) for Derik meets the Care Plan requirement. This Care Plan has been established and reviewed with the Patient.          Jaki Villagran MD  [facility]    Identified Health Risks:  I have reviewed Opioid Use Disorder and Substance Use Disorder risk factors and made any needed referrals. \

## 2023-11-22 LAB — T PALLIDUM AB SER QL: NONREACTIVE

## 2023-11-28 ENCOUNTER — ALLIED HEALTH/NURSE VISIT (OUTPATIENT)
Dept: INFECTIOUS DISEASES | Facility: CLINIC | Age: 68
End: 2023-11-28
Attending: STUDENT IN AN ORGANIZED HEALTH CARE EDUCATION/TRAINING PROGRAM
Payer: COMMERCIAL

## 2023-11-28 DIAGNOSIS — Z23 NEED FOR VACCINATION: Primary | ICD-10-CM

## 2023-11-28 PROCEDURE — 90611 SMALLPOX&MONKEYPOX VAC 0.5ML: CPT | Performed by: STUDENT IN AN ORGANIZED HEALTH CARE EDUCATION/TRAINING PROGRAM

## 2023-11-28 PROCEDURE — 250N000011 HC RX IP 250 OP 636: Performed by: STUDENT IN AN ORGANIZED HEALTH CARE EDUCATION/TRAINING PROGRAM

## 2023-11-28 PROCEDURE — 90471 IMMUNIZATION ADMIN: CPT | Performed by: STUDENT IN AN ORGANIZED HEALTH CARE EDUCATION/TRAINING PROGRAM

## 2023-11-28 RX ADMIN — RABIES VACCINE 0.5 ML: KIT at 09:50

## 2023-12-26 ENCOUNTER — ALLIED HEALTH/NURSE VISIT (OUTPATIENT)
Dept: INFECTIOUS DISEASES | Facility: CLINIC | Age: 68
End: 2023-12-26
Payer: COMMERCIAL

## 2023-12-26 DIAGNOSIS — Z23 NEED FOR VACCINATION: Primary | ICD-10-CM

## 2023-12-26 PROCEDURE — 90471 IMMUNIZATION ADMIN: CPT | Performed by: INTERNAL MEDICINE

## 2023-12-26 PROCEDURE — 250N000011 HC RX IP 250 OP 636: Performed by: INTERNAL MEDICINE

## 2023-12-26 PROCEDURE — 90611 SMALLPOX&MONKEYPOX VAC 0.5ML: CPT | Performed by: INTERNAL MEDICINE

## 2023-12-26 RX ADMIN — RABIES VACCINE 0.5 ML: KIT at 09:42

## 2023-12-26 NOTE — PROGRESS NOTES
Nurse review: Monekypox and Jynneos vaccine administration criteria:     Criteria for Jynneos vaccine reviewed via In person.     Reviewed Jynneos vaccine criteria Yes  and patient is a candidate for the vaccine: Yes     Patient does not have symptoms of monkeypox: Yes. If patient has symptoms of monkeypox then a provider visit should be scheduled and vaccination not administered.    Patient does not have contraindications to the monkeypox vaccine: Yes.Contraindications for vaccine include:   Allergies to a Jynneos vaccine or to an ingredient in the the vaccine (gentamicin, ciprofloxacin,egg protein).    Date of vaccine: 12/26/23

## 2024-01-13 ENCOUNTER — HEALTH MAINTENANCE LETTER (OUTPATIENT)
Age: 69
End: 2024-01-13

## 2024-01-19 ENCOUNTER — TELEPHONE (OUTPATIENT)
Dept: PEDIATRICS | Facility: CLINIC | Age: 69
End: 2024-01-19

## 2024-01-19 NOTE — TELEPHONE ENCOUNTER
Prior Authorization Retail Medication Request    Medication/Dose:   Diagnosis and ICD code (if different than what is on RX):  Z79.899  New/renewal/insurance change PA/secondary ins. PA:  Previously Tried and Failed:    Rationale:      Insurance   Primary:   Insurance ID:      Secondary (if applicable):  Insurance ID:      Pharmacy Information (if different than what is on RX)  [facility]  Phone:  [phone]  Fax:[phone]    Covermymeds:    Bah:[id number]

## 2024-01-23 ENCOUNTER — MYC REFILL (OUTPATIENT)
Dept: PEDIATRICS | Facility: CLINIC | Age: 69
End: 2024-01-23
Payer: COMMERCIAL

## 2024-01-23 ENCOUNTER — MYC MEDICAL ADVICE (OUTPATIENT)
Dept: PEDIATRICS | Facility: CLINIC | Age: 69
End: 2024-01-23
Payer: COMMERCIAL

## 2024-01-23 DIAGNOSIS — Z79.899 ON PRE-EXPOSURE PROPHYLAXIS FOR HIV: ICD-10-CM

## 2024-01-23 RX ORDER — EMTRICITABINE AND TENOFOVIR DISOPROXIL FUMARATE 200; 300 MG/1; MG/1
1 TABLET, FILM COATED ORAL DAILY
Qty: 90 TABLET | Refills: 3 | Status: SHIPPED | OUTPATIENT
Start: 2024-01-23 | End: 2024-01-24

## 2024-01-23 NOTE — TELEPHONE ENCOUNTER
Could do on-demand PrEP in meantime to stretch out pills, or pay out of pocket for short script.  On-demand dosing in 2 tablets 24 hours before sex, and 1 tablet 24 hours and 1 tablet 48 hours after sex.        https://www.cdc.gov/hiv/basics/prep/on-demand-prep.html        Jaki Villagran MD  Internal Medicine & Pediatrics  [facility]  She/her

## 2024-01-23 NOTE — TELEPHONE ENCOUNTER
Dr Villagran,     Please review patient message. PA was started, but patient will not have enough medication. Do you have any recommendations?     Advise patient to pay out of pocket for a short script? Question about skipping days?     Please advise!     Thanks.   Soniya MOORE RN on 1/23/2024 at 12:00 PM

## 2024-01-24 RX ORDER — EMTRICITABINE AND TENOFOVIR DISOPROXIL FUMARATE 200; 300 MG/1; MG/1
1 TABLET, FILM COATED ORAL DAILY
Qty: 7 TABLET | Refills: 0 | Status: SHIPPED | OUTPATIENT
Start: 2024-01-24 | End: 2024-02-05

## 2024-01-26 ENCOUNTER — MYC REFILL (OUTPATIENT)
Dept: DERMATOLOGY | Facility: CLINIC | Age: 69
End: 2024-01-26
Payer: COMMERCIAL

## 2024-01-26 DIAGNOSIS — L40.8 SEBOPSORIASIS: ICD-10-CM

## 2024-01-26 RX ORDER — KETOCONAZOLE 20 MG/ML
SHAMPOO TOPICAL
Qty: 120 ML | Refills: 11 | Status: SHIPPED | OUTPATIENT
Start: 2024-01-26

## 2024-01-31 NOTE — TELEPHONE ENCOUNTER
[employer] does not require a prior authorization through patient's plan, however patient is restricted to [facility] (Ph: [phone]) for this medication as they are considering it specialty. Please have new Rx sent to [facility] and notify patient he will need to fill through them.

## 2024-03-04 ENCOUNTER — MYC MEDICAL ADVICE (OUTPATIENT)
Dept: PEDIATRICS | Facility: CLINIC | Age: 69
End: 2024-03-04
Payer: COMMERCIAL

## 2024-03-04 ENCOUNTER — MYC REFILL (OUTPATIENT)
Dept: PEDIATRICS | Facility: CLINIC | Age: 69
End: 2024-03-04
Payer: COMMERCIAL

## 2024-03-04 DIAGNOSIS — I42.9 SECONDARY CARDIOMYOPATHY (H): ICD-10-CM

## 2024-03-04 DIAGNOSIS — I48.92 ATRIAL FLUTTER WITH RAPID VENTRICULAR RESPONSE (H): ICD-10-CM

## 2024-03-04 RX ORDER — METOPROLOL SUCCINATE 25 MG/1
25 TABLET, EXTENDED RELEASE ORAL DAILY
Qty: 90 TABLET | Refills: 4 | Status: SHIPPED | OUTPATIENT
Start: 2024-03-04 | End: 2024-09-03

## 2024-03-04 RX ORDER — LISINOPRIL 2.5 MG/1
2.5 TABLET ORAL DAILY
Qty: 90 TABLET | Refills: 4 | Status: SHIPPED | OUTPATIENT
Start: 2024-03-04 | End: 2024-09-03

## 2024-03-04 RX ORDER — METOPROLOL SUCCINATE 25 MG/1
25 TABLET, EXTENDED RELEASE ORAL DAILY
Qty: 90 TABLET | Refills: 0 | Status: SHIPPED | OUTPATIENT
Start: 2024-03-04 | End: 2024-03-04

## 2024-03-04 RX ORDER — LISINOPRIL 2.5 MG/1
2.5 TABLET ORAL DAILY
Qty: 90 TABLET | Refills: 3 | Status: CANCELLED | OUTPATIENT
Start: 2024-03-04

## 2024-03-04 NOTE — TELEPHONE ENCOUNTER
Prescriptions signed.    Jaki Villagran MD  Internal Medicine & Pediatrics  [facility]  She/her

## 2024-03-04 NOTE — TELEPHONE ENCOUNTER
Patient called and stated that he had requested Lisinopril and Metoprolol be refilled through the [facility] pharmacy in [place]. Only one script sent and it was directed to  pharmacy. Would like that canceled and both sent to [facility], please.  Lupis Sanchez LPN

## 2024-03-05 ENCOUNTER — LAB (OUTPATIENT)
Dept: LAB | Facility: CLINIC | Age: 69
End: 2024-03-05
Payer: COMMERCIAL

## 2024-03-05 DIAGNOSIS — Z79.899 ON PRE-EXPOSURE PROPHYLAXIS FOR HIV: ICD-10-CM

## 2024-03-05 LAB — T PALLIDUM AB SER QL: NONREACTIVE

## 2024-03-05 PROCEDURE — 36415 COLL VENOUS BLD VENIPUNCTURE: CPT

## 2024-03-05 PROCEDURE — 80048 BASIC METABOLIC PNL TOTAL CA: CPT

## 2024-03-05 PROCEDURE — 86780 TREPONEMA PALLIDUM: CPT

## 2024-03-05 PROCEDURE — 87389 HIV-1 AG W/HIV-1&-2 AB AG IA: CPT

## 2024-03-05 PROCEDURE — 87591 N.GONORRHOEAE DNA AMP PROB: CPT

## 2024-03-05 PROCEDURE — 87491 CHLMYD TRACH DNA AMP PROBE: CPT

## 2024-03-06 LAB
ANION GAP SERPL CALCULATED.3IONS-SCNC: 8 MMOL/L (ref 7–15)
BUN SERPL-MCNC: 31.6 MG/DL (ref 8–23)
C TRACH DNA SPEC QL NAA+PROBE: NEGATIVE
CALCIUM SERPL-MCNC: 9.7 MG/DL (ref 8.8–10.2)
CHLORIDE SERPL-SCNC: 102 MMOL/L (ref 98–107)
CREAT SERPL-MCNC: 1.35 MG/DL (ref 0.67–1.17)
DEPRECATED HCO3 PLAS-SCNC: 32 MMOL/L (ref 22–29)
EGFRCR SERPLBLD CKD-EPI 2021: 58 ML/MIN/1.73M2
GLUCOSE SERPL-MCNC: 104 MG/DL (ref 70–99)
HIV 1+2 AB+HIV1 P24 AG SERPL QL IA: NONREACTIVE
N GONORRHOEA DNA SPEC QL NAA+PROBE: NEGATIVE
POTASSIUM SERPL-SCNC: 4.8 MMOL/L (ref 3.4–5.3)
SODIUM SERPL-SCNC: 142 MMOL/L (ref 135–145)

## 2024-03-11 DIAGNOSIS — I42.9 SECONDARY CARDIOMYOPATHY (H): ICD-10-CM

## 2024-03-11 DIAGNOSIS — I48.92 ATRIAL FLUTTER WITH RAPID VENTRICULAR RESPONSE (H): ICD-10-CM

## 2024-03-11 NOTE — TELEPHONE ENCOUNTER
[facility] requesting refill    Drug Metoprolol succinate er    Sig take one tablet by mouth every day    Christine Vega on 3/11/2024 at 3:57 PM

## 2024-03-12 RX ORDER — LISINOPRIL 2.5 MG/1
2.5 TABLET ORAL DAILY
Qty: 90 TABLET | Refills: 4 | OUTPATIENT
Start: 2024-03-12

## 2024-03-12 RX ORDER — METOPROLOL SUCCINATE 25 MG/1
25 TABLET, EXTENDED RELEASE ORAL DAILY
Qty: 90 TABLET | Refills: 4 | OUTPATIENT
Start: 2024-03-12

## 2024-03-12 NOTE — NURSING NOTE
Chief Complaints and History of Present Illnesses   Patient presents with     Keratitis Follow Up     Acanthamoeba keratitis RE recheck     Chief Complaint(s) and History of Present Illness(es)     Keratitis Follow Up     Laterality: right eye    Characteristics: constant    Associated symptoms: Negative for eye pain, lid swelling, itching and irritation    Frequency: constantly    Timing: throughout the day    Pain scale: 0/10    Comments: Acanthamoeba keratitis RE recheck              Comments     Acanthamoeba keratitis of RE.  Pt states VA unchanged and denies discomfort.  Pt states instilling drops as directed.  Jacki Emanuel, COT COT 8:54 AM 08/20/2021                   Patient arrived for injection appointment alone and on time. No vitals taken at this visit.      Side effects:  [x] Denies  []  Reports  Reaction to last injection:    [] Denies  [x] Reports:Just a little pain.  Resolved on its own.  Suicidal ideation/homicidal ideation/self harm:   [] Denies:   [x]  Reports:   \"A few thoughts of SI.  Not as bad, I guess.\"  Denies having them at this time.  Denies plan or intent.  Reports he can keep himself safe.   No self harm thought or homicidal ideations.   Patient and RN discussed HLOC.  Patient denies need for hospitalization or day treatment services.   Symptoms of psychosis/christie/depression/anxiety/etc:   [] Denies   [x]  Reports: Increased anxiety. I working on taking medications more regularly.  Depression is about the same. Says he is a little bit better since the last time I was here.  Concerns with sleep:  [] Denies  [x] Reports: It's been better since they upped my meds.  Reports having bad dreams, nightmares.  Having bad dreams more frequently.  Will discuss with NP at next appointment.  Concerns with appetite:   [] Denies  [x] Reports: Has one \"binge meal\" per day.     Demeanor, hygiene: John presented with quiet, polite, and cooperative demeanor. Was forthcoming with information. Patient was well groomed and dressed appropriately for weather and situation. Affect was flat.     AIMS: []  Assessed             [x]  Not assessed during this visit      Aristada 662mg was injected IM into right dorsogluteal muscle per patient preference. Patient tolerated procedure well.      NDC # 37008-893-48  Lot # [id number]  Exp date 05/2025     Patient supplied his own medication which he picked up from [facility] pharmacy.      Patient verbalized understanding and agreement to contact clinic or [facility] if side effects or psychiatric symptoms develop/worsen or with further questions.      Follow up appointment with Dr. Lynn: 4/3/24.   Next injection appointment: 4/9/24

## 2024-03-12 NOTE — TELEPHONE ENCOUNTER
Pharmacy requested refills that are already active on file. Refused request to pharmacy.   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

## 2024-04-11 ENCOUNTER — OFFICE VISIT (OUTPATIENT)
Dept: OPHTHALMOLOGY | Facility: CLINIC | Age: 69
End: 2024-04-11
Attending: OPHTHALMOLOGY
Payer: COMMERCIAL

## 2024-04-11 DIAGNOSIS — H04.121 DRY EYE SYNDROME OF RIGHT EYE: ICD-10-CM

## 2024-04-11 DIAGNOSIS — Z94.7 HISTORY OF PENETRATING KERATOPLASTY: ICD-10-CM

## 2024-04-11 DIAGNOSIS — Z94.7 S/P PKP (PENETRATING KERATOPLASTY): ICD-10-CM

## 2024-04-11 DIAGNOSIS — T86.91 GRAFT REJECTION: Primary | ICD-10-CM

## 2024-04-11 DIAGNOSIS — H16.401 CORNEAL NEOVASCULARIZATION OF RIGHT EYE: ICD-10-CM

## 2024-04-11 PROCEDURE — 99211 OFF/OP EST MAY X REQ PHY/QHP: CPT | Performed by: OPHTHALMOLOGY

## 2024-04-11 PROCEDURE — 99214 OFFICE O/P EST MOD 30 MIN: CPT | Performed by: OPHTHALMOLOGY

## 2024-04-11 RX ORDER — METHYLPREDNISOLONE 4 MG
TABLET, DOSE PACK ORAL
Qty: 21 TABLET | Refills: 0 | Status: SHIPPED | OUTPATIENT
Start: 2024-04-11 | End: 2024-06-06

## 2024-04-11 RX ORDER — PREDNISOLONE ACETATE 10 MG/ML
1-2 SUSPENSION/ DROPS OPHTHALMIC
Qty: 15 ML | Refills: 11 | Status: SHIPPED | OUTPATIENT
Start: 2024-04-11

## 2024-04-11 ASSESSMENT — EXTERNAL EXAM - LEFT EYE: OS_EXAM: NORMAL

## 2024-04-11 ASSESSMENT — SLIT LAMP EXAM - LIDS: COMMENTS: MGD, BLEPHARITIS

## 2024-04-11 ASSESSMENT — REFRACTION_WEARINGRX
OS_CYLINDER: SPHERE
OD_ADD: +2.50
OS_ADD: +2.50
OS_SPHERE: +1.50
OD_SPHERE: PLANO

## 2024-04-11 ASSESSMENT — TONOMETRY
OD_IOP_MMHG: 9
IOP_METHOD: ICARE
OS_IOP_MMHG: 10

## 2024-04-11 ASSESSMENT — PACHYMETRY
OD_CT(UM): 959
EXAM_DATE: 4/11/2024
OS_CT(UM): 526

## 2024-04-11 ASSESSMENT — VISUAL ACUITY
OS_CC: 20/20
OD_CC: HM
CORRECTION_TYPE: GLASSES
METHOD: SNELLEN - LINEAR

## 2024-04-11 ASSESSMENT — EXTERNAL EXAM - RIGHT EYE: OD_EXAM: NORMAL

## 2024-04-11 NOTE — PROGRESS NOTES
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 Rosie Han     HPI:  Derik Hamm is male who presents as follow up for Acanthamoeba Keratitis right eye.     Patient initially presented for evaluation of persistent keratoconjunctivitis, OD, since 10/5/2020. Initially seen by Dr. Ngo at [facility] and felt it was related to severe dryness vs bacterial keratitis, so he was started on PFATs and Vigamox Q2H WA. Patient was seen frequently for follow up; he was started on Valtrex 500 mg TID on 10/9/20 and has been on that since then. Despite this therapy, pt failed to improve so he was started prednisolone. He has had a waxing and waning course, but more recently has gradually declined. Stopped PF on 11/2 after running out. He was seen by Dr. Ngo on 11/2 who noted worsening VA and worsening exam, so the patient was referred to Dr. Han, whom he saw 11/4/20. Dr. Han referred to our clinic due to concerns for acanthamoeba keratitis. Culture positive for acanthamoeba on 11/05/20.     Interval History 7/11/23: Feels well without change in vision following suture removal at last visit. No flashes, floaters or curtains. No eye pain. Using prednisolone about twice a day.     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     Glasses: Yes  CTL wearer: yes - none x1.5 months; wears while showering or in the sauna; he sleeps in them once every 3-4 weeks; never wears them in a lake or pool     Family hx of eye disease: negative for glaucoma/macular degeneration     Social Hx: (-) smoking, (-) EtOH, (-) illicit drugs     Meds:  Pred 1% 3 times per day     Surgical Hx:  S/p PKP with posterior synechiolysis right eye (12/8/21), did not perform ECCE at this time     Assessment:  # Acanthamoeba keratitis, OD              - Symptoms started 10/5/2020              -  Acanthamoeba risks: sleeps in CTL, wears in shower, sauna              - Cultures obtained 11/05/20,   Culture positive for acanthamoeba.              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 Plan to increase lubrication.   1/07: Better overall.  pain better.  Stromal haze/KP better as well.  1/19: Stable from previous visit w/ continued diffuse haze,no epi defects.   1/28: Slightly improved corneal clarity.  2/4: Slightly improved corneal clarity  2/18: Exam largely stable, new central bulla  3/2: increased soreness and tearing past few days, exam stable.  Diffuse haze, edema but improved from last photos.  3/11: Worsening pain and soreness over the past week. 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 CL placed right eye  4/8: BCL really helped his pain. Much worse with it off. Epi defect larger with rolled edges. 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 Infiltrate improved. AmbioDisk 9.0 + Kontur lens 8.6/16.0 4/8/21 4/22: Pain improving. Ulcer looks stable to slightly improved. Prokera ring placed  4/29: right eye Much improved infiltrate. Epi defect resolving. Am dissolved.  5/6/21:  AM dissolved, ring removed.  Epi healed, small superocentral crescentic infiltrate remains w/ surrounding central scarring.    5/20: significant improvement in Snellen acuity today. Crescentic infiltrate smaller and less dense since 5/6 photo.  6/17: small central infiltrate present at inferior aspect of pupil - smaller than 5/20. Edema much improved. Vision stable.   7/16/21: Stable vision, pt comfortable.    Would like to see active infiltrate completely resolved before stopping  / reducing PHMB further.  8/20/21: PHMB stopped.  Doing well.  10/21/21: Doing well.  Stable off the PHMB.  Disc right eye PKP with cat ext and IOL.  Disc R, B, PC and option. IOL calcs today.  Pt will decide about general anesthesia vs retrobulbar block. 3 hours for  Triple.  12/9/21: POD#1 s/p PKP.  Doing well.  Epi intact, cornea quite clear.     12/16/21: POW#1 s/p PKP. Doing well - no signs of graft failure, cornea clear  1/4/22: POM#1 s/p PKP.  Doing well, cornea is clear.    1/26/22: Approx post op M 2. Pt doing well  4/19/22: Pt doing well with right eye PKP. Cont meds.  8/16/22: Doing well, no pain, stable poor vision right eye, no changes.  12/13/22: PKP looks great. Nir with 2 tight sutures based on todays nir.  1/17/23: PKP clear and compact. Nir with irreg ular cyk noted see nir. Will remove 2 sutures.  4/4/23: Pentacam shows about 10 diopters of astigmatism in right eye. Removed 3 sutures at slit lamp (at 12, 6, and 9 -o-clock).   5/9/23: Pentacam with 6.7D astigmatism (less than last visit)  6/8/23: Penta cam astig at 4.7 D  7/11/23: Pentacam astig 7.3D  4/11/24: Pt did not receive refill since message for refill was misdirected and pt thought that decreased vision was due to cataract (told by outside provider). He comes today with corneal edema which he says started about 3 weeks ago. Will use topical and oral pred today to reverse rejection. Discussed with pt. Pt going out of [facility] rtn in 1 week.            PLAN:   - start pred at right eye q 1 gris around clock for 1 day, then pred q 1 hour during day and q 2 hr at night. Start medrol dose pack.   Rx sent to [facility] pharmacy since they will have meds.  Disc No Tx for astigmatism, VS RGP CL or AK.      RTC:  1 week, call if sx worsen to clinic.  -  Later - Appt with Dr Pearl   For right eye RGP or other CL fitting post - PKP    Anson Cheng MD    Attending Physician Attestation:  Complete documentation of historical and  exam elements from today's encounter can be found in the full encounter summary report (not reduplicated in this progress note).  I personally obtained the chief complaint(s) and history of present illness.  I confirmed and edited as necessary the review of systems, past medical/surgical history, family history, social history, and examination findings as documented by others; and I examined the patient myself.  I personally reviewed the relevant tests, images, and reports as documented above.  I formulated and edited as necessary the assessment and plan and discussed the findings and management plan with the patient and family. - Anson Cheng MD

## 2024-04-11 NOTE — NURSING NOTE
Chief Complaints and History of Present Illnesses   Patient presents with    Follow Up     Chief Complaint(s) and History of Present Illness(es)       Follow Up              Laterality: both eyes    Course: gradually worsening    Associated symptoms: glare, eye pain and photophobia.  Negative for flashes and floaters    Treatments tried: no treatments              Comments    Here for annual corneal exam. VA has decreased in the right eye. Glare has been bothersome. Some eye pain and photophobia. Has not been using any eye drops - multiple attempts to get refills with no avail.     Ross Soria COT 11:31 AM April 11, 2024

## 2024-04-18 ENCOUNTER — TELEPHONE (OUTPATIENT)
Dept: OPHTHALMOLOGY | Facility: CLINIC | Age: 69
End: 2024-04-18
Payer: COMMERCIAL

## 2024-04-18 ENCOUNTER — OFFICE VISIT (OUTPATIENT)
Dept: OPHTHALMOLOGY | Facility: CLINIC | Age: 69
End: 2024-04-18
Attending: OPHTHALMOLOGY
Payer: COMMERCIAL

## 2024-04-18 DIAGNOSIS — T86.91 GRAFT REJECTION: Primary | ICD-10-CM

## 2024-04-18 DIAGNOSIS — Z94.7 HISTORY OF PENETRATING KERATOPLASTY: ICD-10-CM

## 2024-04-18 PROCEDURE — 99214 OFFICE O/P EST MOD 30 MIN: CPT | Mod: 25 | Performed by: OPHTHALMOLOGY

## 2024-04-18 PROCEDURE — 250N000011 HC RX IP 250 OP 636: Performed by: OPHTHALMOLOGY

## 2024-04-18 PROCEDURE — 68200 TREAT EYELID BY INJECTION: CPT | Mod: LT | Performed by: OPHTHALMOLOGY

## 2024-04-18 RX ORDER — DEXAMETHASONE SODIUM PHOSPHATE 1 MG/ML
1-2 SOLUTION/ DROPS OPHTHALMIC
Qty: 15 ML | Refills: 5 | Status: SHIPPED | OUTPATIENT
Start: 2024-04-18

## 2024-04-18 RX ORDER — TRIAMCINOLONE ACETONIDE 40 MG/ML
40 INJECTION, SUSPENSION INTRA-ARTICULAR; INTRAMUSCULAR ONCE
Status: COMPLETED | OUTPATIENT
Start: 2024-04-18 | End: 2024-04-18

## 2024-04-18 RX ADMIN — TRIAMCINOLONE ACETONIDE 40 MG: 40 INJECTION, SUSPENSION INTRA-ARTICULAR; INTRAMUSCULAR at 15:13

## 2024-04-18 ASSESSMENT — PACHYMETRY
OS_CT(UM): 526
OD_CT(UM): 930
EXAM_DATE: 4/18/2024

## 2024-04-18 ASSESSMENT — VISUAL ACUITY
METHOD: SNELLEN - LINEAR
OD_CC: CF AT 3 FT

## 2024-04-18 ASSESSMENT — TONOMETRY
IOP_METHOD: TONOPEN
OD_IOP_MMHG: 10

## 2024-04-18 ASSESSMENT — SLIT LAMP EXAM - LIDS: COMMENTS: MGD, BLEPHARITIS

## 2024-04-18 ASSESSMENT — EXTERNAL EXAM - LEFT EYE: OS_EXAM: NORMAL

## 2024-04-18 ASSESSMENT — EXTERNAL EXAM - RIGHT EYE: OD_EXAM: NORMAL

## 2024-04-18 NOTE — PROGRESS NOTES
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 Rosie Han     HPI:  Derik Hamm is male who presents as follow up for Acanthamoeba Keratitis right eye.     Patient initially presented for evaluation of persistent keratoconjunctivitis, OD, since 10/5/2020. Initially seen by Dr. Ngo at [facility] and felt it was related to severe dryness vs bacterial keratitis, so he was started on PFATs and Vigamox Q2H WA. Patient was seen frequently for follow up; he was started on Valtrex 500 mg TID on 10/9/20 and has been on that since then. Despite this therapy, pt failed to improve so he was started prednisolone. He has had a waxing and waning course, but more recently has gradually declined. Stopped PF on 11/2 after running out. He was seen by Dr. Ngo on 11/2 who noted worsening VA and worsening exam, so the patient was referred to Dr. Han, whom he saw 11/4/20. Dr. Han referred to our clinic due to concerns for acanthamoeba keratitis. Culture positive for acanthamoeba on 11/05/20.     Interval History 7/11/23: Feels well without change in vision following suture removal at last visit. No flashes, floaters or curtains. No eye pain. Using prednisolone about twice a day.     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     Glasses: Yes  CTL wearer: yes - none x1.5 months; wears while showering or in the sauna; he sleeps in them once every 3-4 weeks; never wears them in a lake or pool     Family hx of eye disease: negative for glaucoma/macular degeneration     Social Hx: (-) smoking, (-) EtOH, (-) illicit drugs     Meds:  Pred 1% 3 times per day     Surgical Hx:  S/p PKP with posterior synechiolysis right eye (12/8/21), did not perform ECCE at this time     Assessment:  # Acanthamoeba keratitis, OD              - Symptoms started 10/5/2020              -  Acanthamoeba risks: sleeps in CTL, wears in shower, sauna              - Cultures obtained 11/05/20,   Culture positive for acanthamoeba.              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 Plan to increase lubrication.   1/07: Better overall.  pain better.  Stromal haze/KP better as well.  1/19: Stable from previous visit w/ continued diffuse haze,no epi defects.   1/28: Slightly improved corneal clarity.  2/4: Slightly improved corneal clarity  2/18: Exam largely stable, new central bulla  3/2: increased soreness and tearing past few days, exam stable.  Diffuse haze, edema but improved from last photos.  3/11: Worsening pain and soreness over the past week. 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 CL placed right eye  4/8: BCL really helped his pain. Much worse with it off. Epi defect larger with rolled edges. 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 Infiltrate improved. AmbioDisk 9.0 + Kontur lens 8.6/16.0 4/8/21 4/22: Pain improving. Ulcer looks stable to slightly improved. Prokera ring placed  4/29: right eye Much improved infiltrate. Epi defect resolving. Am dissolved.  5/6/21:  AM dissolved, ring removed.  Epi healed, small superocentral crescentic infiltrate remains w/ surrounding central scarring.    5/20: significant improvement in Snellen acuity today. Crescentic infiltrate smaller and less dense since 5/6 photo.  6/17: small central infiltrate present at inferior aspect of pupil - smaller than 5/20. Edema much improved. Vision stable.   7/16/21: Stable vision, pt comfortable.    Would like to see active infiltrate completely resolved before stopping  / reducing PHMB further.  8/20/21: PHMB stopped.  Doing well.  10/21/21: Doing well.  Stable off the PHMB.  Disc right eye PKP with cat ext and IOL.  Disc R, B, PC and option. IOL calcs today.  Pt will decide about general anesthesia vs retrobulbar block. 3 hours for  Triple.  12/9/21: POD#1 s/p PKP.  Doing well.  Epi intact, cornea quite clear.     12/16/21: POW#1 s/p PKP. Doing well - no signs of graft failure, cornea clear  1/4/22: POM#1 s/p PKP.  Doing well, cornea is clear.    1/26/22: Approx post op M 2. Pt doing well  4/19/22: Pt doing well with right eye PKP. Cont meds.  8/16/22: Doing well, no pain, stable poor vision right eye, no changes.  12/13/22: PKP looks great. Nir with 2 tight sutures based on todays nir.  1/17/23: PKP clear and compact. Nir with irreg ular cyk noted see nir. Will remove 2 sutures.  4/4/23: Pentacam shows about 10 diopters of astigmatism in right eye. Removed 3 sutures at slit lamp (at 12, 6, and 9 -o-clock).   5/9/23: Pentacam with 6.7D astigmatism (less than last visit)  6/8/23: Penta cam astig at 4.7 D  7/11/23: Pentacam astig 7.3D  4/11/24: Pt did not receive refill since message for refill was misdirected and pt thought that decreased vision was due to cataract (told by outside provider). He comes today with corneal edema which he says started about 3 weeks ago. Will use topical and oral pred today to reverse rejection. Discussed with pt. Pt going out of [facility] rtn in 1 week.            PLAN:   - start pred at right eye q 1 gris around clock for 1 day, then pred q 1 hour during day and q 2 hr at night. Disc right eye subconj steroid injection. Disc R, B PC and options. Pt wishes injection.  Rx sent to [facility] mailorder (pt has gtt now).  Disc No Tx for astigmatism, VS RGP CL or AK.      RTC:  1 week, call if sx worsen to clinic.  -  Later - Appt with Dr Pearl   For right eye RGP or other CL fitting post - PKP    Anson Cheng MD    Attending Physician Attestation:   Complete documentation of historical and exam elements from today's encounter can be found in the full encounter summary report (not reduplicated in this progress note).  I personally obtained the chief complaint(s) and history of present illness.  I confirmed and edited as necessary the review of systems, past medical/surgical history, family history, social history, and examination findings as documented by others; and I examined the patient myself.  I personally reviewed the relevant tests, images, and reports as documented above.  I formulated and edited as necessary the assessment and plan and discussed the findings and management plan with the patient and family. - Anson Cheng MD

## 2024-04-18 NOTE — TELEPHONE ENCOUNTER
[facility] Call Center    Phone Message    May a detailed message be left on voicemail: yes     Reason for Call: Other: Patient returning our call stating that the soonest he can get here is 2pm due to work. If we absolutely need patient to be seen earlier then he can try to take off work early and be here by 1:30pm otherwise patients scheduled to work until 2pm.     Please call patient back at [phone]. Thank you.     Action Taken: Message routed to:  [facility] ([facility]    Travel Screening: Not Applicable

## 2024-04-25 ENCOUNTER — OFFICE VISIT (OUTPATIENT)
Dept: OPHTHALMOLOGY | Facility: CLINIC | Age: 69
End: 2024-04-25
Attending: OPHTHALMOLOGY
Payer: COMMERCIAL

## 2024-04-25 DIAGNOSIS — H04.121 DRY EYE SYNDROME OF RIGHT EYE: ICD-10-CM

## 2024-04-25 DIAGNOSIS — T86.91 GRAFT REJECTION: ICD-10-CM

## 2024-04-25 DIAGNOSIS — Z94.7 S/P PKP (PENETRATING KERATOPLASTY): ICD-10-CM

## 2024-04-25 DIAGNOSIS — Z94.7 HISTORY OF PENETRATING KERATOPLASTY: Primary | ICD-10-CM

## 2024-04-25 DIAGNOSIS — H52.211 IRREGULAR ASTIGMATISM OF RIGHT EYE: ICD-10-CM

## 2024-04-25 DIAGNOSIS — H16.401 CORNEAL NEOVASCULARIZATION OF RIGHT EYE: ICD-10-CM

## 2024-04-25 PROCEDURE — 99214 OFFICE O/P EST MOD 30 MIN: CPT | Mod: GC | Performed by: OPHTHALMOLOGY

## 2024-04-25 PROCEDURE — 99213 OFFICE O/P EST LOW 20 MIN: CPT | Performed by: OPHTHALMOLOGY

## 2024-04-25 ASSESSMENT — SLIT LAMP EXAM - LIDS: COMMENTS: MGD, BLEPHARITIS

## 2024-04-25 ASSESSMENT — CONF VISUAL FIELD
OD_INFERIOR_TEMPORAL_RESTRICTION: 0
OD_SUPERIOR_NASAL_RESTRICTION: 0
OD_NORMAL: 1
OS_NORMAL: 1
OD_INFERIOR_NASAL_RESTRICTION: 0
OS_INFERIOR_NASAL_RESTRICTION: 0
OS_SUPERIOR_TEMPORAL_RESTRICTION: 0
OS_SUPERIOR_NASAL_RESTRICTION: 0
OS_INFERIOR_TEMPORAL_RESTRICTION: 0
METHOD: COUNTING FINGERS
OD_SUPERIOR_TEMPORAL_RESTRICTION: 0

## 2024-04-25 ASSESSMENT — PACHYMETRY: OD_CT(UM): 933

## 2024-04-25 ASSESSMENT — VISUAL ACUITY
CORRECTION_TYPE: GLASSES
METHOD: SNELLEN - LINEAR
OS_CC+: -2
OD_CC: HM 3'
OS_CC: 20/20

## 2024-04-25 ASSESSMENT — EXTERNAL EXAM - LEFT EYE: OS_EXAM: NORMAL

## 2024-04-25 ASSESSMENT — EXTERNAL EXAM - RIGHT EYE: OD_EXAM: NORMAL

## 2024-04-25 ASSESSMENT — TONOMETRY
IOP_METHOD: ICARE
OD_IOP_MMHG: 9
OS_IOP_MMHG: 15

## 2024-04-25 NOTE — PROGRESS NOTES
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 Rosie Han     HPI:  Derik Hamm is male who presents as follow up for Acanthamoeba Keratitis right eye.     Patient initially presented for evaluation of persistent keratoconjunctivitis, OD, since 10/5/2020. Initially seen by Dr. Ngo at [facility] and felt it was related to severe dryness vs bacterial keratitis, so he was started on PFATs and Vigamox Q2H WA. Patient was seen frequently for follow up; he was started on Valtrex 500 mg TID on 10/9/20 and has been on that since then. Despite this therapy, pt failed to improve so he was started prednisolone. He has had a waxing and waning course, but more recently has gradually declined. Stopped PF on 11/2 after running out. He was seen by Dr. Ngo on 11/2 who noted worsening VA and worsening exam, so the patient was referred to Dr. Han, whom he saw 11/4/20. Dr. Han referred to our clinic due to concerns for acanthamoeba keratitis. Culture positive for acanthamoeba on 11/05/20.     Interval hx 04/25/2024  Chief Complaint(s) and History of Present Illness(es)       Follow Up    In right eye.  Presenting in central vision.  Charactertized as  blurred vision.  Associated symptoms include dryness, redness and burning.  Negative for photophobia, flashes and floaters.  Treatments tried include eye drops.  Pain was noted as 0/10. Additional comments: RTN for today post injection             Comments    Pt states that he can see HM with his RE.  Injection was ok last time. Tolerated injection.  Using Dex gtt q1hr during q2hr night.    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  12/8/2021  Dexamethasone injection right eye for rejection 4/18/24     Glasses: Yes  CTL wearer: yes - none x1.5 months; wears while showering or in the sauna; he sleeps in them once every 3-4 weeks; never wears them in a lake or pool     Family hx of eye disease: negative for glaucoma/macular degeneration     Social Hx: (-) smoking, (-) EtOH, (-) illicit drugs     Meds:  Dexamethasone 0.1% Q2H right eye while awake and overnight when he wakes up      Surgical Hx:  S/p PKP with posterior synechiolysis right eye (12/8/21), did not perform ECCE at this time     Assessment:  # Acanthamoeba keratitis, OD              - Symptoms started 10/5/2020              - Acanthamoeba risks: sleeps in CTL, wears in shower, sauna              - Cultures obtained 11/05/20,   Culture positive for acanthamoeba.              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 Plan to increase lubrication.   1/07: Better overall.  pain better.  Stromal haze/KP better as well.  1/19: Stable from previous visit w/ continued diffuse haze,no epi defects.   1/28: Slightly improved corneal clarity.  2/4: Slightly improved corneal clarity  2/18: Exam largely stable, new central bulla  3/2: increased soreness and tearing past few days, exam stable.  Diffuse haze, edema but improved from last photos.  3/11: Worsening pain and soreness over the past week. 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 CL placed right eye  4/8: BCL really helped his pain. Much worse with it off. Epi defect larger with rolled edges. 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 Infiltrate improved. AmbioDisk 9.0 + Kontur lens 8.6/16.0  4/8/21 4/22: Pain improving. Ulcer looks stable to slightly improved. Prokera ring placed  4/29: right eye Much improved infiltrate. Epi defect resolving. Am dissolved.  5/6/21:  AM dissolved, ring removed.  Epi healed, small superocentral crescentic infiltrate remains w/ surrounding central scarring.    5/20: significant improvement in Snellen acuity today. Crescentic infiltrate smaller and less dense since 5/6 photo.  6/17: small central infiltrate present at inferior aspect of pupil - smaller than 5/20. Edema much improved. Vision stable.   7/16/21: Stable vision, pt comfortable.    Would like to see active infiltrate completely resolved before stopping / reducing PHMB further.  8/20/21: PHMB stopped.  Doing well.  10/21/21: Doing well.  Stable off the PHMB.  Disc right eye PKP with cat ext and IOL.  Disc R, B, PC and option. IOL calcs today.  Pt will decide about general anesthesia vs retrobulbar block. 3 hours for  Triple.  12/9/21: POD#1 s/p PKP.  Doing well.  Epi intact, cornea quite clear.     12/16/21: POW#1 s/p PKP. Doing well - no signs of graft failure, cornea clear  1/4/22: POM#1 s/p PKP.  Doing well, cornea is clear.    1/26/22: Approx post op M 2. Pt doing well  4/19/22: Pt doing well with right eye PKP. Cont meds.  8/16/22: Doing well, no pain, stable poor vision right eye, no changes.  12/13/22: PKP looks great. Nir with 2 tight sutures based on todays nir.  1/17/23: PKP clear and compact. Nir with irreg ular cyk noted see nir. Will remove 2 sutures.  4/4/23: Pentacam shows about 10 diopters of astigmatism in right eye. Removed 3 sutures at slit lamp (at 12, 6, and 9 -o-clock).   5/9/23: Pentacam with 6.7D astigmatism (less than last visit)  6/8/23: Penta cam astig at 4.7 D  7/11/23: Pentacam astig 7.3D  4/11/24: Pt did not receive refill (Ran out of drops January 2024) since message for refill was misdirected and pt thought that decreased vision was due to cataract (told by outside provider).  He comes today with corneal edema which he says started about 3 weeks ago. Will use topical and oral pred today to reverse rejection. Discussed with pt. Pt going out of town & wilkl rtn in 1 week.    4/25/24: 1 week s/p dexamethasone subconjunctival injection right eye. Pachymetry persistently 933 today, VA HM. Will follow.  - discussed possible need for repear corneal transplant.            PLAN:   - start pred at right eye q 1 hr around clock for 1 day, then pred q 1 hour during day and q 2 hr at night. Disc right eye subconj steroid injection. Disc R, B PC and options. Pt wishes injection.  - If Corneal edema not improving, will let eye inflammation reduce and consider regraft.      RTC:  2 week, call if sx worsen to clinic.  -  Later - Appt with Dr Pearl   For right eye RGP or other CL fitting post - PKP      Carmita Almodovar MD  Cornea and External Disease Fellow  [facility]     Attending Physician Attestation:  Complete documentation of historical and exam elements from today's encounter can be found in the full encounter summary report (not reduplicated in this progress note).  I personally obtained the chief complaint(s) and history of present illness.  I confirmed and edited as necessary the review of systems, past medical/surgical history, family history, social history, and examination findings as documented by others; and I examined the patient myself.  I personally reviewed the relevant tests, images, and reports as documented above.  I formulated and edited as necessary the assessment and plan and discussed the findings and management plan with the patient and family. - Anson Cheng MD

## 2024-05-03 ENCOUNTER — TELEPHONE (OUTPATIENT)
Dept: OPHTHALMOLOGY | Facility: CLINIC | Age: 69
End: 2024-05-03
Payer: COMMERCIAL

## 2024-05-03 NOTE — TELEPHONE ENCOUNTER
[facility] Call Center    Phone Message    May a detailed message be left on voicemail: yes     Reason for Call: Other: pt wanted to advise he had to cancel the 5/9/24 appt pt is R/S for 5/30/24, pt wants to know if this is ok? If not can he be squeezed in sooner? Please contact pt to discuss Thank you     Action Taken: Message routed to:  [facility] eye    Travel Screening: Not Applicable

## 2024-05-06 NOTE — TELEPHONE ENCOUNTER
Called and LVM     Provided the time and date of the appointment with Dr. Prosper Neff Communication Facilitator on 5/6/2024 at 9:59 AM

## 2024-05-14 ENCOUNTER — OFFICE VISIT (OUTPATIENT)
Dept: OPHTHALMOLOGY | Facility: CLINIC | Age: 69
End: 2024-05-14
Attending: OPHTHALMOLOGY
Payer: COMMERCIAL

## 2024-05-14 DIAGNOSIS — T86.91 GRAFT REJECTION: ICD-10-CM

## 2024-05-14 DIAGNOSIS — Z98.890 POSTOPERATIVE STATE: ICD-10-CM

## 2024-05-14 DIAGNOSIS — H16.401 CORNEAL NEOVASCULARIZATION OF RIGHT EYE: ICD-10-CM

## 2024-05-14 DIAGNOSIS — H04.121 DRY EYE SYNDROME OF RIGHT EYE: ICD-10-CM

## 2024-05-14 DIAGNOSIS — Z94.7 HISTORY OF PENETRATING KERATOPLASTY: Primary | ICD-10-CM

## 2024-05-14 DIAGNOSIS — Z94.7 S/P PKP (PENETRATING KERATOPLASTY): ICD-10-CM

## 2024-05-14 PROCEDURE — 99214 OFFICE O/P EST MOD 30 MIN: CPT | Performed by: OPHTHALMOLOGY

## 2024-05-14 PROCEDURE — 99213 OFFICE O/P EST LOW 20 MIN: CPT | Performed by: OPHTHALMOLOGY

## 2024-05-14 ASSESSMENT — SLIT LAMP EXAM - LIDS: COMMENTS: MGD, BLEPHARITIS

## 2024-05-14 ASSESSMENT — PACHYMETRY: OD_CT(UM): 820

## 2024-05-14 ASSESSMENT — VISUAL ACUITY
OS_CC: 20/20
OD_SC: 2'/200E
METHOD: SNELLEN - LINEAR
CORRECTION_TYPE: GLASSES

## 2024-05-14 ASSESSMENT — EXTERNAL EXAM - RIGHT EYE: OD_EXAM: NORMAL

## 2024-05-14 ASSESSMENT — TONOMETRY
OS_IOP_MMHG: 12
IOP_METHOD: ICARE
OD_IOP_MMHG: 14

## 2024-05-14 ASSESSMENT — EXTERNAL EXAM - LEFT EYE: OS_EXAM: NORMAL

## 2024-05-14 NOTE — NURSING NOTE
Chief Complaints and History of Present Illnesses   Patient presents with    Follow Up     Follow up for acanthamoeba right eye.     Chief Complaint(s) and History of Present Illness(es)       Follow Up              Laterality: right eye    Associated symptoms: Negative for flashes and floaters    Treatments tried: eye drops    Pain scale: 0/10    Comments: Follow up for acanthamoeba right eye.              Comments    The patient uses Prednisolone q2h during the day and at night when he wakes up in the Right eye.  He resports no change in vision.  Flor Betancourt, COA, COA 9:09 AM 05/14/2024

## 2024-05-14 NOTE — PROGRESS NOTES
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 Rosie Han     HPI:  Derik Hamm is male who presents as follow up for Acanthamoeba Keratitis right eye.     Patient initially presented for evaluation of persistent keratoconjunctivitis, OD, since 10/5/2020. Initially seen by Dr. Ngo at [facility] and felt it was related to severe dryness vs bacterial keratitis, so he was started on PFATs and Vigamox Q2H WA. Patient was seen frequently for follow up; he was started on Valtrex 500 mg TID on 10/9/20 and has been on that since then. Despite this therapy, pt failed to improve so he was started prednisolone. He has had a waxing and waning course, but more recently has gradually declined. Stopped PF on 11/2 after running out. He was seen by Dr. Ngo on 11/2 who noted worsening VA and worsening exam, so the patient was referred to Dr. Han, whom he saw 11/4/20. Dr. Han referred to our clinic due to concerns for acanthamoeba keratitis. Culture positive for acanthamoeba on 11/05/20.     Interval hx 05/14/2024  Chief Complaint(s) and History of Present Illness(es)       Follow Up    In right eye.  Presenting in central vision.  Charactertized as  blurred vision.  Associated symptoms include dryness, redness and burning.  Negative for photophobia, flashes and floaters.  Treatments tried include eye drops.  Pain was noted as 0/10. Additional comments: RTN for today post injection             Comments    Pt states that he can see HM with his RE.  Injection was ok last time. Tolerated injection.  Using Dex gtt q1hr during q2hr night.    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  12/8/2021  Dexamethasone injection right eye for rejection 4/18/24     Glasses: Yes  CTL wearer: yes - none x1.5 months; wears while showering or in the sauna; he sleeps in them once every 3-4 weeks; never wears them in a lake or pool     Family hx of eye disease: negative for glaucoma/macular degeneration     Social Hx: (-) smoking, (-) EtOH, (-) illicit drugs     Meds:  Dexamethasone 0.1% Q2H right eye while awake and overnight when he wakes up      Surgical Hx:  S/p PKP with posterior synechiolysis right eye (12/8/21), did not perform ECCE at this time     Assessment:  # Acanthamoeba keratitis, OD              - Symptoms started 10/5/2020              - Acanthamoeba risks: sleeps in CTL, wears in shower, sauna              - Cultures obtained 11/05/20,   Culture positive for acanthamoeba.              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 Plan to increase lubrication.   1/07: Better overall.  pain better.  Stromal haze/KP better as well.  1/19: Stable from previous visit w/ continued diffuse haze,no epi defects.   1/28: Slightly improved corneal clarity.  2/4: Slightly improved corneal clarity  2/18: Exam largely stable, new central bulla  3/2: increased soreness and tearing past few days, exam stable.  Diffuse haze, edema but improved from last photos.  3/11: Worsening pain and soreness over the past week. 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 CL placed right eye  4/8: BCL really helped his pain. Much worse with it off. Epi defect larger with rolled edges. 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 Infiltrate improved. AmbioDisk 9.0 + Kontur lens 8.6/16.0  4/8/21 4/22: Pain improving. Ulcer looks stable to slightly improved. Prokera ring placed  4/29: right eye Much improved infiltrate. Epi defect resolving. Am dissolved.  5/6/21:  AM dissolved, ring removed.  Epi healed, small superocentral crescentic infiltrate remains w/ surrounding central scarring.    5/20: significant improvement in Snellen acuity today. Crescentic infiltrate smaller and less dense since 5/6 photo.  6/17: small central infiltrate present at inferior aspect of pupil - smaller than 5/20. Edema much improved. Vision stable.   7/16/21: Stable vision, pt comfortable.    Would like to see active infiltrate completely resolved before stopping / reducing PHMB further.  8/20/21: PHMB stopped.  Doing well.  10/21/21: Doing well.  Stable off the PHMB.  Disc right eye PKP with cat ext and IOL.  Disc R, B, PC and option. IOL calcs today.  Pt will decide about general anesthesia vs retrobulbar block. 3 hours for  Triple.  12/9/21: POD#1 s/p PKP.  Doing well.  Epi intact, cornea quite clear.     12/16/21: POW#1 s/p PKP. Doing well - no signs of graft failure, cornea clear  1/4/22: POM#1 s/p PKP.  Doing well, cornea is clear.    1/26/22: Approx post op M 2. Pt doing well  4/19/22: Pt doing well with right eye PKP. Cont meds.  8/16/22: Doing well, no pain, stable poor vision right eye, no changes.  12/13/22: PKP looks great. Nir with 2 tight sutures based on todays inr.  1/17/23: PKP clear and compact. Nir with irreg ular cyk noted see nir. Will remove 2 sutures.  4/4/23: Pentacam shows about 10 diopters of astigmatism in right eye. Removed 3 sutures at slit lamp (at 12, 6, and 9 -o-clock).   5/9/23: Pentacam with 6.7D astigmatism (less than last visit)  6/8/23: Penta cam astig at 4.7 D  7/11/23: Pentacam astig 7.3D  4/11/24: Pt did not receive refill (Ran out of drops January 2024) since message for refill was misdirected and pt thought that decreased vision was due to cataract (told by outside provider).  He comes today with corneal edema which he says started about 3 weeks ago. Will use topical and oral pred today to reverse rejection. Discussed with pt. Pt going out of town & wilkl rtn in 1 week.    4/25/24: 1 week s/p dexamethasone subconjunctival injection right eye. Pachymetry persistently 933 today, [facility] HM. Will follow.  - discussed possible need for repear corneal transplant.    5/14/24: Improved pachy right eye to 820 from 904. Cornea is edematous right eye.            PLAN:   - start pred at right eye q 2 hr around clock for 1 day, during day and 3 times at night. Disc right eye subconj steroid injection. Disc R, B PC and options. Pt wishes injection.  - If Corneal edema not improving more , will let eye inflammation reduce and consider regraft.      RTC:  3 week with Pachy and V and T, , call if sx worsen to clinic.    Anson betancourt MD    Attending Physician Attestation:  Complete documentation of historical and exam elements from today's encounter can be found in the full encounter summary report (not reduplicated in this progress note).  I personally obtained the chief complaint(s) and history of present illness.  I confirmed and edited as necessary the review of systems, past medical/surgical history, family history, social history, and examination findings as documented by others; and I examined the patient myself.  I personally reviewed the relevant tests, images, and reports as documented above.  I formulated and edited as necessary the assessment and plan and discussed the findings and management plan with the patient and family. - Anson Betancourt MD

## 2024-06-03 ENCOUNTER — PREP FOR PROCEDURE (OUTPATIENT)
Dept: OPHTHALMOLOGY | Facility: CLINIC | Age: 69
End: 2024-06-03

## 2024-06-03 ENCOUNTER — OFFICE VISIT (OUTPATIENT)
Dept: OPHTHALMOLOGY | Facility: CLINIC | Age: 69
End: 2024-06-03
Attending: OPHTHALMOLOGY
Payer: COMMERCIAL

## 2024-06-03 DIAGNOSIS — H25.13 NUCLEAR SCLEROTIC CATARACT OF BOTH EYES: ICD-10-CM

## 2024-06-03 DIAGNOSIS — T86.8411 CORNEAL TRANSPLANT FAILURE, RIGHT EYE: Primary | ICD-10-CM

## 2024-06-03 PROCEDURE — 76514 ECHO EXAM OF EYE THICKNESS: CPT | Performed by: OPHTHALMOLOGY

## 2024-06-03 PROCEDURE — 99214 OFFICE O/P EST MOD 30 MIN: CPT | Performed by: OPHTHALMOLOGY

## 2024-06-03 PROCEDURE — 99214 OFFICE O/P EST MOD 30 MIN: CPT | Mod: GC | Performed by: OPHTHALMOLOGY

## 2024-06-03 ASSESSMENT — REFRACTION_WEARINGRX
OS_CYLINDER: SPHERE
OS_SPHERE: +1.50
OD_ADD: +2.50
OD_SPHERE: PLANO
OS_ADD: +2.50

## 2024-06-03 ASSESSMENT — PACHYMETRY
OS_CT(UM): 494
OD_CT(UM): 858

## 2024-06-03 ASSESSMENT — EXTERNAL EXAM - RIGHT EYE: OD_EXAM: NORMAL

## 2024-06-03 ASSESSMENT — VISUAL ACUITY
OS_CC: 20/20
METHOD: SNELLEN - LINEAR
OS_CC+: -3
CORRECTION_TYPE: GLASSES
OD_CC: 2/200 E

## 2024-06-03 ASSESSMENT — TONOMETRY
OD_IOP_MMHG: 20
IOP_METHOD: TONOPEN
OS_IOP_MMHG: 18

## 2024-06-03 ASSESSMENT — EXTERNAL EXAM - LEFT EYE: OS_EXAM: NORMAL

## 2024-06-03 ASSESSMENT — SLIT LAMP EXAM - LIDS: COMMENTS: MGD, BLEPHARITIS

## 2024-06-03 NOTE — PROGRESS NOTES
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 Rosie Han     HPI:  Derik Hamm is male who presents as follow up for Acanthamoeba Keratitis right eye.     Patient initially presented for evaluation of persistent keratoconjunctivitis, OD, since 10/5/2020. Initially seen by Dr. Ngo at [facility] and felt it was related to severe dryness vs bacterial keratitis, so he was started on PFATs and Vigamox Q2H WA. Patient was seen frequently for follow up; he was started on Valtrex 500 mg TID on 10/9/20 and has been on that since then. Despite this therapy, pt failed to improve so he was started prednisolone. He has had a waxing and waning course, but more recently has gradually declined. Stopped PF on 11/2 after running out. He was seen by Dr. Ngo on 11/2 who noted worsening VA and worsening exam, so the patient was referred to Dr. Han, whom he saw 11/4/20. Dr. Han referred to our clinic due to concerns for acanthamoeba keratitis. Culture positive for acanthamoeba on 11/05/20.     Interval hx 06/03/2024  Chief Complaint(s) and History of Present Illness(es)       Follow Up    In right eye.  Since onset it is stable.  Associated symptoms include eye pain.  Negative for dryness, headache and photophobia.  Treatments tried include eye drops.  Pain was noted as 3/10. Additional comments: Acanthamoeba keratitis, right eye              Comments    He states that his vision has seemed stable in both eyes, since his last eye exam.  He tells me that his right eye feels sore and achy at times.   He uses Dexamethasone drops every 2-3 hours in his right eye.    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  Dexamethasone injection right eye for rejection 4/18/24     Glasses: Yes  CTL wearer:  yes - none x1.5 months; wears while showering or in the sauna; he sleeps in them once every 3-4 weeks; never wears them in a lake or pool     Family hx of eye disease: negative for glaucoma/macular degeneration     Social Hx: (-) smoking, (-) EtOH, (-) illicit drugs     Meds:  Dexamethasone 0.1% Q2H right eye while awake and overnight when he wakes up      Surgical Hx:  S/p PKP with posterior synechiolysis right eye (12/8/21), did not perform ECCE at this time     Assessment:  # Acanthamoeba keratitis, OD              - Symptoms started 10/5/2020              - Acanthamoeba risks: sleeps in CTL, wears in shower, sauna              - Cultures obtained 11/05/20,   Culture positive for acanthamoeba.              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 Plan to increase lubrication.   1/07: Better overall.  pain better.  Stromal haze/KP better as well.  1/19: Stable from previous visit w/ continued diffuse haze,no epi defects.   1/28: Slightly improved corneal clarity.  2/4: Slightly improved corneal clarity  2/18: Exam largely stable, new central bulla  3/2: increased soreness and tearing past few days, exam stable.  Diffuse haze, edema but improved from last photos.  3/11: Worsening pain and soreness over the past week. 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 CL placed right eye  4/8: BCL really helped his pain. Much worse with it off. Epi defect larger with rolled edges. 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 Infiltrate improved. AmbioDisk 9.0 + Kontur lens 8.6/16.0 4/8/21 4/22: Pain improving. Ulcer looks stable to slightly improved. Prokera ring placed  4/29:  right eye Much improved infiltrate. Epi defect resolving. Am dissolved.  5/6/21:  AM dissolved, ring removed.  Epi healed, small superocentral crescentic infiltrate remains w/ surrounding central scarring.    5/20: significant improvement in Snellen acuity today. Crescentic infiltrate smaller and less dense since 5/6 photo.  6/17: small central infiltrate present at inferior aspect of pupil - smaller than 5/20. Edema much improved. Vision stable.   7/16/21: Stable vision, pt comfortable.    Would like to see active infiltrate completely resolved before stopping / reducing PHMB further.  8/20/21: PHMB stopped.  Doing well.  10/21/21: Doing well.  Stable off the PHMB.  Disc right eye PKP with cat ext and IOL.  Disc R, B, PC and option. IOL calcs today.  Pt will decide about general anesthesia vs retrobulbar block. 3 hours for  Triple.  12/9/21: POD#1 s/p PKP.  Doing well.  Epi intact, cornea quite clear.     12/16/21: POW#1 s/p PKP. Doing well - no signs of graft failure, cornea clear  1/4/22: POM#1 s/p PKP.  Doing well, cornea is clear.    1/26/22: Approx post op M 2. Pt doing well  4/19/22: Pt doing well with right eye PKP. Cont meds.  8/16/22: Doing well, no pain, stable poor vision right eye, no changes.  12/13/22: PKP looks great. Nir with 2 tight sutures based on todays nir.  1/17/23: PKP clear and compact. Nir with irreg ular cyk noted see nir. Will remove 2 sutures.  4/4/23: Pentacam shows about 10 diopters of astigmatism in right eye. Removed 3 sutures at slit lamp (at 12, 6, and 9 -o-clock).   5/9/23: Pentacam with 6.7D astigmatism (less than last visit)  6/8/23: Penta cam astig at 4.7 D  7/11/23: Pentacam astig 7.3D  4/11/24: Pt did not receive refill (Ran out of drops January 2024) since message for refill was misdirected and pt thought that decreased vision was due to cataract (told by outside provider). He comes today with corneal edema which he says started about 3 weeks ago. Will use topical and  oral pred today to reverse rejection. Discussed with pt. Pt going out of town & wilkl rtn in 1 week.  4/25/24: 1 week s/p dexamethasone subconjunctival injection right eye. Pachymetry persistently 933 today, VA HM. Will follow.  - discussed possible need for repear corneal transplant.  5/14/24: Improved pachy right eye to 820 from 904. Cornea is edematous right eye.  06/03/24: right eye pachy increased from 820 to 858 right eye. One loose suture removed right eye             PLAN:   - Corneal transplant failure OD - no response to aggressive topical, subconj, and oral prednisone  - cornea rejected when patient he was unable to get a refill of topical steroid  - patient was intolerant of lenses prior to rejection OD  - discussed DMEK OD vs repeat PKP OD - patient prefers repeat PKP OD since he was unsatisfied with glasses only vision OD 1 year ago  - discussed concurrent CE/IOL OD vs. Staged CE/IOL OD   - recommend repeat PKP OD with staged CE/IOL OD after suture removal OD  - plan for larger diameter PKP OD to help minimize astigmatism    RTC:  POD#1    Thank you for entrusting us with your care  Leslie Mathis MD, PGY3  Ophthalmology Resident  [facility] [place]    Attending Physician Attestation:  Complete documentation of historical and exam elements from today's encounter can be found in the full encounter summary report (not reduplicated in this progress note).  I personally obtained the chief complaint(s) and history of present illness.  I confirmed and edited as necessary the review of systems, past medical/surgical history, family history, social history, and examination findings as documented by others; and I examined the patient myself.  I personally reviewed the relevant tests, images, and reports as documented above.  I formulated and edited as necessary the assessment and plan and discussed the findings and management plan with the patient and family. - Matti Mendes,  MD    --------------------------------------------------------------------------------------------------------------------------------------------  Pachymetry - Interpretation & Report  Indication: post corneal transplant, corneal transplant failure OD, bullous keratopathy OD  Performed by: Matti Mendes MD  Reliability: good  Patient cooperation: good  Findings:   Right eye:  858 micrometers centrally    Left eye:  494 micrometers centrally   Interval Change, Assessment, & Impact on treatment:   Right eye:  worsening K edema   Left eye:  thin   Signed: Matti Mendes MD 6/3/2024 2:06 PM

## 2024-06-03 NOTE — NURSING NOTE
Chief Complaints and History of Present Illnesses   Patient presents with    Follow Up     Acanthamoeba keratitis, right eye      Chief Complaint(s) and History of Present Illness(es)       Follow Up              Laterality: right eye    Course: stable    Associated symptoms: eye pain.  Negative for dryness, headache and photophobia    Treatments tried: eye drops    Pain scale: 3/10    Comments: Acanthamoeba keratitis, right eye               Comments    He states that his vision has seemed stable in both eyes, since his last eye exam.  He tells me that his right eye feels sore and achy at times.   He uses Dexamethasone drops every 2-3 hours in his right eye.    Lisa Moe, COT 12:57 PM  Alicia 3, 2024

## 2024-06-05 ENCOUNTER — TELEPHONE (OUTPATIENT)
Dept: OPHTHALMOLOGY | Facility: CLINIC | Age: 69
End: 2024-06-05
Payer: COMMERCIAL

## 2024-06-05 PROBLEM — T86.8411 CORNEAL TRANSPLANT FAILURE, RIGHT EYE: Status: ACTIVE | Noted: 2024-06-03

## 2024-06-05 NOTE — TELEPHONE ENCOUNTER
FUTURE VISIT INFORMATION      SURGERY INFORMATION:  Date: 24  [facility]  Surgeon:  Matti Mendes MD   Anesthesia Type:  MAC with Retrobulbar   Procedure: KERATOPLASTY, PENETRATING   Consult: ov 6/3    RECORDS REQUESTED FROM:       Primary Care Provider: Jaki Villagran MD - [facility]    Pertinent Medical History: paroxysmal atrial fibrillation     Most recent EKG+ Tracin21    Most recent ECHO: 22

## 2024-06-05 NOTE — TELEPHONE ENCOUNTER
Called patient to schedule surgery with Dr Mendes    Spoke with: patient    Date of Surgery: 6/11 (Right)     Patient aware of approximate arrival time: Yes at late morning/early afternoon     Location of surgery: [facility] (Right)      Pre-Op H&P: PAC  at 6/6     Informed patient that they need to call to schedule pre-op H&P with  within 30 days of surgery date: Not Applicable    Post-Op Appt Dates: 6/12, 6/19, 7/3, 8/19     Discussed with patient pre-op RN will call 2-3 days prior to surgery with arrival time and instructions:  Not Applicable    Discussed with patient PAC RN will provide arrival time and instructions for surgery at the time of the appointment: [[place] locations only]: Yes      Standard Surgery Packet Sent: Yes 06/05/24  via [employer] Message      Surgical Soap Discussed with Patient: No      Additional Information Sent in Packet: Post-op Appointment Itinerary      Informed patient that they will need an adult  to bring patient home from surgery: Yes  : Patient is aware of the need for an adult          Additional Comments:        All patients questions were answered and was instructed to review surgical packet and call back [phone] with any questions or concerns.       Ashley Covington on 6/5/2024 at 3:01 PM

## 2024-06-06 ENCOUNTER — PRE VISIT (OUTPATIENT)
Dept: SURGERY | Facility: CLINIC | Age: 69
End: 2024-06-06

## 2024-06-06 ENCOUNTER — LAB (OUTPATIENT)
Dept: LAB | Facility: CLINIC | Age: 69
End: 2024-06-06
Payer: COMMERCIAL

## 2024-06-06 ENCOUNTER — OFFICE VISIT (OUTPATIENT)
Dept: SURGERY | Facility: CLINIC | Age: 69
End: 2024-06-06
Payer: COMMERCIAL

## 2024-06-06 ENCOUNTER — ANESTHESIA EVENT (OUTPATIENT)
Dept: SURGERY | Facility: AMBULATORY SURGERY CENTER | Age: 69
End: 2024-06-06
Payer: COMMERCIAL

## 2024-06-06 VITALS
HEIGHT: 72 IN | DIASTOLIC BLOOD PRESSURE: 88 MMHG | RESPIRATION RATE: 16 BRPM | HEART RATE: 74 BPM | SYSTOLIC BLOOD PRESSURE: 138 MMHG | TEMPERATURE: 97.8 F | OXYGEN SATURATION: 94 % | BODY MASS INDEX: 29.77 KG/M2 | WEIGHT: 219.8 LBS

## 2024-06-06 DIAGNOSIS — Z01.818 PREOP EXAMINATION: Primary | ICD-10-CM

## 2024-06-06 DIAGNOSIS — Z01.818 PREOP EXAMINATION: ICD-10-CM

## 2024-06-06 DIAGNOSIS — T86.8411 CORNEAL TRANSPLANT FAILURE, RIGHT EYE: ICD-10-CM

## 2024-06-06 DIAGNOSIS — Z79.899 ON PRE-EXPOSURE PROPHYLAXIS FOR HIV: ICD-10-CM

## 2024-06-06 LAB
ANION GAP SERPL CALCULATED.3IONS-SCNC: 9 MMOL/L (ref 7–15)
BUN SERPL-MCNC: 26.6 MG/DL (ref 8–23)
C TRACH DNA SPEC QL NAA+PROBE: NEGATIVE
CALCIUM SERPL-MCNC: 9.5 MG/DL (ref 8.8–10.2)
CHLORIDE SERPL-SCNC: 101 MMOL/L (ref 98–107)
CREAT SERPL-MCNC: 1.37 MG/DL (ref 0.67–1.17)
DEPRECATED HCO3 PLAS-SCNC: 28 MMOL/L (ref 22–29)
EGFRCR SERPLBLD CKD-EPI 2021: 57 ML/MIN/1.73M2
GLUCOSE SERPL-MCNC: 106 MG/DL (ref 70–99)
N GONORRHOEA DNA SPEC QL NAA+PROBE: NEGATIVE
POTASSIUM SERPL-SCNC: 4.7 MMOL/L (ref 3.4–5.3)
SODIUM SERPL-SCNC: 138 MMOL/L (ref 135–145)
T PALLIDUM AB SER QL: NONREACTIVE

## 2024-06-06 PROCEDURE — 80048 BASIC METABOLIC PNL TOTAL CA: CPT | Performed by: PATHOLOGY

## 2024-06-06 PROCEDURE — 36415 COLL VENOUS BLD VENIPUNCTURE: CPT | Performed by: PATHOLOGY

## 2024-06-06 PROCEDURE — 87591 N.GONORRHOEAE DNA AMP PROB: CPT | Performed by: STUDENT IN AN ORGANIZED HEALTH CARE EDUCATION/TRAINING PROGRAM

## 2024-06-06 PROCEDURE — 99000 SPECIMEN HANDLING OFFICE-LAB: CPT | Performed by: PATHOLOGY

## 2024-06-06 PROCEDURE — 99203 OFFICE O/P NEW LOW 30 MIN: CPT | Performed by: NURSE PRACTITIONER

## 2024-06-06 PROCEDURE — 87389 HIV-1 AG W/HIV-1&-2 AB AG IA: CPT | Performed by: STUDENT IN AN ORGANIZED HEALTH CARE EDUCATION/TRAINING PROGRAM

## 2024-06-06 PROCEDURE — 86780 TREPONEMA PALLIDUM: CPT | Performed by: STUDENT IN AN ORGANIZED HEALTH CARE EDUCATION/TRAINING PROGRAM

## 2024-06-06 PROCEDURE — 87491 CHLMYD TRACH DNA AMP PROBE: CPT | Performed by: STUDENT IN AN ORGANIZED HEALTH CARE EDUCATION/TRAINING PROGRAM

## 2024-06-06 ASSESSMENT — PAIN SCALES - GENERAL: PAINLEVEL: NO PAIN (0)

## 2024-06-06 ASSESSMENT — LIFESTYLE VARIABLES: TOBACCO_USE: 1

## 2024-06-06 ASSESSMENT — ENCOUNTER SYMPTOMS: DYSRHYTHMIAS: 1

## 2024-06-06 NOTE — LETTER
"June 12, 2024      Derik Wilson  [address] [place] [address]          Dear Derik,     Here are your recent lab results:     Your STI (sexually transmitted infection) testing was normal.     We should repeat the creatinine level in a few weeks. I recommend drinking plenty of water and trying to be as hydrated as possible. It does not have to be a fasting lab.  If the creatinine is still high, it may be a marker of some kidney involvement, possibly from the [place]. We may want to consider switching PrEP to Descovy.     You can make the \"lab only\" appointment through [employer] or by calling us at [phone].     Please let us know if you have questions or concerns.     Best,  Deirdre Milligan, MD  Internal Medicine & Pediatrics  [facility]    Resulted Orders   HIV Antigen Antibody Combo   Result Value Ref Range    HIV Antigen Antibody Combo Nonreactive Nonreactive      Comment:      Negative HIV-1 p24 antigen and HIV-1/2 antibody screening test results usually indicate the absence of HIV-1 and HIV-2 infection. However, such negative results do not rule-out acute HIV infection.  If acute HIV-1 or HIV-2 infection is suspected, detection of HIV-1 or HIV-2 RNA  is recommended.    NEISSERIA GONORRHOEA PCR   Result Value Ref Range    Neisseria gonorrhoeae Negative Negative      Comment:      Negative for N. gonorrhoeae rRNA by transcription mediated amplification. A negative result by transcription mediated amplification does not preclude the presence of C. trachomatis infection because results are dependent on proper and adequate collection, absence of inhibitors and sufficient rRNA to be detected.   CHLAMYDIA TRACHOMATIS PCR   Result Value Ref Range    Chlamydia trachomatis Negative Negative      Comment:      A negative result by transcription mediated amplification does not preclude the presence of C. trachomatis infection because results are dependent on proper and adequate collection, absence " of inhibitors and sufficient rRNA to be detected.   Treponema Abs w Reflex to RPR and Titer   Result Value Ref Range    Treponema Antibody Total Nonreactive Nonreactive   Basic metabolic panel  (Ca, Cl, CO2, Creat, Gluc, K, Na, BUN)   Result Value Ref Range    Sodium 138 135 - 145 mmol/L      Comment:      Reference intervals for this test were updated on 09/26/2023 to more accurately reflect our healthy population. There may be differences in the flagging of prior results with similar values performed with this method. Interpretation of those prior results can be made in the context of the updated reference intervals.     Potassium 4.7 3.4 - 5.3 mmol/L    Chloride 101 98 - 107 mmol/L    Carbon Dioxide (CO2) 28 22 - 29 mmol/L    Anion Gap 9 7 - 15 mmol/L    Urea Nitrogen 26.6 (H) 8.0 - 23.0 mg/dL    Creatinine 1.37 (H) 0.67 - 1.17 mg/dL    GFR Estimate 57 (L) >60 mL/min/1.73m2    Calcium 9.5 8.8 - 10.2 mg/dL    Glucose 106 (H) 70 - 99 mg/dL       If you have any questions or concerns, please call the clinic at the number listed above.       Sincerely,      Deirdre E Milligan, MD

## 2024-06-06 NOTE — H&P
Pre-Operative H & P     CC:  Preoperative exam to assess for increased cardiopulmonary risk while undergoing surgery and anesthesia.    Date of Encounter: 6/6/2024  Primary Care Physician:  Milligan, Deirdre E     Reason for visit:   Encounter Diagnoses   Name Primary?    Preop examination Yes    Corneal transplant failure, right eye        HPI  Derik Hamm is a 68 year old male who presents for pre-operative H & P in preparation for  Procedure Information       Case: [id number] Date/Time: 06/11/24 1200    Procedure: KERATOPLASTY, PENETRATING (Right: Eye)    Anesthesia type: MAC with Retrobulbar    Diagnosis: Corneal transplant failure, right eye [T86.8411]    Pre-op diagnosis: Corneal transplant failure, right eye [T86.8411]    Location: [place] [address] / [facility]    Providers: Matti Mendes MD            Derik Hamm is a 68 year old male with hypertension, history of a-flutter/a-fib, history of smoking, GERD, CKD and history of BCC that has corneal transplant failure of the right eye.  He had his corneal transplant on 12/8/21.  He has been having pain and vision changes in the right eye.  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  The above listed procedure has been recommended for treatment.     History is obtained from the patient and chart review    Hx of abnormal bleeding or anti-platelet use: none      Past Medical History  Past Medical History:   Diagnosis Date    Acanthamoeba keratitis     Appendicitis with perforation 11/05/2018    Atrial flutter with rapid ventricular response (H) 09/29/2021    Atrial flutter with rapid ventricular response (H) 09/29/2021    Basal cell carcinoma     New onset atrial flutter (H) 09/29/2021    Paroxysmal atrial fibrillation (H)     Presbyopia     PVD (posterior vitreous detachment), left     Secondary cardiomyopathy (H)     Secondary cardiomyopathy (H)     In Jan 2022: Compared to the [facility]  dated 10/1/2021, LVEF has improved from 40-45% to 56%,  left atrial size has normalized, mitral and tricuspid valve regurgitation has  decreased from mild to trace.     On ACE-I and beta blocker       Past Surgical History  Past Surgical History:   Procedure Laterality Date    ANESTHESIA CARDIOVERSION N/A 10/01/2021    Procedure: ANESTHESIA, FOR CARDIOVERSION;  Surgeon: GENERIC ANESTHESIA PROVIDER;  Location: [facility] OR    EP ABLATION ATRIAL FLUTTER N/A 11/17/2021    Procedure: EP Ablation Atrial Flutter;  Surgeon: Nayla Levin MD;  Location: [facility] CARDIAC CATH LAB    KERATOPLASTY PENETRATING Right 12/08/2021    Procedure: KERATOPLASTY, PENETRATING, posteror synechiolysis;  Surgeon: Anson Cheng MD;  Location: [facility] OR    LAPAROSCOPIC APPENDECTOMY N/A 11/04/2018    Procedure: LAPAROSCOPIC APPENDECTOMY;  Surgeon: Mackenzie Dale MD;  Location: [facility] OR    ORTHOPEDIC SURGERY      TRANSPLANT  12/21    Cornea       Prior to Admission Medications  Current Outpatient Medications   Medication Sig Dispense Refill    dexAMETHasone (DECADRON) 0.1 % ophthalmic solution Place 1-2 drops into the right eye every hour 15 mL 5    emtricitabine-tenofovir (TRUVADA) 200-300 MG per tablet Take 1 tablet by mouth daily (Patient taking differently: Take 1 tablet by mouth every morning) 90 tablet 4    emtricitabine-tenofovir (TRUVADA) 200-300 MG per tablet Take 1 tablet by mouth daily (Patient taking differently: Take 1 tablet by mouth every morning) 10 tablet 0    ketoconazole (NIZORAL) 2 % external shampoo Use daily as needed 120 mL 11    lisinopril (ZESTRIL) 2.5 MG tablet Take 1 tablet (2.5 mg) by mouth daily (Patient taking differently: Take 2.5 mg by mouth every morning) 90 tablet 4    metoprolol succinate ER (TOPROL XL) 25 MG 24 hr tablet Take 1 tablet (25 mg) by mouth daily (Patient taking differently: Take 25 mg by mouth every morning) 90 tablet 4    multivitamin, therapeutic (THERA-VIT) TABS tablet  Take 1 tablet by mouth daily (with lunch)      naproxen sodium 220 MG capsule Take 220 mg by mouth 2 times daily as needed (pain)      omeprazole (PRILOSEC) 20 MG DR capsule Take 1 capsule (20 mg) by mouth daily (Patient taking differently: Take 20 mg by mouth every morning) 90 capsule 4    TURMERIC PO Take by mouth daily      imiquimod (ALDARA) 5 % external cream Apply to AA and to surrounding 1/2 inch M-F at HS x 6 weeks (Patient not taking: Reported on 2023) 20 packet 3    ofloxacin (OCUFLOX) 0.3 % ophthalmic solution 1-2 drops 4 times daily (Patient not taking: Reported on 2023)      prednisoLONE acetate (PRED FORTE) 1 % ophthalmic suspension Place 1-2 drops into the right eye every hour (Patient not taking: Reported on 2024) 15 mL 11    prednisoLONE acetate (PRED FORTE) 1 % ophthalmic suspension Place 1 drop into the right eye 4 times daily (Patient not taking: Reported on 2024) 10 mL 7       Allergies  No Known Allergies    Social History  Social History     Socioeconomic History    Marital status:      Spouse name: Not on file    Number of children: 0    Years of education: Not on file    Highest education level: Not on file   Occupational History    Occupation: sales     Employer: [employer]   Tobacco Use    Smoking status: Former     Current packs/day: 0.00     Average packs/day: 1.5 packs/day for 10.0 years (15.0 ttl pk-yrs)     Types: Cigarettes     Start date: 1975     Quit date: 1980     Years since quittin.5    Smokeless tobacco: Never   Substance and Sexual Activity    Alcohol use: No    Drug use: No    Sexual activity: Yes     Partners: Male     Comment: Monogmous relationship     Other Topics Concern    Parent/sibling w/ CABG, MI or angioplasty before 65F 55M? No   Social History Narrative    Not on file     Social Determinants of Health     Financial Resource Strain: Low Risk  (2023)    Financial Resource Strain     Within the past 12 months, have you  or your family members you live with been unable to get utilities (heat, electricity) when it was really needed?: No   Food Insecurity: Low Risk  (11/17/2023)    Food Insecurity     Within the past 12 months, did you worry that your food would run out before you got money to buy more?: No     Within the past 12 months, did the food you bought just not last and you didn t have money to get more?: No   Transportation Needs: Low Risk  (11/17/2023)    Transportation Needs     Within the past 12 months, has lack of transportation kept you from medical appointments, getting your medicines, non-medical meetings or appointments, work, or from getting things that you need?: No   Physical Activity: Sufficiently Active (9/26/2022)    Exercise Vital Sign     Days of Exercise per Week: 6 days     Minutes of Exercise per Session: 50 min   Stress: No Stress Concern Present (9/26/2022)    [employer] [place] [employer] - Occupational Stress Questionnaire     Feeling of Stress : Only a little   Social Connections: Moderately Isolated (9/26/2022)    Social Connection and Isolation Panel [NHANES]     Frequency of Communication with Friends and Family: More than three times a week     Frequency of Social Gatherings with Friends and Family: Once a week     Attends Quaker Services: Never     Active Member of Clubs or Organizations: No     Attends Club or Organization Meetings: Not on file     Marital Status:    Interpersonal Safety: Low Risk  (11/21/2023)    Interpersonal Safety     Do you feel physically and emotionally safe where you currently live?: Yes     Within the past 12 months, have you been hit, slapped, kicked or otherwise physically hurt by someone?: No     Within the past 12 months, have you been humiliated or emotionally abused in other ways by your partner or ex-partner?: No   Housing Stability: Low Risk  (11/17/2023)    Housing Stability     Do you have housing? : Yes     Are you worried about losing  your housing?: No       Family History  Family History   Problem Relation Age of Onset    Skin Cancer Mother     Lung Cancer Father 85    Skin Cancer Brother     Glaucoma No family hx of     Macular Degeneration No family hx of     Diabetes No family hx of     Hypertension No family hx of     Anesthesia Reaction No family hx of     Thrombosis No family hx of        Review of Systems  The complete review of systems is negative other than noted in the HPI or here.   Anesthesia Evaluation   Pt has had prior anesthetic.     No history of anesthetic complications       ROS/MED HX  ENT/Pulmonary:     (+)     JOAQUIN risk factors,  hypertension,         tobacco use, Past use,                    (-) recent URI   Neurologic:  - neg neurologic ROS     Cardiovascular: Comment: Recovered secondary cardiomyopathy.    (+)  hypertension- -   -  - -                        dysrhythmias (s/p a-fib ablation in dec 2021), a-fib and a-flutter,        Previous cardiac testing   Echo: Date: 2022 Results:  Echo  2022  Interpretation Summary     Left ventricular systolic function is normal.  Biplane LVEF is 56%.  Left ventricular diastolic function is normal.  No regional wall motion abnormalities noted.  Normal left atrial size.  No valve disease.     Compared to the [facility] dated 10/1/2021, LVEF has improved from 40-45% to 56%,  left atrial size has normalized, mitral and tricuspid valve regurgitation has  decreased from mild to trace.     Stress Test:  Date: Results:    ECG Reviewed:  Date: Results:  Sinus  Rhythm   -RSR(V1) -nondiagnostic.    -Left atrial enlargement.   Cath:  Date: 2021 Results:      METS/Exercise Tolerance: >4 METS Comment: Goes to the gym daily and does weight training there.  Is also active walking during his work shifts at [employer] (approx 8 miles per shift).     Denies any exertional dyspnea or angina.    Hematologic:  - neg hematologic  ROS     Musculoskeletal:  - neg musculoskeletal ROS     GI/Hepatic:     (+) GERD,  Asymptomatic on medication,                  Renal/Genitourinary:     (+) renal disease, type: CRI,            Endo:  - neg endo ROS     Psychiatric/Substance Use:  - neg psychiatric ROS     Infectious Disease:  - neg infectious disease ROS     Malignancy:   (+) Malignancy, History of Skin.Skin CA Remission status post Surgery.      Other: Comment: Right eye corneal transplant failure           /88 (BP Location: Right arm, Patient Position: Sitting, Cuff Size: Adult Regular)   Pulse 74   Temp 97.8  F (36.6  C) (Oral)   Resp 16   Ht 1.829 m (6')   Wt 99.7 kg (219 lb 12.8 oz)   SpO2 94%   BMI 29.81 kg/m      Physical Exam   Constitutional: Awake, alert, cooperative, no apparent distress, and appears stated age.  Eyes: Pupils equal, round and reactive to light, extra ocular muscles intact, sclera clear, conjunctiva normal.  HENT: Normocephalic, oral pharynx with moist mucus membranes, good dentition. No goiter appreciated.   Respiratory: Clear to auscultation bilaterally, no crackles or wheezing.  Cardiovascular: Regular rate and rhythm, normal S1 and S2, and no murmur noted.  Carotids +2, no bruits. No edema. Palpable pulses to radial  DP and PT arteries.   GI: Normal bowel sounds, soft, non-distended, non-tender, no masses palpated, no hepatosplenomegaly.    Lymph/Hematologic: No cervical lymphadenopathy and no supraclavicular lymphadenopathy.  Genitourinary:  deferred  Skin: Warm and dry.  No rashes at anticipated surgical site.   Musculoskeletal: Full ROM of neck. There is no redness, warmth, or swelling of the expose joints. Gross motor strength is normal.    Neurologic: Awake, alert, oriented to name, place and time. Cranial nerves II-XII are grossly intact. Gait is normal.   Neuropsychiatric: Calm, cooperative. Normal affect.     Prior Labs/Diagnostic Studies   All labs and imaging personally reviewed     EKG/ stress test - if available please see in ROS above     Component      Latest Ref Rng  11/21/2023  9:52 AM 3/5/2024  9:31 AM   Sodium      135 - 145 mmol/L  142    Potassium      3.4 - 5.3 mmol/L  4.8    Chloride      98 - 107 mmol/L  102    Carbon Dioxide (CO2)      22 - 29 mmol/L  32 (H)    Anion Gap      7 - 15 mmol/L  8    Urea Nitrogen      8.0 - 23.0 mg/dL  31.6 (H)    Creatinine      0.67 - 1.17 mg/dL  1.35 (H)    GFR Estimate      >60 mL/min/1.73m2  58 (L)    Calcium      8.8 - 10.2 mg/dL  9.7    Glucose      70 - 99 mg/dL  104 (H)    WBC      4.0 - 11.0 10e3/uL 5.9     RBC Count      4.40 - 5.90 10e6/uL 6.38 (H)     Hemoglobin      13.3 - 17.7 g/dL 17.4     Hematocrit      40.0 - 53.0 % 56.9 (H)     MCV      78 - 100 fL 89     MCH      26.5 - 33.0 pg 27.3     MCHC      31.5 - 36.5 g/dL 30.6 (L)     RDW      10.0 - 15.0 % 17.3 (H)     Platelet Count      150 - 450 10e3/uL 267        Legend:  (H) High  (L) Low      The patient's records and results personally reviewed by this provider.     Outside records reviewed from: Care Everywhere    LAB/DIAGNOSTIC STUDIES TODAY:    Component      Latest Ref Rng 6/6/2024  9:53 AM   Sodium      135 - 145 mmol/L 138    Potassium      3.4 - 5.3 mmol/L 4.7    Chloride      98 - 107 mmol/L 101    Carbon Dioxide (CO2)      22 - 29 mmol/L 28    Anion Gap      7 - 15 mmol/L 9    Urea Nitrogen      8.0 - 23.0 mg/dL 26.6 (H)    Creatinine      0.67 - 1.17 mg/dL 1.37 (H)    GFR Estimate      >60 mL/min/1.73m2 57 (L)    Calcium      8.8 - 10.2 mg/dL 9.5    Glucose      70 - 99 mg/dL 106 (H)       Legend:  (H) High  (L) Low    Assessment    Derik Hamm is a 68 year old male seen as a PAC referral for risk assessment and optimization for anesthesia.    Plan/Recommendations  Pt will be optimized for the proposed procedure.  See below for details on the assessment, risk, and preoperative recommendations    NEUROLOGY  - No history of TIA, CVA or seizure    -Post Op delirium risk factors:  No risk identified    ENT  - No current airway concerns.  Will need to be  reassessed day of surgery.  Mallampati: I  TM: > 3    CARDIAC  - No history of CAD  - history of a-flutter/a-fib s/p ablation in Dec 2021.  Also had cardiomyopathy secondary to the a-flutter.  Denies any recurrent symptoms. 2022 repeat echo showed improved EF.  Patient met with caridology last on 2/1/22 and it was noted that further cardiology follow up wasn't needed.    - prior cardiac testing as above.  - continue lisinopril and toprol Xl without interruption.    - METS (Metabolic Equivalents)  Patient performs 4 or more METS exercise without symptoms             Total Score: 0      RCRI-Very low risk: Class 1 0.4% complication rate             Total Score: 0        PULMONARY    JOAQUIN Medium Risk             Total Score: 3    JOAQUIN: Hypertension    JOAQUIN: Over 50 ys old    JOAQUIN: Male      - Denies asthma or inhaler use  - Tobacco History    History   Smoking Status    Former    Packs/day: 1.50    Years: 10.00    Types: Cigarettes    Start date: 12/1/1975    Quit date: 12/1/1980   Smokeless Tobacco    Never       GI  - GERD is well controlled with omeprazole.   PONV Low Risk  Total Score: 1           1 AN PONV: Patient is not a current smoker        /RENAL  - last two creatinine levels have been slightly elevated.  Will recheck BMP today.  Encouraged patient to follow up with primary care provider if creatinine remains elevated as adjustment of his PREP medication may be indicated.      ENDOCRINE    - BMI: Estimated body mass index is 29.81 kg/m  as calculated from the following:    Height as of this encounter: 1.829 m (6').    Weight as of this encounter: 99.7 kg (219 lb 12.8 oz).  Overweight (BMI 25.0-29.9)      HEME  VTE Low Risk 0.5%             Total Score: 3    VTE: Greater than 59 yrs old    VTE: Male      - No history of abnormal bleeding or antiplatelet use.      ID  - on truvada for PREP        Different anesthesia methods/types have been discussed with the patient, but they are aware that the final plan will  be decided by the assigned anesthesia provider on the date of service.      The patient is optimized for their procedure. AVS with information on surgery time/arrival time, meds and NPO status given by nursing staff. No further diagnostic testing indicated.      On the day of service:     Prep time: 5 minutes  Visit time: 14 minutes  Documentation time: 7 minutes  ------------------------------------------  Total time: 26 minutes      DEREK Mcintyre CNP  Preoperative Assessment Center  [facility]  Clinic and Surgery Center  Phone: [phone]  Fax: [phone]

## 2024-06-06 NOTE — PATIENT INSTRUCTIONS
Preparing for Your Surgery      Name:  Derik Hamm   MRN:  [id number]   :  1955   Today's Date:  2024         Arriving for surgery:  Surgery date:  2024  Arrival time:  10:30 am    Restrictions due to COVID 19:    Please maintain social distance.  Masking is optional.      parking is available for anyone with mobility limitations or disabilities. (Monday- Friday 7 am- 5 pm)    Please come to:    [facility]  [address] [place] [id number]    Please check in on the 5th floor at the Ambulatory Surgery Center.      What can I eat or drink?    -  You may eat and drink normally until 8 hours prior to arrival  time. (Until 2 am)  -  You may have clear liquids until 2 hours prior to arrival  time. (Until 8:30 am)    Examples of clear liquids:  Water  Clear broth  Juices (apple, white grape, white cranberry  and cider) without pulp  Noncarbonated, powder based beverages  (lemonade and Jermaine-Aid)  Sodas (Sprite, 7-Up, ginger ale and seltzer)  Coffee or tea (without milk or cream)  Gatorade      Which medicines can I take?    Hold Aspirin for 7 days before surgery.   Hold Multivitamins for 7 days before surgery.  Hold Supplements for 7 days before surgery.  (Turmeric)  Hold Ibuprofen (Advil, Motrin) for 1 day before surgery--unless otherwise directed by surgeon.  Hold Naproxen (Aleve) for 4 days before surgery.    No alcohol or cannabis products for 24 hours prior to procedure.      -  PLEASE TAKE the following medications the day of surgery:   All usual am prescription medications  Eye drops as directed       How do I prepare myself?  - Please take 2 showers (one the night prior to surgery and one the morning of surgery) using Scrubcare or Hibiclens soap.    Use this soap only from the neck to your toes.     Leave the soap on your skin for one minute--then rinse thoroughly.      You may use your own shampoo and conditioner. No other hair products.   - Please  remove all jewelry and body piercings.  - No lotions, deodorants or fragrance.  - No makeup or fingernail polish.   - Bring your ID and insurance card.    -If you have a Deep Brain Stimulator, a Spinal Cord Stimulator, or any implanted Neuro Device, you must bring the remote to the Surgery Center.         ALL PATIENTS ARE REQUIRED TO HAVE A RESPONSIBLE ADULT TO DRIVE AND BE IN ATTENDANCE WITH THEM FOR 24 HOURS FOLLOWING SURGERY.     Covid testing policy as of 12/06/2022  Your surgeon will notify and schedule you for a COVID test if one is needed before surgery--please direct any questions or COVID symptoms to your surgeon      Questions or Concerns:    -For questions regarding the day of surgery, please contact the Ambulatory Surgery Center at [phone].    -If you have health changes between today and your surgery, please contact your surgeon.     - For questions after surgery, please contact your surgeon's office.

## 2024-06-07 DIAGNOSIS — Z00.00 LABORATORY EXAMINATION ORDERED AS PART OF A ROUTINE GENERAL MEDICAL EXAMINATION: ICD-10-CM

## 2024-06-07 DIAGNOSIS — Z79.899 ON PRE-EXPOSURE PROPHYLAXIS FOR HIV: Primary | ICD-10-CM

## 2024-06-07 LAB — HIV 1+2 AB+HIV1 P24 AG SERPL QL IA: NONREACTIVE

## 2024-06-07 NOTE — PROGRESS NOTES
Repeat creatinine. Consider switch truvada to descovy if still elevated.    Deirdre Milligan, MD  Internal Medicine & Pediatrics  [facility]  She/her

## 2024-06-07 NOTE — RESULT ENCOUNTER NOTE
Angel More,    Your test results are attached.  Your blood test is acceptable for surgery, but as you can see, the creatinine level is still elevated.  Please follow up with your primary care provider regarding this as we discussed.         Natalie Aguilera DNP, RN, ANP-C

## 2024-06-10 DIAGNOSIS — T86.91 GRAFT REJECTION: Primary | ICD-10-CM

## 2024-06-10 DIAGNOSIS — Z94.7 HISTORY OF PENETRATING KERATOPLASTY: ICD-10-CM

## 2024-06-10 DIAGNOSIS — T86.8411 CORNEAL TRANSPLANT FAILURE, RIGHT EYE: ICD-10-CM

## 2024-06-10 RX ORDER — PREDNISOLONE ACETATE 10 MG/ML
1 SUSPENSION/ DROPS OPHTHALMIC 4 TIMES DAILY
Qty: 10 ML | Refills: 1 | Status: SHIPPED | OUTPATIENT
Start: 2024-06-10

## 2024-06-10 RX ORDER — OFLOXACIN 3 MG/ML
1 SOLUTION/ DROPS OPHTHALMIC 4 TIMES DAILY
Qty: 10 ML | Refills: 1 | Status: SHIPPED | OUTPATIENT
Start: 2024-06-10

## 2024-06-10 ASSESSMENT — LIFESTYLE VARIABLES: TOBACCO_USE: 1

## 2024-06-10 ASSESSMENT — ENCOUNTER SYMPTOMS: DYSRHYTHMIAS: 1

## 2024-06-10 NOTE — ANESTHESIA PREPROCEDURE EVALUATION
Anesthesia Pre-Procedure Evaluation    Patient: Derik Hamm   MRN: [id number] : 1955        Procedure : Procedure(s):  RIGHT EYE KERATOPLASTY, PENETRATING          Past Medical History:   Diagnosis Date    Acanthamoeba keratitis     Appendicitis with perforation 2018    Atrial flutter with rapid ventricular response (H) 2021    Atrial flutter with rapid ventricular response (H) 2021    Basal cell carcinoma     New onset atrial flutter (H) 2021    Paroxysmal atrial fibrillation (H)     Presbyopia     PVD (posterior vitreous detachment), left     Secondary cardiomyopathy (H)     Secondary cardiomyopathy (H)     In 2022: Compared to the [facility]I dated 10/1/2021, LVEF has improved from 40-45% to 56%,  left atrial size has normalized, mitral and tricuspid valve regurgitation has  decreased from mild to trace.     On ACE-I and beta blocker      Past Surgical History:   Procedure Laterality Date    ANESTHESIA CARDIOVERSION N/A 10/01/2021    Procedure: ANESTHESIA, FOR CARDIOVERSION;  Surgeon: GENERIC ANESTHESIA PROVIDER;  Location: [facility] OR    EP ABLATION ATRIAL FLUTTER N/A 2021    Procedure: EP Ablation Atrial Flutter;  Surgeon: Nayla Levin MD;  Location: [facility] CARDIAC CATH LAB    KERATOPLASTY PENETRATING Right 2021    Procedure: KERATOPLASTY, PENETRATING, posteror synechiolysis;  Surgeon: Anson Cheng MD;  Location: [facility] OR    LAPAROSCOPIC APPENDECTOMY N/A 2018    Procedure: LAPAROSCOPIC APPENDECTOMY;  Surgeon: Mackenzie Dale MD;  Location:  OR    ORTHOPEDIC SURGERY      TRANSPLANT      Cornea      No Known Allergies   Social History     Tobacco Use    Smoking status: Former     Current packs/day: 0.00     Average packs/day: 1.5 packs/day for 10.0 years (15.0 ttl pk-yrs)     Types: Cigarettes     Start date: 1975     Quit date: 1980     Years since quittin.5    Smokeless tobacco: Never   Substance Use  Topics    Alcohol use: No      Wt Readings from Last 1 Encounters:   06/06/24 99.7 kg (219 lb 12.8 oz)        Anesthesia Evaluation   Pt has had prior anesthetic.     No history of anesthetic complications       ROS/MED HX  ENT/Pulmonary: Comment: Corneal transplant failure, right eye    (+)                tobacco use, Past use,                       Neurologic:  - neg neurologic ROS     Cardiovascular:     (+)  - -   -  - -                        dysrhythmias, a-flutter and a-fib,             METS/Exercise Tolerance: >4 METS    Hematologic:  - neg hematologic  ROS     Musculoskeletal:  - neg musculoskeletal ROS     GI/Hepatic:  - neg GI/hepatic ROS     Renal/Genitourinary:  - neg Renal ROS     Endo:  - neg endo ROS     Psychiatric/Substance Use:  - neg psychiatric ROS     Infectious Disease:  - neg infectious disease ROS     Malignancy:  - neg malignancy ROS     Other:  - neg other ROS          Physical Exam    Airway  airway exam normal      Mallampati: I   TM distance: > 3 FB   Neck ROM: full   Mouth opening: > 3 cm    Respiratory Devices and Support         Dental       (+) Completely normal teeth      Cardiovascular   cardiovascular exam normal       Rhythm and rate: regular and normal     Pulmonary   pulmonary exam normal        breath sounds clear to auscultation           OUTSIDE LABS:  CBC:   Lab Results   Component Value Date    WBC 5.9 11/21/2023    WBC 5.5 11/17/2021    HGB 17.4 11/21/2023    HGB 17.8 (H) 11/17/2021    HCT 56.9 (H) 11/21/2023    HCT 55.7 (H) 11/17/2021     11/21/2023     11/17/2021     BMP:   Lab Results   Component Value Date     06/06/2024     03/05/2024    POTASSIUM 4.7 06/06/2024    POTASSIUM 4.8 03/05/2024    CHLORIDE 101 06/06/2024    CHLORIDE 102 03/05/2024    CO2 28 06/06/2024    CO2 32 (H) 03/05/2024    BUN 26.6 (H) 06/06/2024    BUN 31.6 (H) 03/05/2024    CR 1.37 (H) 06/06/2024    CR 1.35 (H) 03/05/2024     (H) 06/06/2024     (H)  "03/05/2024     COAGS:   Lab Results   Component Value Date    INR 1.03 10/01/2021     POC: No results found for: \"BGM\", \"HCG\", \"HCGS\"  HEPATIC:   Lab Results   Component Value Date    ALBUMIN 3.9 11/21/2023    PROTTOTAL 6.9 11/21/2023    ALT 36 11/21/2023    AST 32 11/21/2023    ALKPHOS 69 11/21/2023    BILITOTAL 0.5 11/21/2023     OTHER:   Lab Results   Component Value Date    A1C 5.3 10/03/2022    HENRY 9.5 06/06/2024    MAG 2.0 10/01/2021    LIPASE 69 (L) 11/04/2018    TSH 1.99 09/29/2021       Anesthesia Plan    ASA Status:  3    NPO Status:  NPO Appropriate    Anesthesia Type: MAC.     - Reason for MAC: Deep or markedly invasive procedure (G8)   Induction: Propofol.   Maintenance: N/A.        Consents    Anesthesia Plan(s) and associated risks, benefits, and realistic alternatives discussed. Questions answered and patient/representative(s) expressed understanding.     - Discussed:     - Discussed with:  Patient       Use of blood products discussed: No .     Postoperative Care    Pain management: Multi-modal analgesia, Oral pain medications.   PONV prophylaxis: Ondansetron (or other 5HT-3), Dexamethasone or Solumedrol     Comments:               Alexey Smith,     I have reviewed the pertinent notes and labs in the chart from the past 30 days and (re)examined the patient.  Any updates or changes from those notes are reflected in this note.              # Overweight: Estimated body mass index is 29.81 kg/m  as calculated from the following:    Height as of 6/6/24: 1.829 m (6').    Weight as of 6/6/24: 99.7 kg (219 lb 12.8 oz).      "

## 2024-06-11 ENCOUNTER — HOSPITAL ENCOUNTER (OUTPATIENT)
Facility: AMBULATORY SURGERY CENTER | Age: 69
Discharge: HOME | End: 2024-06-11
Attending: OPHTHALMOLOGY
Payer: COMMERCIAL

## 2024-06-11 ENCOUNTER — ANESTHESIA (OUTPATIENT)
Dept: SURGERY | Facility: AMBULATORY SURGERY CENTER | Age: 69
End: 2024-06-11
Payer: COMMERCIAL

## 2024-06-11 VITALS
WEIGHT: 210 LBS | HEART RATE: 69 BPM | BODY MASS INDEX: 28.44 KG/M2 | SYSTOLIC BLOOD PRESSURE: 127 MMHG | TEMPERATURE: 98.3 F | HEIGHT: 72 IN | OXYGEN SATURATION: 97 % | DIASTOLIC BLOOD PRESSURE: 88 MMHG | RESPIRATION RATE: 16 BRPM

## 2024-06-11 DIAGNOSIS — T86.8411 CORNEAL TRANSPLANT FAILURE, RIGHT EYE: Primary | ICD-10-CM

## 2024-06-11 PROCEDURE — 65730 CORNEAL TRANSPLANT: CPT | Performed by: NURSE ANESTHETIST, CERTIFIED REGISTERED

## 2024-06-11 PROCEDURE — 65730 CORNEAL TRANSPLANT: CPT | Mod: RT | Performed by: OPHTHALMOLOGY

## 2024-06-11 PROCEDURE — 65730 CORNEAL TRANSPLANT: CPT | Performed by: ANESTHESIOLOGY

## 2024-06-11 PROCEDURE — V2785 CORNEAL TISSUE PROCESSING: HCPCS | Mod: RT,ZZSP

## 2024-06-11 PROCEDURE — 87070 CULTURE OTHR SPECIMN AEROBIC: CPT | Performed by: OPHTHALMOLOGY

## 2024-06-11 PROCEDURE — 65730 CORNEAL TRANSPLANT: CPT | Mod: RT

## 2024-06-11 PROCEDURE — 87102 FUNGUS ISOLATION CULTURE: CPT | Performed by: OPHTHALMOLOGY

## 2024-06-11 PROCEDURE — 99000 SPECIMEN HANDLING OFFICE-LAB: CPT | Performed by: PATHOLOGY

## 2024-06-11 DEVICE — EYE CORNEA PROCESS FEE FOR MN LIONS BANK: Type: IMPLANTABLE DEVICE | Site: EYE | Status: FUNCTIONAL

## 2024-06-11 RX ORDER — HYDROMORPHONE HYDROCHLORIDE 1 MG/ML
0.2 INJECTION, SOLUTION INTRAMUSCULAR; INTRAVENOUS; SUBCUTANEOUS EVERY 5 MIN PRN
Status: DISCONTINUED | OUTPATIENT
Start: 2024-06-11 | End: 2024-06-12 | Stop reason: HOSPADM

## 2024-06-11 RX ORDER — NALOXONE HYDROCHLORIDE 0.4 MG/ML
0.1 INJECTION, SOLUTION INTRAMUSCULAR; INTRAVENOUS; SUBCUTANEOUS
Status: DISCONTINUED | OUTPATIENT
Start: 2024-06-11 | End: 2024-06-12 | Stop reason: HOSPADM

## 2024-06-11 RX ORDER — FENTANYL CITRATE 50 UG/ML
25 INJECTION, SOLUTION INTRAMUSCULAR; INTRAVENOUS EVERY 5 MIN PRN
Status: DISCONTINUED | OUTPATIENT
Start: 2024-06-11 | End: 2024-06-12 | Stop reason: HOSPADM

## 2024-06-11 RX ORDER — DEXAMETHASONE SODIUM PHOSPHATE 4 MG/ML
INJECTION, SOLUTION INTRA-ARTICULAR; INTRALESIONAL; INTRAMUSCULAR; INTRAVENOUS; SOFT TISSUE PRN
Status: DISCONTINUED | OUTPATIENT
Start: 2024-06-11 | End: 2024-06-11 | Stop reason: HOSPADM

## 2024-06-11 RX ORDER — BALANCED SALT SOLUTION 6.4; .75; .48; .3; 3.9; 1.7 MG/ML; MG/ML; MG/ML; MG/ML; MG/ML; MG/ML
SOLUTION OPHTHALMIC PRN
Status: DISCONTINUED | OUTPATIENT
Start: 2024-06-11 | End: 2024-06-11 | Stop reason: HOSPADM

## 2024-06-11 RX ORDER — LIDOCAINE HYDROCHLORIDE 20 MG/ML
INJECTION, SOLUTION INFILTRATION; PERINEURAL PRN
Status: DISCONTINUED | OUTPATIENT
Start: 2024-06-11 | End: 2024-06-11

## 2024-06-11 RX ORDER — OXYCODONE HYDROCHLORIDE 5 MG/1
10 TABLET ORAL
Status: DISCONTINUED | OUTPATIENT
Start: 2024-06-11 | End: 2024-06-12 | Stop reason: HOSPADM

## 2024-06-11 RX ORDER — ACETAMINOPHEN 325 MG/1
975 TABLET ORAL ONCE
Status: COMPLETED | OUTPATIENT
Start: 2024-06-11 | End: 2024-06-11

## 2024-06-11 RX ORDER — OXYCODONE HYDROCHLORIDE 5 MG/1
5 TABLET ORAL
Status: DISCONTINUED | OUTPATIENT
Start: 2024-06-11 | End: 2024-06-12 | Stop reason: HOSPADM

## 2024-06-11 RX ORDER — FENTANYL CITRATE 50 UG/ML
INJECTION, SOLUTION INTRAMUSCULAR; INTRAVENOUS PRN
Status: DISCONTINUED | OUTPATIENT
Start: 2024-06-11 | End: 2024-06-11

## 2024-06-11 RX ORDER — SODIUM CHLORIDE, SODIUM LACTATE, POTASSIUM CHLORIDE, CALCIUM CHLORIDE 600; 310; 30; 20 MG/100ML; MG/100ML; MG/100ML; MG/100ML
INJECTION, SOLUTION INTRAVENOUS CONTINUOUS
Status: CANCELLED | OUTPATIENT
Start: 2024-06-11

## 2024-06-11 RX ORDER — TETRACAINE HYDROCHLORIDE 5 MG/ML
SOLUTION OPHTHALMIC PRN
Status: DISCONTINUED | OUTPATIENT
Start: 2024-06-11 | End: 2024-06-11 | Stop reason: HOSPADM

## 2024-06-11 RX ORDER — SODIUM CHLORIDE, SODIUM LACTATE, POTASSIUM CHLORIDE, CALCIUM CHLORIDE 600; 310; 30; 20 MG/100ML; MG/100ML; MG/100ML; MG/100ML
INJECTION, SOLUTION INTRAVENOUS CONTINUOUS
Status: DISCONTINUED | OUTPATIENT
Start: 2024-06-11 | End: 2024-06-12 | Stop reason: HOSPADM

## 2024-06-11 RX ORDER — SODIUM CHLORIDE, SODIUM LACTATE, POTASSIUM CHLORIDE, CALCIUM CHLORIDE 600; 310; 30; 20 MG/100ML; MG/100ML; MG/100ML; MG/100ML
INJECTION, SOLUTION INTRAVENOUS CONTINUOUS PRN
Status: DISCONTINUED | OUTPATIENT
Start: 2024-06-11 | End: 2024-06-11

## 2024-06-11 RX ORDER — PROPOFOL 10 MG/ML
INJECTION, EMULSION INTRAVENOUS PRN
Status: DISCONTINUED | OUTPATIENT
Start: 2024-06-11 | End: 2024-06-11

## 2024-06-11 RX ORDER — PREDNISOLONE ACETATE 1 %
SUSPENSION, DROPS(FINAL DOSAGE FORM)(ML) OPHTHALMIC (EYE) PRN
Status: DISCONTINUED | OUTPATIENT
Start: 2024-06-11 | End: 2024-06-11 | Stop reason: HOSPADM

## 2024-06-11 RX ORDER — ONDANSETRON 4 MG/1
4 TABLET, ORALLY DISINTEGRATING ORAL EVERY 30 MIN PRN
Status: DISCONTINUED | OUTPATIENT
Start: 2024-06-11 | End: 2024-06-12 | Stop reason: HOSPADM

## 2024-06-11 RX ORDER — ONDANSETRON 2 MG/ML
4 INJECTION INTRAMUSCULAR; INTRAVENOUS EVERY 30 MIN PRN
Status: DISCONTINUED | OUTPATIENT
Start: 2024-06-11 | End: 2024-06-12 | Stop reason: HOSPADM

## 2024-06-11 RX ORDER — DEXAMETHASONE SODIUM PHOSPHATE 10 MG/ML
4 INJECTION, SOLUTION INTRAMUSCULAR; INTRAVENOUS
Status: DISCONTINUED | OUTPATIENT
Start: 2024-06-11 | End: 2024-06-12 | Stop reason: HOSPADM

## 2024-06-11 RX ORDER — FENTANYL CITRATE 50 UG/ML
50 INJECTION, SOLUTION INTRAMUSCULAR; INTRAVENOUS EVERY 5 MIN PRN
Status: DISCONTINUED | OUTPATIENT
Start: 2024-06-11 | End: 2024-06-12 | Stop reason: HOSPADM

## 2024-06-11 RX ORDER — LIDOCAINE 40 MG/G
CREAM TOPICAL
Status: DISCONTINUED | OUTPATIENT
Start: 2024-06-11 | End: 2024-06-12 | Stop reason: HOSPADM

## 2024-06-11 RX ORDER — HYDROMORPHONE HYDROCHLORIDE 1 MG/ML
0.4 INJECTION, SOLUTION INTRAMUSCULAR; INTRAVENOUS; SUBCUTANEOUS EVERY 5 MIN PRN
Status: DISCONTINUED | OUTPATIENT
Start: 2024-06-11 | End: 2024-06-12 | Stop reason: HOSPADM

## 2024-06-11 RX ADMIN — LIDOCAINE HYDROCHLORIDE 60 MG: 20 INJECTION, SOLUTION INFILTRATION; PERINEURAL at 13:30

## 2024-06-11 RX ADMIN — SODIUM CHLORIDE, SODIUM LACTATE, POTASSIUM CHLORIDE, CALCIUM CHLORIDE: 600; 310; 30; 20 INJECTION, SOLUTION INTRAVENOUS at 13:22

## 2024-06-11 RX ADMIN — FENTANYL CITRATE 25 MCG: 50 INJECTION, SOLUTION INTRAMUSCULAR; INTRAVENOUS at 13:38

## 2024-06-11 RX ADMIN — ACETAMINOPHEN 975 MG: 325 TABLET ORAL at 11:18

## 2024-06-11 RX ADMIN — PROPOFOL 70 MG: 10 INJECTION, EMULSION INTRAVENOUS at 13:30

## 2024-06-11 NOTE — ADDENDUM NOTE
Addendum  created 06/11/24 1636 by Torres Mendez MD    Attestation recorded in Intraprocedure, Intraprocedure Attestations filed

## 2024-06-11 NOTE — ANESTHESIA POSTPROCEDURE EVALUATION
Patient: Derik Wilson    Procedure: Procedure(s):  RIGHT EYE KERATOPLASTY, PENETRATING       Anesthesia Type:  MAC    Note:  Disposition: Outpatient   Postop Pain Control: Uneventful            Sign Out: Well controlled pain   PONV: No   Neuro/Psych: Uneventful            Sign Out: Acceptable/Baseline neuro status   Airway/Respiratory: Uneventful            Sign Out: Acceptable/Baseline resp. status   CV/Hemodynamics: Uneventful            Sign Out: Acceptable CV status; No obvious hypovolemia; No obvious fluid overload   Other NRE: NONE   DID A NON-ROUTINE EVENT OCCUR?            Last vitals:  Vitals Value Taken Time   /88 06/11/24 1444   Temp 36.8  C (98.3  F) 06/11/24 1444   Pulse 69 06/11/24 1444   Resp 16 06/11/24 1444   SpO2 97 % 06/11/24 1444       Electronically Signed By: Andrea Barry MD  June 11, 2024  3:14 PM

## 2024-06-11 NOTE — OP NOTE
Operative Report    Date of Operation: 6/11/24  Pre-operative diagnosis:   1. Corneal graft failure, RIGHT eye    Post-operative diagnosis: Same   Procedure(s):   1. Penetrating keratoplasty, right eye (8.75 mm into 8.5mm)  Surgeon(s): Dr. Matti Mendes  Fellow: Carmita Almodovar MD    Findings: None   Blood Loss: None  Complications: None   Implant Name Type Inv. Item Serial No.  Lot No. LRB No. Used Action   EYE [employer] PROCESS FEE FOR [employer] - [id number] OD-C Lens/Eye Implant EYE [employer] PROCESS FEE FOR [employer]  OD[employer]C [employer]  Right 1 Implanted       INDICATION FOR PROCEDURE   The patient has a history of acanthameoba keratitis requiring penetrating keratoplasty. History of PKP failure requiring repeat PKP.  The risks, including, but not limited to infection, loss of vision, loss of eye, need for more surgery, and bleeding, along with the benefits, alternatives, expectations, and the procedure itself were discussed at length with the patient who wished to proceed with surgery.   DESCRIPTION OF PROCEDURE   In the preoperative suite, the patient was identified, the surgical site marked and informed consent was obtained. The patient was the brought back to the operative suite where the appropriate anesthesia monitors were connected. A routine time-out was performed and retrobulbar block consisting of Lidocaine, Marcaine, and Wydase was administered to the right eye. The patient's right eye was then prepped and draped in the usual sterile fashion for ophthalmic surgery.  Following draping, a lid speculum was placed to the operative eye. Calipers were used to measure the cornea and the decision was made to proceed with an 8.5 mm corneal trephination and a 8.75 mm donor transplant. A marking pen was used to identify the center of the cornea. An RK marker was used to teresa the location for the 8 cardinal sutures.  Attention was then directed towards the donor cornea. An 8.5 mm donor  suction trephine was then used to trephinate the cornea. The donor rim and media were sent for routine aerobic, anaerobic, and fungal cultures.   Attention was then directed back to the patient's cornea. An 8.75 mm Fernandez-Hessberg corneal trephine was used to trephinate the patient's cornea to 80% depth. A pair of 0.12 forceps and a supersharp blade were used to enter the AC in a controlled fashion at 10 o'clock within the groove of the trephination. Healon was injected to fill the anterior chamber. Corneal scissors to the left and right were then used to completed excise the cornea along the trephination groove. Healon was then used to coat the open terri and ocular surface. The donor cornea was then placed endothelial side down over the open-terri. The cornea was sutured in place using 16 interrupted 10-0 nylon sutures. BSS on a 30G cannula was used to irrigate the anterior chamber and burp out residual Healon. The sutures were rotated and buried. The wound was checked and found to be watertight. The AC was noted to be formed and the pressure was noted to be adequate.  Subconjunctival injections of Ancef and Dexamethasone were administered to the inferior fornix. The lid speculum and drapes were carefully removed. Prednisolone and ofloxacin drops were placed to the operative eye. A patch and shield were taped over the eye. The patient was then taken to the recovery room in stable condition having tolerated the procedure well. The patient was discharged home in good condition. Patient is to follow-up next day at eye clinic for post-operative visit.  Dr. Matti Mendes was scrubbed and performed the entire surgery.      Carmita Almodovar MD  Cornea and External Disease Fellow  [facility]     Matti Mendes MD   of Ophthalmology

## 2024-06-11 NOTE — ANESTHESIA CARE TRANSFER NOTE
Patient: Derik Wilson    Procedure: Procedure(s):  RIGHT EYE KERATOPLASTY, PENETRATING       Diagnosis: Corneal transplant failure, right eye [T86.8411]  Diagnosis Additional Information: No value filed.    Anesthesia Type:   MAC     Note:    Oropharynx: oropharynx clear of all foreign objects  Level of Consciousness: awake  Oxygen Supplementation: room air    Independent Airway: airway patency satisfactory and stable  Dentition: dentition unchanged  Vital Signs Stable: post-procedure vital signs reviewed and stable  Report to RN Given: handoff report given  Patient transferred to: Phase II  Comments: Report to Phase II RN. Resps easy and reg.   Handoff Report: Identifed the Patient, Identified the Reponsible Provider, Reviewed the pertinent medical history, Discussed the surgical course, Reviewed Intra-OP anesthesia mangement and issues during anesthesia, Set expectations for post-procedure period and Allowed opportunity for questions and acknowledgement of understanding      Vitals:  Vitals Value Taken Time   /76 06/11/24 1432   Temp 36.8  C (98.3  F) 06/11/24 1432   Pulse 82 06/11/24 1432   Resp 16 06/11/24 1432   SpO2 95 % 06/11/24 1432       Electronically Signed By: DEREK Norris CRNA  June 11, 2024  2:34 PM

## 2024-06-11 NOTE — DISCHARGE INSTRUCTIONS
"[facility]  Home Care Following Anesthesia  For 24 hours after surgery:  Get plenty of rest.  A responsible adult must stay with you for at least 24 hours after you leave the surgery center.  Do not drive or use heavy equipment.  If you have weakness or tingling, don't drive or use heavy equipment until this feeling goes away.   Do not drink alcohol.   Avoid strenuous or risky activities.  Ask for help when climbing stairs.  You may feel lightheaded.  IF so, sit for a few minutes before standing.  Have someone help you get up.   If you have nausea (feel sick to your stomach): Drink only clear liquids such as apple juice, ginger ale, broth or 7-Up.  Rest may also help.  Be sure to drink enough fluids.  Move to a regular diet as you feel able.   You may have a slight fever.  Call the doctor if your fever is over 100 F (37.7 C) (taken under the tongue) or lasts longer than 24 hours.  You may have a dry mouth, a sore throat, muscle aches or trouble sleeping. These should go away after 24 hours.  Do not make important or legal decisions.   It is recommended to avoid smoking.        Today you received a Marcaine or bupivacaine block to numb the nerves near your surgery site.  This is a block using local anesthetic or \"numbing\" medication injected around the nerves to anesthetize or \"numb\" the area supplied by those nerves.  This block is injected into the muscle layer near your surgical site.  The medication may numb the location where you had surgery for 6-18 hours, but may last up to 24 hours.  If your surgical site is an arm or leg you should be careful with your affected limb, since it is possible to injure your limb without being aware of it due to the numbing.  Until full feeling returns, you should guard against bumping or hitting your limb, and avoid extreme hot or cold temperatures on the skin.  As the block wears off, the feeling will return as a tingling or prickly " sensation near your surgical site.  You will experience more discomfort from your incision as the feeling returns.  You may want to take a pain pill (a narcotic or Tylenol if this was prescribed by your surgeon) when you start to experience mild pain before the pain beccomes more severe.  If your pain medications do not control your pain you should notifiy your surgeon.    Tips for taking pain medications  To get the best pain relief possible, remember these points:  Take pain medications as directed, before pain becomes severe.  Pain medication can upset your stomach: taking it with food may help.  Constipation is a common side effect of pain medication. Drink plenty of  fluids.  Eat foods high in fiber. Take a stool softener if recommended by your doctor or pharmacist.  Do not drink alcohol, drive or operate machinery while taking pain medications.  Ask about other ways to control pain, such as with heat, ice or relaxation.    Tylenol/Acetaminophen Consumption    If you feel your pain relief is insufficient, you may take Tylenol/Acetaminophen in addition to your narcotic pain medication.   Be careful not to exceed 4,000 mg of Tylenol/Acetaminophen in a 24 hour period from all sources.  If you are taking extra strength Tylenol/acetaminophen (500 mg), the maximum dose is 8 tablets in 24 hours.  If you are taking regular strength acetaminophen (325 mg), the maximum dose is 12 tablets in 24 hours.    Call a doctor for any of the following:  Signs of infection (fever, growing tenderness at the surgery site, a large amount of drainage or bleeding, severe pain, foul-smelling drainage, redness, swelling).  It has been over 8 to 10 hours since surgery and you are still not able to urinate (pass water).  Headache for over 24 hours.  Signs of Covid-19 infection (temperature over 100 degrees, shortness of breath, cough, loss of taste/smell, generalized body aches, persistent headache, chills, sore throat,  nausea/vomiting/diarrhea)  Your doctor is:  Dr. Matti Mendes, Ophthalmology: [phone]                 Or dial [phone] and ask for the resident on call for:  Ophthalmology  For emergency care, call the:  [facility]:  [phone] (TTY for hearing impaired: [phone])

## 2024-06-12 ENCOUNTER — OFFICE VISIT (OUTPATIENT)
Dept: OPHTHALMOLOGY | Facility: CLINIC | Age: 69
End: 2024-06-12
Attending: OPHTHALMOLOGY
Payer: COMMERCIAL

## 2024-06-12 DIAGNOSIS — Z98.890 POSTOPERATIVE EYE STATE: Primary | ICD-10-CM

## 2024-06-12 DIAGNOSIS — H40.051 BORDERLINE GLAUCOMA OF RIGHT EYE WITH OCULAR HYPERTENSION: ICD-10-CM

## 2024-06-12 PROCEDURE — 99024 POSTOP FOLLOW-UP VISIT: CPT | Mod: GC | Performed by: OPHTHALMOLOGY

## 2024-06-12 RX ORDER — BRIMONIDINE TARTRATE 2 MG/ML
1 SOLUTION/ DROPS OPHTHALMIC 2 TIMES DAILY
Qty: 10 ML | Refills: 1 | Status: SHIPPED | OUTPATIENT
Start: 2024-06-12

## 2024-06-12 RX ORDER — DORZOLAMIDE HYDROCHLORIDE AND TIMOLOL MALEATE 20; 5 MG/ML; MG/ML
1 SOLUTION/ DROPS OPHTHALMIC 2 TIMES DAILY
Qty: 10 ML | Refills: 1 | Status: SHIPPED | OUTPATIENT
Start: 2024-06-12

## 2024-06-12 RX ORDER — LATANOPROST 50 UG/ML
1 SOLUTION/ DROPS OPHTHALMIC AT BEDTIME
Qty: 2.5 ML | Refills: 1 | Status: SHIPPED | OUTPATIENT
Start: 2024-06-12

## 2024-06-12 RX ORDER — ACETAZOLAMIDE 250 MG/1
500 TABLET ORAL ONCE
Status: DISPENSED | OUTPATIENT
Start: 2024-06-12

## 2024-06-12 ASSESSMENT — TONOMETRY
OD_IOP_MMHG: 38
IOP_METHOD: ICARE
OD_IOP_MMHG: 42
IOP_METHOD: ICARE

## 2024-06-12 ASSESSMENT — EXTERNAL EXAM - LEFT EYE: OS_EXAM: NORMAL

## 2024-06-12 ASSESSMENT — VISUAL ACUITY
METHOD: SNELLEN - LINEAR
OD_SC: 20/250

## 2024-06-12 ASSESSMENT — EXTERNAL EXAM - RIGHT EYE: OD_EXAM: NORMAL

## 2024-06-12 ASSESSMENT — SLIT LAMP EXAM - LIDS: COMMENTS: MGD, BLEPHARITIS

## 2024-06-12 NOTE — LETTER
June 12, 2024      Re: Derik Wilson   1955    To Whom It May Concern:    This is to confirm that the above patient was seen on 6/12/2024.  Derik Wilson is able to return to work tomorrow. Patient had eye surgery yesterday and is unable to lift up to 10 pounds for next month.    Thank you for your cooperation in this matter.  Please do not hesitate to contact me if you have any further questions.    Sincerely,      IVANNA KEITH

## 2024-06-12 NOTE — PROGRESS NOTES
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 Rosie Han     HPI:  Derik Hamm is male who presents as follow up for Acanthamoeba Keratitis right eye.     Patient initially presented for evaluation of persistent keratoconjunctivitis, OD, since 10/5/2020. Initially seen by Dr. Ngo at [facility] and felt it was related to severe dryness vs bacterial keratitis, so he was started on PFATs and Vigamox Q2H WA. Patient was seen frequently for follow up; he was started on Valtrex 500 mg TID on 10/9/20 and has been on that since then. Despite this therapy, pt failed to improve so he was started prednisolone. He has had a waxing and waning course, but more recently has gradually declined. Stopped PF on 11/2 after running out. He was seen by Dr. Ngo on 11/2 who noted worsening VA and worsening exam, so the patient was referred to Dr. Han, whom he saw 11/4/20. Dr. Han referred to our clinic due to concerns for acanthamoeba keratitis. Culture positive for acanthamoeba on 11/05/20.     Interval hx 6/12/24:  POD#1 s/p PKP OD for failed corneal transplant, performed for acanthamoeba keratitis OD. Previous Dr. Cheng patient. Patient states he has pain 1/10, had some mild headache. Vision is improved compared to pre-op. Patient denies new flashes, floaters, diplopia.    Chief Complaint(s) and History of Present Illness(es)       Follow Up    In right eye.  Since onset it is stable.  Associated symptoms include eye pain.  Negative for dryness, headache and photophobia.  Treatments tried include eye drops.  Pain was noted as 3/10. Additional comments: Acanthamoeba keratitis, right eye              Comments    He states that his vision has seemed stable in both eyes, since his last eye exam.  He tells me that his right eye feels sore and achy at times.   He uses Dexamethasone drops every 2-3 hours in his right eye.    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  Dexamethasone injection right eye for rejection 4/18/24     Glasses: Yes  CTL wearer: yes - none x1.5 months; wears while showering or in the sauna; he sleeps in them once every 3-4 weeks; never wears them in a lake or pool     Family hx of eye disease: negative for glaucoma/macular degeneration     Social Hx: (-) smoking, (-) EtOH, (-) illicit drugs     Meds:  Dexamethasone 0.1% Q2H right eye while awake and overnight when he wakes up - held post-op     Surgical Hx:  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     PLAN:   Postoperative day 1 right eye   Good postoperative appearance, VA 20/250, IOP wnl   Start the following drops in the operative eye:  - Prednisolone 1 drop 4 times per day   - Ofloxacin 1 drop 4 times per day  - IOP burped down at slit lamp 6/12/24  - given a single dose of Diamox 500mg po x 1 - given mildly elevated GFR, do not recommend taking long term  - loaded with brimonidine, timolol, dorzolamide, and latanoprost x 1 in clinic 6/12/24  - start brimonidine BID OD  - start cosopt BID OD  - start latanoprost QHS OD   - No exercise, no lifting over 10 lbs, no bending head lower than heart  - No  water in the eye for 2-3 weeks  - Discussed RD/endophthalmitis return precautions  - shield at bedtime  - Plan for CE/IOL OD after suture removal OD      RTC:  POW#1 VT, sooner as needed    Carmita Almodovar MD  Cornea and External Disease Fellow  [facility]     Attending Physician Attestation:  Complete documentation of historical and exam elements from today's encounter can be found in the full encounter summary report (not reduplicated in this progress note).  I personally obtained the chief complaint(s) and history of present illness.  I confirmed and edited as necessary the review of systems, past medical/surgical history, family history, social history, and examination findings as documented by others; and I examined the patient myself.  I personally reviewed the relevant tests, images, and reports as documented above.  I formulated and edited as necessary the assessment and plan and discussed the findings and management plan with the patient and family. - Matti Mendes MD

## 2024-06-12 NOTE — PATIENT INSTRUCTIONS
START in right eye:    Brimonidine 1 drop 2 times per day  Cosopt 1 drop 2 times per day  Latanoprost 1 drop 2 times per day   Ofloxacin 1 drop 4 times per day   Prednisolone 1 drop 4 times per day

## 2024-06-18 LAB — BACTERIA TISS BX CULT: NO GROWTH

## 2024-06-19 ENCOUNTER — LAB (OUTPATIENT)
Dept: LAB | Facility: CLINIC | Age: 69
End: 2024-06-19
Payer: COMMERCIAL

## 2024-06-19 ENCOUNTER — OFFICE VISIT (OUTPATIENT)
Dept: OPHTHALMOLOGY | Facility: CLINIC | Age: 69
End: 2024-06-19
Attending: OPHTHALMOLOGY
Payer: COMMERCIAL

## 2024-06-19 DIAGNOSIS — H04.121 DRY EYE SYNDROME OF RIGHT EYE: ICD-10-CM

## 2024-06-19 DIAGNOSIS — T86.8411 CORNEAL TRANSPLANT FAILURE, RIGHT EYE: Primary | ICD-10-CM

## 2024-06-19 DIAGNOSIS — Z00.00 LABORATORY EXAMINATION ORDERED AS PART OF A ROUTINE GENERAL MEDICAL EXAMINATION: ICD-10-CM

## 2024-06-19 DIAGNOSIS — Z79.899 ON PRE-EXPOSURE PROPHYLAXIS FOR HIV: ICD-10-CM

## 2024-06-19 PROCEDURE — 99213 OFFICE O/P EST LOW 20 MIN: CPT | Performed by: OPHTHALMOLOGY

## 2024-06-19 PROCEDURE — 82565 ASSAY OF CREATININE: CPT

## 2024-06-19 PROCEDURE — 99024 POSTOP FOLLOW-UP VISIT: CPT | Mod: GC | Performed by: OPHTHALMOLOGY

## 2024-06-19 PROCEDURE — 36415 COLL VENOUS BLD VENIPUNCTURE: CPT

## 2024-06-19 ASSESSMENT — REFRACTION_WEARINGRX
OD_ADD: +2.50
OD_SPHERE: PLANO
OS_SPHERE: +1.50
OS_CYLINDER: SPHERE
OS_ADD: +2.50

## 2024-06-19 ASSESSMENT — VISUAL ACUITY
OS_CC: 20/20
CORRECTION_TYPE: GLASSES
METHOD: SNELLEN - LINEAR
OD_SC: 20/100

## 2024-06-19 ASSESSMENT — TONOMETRY
OS_IOP_MMHG: 17
OD_IOP_MMHG: 07
IOP_METHOD: ICARE

## 2024-06-19 ASSESSMENT — EXTERNAL EXAM - RIGHT EYE: OD_EXAM: NORMAL

## 2024-06-19 ASSESSMENT — SLIT LAMP EXAM - LIDS: COMMENTS: MGD, BLEPHARITIS

## 2024-06-19 ASSESSMENT — EXTERNAL EXAM - LEFT EYE: OS_EXAM: NORMAL

## 2024-06-19 NOTE — PATIENT INSTRUCTIONS
START in right eye:    Brimonidine 1 drop 2 times per day  Cosopt 1 drop 2 times per day  Latanoprost 1 drop at bedtime  Ofloxacin 1 drop 4 times per day 1 more week then stop  Prednisolone 1 drop 4 times per day  Start artificial tears - preservative free every few hours right eye

## 2024-06-19 NOTE — NURSING NOTE
Chief Complaints and History of Present Illnesses   Patient presents with    Post Op (Ophthalmology) Right Eye     Chief Complaint(s) and History of Present Illness(es)       Post Op (Ophthalmology) Right Eye              Laterality: right eye    Onset: gradual    Onset: weeks ago    Quality: States va is the same since last visit     Associated symptoms: eye pain and tearing.  Negative for dryness, redness and headache    Treatments tried: eye drops    Pain scale: 0/10              Comments    s/p PKP and posterior synechiolysis right eye   Has pressure around the right eye   Prednisolone 4 times per day right eye   Ofloxacin 4 times per day right eye   brimonidine BID right eye   cosopt BID right eye   latanoprost BID right eye   Juliana Duvall COT 9:49 AM June 19, 2024

## 2024-06-19 NOTE — PROGRESS NOTES
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 Rosie Han     HPI:  Derik Hamm is male who presents as follow up for Acanthamoeba Keratitis right eye.     Patient initially presented for evaluation of persistent keratoconjunctivitis, OD, since 10/5/2020. Initially seen by Dr. Ngo at [facility] and felt it was related to severe dryness vs bacterial keratitis, so he was started on PFATs and Vigamox Q2H WA. Patient was seen frequently for follow up; he was started on Valtrex 500 mg TID on 10/9/20 and has been on that since then. Despite this therapy, pt failed to improve so he was started prednisolone. He has had a waxing and waning course, but more recently has gradually declined. Stopped PF on 11/2 after running out. He was seen by Dr. Ngo on 11/2 who noted worsening VA and worsening exam, so the patient was referred to Dr. Han, whom he saw 11/4/20. Dr. Han referred to our clinic due to concerns for acanthamoeba keratitis. Culture positive for acanthamoeba on 11/05/20.        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 Previous Dr. Cheng patient. Yesterday eye felt tearing and pressure around the eye.No associated nausea or headache.     Chief Complaint(s) and History of Present Illness(es)       Post Op (Ophthalmology) Right Eye    In right eye.  Onset was gradual.  This started weeks ago.  Quality: States va is the same since last visit .  Associated symptoms include eye pain and tearing.  Negative for dryness, redness and headache.  Treatments tried include eye drops.  Pain was noted as 0/10.             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  Dexamethasone injection right eye for rejection 4/18/24  Right eye repeat PKP 6/11/24     Glasses: Yes  CTL wearer: yes - none x1.5 months; wears while showering or in the sauna; he sleeps in them once every 3-4 weeks; never wears them in a lake or pool     Family hx of eye disease: negative for glaucoma/macular degeneration     Social Hx: (-) smoking, (-) EtOH, (-) illicit drugs     Meds:  Prednisolone QID OD  Ofloxacin QID OD  Cosopt BID OD  Brimonidine BID OD  Latanoprost BID OD    Surgical Hx:  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    - On POD1 given a single dose of Diamox 500mg po x 1 POD1 - given mildly elevated GFR, do not recommend taking long term  - loaded with brimonidine, timolol, dorzolamide, and latanoprost x 1 in clinic 6/12/24 POD1     PLAN:   Postoperative day 1 right eye   Good postoperative appearance, VA 20/250, - IOP good on palpation 6/19/24  Continue the following drops in the operative eye:  - Prednisolone 1 drop 4 times per day   - Ofloxacin 1 drop 4 times per day x 1 week then stop  - continue  brimonidine BID OD  - continue cosopt BID OD  - reduce  latanoprost from BID QHS OD - taper off if IOP well controlled at POM#1  - No exercise, no lifting over 10 lbs, no bending head lower than heart  - No water in the eye for 2-3 weeks  - Discussed RD/endophthalmitis return precautions  - shield at bedtime  - Plan for CE/IOL OD after suture removal OD  - Start artificial tears every 2-3H right eye     RTC:  POM#1 VT, sooner as needed    Carmita Almodovar MD  Cornea and External Disease Fellow  [facility]     Attending Physician Attestation:  Complete documentation of historical and exam elements from today's encounter can be found in the full encounter summary report (not reduplicated in this progress note).  I personally obtained the chief complaint(s) and history of present illness.  I confirmed and edited as necessary the review of systems, past medical/surgical history, family history, social history, and examination findings as documented by others; and I examined the patient myself.  I personally reviewed the relevant tests, images, and reports as documented above.  I formulated and edited as necessary the assessment and plan and discussed the findings and management plan with the patient and family. - Matti Mendes MD

## 2024-06-20 LAB
CREAT SERPL-MCNC: 1.11 MG/DL (ref 0.67–1.17)
EGFRCR SERPLBLD CKD-EPI 2021: 73 ML/MIN/1.73M2

## 2024-06-25 ENCOUNTER — OFFICE VISIT (OUTPATIENT)
Dept: DERMATOLOGY | Facility: CLINIC | Age: 69
End: 2024-06-25
Payer: COMMERCIAL

## 2024-06-25 DIAGNOSIS — Z85.828 HISTORY OF BASAL CELL CARCINOMA (BCC): ICD-10-CM

## 2024-06-25 DIAGNOSIS — D18.01 ANGIOMA OF SKIN: ICD-10-CM

## 2024-06-25 DIAGNOSIS — D48.5 NEOPLASM OF UNCERTAIN BEHAVIOR OF SKIN: ICD-10-CM

## 2024-06-25 DIAGNOSIS — D22.9 NEVUS: Primary | ICD-10-CM

## 2024-06-25 DIAGNOSIS — L82.1 SEBORRHEIC KERATOSIS: ICD-10-CM

## 2024-06-25 DIAGNOSIS — L81.4 LENTIGO: ICD-10-CM

## 2024-06-25 PROCEDURE — 99213 OFFICE O/P EST LOW 20 MIN: CPT | Mod: 25 | Performed by: PHYSICIAN ASSISTANT

## 2024-06-25 PROCEDURE — 88312 SPECIAL STAINS GROUP 1: CPT | Performed by: DERMATOLOGY

## 2024-06-25 PROCEDURE — 88341 IMHCHEM/IMCYTCHM EA ADD ANTB: CPT | Performed by: DERMATOLOGY

## 2024-06-25 PROCEDURE — 88342 IMHCHEM/IMCYTCHM 1ST ANTB: CPT | Performed by: DERMATOLOGY

## 2024-06-25 PROCEDURE — 88305 TISSUE EXAM BY PATHOLOGIST: CPT | Performed by: DERMATOLOGY

## 2024-06-25 PROCEDURE — 11102 TANGNTL BX SKIN SINGLE LES: CPT | Performed by: PHYSICIAN ASSISTANT

## 2024-06-25 NOTE — PROGRESS NOTES
HPI:   Chief complaints: Derik Hamm is a 68 year old male who presents for Full skin cancer screening to rule out skin cancer   Last Skin Exam: 1 year ago      1st Baseline: no  Personal HX of Skin Cancer: Yes multiple BCC most recently on the right arm and right lower leg treated with aldara;  on the neck and chest as well  Personal HX of Malignant Melanoma: no   Family HX of Skin Cancer / Malignant Melanoma: no  Personal HX of Atypical Moles:   no  Risk factors: history of sun exposure and burns  New / Changing lesions: none  Social History: had an amoeba infection in the right eye - vision is still cloudy but he had a corneal transplant. Had an ablation for a fib recently.   On review of systems, there are no further skin complaints, patient is feeling otherwise well.  See patient intake sheet.  ROS of the following were done and are negative: Constitutional, Eyes, Ears, Nose,   Mouth, Throat, Cardiovascular, Respiratory, GI, Genitourinary, Musculoskeletal,   Psychiatric, Endocrine, Allergic/Immunologic.    PHYSICAL EXAM:   There were no vitals taken for this visit.  Skin exam performed as follows: Type 2 skin. Mood appropriate  Alert and Oriented X 3. Well developed, well nourished in no distress.  General appearance: Normal  Head including face: Normal  Eyes: conjunctiva and lids: Normal  Mouth: Lips, teeth, gums: Normal  Neck: Normal  Chest-breast/axillae: Normal  Back: Normal  Spleen and liver: Normal  Cardiovascular: Exam of peripheral vascular system by observation for swelling, varicosities, edema: Normal  Genitalia: groin, buttocks: Normal  Extremities: digits/nails (clubbing): Normal  Eccrine and Apocrine glands: Normal  Right upper extremity: Normal  Left upper extremity: Normal  Right lower extremity: Normal  Left lower extremity: Normal  Skin: Scalp and body hair: See below    Pt deferred exam of breasts, groin, buttocks: No    Other physical findings:  1. Multiple pigmented macules on  extremities and trunk  2. Multiple pigmented macules on face, trunk and extremities  3. Multiple vascular papules on trunk, arms and legs  4. Multiple scattered keratotic plaques  5. Dermatitis on the right buttocks         Except as noted above, no other signs of skin cancer or melanoma.     ASSESSMENT/PLAN:   Benign Full skin cancer screening today. . Patient with history of BCC  Advised on monthly self exams and 1 year  Patient Education: Appropriate brochures given.    Multiple benign appearing nevi on arms, legs and trunk. Discussed ABCDEs of melanoma and sunscreen.   Multiple lentigos on arms, legs and trunk. Advised benign, no treatment needed.  Multiple scattered angiomas. Advised benign, no treatment needed.   Seborrheic keratosis on arms, legs and trunk. Advised benign, no treatment needed.  R/o CTCL vs spong derm on the left buttocks. Shave biopsy performed.  Area cleaned and anesthetized with 1% lidocaine with epinephrine.  Dermablade used to remove the lesion and sent to pathology. Bleeding was cauterized. Pt tolerated procedure well with no complications.   Superficial BCC on the right upper arm, right lower leg - appear resolved              Follow-up: yearly FSE/PRN sooner    1.) Patient was asked about new and changing moles. YES  2.) Patient received a complete physical skin examination: YES  3.) Patient was counseled to perform a monthly self skin examination: YES  Scribed By: Kristi Lyons MS, PAISAEL

## 2024-06-25 NOTE — PATIENT INSTRUCTIONS
Wound Care Instructions     FOR SUPERFICIAL WOUNDS     [facility]  [phone]                 AFTER 24 HOURS YOU SHOULD REMOVE THE BANDAGE AND BEGIN DAILY DRESSING CHANGES AS FOLLOWS:     1) Remove Dressing.     2) Clean and dry the area with tap water using a Q-tip or sterile gauze pad.     3) Apply Vaseline, Aquaphor, Polysporin ointment or Bacitracin ointment over entire wound.  Do NOT use Neosporin ointment.     4) Cover the wound with a band-aid, or a sterile non-stick gauze pad and micropore paper tape    REPEAT THESE INSTRUCTIONS AT LEAST ONCE A DAY UNTIL THE WOUND HAS COMPLETELY HEALED.    It is an old wives tale that a wound heals better when it is exposed to air and allowed to dry out. The wound will heal faster with a better cosmetic result if it is kept moist with ointment and covered with a bandage.    **Do not let the wound dry out.**    Supplies Needed:      *Cotton tipped applicators (Q-tips)    *Vaseline, Aquaphor, Polysporin or Bacitracin Ointment (NOT NEOSPORIN)    *Band-aids or non-stick gauze pads and micropore paper tape.      PATIENT INFORMATION:    During the healing process you will notice a number of changes. All wounds develop a small halo of redness surrounding the wound.  This means healing is occurring. Severe itching with extensive redness usually indicates sensitivity to the ointment or bandage tape used to dress the wound.  You should call our office if this develops.      Swelling  and/or discoloration around your surgical site is common, particularly when performed around the eye.    All wounds normally drain.  The larger the wound the more drainage there will be.  After 7-10 days, you will notice the wound beginning to shrink and new skin will begin to grow.  The wound is healed when you can see skin has formed over the entire area.  A healed wound has a healthy, shiny look to the surface and is red to dark pink in color to normalize.  Wounds may  take approximately 4-6 weeks to heal.  Larger wounds may take 6-8 weeks.  After the wound is healed you may discontinue dressing changes.    You may experience a sensation of tightness as your wound heals. This is normal and will gradually subside.    Your healed wound may be sensitive to temperature changes. This sensitivity improves with time, but if you re having a lot of discomfort, try to avoid temperature extremes.    Patients frequently experience itching after their wound appears to have healed because of the continue healing under the skin.  Plain Vaseline will help relieve the itching.      POSSIBLE COMPLICATIONS    BLEEDING:    Leave the bandage in place.  Use tightly rolled up gauze or a cloth to apply direct pressure over the bandage for 30  minutes.  Reapply pressure for an additional 30 minutes if necessary  Use additional gauze and tape to maintain pressure once the bleeding has stopped.

## 2024-06-25 NOTE — LETTER
6/25/2024      Derik Wilson  [address] [place] [address]      Dear Colleague,    Thank you for referring your patient, Derik Wilson, to the [facility]. Please see a copy of my visit note below.    HPI:   Chief complaints: Derik Hamm is a 68 year old male who presents for Full skin cancer screening to rule out skin cancer   Last Skin Exam: 1 year ago      1st Baseline: no  Personal HX of Skin Cancer: Yes multiple BCC most recently on the right arm and right lower leg treated with aldara;  on the neck and chest as well  Personal HX of Malignant Melanoma: no   Family HX of Skin Cancer / Malignant Melanoma: no  Personal HX of Atypical Moles:   no  Risk factors: history of sun exposure and burns  New / Changing lesions: none  Social History: had an amoeba infection in the right eye - vision is still cloudy but he had a corneal transplant. Had an ablation for a fib recently.   On review of systems, there are no further skin complaints, patient is feeling otherwise well.  See patient intake sheet.  ROS of the following were done and are negative: Constitutional, Eyes, Ears, Nose,   Mouth, Throat, Cardiovascular, Respiratory, GI, Genitourinary, Musculoskeletal,   Psychiatric, Endocrine, Allergic/Immunologic.    PHYSICAL EXAM:   There were no vitals taken for this visit.  Skin exam performed as follows: Type 2 skin. Mood appropriate  Alert and Oriented X 3. Well developed, well nourished in no distress.  General appearance: Normal  Head including face: Normal  Eyes: conjunctiva and lids: Normal  Mouth: Lips, teeth, gums: Normal  Neck: Normal  Chest-breast/axillae: Normal  Back: Normal  Spleen and liver: Normal  Cardiovascular: Exam of peripheral vascular system by observation for swelling, varicosities, edema: Normal  Genitalia: groin, buttocks: Normal  Extremities: digits/nails (clubbing): Normal  Eccrine and Apocrine glands: Normal  Right upper extremity:  Normal  Left upper extremity: Normal  Right lower extremity: Normal  Left lower extremity: Normal  Skin: Scalp and body hair: See below    Pt deferred exam of breasts, groin, buttocks: No    Other physical findings:  1. Multiple pigmented macules on extremities and trunk  2. Multiple pigmented macules on face, trunk and extremities  3. Multiple vascular papules on trunk, arms and legs  4. Multiple scattered keratotic plaques  5. Dermatitis on the right buttocks         Except as noted above, no other signs of skin cancer or melanoma.     ASSESSMENT/PLAN:   Benign Full skin cancer screening today. . Patient with history of BCC  Advised on monthly self exams and 1 year  Patient Education: Appropriate brochures given.    Multiple benign appearing nevi on arms, legs and trunk. Discussed ABCDEs of melanoma and sunscreen.   Multiple lentigos on arms, legs and trunk. Advised benign, no treatment needed.  Multiple scattered angiomas. Advised benign, no treatment needed.   Seborrheic keratosis on arms, legs and trunk. Advised benign, no treatment needed.  R/o CTCL vs spong derm on the left buttocks. Shave biopsy performed.  Area cleaned and anesthetized with 1% lidocaine with epinephrine.  Dermablade used to remove the lesion and sent to pathology. Bleeding was cauterized. Pt tolerated procedure well with no complications.   Superficial BCC on the right upper arm, right lower leg - appear resolved              Follow-up: yearly FSE/PRN sooner    1.) Patient was asked about new and changing moles. YES  2.) Patient received a complete physical skin examination: YES  3.) Patient was counseled to perform a monthly self skin examination: YES  Scribed By: Kristi Lyons MS, PAISAEL        Again, thank you for allowing me to participate in the care of your patient.        Sincerely,        Kristi Lyons PA-C

## 2024-07-01 LAB
PATH REPORT.COMMENTS IMP SPEC: NORMAL
PATH REPORT.FINAL DX SPEC: NORMAL
PATH REPORT.GROSS SPEC: NORMAL
PATH REPORT.MICROSCOPIC SPEC OTHER STN: NORMAL
PATH REPORT.RELEVANT HX SPEC: NORMAL

## 2024-07-03 ENCOUNTER — OFFICE VISIT (OUTPATIENT)
Dept: OPHTHALMOLOGY | Facility: CLINIC | Age: 69
End: 2024-07-03
Attending: OPHTHALMOLOGY
Payer: COMMERCIAL

## 2024-07-03 DIAGNOSIS — Z94.7 S/P PKP (PENETRATING KERATOPLASTY): ICD-10-CM

## 2024-07-03 DIAGNOSIS — Z98.890 POSTOPERATIVE EYE STATE: Primary | ICD-10-CM

## 2024-07-03 DIAGNOSIS — B60.10 ACANTHAMOEBA INFECTION: ICD-10-CM

## 2024-07-03 DIAGNOSIS — T86.8411 CORNEAL TRANSPLANT FAILURE, RIGHT EYE: ICD-10-CM

## 2024-07-03 PROCEDURE — 99024 POSTOP FOLLOW-UP VISIT: CPT | Performed by: STUDENT IN AN ORGANIZED HEALTH CARE EDUCATION/TRAINING PROGRAM

## 2024-07-03 PROCEDURE — 99213 OFFICE O/P EST LOW 20 MIN: CPT | Performed by: STUDENT IN AN ORGANIZED HEALTH CARE EDUCATION/TRAINING PROGRAM

## 2024-07-03 ASSESSMENT — SLIT LAMP EXAM - LIDS: COMMENTS: MGD, BLEPHARITIS

## 2024-07-03 ASSESSMENT — VISUAL ACUITY
METHOD: SNELLEN - LINEAR
OS_CC+: -3
OD_SC: 20/125
OS_CC: 20/20
CORRECTION_TYPE: GLASSES

## 2024-07-03 ASSESSMENT — EXTERNAL EXAM - LEFT EYE: OS_EXAM: NORMAL

## 2024-07-03 ASSESSMENT — TONOMETRY
OS_IOP_MMHG: 15
OD_IOP_MMHG: 8
IOP_METHOD: ICARE

## 2024-07-03 ASSESSMENT — REFRACTION_WEARINGRX
OS_SPHERE: +1.50
OD_ADD: +2.50
OS_ADD: +2.50
OD_SPHERE: PLANO
OS_CYLINDER: SPHERE

## 2024-07-03 ASSESSMENT — EXTERNAL EXAM - RIGHT EYE: OD_EXAM: NORMAL

## 2024-07-03 NOTE — PATIENT INSTRUCTIONS
Right eye:    Stop brimonidine    Continue Cosopt (blue) 1 drop 2 times per day    Stop Latanoprost (teal)    Stop Ofloxacin but don't throw away, keep it in the fridge    Prednisolone 1 drop 4 times per day, once you run out, start dexamethasone drop 1 drop 5-6 times per day     Continue artificial tears - preservative free every few hours right eye

## 2024-07-03 NOTE — PROGRESS NOTES
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 Rosie Han     HPI:  Derik Hamm is male who presents as follow up for Acanthamoeba Keratitis right eye.     Patient initially presented for evaluation of persistent keratoconjunctivitis, OD, since 10/5/2020. Initially seen by Dr. Ngo at [facility] and felt it was related to severe dryness vs bacterial keratitis, so he was started on PFATs and Vigamox Q2H WA. Patient was seen frequently for follow up; he was started on Valtrex 500 mg TID on 10/9/20 and has been on that since then. Despite this therapy, pt failed to improve so he was started prednisolone. He has had a waxing and waning course, but more recently has gradually declined. Stopped PF on 11/2 after running out. He was seen by Dr. Ngo on 11/2 who noted worsening VA and worsening exam, so the patient was referred to Dr. Han, whom he saw 11/4/20. Dr. Han referred to our clinic due to concerns for acanthamoeba keratitis. Culture positive for acanthamoeba on 11/05/20.      Interval hx 07/03/2024 POM1 s/p repeat PKP right eye. Doing well. No pain or light sensitivity. Vision is clearing up.  Chief Complaint(s) and History of Present Illness(es)       Post Op (Ophthalmology) Right Eye    In right eye.  Associated symptoms include floaters.  Negative for dryness, eye pain, tearing, photophobia and flashes.  Treatments tried include artificial tears.  Pain was noted as 0/10. Additional comments: Corneal transplant failure, right eye             Comments    Pt states vision is a little better.  No pain.  No flashes.  No change to floaters.  Occasional low pain.  Pt compliant with drops.    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  Dexamethasone injection right eye for  rejection 4/18/24  Right eye repeat PKP 6/11/24     Glasses: Yes  CTL wearer: yes - none x1.5 months; wears while showering or in the sauna; he sleeps in them once every 3-4 weeks; never wears them in a lake or pool     Family hx of eye disease: negative for glaucoma/macular degeneration     Social Hx: (-) smoking, (-) EtOH, (-) illicit drugs     Meds:  Prednisolone QID OD  Ofloxacin QID OD  Cosopt BID OD  Brimonidine BID OD  Latanoprost qhs right eye  Artificial tears OD    Surgical Hx:  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    - On POM1 given a single dose of Diamox 500mg po x 1 POD1 - given mildly elevated GFR, do not recommend taking long term  - loaded with brimonidine, timolol, dorzolamide, and latanoprost x 1 in clinic 6/12/24 POD1     PLAN:   Postoperative month 1 right eye   Good postoperative appearance, VA 20/150, - IOP good on palpation 6/19/24 and 7/3/24  There is superficial branching? Lesion mid-graft 9 o'clock - ddx scar less likely epithelial healing line, no history of viral keratitis. Gave return precautions and monitor closely.    Continue the following drops in the operative eye:  - Prednisolone 1 drop 4 times per day, insurance doesn't cover prednisolone - once patient runs out okay to start dexamethasone 5-6x/day (patient has stock from prior transplant)  - stop  ofloxacin  - stop brimonidine BID OD  - continue cosopt BID OD  - stop latanoprost   - No exercise, no lifting over 10 lbs, no bending head lower than heart  - No water in the eye for 2-3 weeks  - Discussed RD/endophthalmitis return precautions  - shield at bedtime  - Plan for CE/IOL OD after suture removal OD  - Start artificial tears every 2-3H right eye     RTC:  POM2 VT, sooner as needed    Carmita Almodovar MD  Cornea and External Disease Fellow  [facility]     Attending Physician Attestation: Complete documentation of historical and exam elements from today's encounter can be found in the full encounter summary report (not reduplicated in this progress note). I personally obtained the chief complaint(s) and history of present illness. I confirmed and edited as necessary the review of systems, past medical/surgical history, family history, social history, and examination findings as documented by others; and I examined the patient myself. I personally reviewed the relevant tests, images, and reports as documented above.  formulated and edited as necessary the assessment and plan and discussed the findings and management plan with the patient and family.   -Carmita Almodovar MD

## 2024-07-03 NOTE — NURSING NOTE
Chief Complaints and History of Present Illnesses   Patient presents with    Post Op (Ophthalmology) Right Eye     Corneal transplant failure, right eye     Chief Complaint(s) and History of Present Illness(es)       Post Op (Ophthalmology) Right Eye              Laterality: right eye    Associated symptoms: floaters.  Negative for dryness, eye pain, tearing, photophobia and flashes    Treatments tried: artificial tears    Pain scale: 0/10    Comments: Corneal transplant failure, right eye              Comments    Pt states vision is a little better.  No pain.  No flashes.  No change to floaters.  Occasional low pain.  Pt compliant with drops.    TANNER Corral July 3, 2024 8:13 AM

## 2024-07-09 LAB — BACTERIA TISS BX CULT: NO GROWTH

## 2024-07-17 ENCOUNTER — OFFICE VISIT (OUTPATIENT)
Dept: OPHTHALMOLOGY | Facility: CLINIC | Age: 69
End: 2024-07-17
Attending: OPHTHALMOLOGY
Payer: COMMERCIAL

## 2024-07-17 DIAGNOSIS — Z98.890 POSTOPERATIVE EYE STATE: Primary | ICD-10-CM

## 2024-07-17 DIAGNOSIS — B60.10 ACANTHAMOEBA INFECTION: ICD-10-CM

## 2024-07-17 DIAGNOSIS — T86.8411 CORNEAL TRANSPLANT FAILURE, RIGHT EYE: ICD-10-CM

## 2024-07-17 PROCEDURE — 99213 OFFICE O/P EST LOW 20 MIN: CPT | Performed by: OPHTHALMOLOGY

## 2024-07-17 PROCEDURE — 99024 POSTOP FOLLOW-UP VISIT: CPT | Performed by: OPHTHALMOLOGY

## 2024-07-17 RX ORDER — DORZOLAMIDE HYDROCHLORIDE AND TIMOLOL MALEATE 20; 5 MG/ML; MG/ML
1 SOLUTION/ DROPS OPHTHALMIC 2 TIMES DAILY
Qty: 10 ML | Refills: 11 | Status: SHIPPED | OUTPATIENT
Start: 2024-07-17

## 2024-07-17 ASSESSMENT — TONOMETRY
OS_IOP_MMHG: 15
IOP_METHOD: ICARE
OD_IOP_MMHG: 9

## 2024-07-17 ASSESSMENT — EXTERNAL EXAM - RIGHT EYE: OD_EXAM: NORMAL

## 2024-07-17 ASSESSMENT — SLIT LAMP EXAM - LIDS: COMMENTS: MGD, BLEPHARITIS

## 2024-07-17 ASSESSMENT — VISUAL ACUITY
OS_CC: 20/20
OD_PH_SC: 20/70
OD_PH_SC+: -1
CORRECTION_TYPE: GLASSES
OD_SC: 20/125
METHOD: SNELLEN - LINEAR

## 2024-07-17 ASSESSMENT — REFRACTION_WEARINGRX
OD_ADD: +2.50
OD_SPHERE: PLANO
OS_ADD: +2.50
OS_SPHERE: +1.50
OS_CYLINDER: SPHERE

## 2024-07-17 ASSESSMENT — EXTERNAL EXAM - LEFT EYE: OS_EXAM: NORMAL

## 2024-07-17 NOTE — PROGRESS NOTES
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 Rosie Han     HPI:  Derik Hamm is male who presents as follow up for Acanthamoeba Keratitis right eye.     Patient initially presented for evaluation of persistent keratoconjunctivitis, OD, since 10/5/2020. Initially seen by Dr. Ngo at [facility] and felt it was related to severe dryness vs bacterial keratitis, so he was started on PFATs and Vigamox Q2H [place]. Patient was seen frequently for follow up; he was started on Valtrex 500 mg TID on 10/9/20 and has been on that since then. Despite this therapy, pt failed to improve so he was started prednisolone. He has had a waxing and waning course, but more recently has gradually declined. Stopped PF on 11/2 after running out. He was seen by Dr. Ngo on 11/2 who noted worsening VA and worsening exam, so the patient was referred to Dr. Han, whom he saw 11/4/20. Dr. Han referred to our clinic due to concerns for acanthamoeba keratitis. Culture positive for acanthamoeba on 11/05/20.      Interval hx 07/17/2024  Chief Complaint(s) and History of Present Illness(es)       Post Op (Ophthalmology) Right Eye    In right eye.  Associated symptoms include Negative for dryness, eye pain, tearing, flashes and floaters.  Treatments tried include artificial tears.  Pain was noted as 0/10. Additional comments: Postoperative eye state             Comments    Pt states vision is better.  No pain.  No flashes/floaters.   Pt is compliant with drops.  Pt wondering if he needs refills.    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  Dexamethasone injection right eye for rejection 4/18/24  Right eye repeat PKP 6/11/24     Glasses: Yes  CTL wearer: yes - none x1.5 months; wears while showering or in the  sauna; he sleeps in them once every 3-4 weeks; never wears them in a lake or pool     Family hx of eye disease: negative for glaucoma/macular degeneration     Social Hx: (-) smoking, (-) EtOH, (-) illicit drugs     Meds:  Prednisolone QID right eye/Durezol  Cosopt BID OD    Artificial tears OD    Surgical Hx:  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    7/17/24: healing well, clear cornea    PLAN:     Continue the following drops in the operative eye:  - Prednisolone 1 drop 4 times per day, insurance doesn't cover prednisolone - once patient runs out okay to start dexamethasone 5-6x/day (patient has stock from prior transplant)  - continue cosopt BID right eye, trial off cosopt few days before next visit    - Plan for CE/IOL OD after suture removal OD  - artificial tears every 2-3H right eye     RTC:  POM2 VT, sooner as needed    Attending Physician Attestation:  Complete documentation of historical and exam elements from today's encounter can be found in the full encounter summary report (not reduplicated in this progress note).  I personally obtained the chief complaint(s) and history of present illness.  I confirmed and edited as necessary the review of systems, past medical/surgical history, family history, social history, and examination findings as documented by others; and I  examined the patient myself.  I personally reviewed the relevant tests, images, and reports as documented above.  I formulated and edited as necessary the assessment and plan and discussed the findings and management plan with the patient and family. - Douglas Millan MD

## 2024-07-17 NOTE — NURSING NOTE
Chief Complaints and History of Present Illnesses   Patient presents with    Post Op (Ophthalmology) Right Eye     Postoperative eye state     Chief Complaint(s) and History of Present Illness(es)       Post Op (Ophthalmology) Right Eye              Laterality: right eye    Associated symptoms: Negative for dryness, eye pain, tearing, flashes and floaters    Treatments tried: artificial tears    Pain scale: 0/10    Comments: Postoperative eye state              Comments    Pt states vision is better.  No pain.  No flashes/floaters.   Pt is compliant with drops.  Pt wondering if he needs refills.    TANNER Corral July 17, 2024 8:55 AM

## 2024-08-19 ENCOUNTER — OFFICE VISIT (OUTPATIENT)
Dept: OPHTHALMOLOGY | Facility: CLINIC | Age: 69
End: 2024-08-19
Attending: OPHTHALMOLOGY
Payer: COMMERCIAL

## 2024-08-19 DIAGNOSIS — H25.13 NUCLEAR SCLEROTIC CATARACT OF BOTH EYES: ICD-10-CM

## 2024-08-19 DIAGNOSIS — Z94.7 S/P PKP (PENETRATING KERATOPLASTY): Primary | ICD-10-CM

## 2024-08-19 PROCEDURE — 99214 OFFICE O/P EST MOD 30 MIN: CPT | Performed by: OPHTHALMOLOGY

## 2024-08-19 PROCEDURE — 99024 POSTOP FOLLOW-UP VISIT: CPT | Mod: GC | Performed by: OPHTHALMOLOGY

## 2024-08-19 ASSESSMENT — REFRACTION_WEARINGRX
OD_ADD: +2.50
OD_SPHERE: PLANO
OS_ADD: +2.50
OS_SPHERE: +1.50
OS_CYLINDER: SPHERE

## 2024-08-19 ASSESSMENT — VISUAL ACUITY
OD_SC+: -1
METHOD: SNELLEN - LINEAR
OD_SC: 20/125
OD_PH_SC+: -1
OS_CC: 20/20
OD_PH_SC: 20/80

## 2024-08-19 ASSESSMENT — TONOMETRY
IOP_METHOD: TONOPEN
OS_IOP_MMHG: 17
OD_IOP_MMHG: 18

## 2024-08-19 ASSESSMENT — SLIT LAMP EXAM - LIDS: COMMENTS: MGD, BLEPHARITIS

## 2024-08-19 ASSESSMENT — EXTERNAL EXAM - RIGHT EYE: OD_EXAM: NORMAL

## 2024-08-19 ASSESSMENT — EXTERNAL EXAM - LEFT EYE: OS_EXAM: NORMAL

## 2024-08-19 NOTE — NURSING NOTE
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 He states vision seems about the same as last visit.    Daniel Fernando COT 9:15 AM August 19, 2024

## 2024-08-19 NOTE — PROGRESS NOTES
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 Rosie Han     HPI:  Derik Hamm is male who presents as follow up for Acanthamoeba Keratitis right eye.     Patient initially presented for evaluation of persistent keratoconjunctivitis, OD, since 10/5/2020. Initially seen by Dr. Ngo at [facility] and felt it was related to severe dryness vs bacterial keratitis, so he was started on PFATs and Vigamox Q2H WA. Patient was seen frequently for follow up; he was started on Valtrex 500 mg TID on 10/9/20 and has been on that since then. Despite this therapy, pt failed to improve so he was started prednisolone. He has had a waxing and waning course, but more recently has gradually declined. Stopped PF on 11/2 after running out. He was seen by Dr. Ngo on 11/2 who noted worsening VA and worsening exam, so the patient was referred to Dr. Han, whom he saw 11/4/20. Dr. Han referred to our clinic due to concerns for acanthamoeba keratitis. Culture positive for acanthamoeba on 11/05/20.      Interval hx 08/19/2024: Vision has been stable OU. No new pain, redness, tearing, discharge. No new flashes, floaters, diplopia.   Chief Complaint(s) and History of Present Illness(es)       Post Op (Ophthalmology) Right Eye    In right eye.  Associated symptoms include Negative for dryness, eye pain, tearing, flashes and floaters.  Treatments tried include artificial tears.  Pain was noted as 0/10. Additional comments: Postoperative eye state             Comments    Pt states vision is better.  No pain.  No flashes/floaters.   Pt is compliant with drops.  Pt wondering if he needs refills.    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  Dexamethasone injection right eye for rejection 4/18/24  Right  eye repeat PKP 6/11/24     Glasses: Yes  CTL wearer: yes - none x1.5 months; wears while showering or in the sauna; he sleeps in them once every 3-4 weeks; never wears them in a lake or pool     Family hx of eye disease: negative for glaucoma/macular degeneration     Social Hx: (-) smoking, (-) EtOH, (-) illicit drugs     Meds:  Dexamethasone 5x daily OD  Cosopt BID OD (last used 4 days ago)    Artificial tears OD    Surgical Hx:  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 Sutures intact. IOP 18 off of drops (patient was instructed to stop cosopt 4 days prior to appointment for pressure check)      PLAN:     - Continue dexamethasone 5-6x/day (insurance will not cover prednisolone)   - Continue hold cosopt BID right eye  - plan for suture removal starting at 4-6 months post-op  - Plan for CE/IOL OD after suture removal OD  - artificial tears every 2-3H right eye     RTC:  2-3 months, K jonnie OD, plan for suture removal, VT, sooner as needed    Cecille Hensley MD  PGY-3  Department of Ophthalmology  August 19, 2024 9:54 AM     Attending Physician Attestation:  Complete documentation of historical and exam elements from today's encounter can be found in the full encounter summary report (not reduplicated in this progress  note).  I personally obtained the chief complaint(s) and history of present illness.  I confirmed and edited as necessary the review of systems, past medical/surgical history, family history, social history, and examination findings as documented by others; and I examined the patient myself.  I personally reviewed the relevant tests, images, and reports as documented above.  I formulated and edited as necessary the assessment and plan and discussed the findings and management plan with the patient and family. - Matti Mendes MD

## 2024-09-03 ENCOUNTER — OFFICE VISIT (OUTPATIENT)
Dept: PEDIATRICS | Facility: CLINIC | Age: 69
End: 2024-09-03
Payer: COMMERCIAL

## 2024-09-03 VITALS
OXYGEN SATURATION: 97 % | SYSTOLIC BLOOD PRESSURE: 116 MMHG | WEIGHT: 219 LBS | HEIGHT: 72 IN | DIASTOLIC BLOOD PRESSURE: 70 MMHG | RESPIRATION RATE: 18 BRPM | TEMPERATURE: 97.7 F | BODY MASS INDEX: 29.66 KG/M2 | HEART RATE: 78 BPM

## 2024-09-03 DIAGNOSIS — K21.00 GASTROESOPHAGEAL REFLUX DISEASE WITH ESOPHAGITIS, UNSPECIFIED WHETHER HEMORRHAGE: ICD-10-CM

## 2024-09-03 DIAGNOSIS — I50.20 SYSTOLIC HEART FAILURE, UNSPECIFIED HF CHRONICITY (H): ICD-10-CM

## 2024-09-03 DIAGNOSIS — Z00.00 ENCOUNTER FOR MEDICARE ANNUAL WELLNESS EXAM: Primary | ICD-10-CM

## 2024-09-03 DIAGNOSIS — I48.92 ATRIAL FLUTTER WITH RAPID VENTRICULAR RESPONSE (H): ICD-10-CM

## 2024-09-03 DIAGNOSIS — Z12.5 SCREENING FOR PROSTATE CANCER: ICD-10-CM

## 2024-09-03 DIAGNOSIS — I42.9 SECONDARY CARDIOMYOPATHY (H): ICD-10-CM

## 2024-09-03 DIAGNOSIS — E78.5 DYSLIPIDEMIA: ICD-10-CM

## 2024-09-03 DIAGNOSIS — Z79.899 ON PRE-EXPOSURE PROPHYLAXIS FOR HIV: ICD-10-CM

## 2024-09-03 PROBLEM — R97.20 HIGH PROSTATE SPECIFIC ANTIGEN (PSA): Status: ACTIVE | Noted: 2023-12-28

## 2024-09-03 LAB
ALBUMIN SERPL BCG-MCNC: 4.1 G/DL (ref 3.5–5.2)
ALP SERPL-CCNC: 78 U/L (ref 40–150)
ALT SERPL W P-5'-P-CCNC: 28 U/L (ref 0–70)
ANION GAP SERPL CALCULATED.3IONS-SCNC: 9 MMOL/L (ref 7–15)
AST SERPL W P-5'-P-CCNC: 32 U/L (ref 0–45)
BILIRUB SERPL-MCNC: 0.4 MG/DL
BUN SERPL-MCNC: 21.1 MG/DL (ref 8–23)
CALCIUM SERPL-MCNC: 9 MG/DL (ref 8.8–10.4)
CHLORIDE SERPL-SCNC: 104 MMOL/L (ref 98–107)
CHOLEST SERPL-MCNC: 107 MG/DL
CREAT SERPL-MCNC: 1.17 MG/DL (ref 0.67–1.17)
EGFRCR SERPLBLD CKD-EPI 2021: 68 ML/MIN/1.73M2
FASTING STATUS PATIENT QL REPORTED: YES
FASTING STATUS PATIENT QL REPORTED: YES
GLUCOSE SERPL-MCNC: 111 MG/DL (ref 70–99)
HCO3 SERPL-SCNC: 26 MMOL/L (ref 22–29)
HDLC SERPL-MCNC: 24 MG/DL
HIV 1+2 AB+HIV1 P24 AG SERPL QL IA: NONREACTIVE
LDLC SERPL CALC-MCNC: 73 MG/DL
NONHDLC SERPL-MCNC: 83 MG/DL
POTASSIUM SERPL-SCNC: 4.8 MMOL/L (ref 3.4–5.3)
PROT SERPL-MCNC: 6.7 G/DL (ref 6.4–8.3)
PSA SERPL DL<=0.01 NG/ML-MCNC: 4.28 NG/ML (ref 0–4.5)
SODIUM SERPL-SCNC: 139 MMOL/L (ref 135–145)
TRIGL SERPL-MCNC: 48 MG/DL

## 2024-09-03 PROCEDURE — 80061 LIPID PANEL: CPT | Performed by: STUDENT IN AN ORGANIZED HEALTH CARE EDUCATION/TRAINING PROGRAM

## 2024-09-03 PROCEDURE — 99397 PER PM REEVAL EST PAT 65+ YR: CPT | Mod: 24 | Performed by: STUDENT IN AN ORGANIZED HEALTH CARE EDUCATION/TRAINING PROGRAM

## 2024-09-03 PROCEDURE — 87389 HIV-1 AG W/HIV-1&-2 AB AG IA: CPT | Performed by: STUDENT IN AN ORGANIZED HEALTH CARE EDUCATION/TRAINING PROGRAM

## 2024-09-03 PROCEDURE — G0103 PSA SCREENING: HCPCS | Performed by: STUDENT IN AN ORGANIZED HEALTH CARE EDUCATION/TRAINING PROGRAM

## 2024-09-03 PROCEDURE — 36415 COLL VENOUS BLD VENIPUNCTURE: CPT | Performed by: STUDENT IN AN ORGANIZED HEALTH CARE EDUCATION/TRAINING PROGRAM

## 2024-09-03 PROCEDURE — 99214 OFFICE O/P EST MOD 30 MIN: CPT | Mod: 25 | Performed by: STUDENT IN AN ORGANIZED HEALTH CARE EDUCATION/TRAINING PROGRAM

## 2024-09-03 PROCEDURE — 80053 COMPREHEN METABOLIC PANEL: CPT | Performed by: STUDENT IN AN ORGANIZED HEALTH CARE EDUCATION/TRAINING PROGRAM

## 2024-09-03 RX ORDER — METOPROLOL SUCCINATE 25 MG/1
25 TABLET, EXTENDED RELEASE ORAL DAILY
Qty: 90 TABLET | Refills: 4 | Status: SHIPPED | OUTPATIENT
Start: 2024-09-03

## 2024-09-03 RX ORDER — EMTRICITABINE AND TENOFOVIR DISOPROXIL FUMARATE 200; 300 MG/1; MG/1
1 TABLET, FILM COATED ORAL DAILY
Qty: 90 TABLET | Refills: 4 | Status: SHIPPED | OUTPATIENT
Start: 2024-09-03

## 2024-09-03 RX ORDER — LISINOPRIL 2.5 MG/1
2.5 TABLET ORAL DAILY
Qty: 90 TABLET | Refills: 4 | Status: SHIPPED | OUTPATIENT
Start: 2024-09-03

## 2024-09-03 RX ORDER — EMTRICITABINE AND TENOFOVIR DISOPROXIL FUMARATE 200; 300 MG/1; MG/1
1 TABLET, FILM COATED ORAL DAILY
Qty: 90 TABLET | Refills: 4 | Status: SHIPPED | OUTPATIENT
Start: 2024-09-03 | End: 2024-09-03

## 2024-09-03 NOTE — PATIENT INSTRUCTIONS
Patient Education   Preventive Care Advice   This is general advice given by our system to help you stay healthy. However, your care team may have specific advice just for you. Please talk to your care team about your preventive care needs.  Nutrition  Eat 5 or more servings of fruits and vegetables each day.  Try wheat bread, brown rice and whole grain pasta (instead of white bread, rice, and pasta).  Get enough calcium and vitamin D. Check the label on foods and aim for 100% of the RDA (recommended daily allowance).  Lifestyle  Exercise at least 150 minutes each week  (30 minutes a day, 5 days a week).  Do muscle strengthening activities 2 days a week. These help control your weight and prevent disease.  No smoking.  Wear sunscreen to prevent skin cancer.  Have a dental exam and cleaning every 6 months.  Yearly exams  See your health care team every year to talk about:  Any changes in your health.  Any medicines your care team has prescribed.  Preventive care, family planning, and ways to prevent chronic diseases.  Shots (vaccines)   HPV shots (up to age 26), if you've never had them before.  Hepatitis B shots (up to age 59), if you've never had them before.  COVID-19 shot: Get this shot when it's due.  Flu shot: Get a flu shot every year.  Tetanus shot: Get a tetanus shot every 10 years.  Pneumococcal, hepatitis A, and RSV shots: Ask your care team if you need these based on your risk.  Shingles shot (for age 50 and up)  General health tests  Diabetes screening:  Starting at age 35, Get screened for diabetes at least every 3 years.  If you are younger than age 35, ask your care team if you should be screened for diabetes.  Cholesterol test: At age 39, start having a cholesterol test every 5 years, or more often if advised.  Bone density scan (DEXA): At age 50, ask your care team if you should have this scan for osteoporosis (brittle bones).  Hepatitis C: Get tested at least once in your life.  STIs (sexually  transmitted infections)  Before age 24: Ask your care team if you should be screened for STIs.  After age 24: Get screened for STIs if you're at risk. You are at risk for STIs (including HIV) if:  You are sexually active with more than one person.  You don't use condoms every time.  You or a partner was diagnosed with a sexually transmitted infection.  If you are at risk for HIV, ask about PrEP medicine to prevent HIV.  Get tested for HIV at least once in your life, whether you are at risk for HIV or not.  Cancer screening tests  Cervical cancer screening: If you have a cervix, begin getting regular cervical cancer screening tests starting at age 21.  Breast cancer scan (mammogram): If you've ever had breasts, begin having regular mammograms starting at age 40. This is a scan to check for breast cancer.  Colon cancer screening: It is important to start screening for colon cancer at age 45.  Have a colonoscopy test every 10 years (or more often if you're at risk) Or, ask your provider about stool tests like a FIT test every year or Cologuard test every 3 years.  To learn more about your testing options, visit:   .  For help making a decision, visit:   https://bit.ly/kw52615.  Prostate cancer screening test: If you have a prostate, ask your care team if a prostate cancer screening test (PSA) at age 55 is right for you.  Lung cancer screening: If you are a current or former smoker ages 50 to 80, ask your care team if ongoing lung cancer screenings are right for you.  For informational purposes only. Not to replace the advice of your health care provider. Copyright   2023 [facility]. All rights reserved. Clinically reviewed by the [facility] Transitions Program. LUVHAN [id number] - REV 01/24.  Learning About Stress  What is stress?     Stress is your body's response to a hard situation. Your body can have a physical, emotional, or mental response. Stress is a fact of life for most people, and it  affects everyone differently. What causes stress for you may not be stressful for someone else.  A lot of things can cause stress. You may feel stress when you go on a job interview, take a test, or run a race. This kind of short-term stress is normal and even useful. It can help you if you need to work hard or react quickly. For example, stress can help you finish an important job on time.  Long-term stress is caused by ongoing stressful situations or events. Examples of long-term stress include long-term health problems, ongoing problems at work, or conflicts in your family. Long-term stress can harm your health.  How does stress affect your health?  When you are stressed, your body responds as though you are in danger. It makes hormones that speed up your heart, make you breathe faster, and give you a burst of energy. This is called the fight-or-flight stress response. If the stress is over quickly, your body goes back to normal and no harm is done.  But if stress happens too often or lasts too long, it can have bad effects. Long-term stress can make you more likely to get sick, and it can make symptoms of some diseases worse. If you tense up when you are stressed, you may develop neck, shoulder, or low back pain. Stress is linked to high blood pressure and heart disease.  Stress also harms your emotional health. It can make you golden, tense, or depressed. Your relationships may suffer, and you may not do well at work or school.  What can you do to manage stress?  You can try these things to help manage stress:   Do something active. Exercise or activity can help reduce stress. Walking is a great way to get started. Even everyday activities such as housecleaning or yard work can help.  Try yoga or raffy chi. These techniques combine exercise and meditation. You may need some training at first to learn them.  Do something you enjoy. For example, listen to music or go to a movie. Practice your hobby or do volunteer  "work.  Meditate. This can help you relax, because you are not worrying about what happened before or what may happen in the future.  Do guided imagery. Imagine yourself in any setting that helps you feel calm. You can use online videos, books, or a teacher to guide you.  Do breathing exercises. For example:  From a standing position, bend forward from the waist with your knees slightly bent. Let your arms dangle close to the floor.  Breathe in slowly and deeply as you return to a standing position. Roll up slowly and lift your head last.  Hold your breath for just a few seconds in the standing position.  Breathe out slowly and bend forward from the waist.  Let your feelings out. Talk, laugh, cry, and express anger when you need to. Talking with supportive friends or family, a counselor, or a mohit leader about your feelings is a healthy way to relieve stress. Avoid discussing your feelings with people who make you feel worse.  Write. It may help to write about things that are bothering you. This helps you find out how much stress you feel and what is causing it. When you know this, you can find better ways to cope.  What can you do to prevent stress?  You might try some of these things to help prevent stress:  Manage your time. This helps you find time to do the things you want and need to do.  Get enough sleep. Your body recovers from the stresses of the day while you are sleeping.  Get support. Your family, friends, and community can make a difference in how you experience stress.  Limit your news feed. Avoid or limit time on social media or news that may make you feel stressed.  Do something active. Exercise or activity can help reduce stress. Walking is a great way to get started.  Where can you learn more?  Go to https://www.healthwise.net/patiented  Enter N032 in the search box to learn more about \"Learning About Stress.\"  Current as of: October 24, 2023               Content Version: 14.0    2064-0151 " [employer].   Care instructions adapted under license by your healthcare professional. If you have questions about a medical condition or this instruction, always ask your healthcare professional. [employer] disclaims any warranty or liability for your use of this information.      Learning About Sleeping Well  What does sleeping well mean?     Sleeping well means getting enough sleep to feel good and stay healthy. How much sleep is enough varies among people.  The number of hours you sleep and how you feel when you wake up are both important. If you do not feel refreshed, you probably need more sleep. Another sign of not getting enough sleep is feeling tired during the day.  Experts recommend that adults get at least 7 or more hours of sleep per day. Children and older adults need more sleep.  Why is getting enough sleep important?  Getting enough quality sleep is a basic part of good health. When your sleep suffers, your physical health, mood, and your thoughts can suffer too. You may find yourself feeling more grumpy or stressed. Not getting enough sleep also can lead to serious problems, including injury, accidents, anxiety, and depression.  What might cause poor sleeping?  Many things can cause sleep problems, including:  Changes to your sleep schedule.  Stress. Stress can be caused by fear about a single event, such as giving a speech. Or you may have ongoing stress, such as worry about work or school.  Depression, anxiety, and other mental or emotional conditions.  Changes in your sleep habits or surroundings. This includes changes that happen where you sleep, such as noise, light, or sleeping in a different bed. It also includes changes in your sleep pattern, such as having jet lag or working a late shift.  Health problems, such as pain, breathing problems, and restless legs syndrome.  Lack of regular exercise.  Using alcohol, nicotine, or caffeine before bed.  How can you help  "yourself?  Here are some tips that may help you sleep more soundly and wake up feeling more refreshed.  Your sleeping area   Use your bedroom only for sleeping and sex. A bit of light reading may help you fall asleep. But if it doesn't, do your reading elsewhere in the house. Try not to use your TV, computer, smartphone, or tablet while you are in bed.  Be sure your bed is big enough to stretch out comfortably, especially if you have a sleep partner.  Keep your bedroom quiet, dark, and cool. Use curtains, blinds, or a sleep mask to block out light. To block out noise, use earplugs, soothing music, or a \"white noise\" machine.  Your evening and bedtime routine   Create a relaxing bedtime routine. You might want to take a warm shower or bath, or listen to soothing music.  Go to bed at the same time every night. And get up at the same time every morning, even if you feel tired.  What to avoid   Limit caffeine (coffee, tea, caffeinated sodas) during the day, and don't have any for at least 6 hours before bedtime.  Avoid drinking alcohol before bedtime. Alcohol can cause you to wake up more often during the night.  Try not to smoke or use tobacco, especially in the evening. Nicotine can keep you awake.  Limit naps during the day, especially close to bedtime.  Avoid lying in bed awake for too long. If you can't fall asleep or if you wake up in the middle of the night and can't get back to sleep within about 20 minutes, get out of bed and go to another room until you feel sleepy.  Avoid taking medicine right before bed that may keep you awake or make you feel hyper or energized. Your doctor can tell you if your medicine may do this and if you can take it earlier in the day.  If you can't sleep   Imagine yourself in a peaceful, pleasant scene. Focus on the details and feelings of being in a place that is relaxing.  Get up and do a quiet or boring activity until you feel sleepy.  Avoid drinking any liquids before going to bed " "to help prevent waking up often to use the bathroom.  Where can you learn more?  Go to https://www.[employer]net/patiented  Enter J942 in the search box to learn more about \"Learning About Sleeping Well.\"  Current as of: July 10, 2023  Content Version: 14.1 2006-2024 [employer].   Care instructions adapted under license by your healthcare professional. If you have questions about a medical condition or this instruction, always ask your healthcare professional. [employer] disclaims any warranty or liability for your use of this information.       "

## 2024-09-03 NOTE — PROGRESS NOTES
Preventive Care Visit  [facility] [place] Deirdre E. Milligan, MD, Internal Medicine - Pediatrics  Sep 3, 2024      Assessment & Plan     Encounter for Medicare annual wellness exam  - Comprehensive metabolic panel (BMP + Alb, Alk Phos, ALT, AST, Total. Bili, TP); Future  - Comprehensive metabolic panel (BMP + Alb, Alk Phos, ALT, AST, Total. Bili, TP)    On pre-exposure prophylaxis for HIV  Continue Truvada. Creatinine normalized on last check. Declined other STI (sexually transmitted infection) testing today.  - HIV Antigen Antibody Combo; Future  - Comprehensive metabolic panel (BMP + Alb, Alk Phos, ALT, AST, Total. Bili, TP); Future  - emtricitabine-tenofovir (TRUVADA) 200-300 MG per tablet; Take 1 tablet by mouth daily.  - HIV Antigen Antibody Combo  - Comprehensive metabolic panel (BMP + Alb, Alk Phos, ALT, AST, Total. Bili, TP)    Secondary cardiomyopathy (H)  Systolic heart failure, unspecified HF chronicity (H)  Echocardiogram in 2022 with EF 55% (improved from 40%).  - lisinopril (ZESTRIL) 2.5 MG tablet; Take 1 tablet (2.5 mg) by mouth daily.  - Lipid panel reflex to direct LDL Fasting; Future  - Lipid panel reflex to direct LDL Fasting    Atrial flutter with rapid ventricular response (H)  Continue beta blocker. Heart rate well controlled. Does not need cardiology follow up unless clinical change. Defer anticoagulation until age 77 then revisit.  - metoprolol succinate ER (TOPROL XL) 25 MG 24 hr tablet; Take 1 tablet (25 mg) by mouth daily.    Screening for prostate cancer  If >7 refer back to [facility]. Did have normal MRI recently.  - PSA, screen; Future  - PSA, screen    Gastroesophageal reflux disease with esophagitis, unspecified whether hemorrhage  Symptoms well controlled. Continue PPI.  - omeprazole (PRILOSEC) 20 MG DR capsule; Take 1 capsule (20 mg) by mouth daily.    Dyslipidemia  Abnormal lipids in past. Not currently on statin.  - Lipid panel reflex to direct LDL Fasting;  Future  - Lipid panel reflex to direct LDL Fasting            BMI  Estimated body mass index is 29.7 kg/m  as calculated from the following:    Height as of this encounter: 1.829 m (6').    Weight as of this encounter: 99.3 kg (219 lb).       Counseling  Appropriate preventive services were addressed with this patient via screening, questionnaire, or discussion as appropriate for fall prevention, nutrition, physical activity, Tobacco-use cessation, social engagement, weight loss and cognition.  Checklist reviewing preventive services available has been given to the patient.  Reviewed patient's diet, addressing concerns and/or questions.   Discussed possible causes of fatigue.         Dyan More is a 69 year old, presenting for the following:  Medicare Visit        9/3/2024     9:28 AM   Additional Questions   Roomed by Mela   Accompanied by none         9/3/2024     9:28 AM   Patient Reported Additional Medications   Patient reports taking the following new medications none         Health Care Directive  Patient does not have a Health Care Directive or Living Will: Patient states has Advance Directive and will bring in a copy to clinic.    HPI    Had MRI prostate with [facility] that was normal    Eye drops            8/29/2024   General Health   How would you rate your overall physical health? Good   Feel stress (tense, anxious, or unable to sleep) Very much      (!) STRESS CONCERN      8/29/2024   Nutrition   Diet: Regular (no restrictions)            8/29/2024   Exercise   Days per week of moderate/strenous exercise 5 days   Average minutes spent exercising at this level 60 min            8/29/2024   Social Factors   Frequency of gathering with friends or relatives More than three times a week   Worry food won't last until get money to buy more No   Food not last or not have enough money for food? No   Do you have housing? (Housing is defined as stable permanent housing and does not include  staying ouside in a car, in a tent, in an abandoned building, in an overnight shelter, or couch-surfing.) Yes   Are you worried about losing your housing? No   Lack of transportation? No   Unable to get utilities (heat,electricity)? No            2024   Fall Risk   Fallen 2 or more times in the past year? No   Trouble with walking or balance? No             2024   Activities of Daily Living- Home Safety   Needs help with the following daily activites None of the above   Safety concerns in the home None of the above            2024   Dental   Dentist two times every year? Yes            2024   Hearing Screening   Hearing concerns? None of the above            2024   Driving Risk Screening   Patient/family members have concerns about driving No            2024   General Alertness/Fatigue Screening   Have you been more tired than usual lately? (!) YES            2024   Urinary Incontinence Screening   Bothered by leaking urine in past 6 months No            2024   TB Screening   Were you born outside of the [place]? No              Today's PHQ-2 Score:       2024     9:07 AM   PHQ-2 (  [employer])   Q1: Little interest or pleasure in doing things 0   Q2: Feeling down, depressed or hopeless 0   PHQ-2 Score 0         2024   Substance Use   Alcohol more than 3/day or more than 7/wk Not Applicable   Do you have a current opioid prescription? No   How severe/bad is pain from 1 to 10? 4/10   Do you use any other substances recreationally? No        Social History     Tobacco Use    Smoking status: Former     Current packs/day: 0.00     Average packs/day: 1.5 packs/day for 10.0 years (15.0 ttl pk-yrs)     Types: Cigarettes     Start date: 1975     Quit date: 1980     Years since quittin.7    Smokeless tobacco: Never   Substance Use Topics    Alcohol use: No    Drug use: No       Last PSA:   Prostate Specific Antigen Screen   Date Value Ref Range Status   2023  4.65 (H) 0.00 - 4.50 ng/mL Final   10/03/2022 6.53 (H) 0.00 - 4.00 ug/L Final     ASCVD Risk   The ASCVD Risk score (Sonido THORNTON, et al., 2019) failed to calculate for the following reasons:    The valid total cholesterol range is 130 to 320 mg/dL            Reviewed and updated as needed this visit by Provider                      Current providers sharing in care for this patient include:  Patient Care Team:  Milligan, Deirdre E, MD as PCP - General (Internal Medicine - Pediatrics)  Anson Cheng MD as Assigned Surgical Provider  Krsiti Lyons PA-C as Physician Assistant (Dermatology)  Kailash Jones MD as MD (Dermatology)  Milligan, Deirdre E, MD as Assigned PCP  Matti Mendes MD as MD (Ophthalmology)    The following health maintenance items are reviewed in Epic and correct as of today:  Health Maintenance   Topic Date Due    HF ACTION PLAN  Never done    INFLUENZA VACCINE (1) 09/01/2024    COVID-19 Vaccine (6 - 2023-24 season) 09/01/2024    HEPATITIS A IMMUNIZATION (1 of 2 - Risk 2-dose series) 11/21/2025 (Originally 8/20/1974)    MEDICARE ANNUAL WELLNESS VISIT  11/21/2024    ALT  11/21/2024    LIPID  11/21/2024    ANNUAL REVIEW OF HM ORDERS  11/21/2024    CBC  11/21/2024    BMP  12/06/2024    FALL RISK ASSESSMENT  09/03/2025    GLUCOSE  06/06/2027    ADVANCE CARE PLANNING  11/21/2028    DTAP/TDAP/TD IMMUNIZATION (3 - Td or Tdap) 03/14/2029    COLORECTAL CANCER SCREENING  01/12/2031    TSH W/FREE T4 REFLEX  Completed    HEPATITIS C SCREENING  Completed    PHQ-2 (once per calendar year)  Completed    Pneumococcal Vaccine: 65+ Years  Completed    ZOSTER IMMUNIZATION  Completed    HEPATITIS B IMMUNIZATION  Completed    RSV VACCINE  Completed    AORTIC ANEURYSM SCREENING (SYSTEM ASSIGNED)  Completed    HPV IMMUNIZATION  Aged Out    MENINGITIS IMMUNIZATION  Aged Out    RSV MONOCLONAL ANTIBODY  Aged Out            Objective    Exam  /70 (BP Location: Right arm, Patient Position:  Sitting, Cuff Size: Adult Large)   Pulse 78   Temp 97.7  F (36.5  C) (Tympanic)   Resp 18   Ht 1.829 m (6')   Wt 99.3 kg (219 lb)   SpO2 97%   BMI 29.70 kg/m     Estimated body mass index is 29.7 kg/m  as calculated from the following:    Height as of this encounter: 1.829 m (6').    Weight as of this encounter: 99.3 kg (219 lb).    Physical Exam  GENERAL: alert and no distress  EYES: Eyes grossly normal to inspection, and conjunctivae and sclerae normal  HENT: ear canals and TM's normal, nose and mouth without ulcers or lesions  NECK: no adenopathy, no asymmetry, masses, or scars  RESP: lungs clear to auscultation - no rales, rhonchi or wheezes  CV: regular rate and rhythm, normal S1 S2, no S3 or S4, no murmur, click or rub, no peripheral edema  ABDOMEN: soft, nontender, no hepatosplenomegaly, no masses  MS: no gross musculoskeletal defects noted, no edema  SKIN: no suspicious lesions or rashes  NEURO: Normal strength and tone, mentation intact and speech normal  PSYCH: mentation appears normal, affect normal/bright        9/3/2024   Mini Cog   Clock Draw Score 2 Normal   3 Item Recall 3 objects recalled   Mini Cog Total Score 5                 Signed Electronically by: Deirdre E. Milligan, MD

## 2024-10-28 ENCOUNTER — OFFICE VISIT (OUTPATIENT)
Dept: OPHTHALMOLOGY | Facility: CLINIC | Age: 69
End: 2024-10-28
Attending: OPHTHALMOLOGY
Payer: COMMERCIAL

## 2024-10-28 DIAGNOSIS — H25.13 NUCLEAR SCLEROTIC CATARACT OF BOTH EYES: ICD-10-CM

## 2024-10-28 DIAGNOSIS — Z94.7 POST CORNEAL TRANSPLANT: Primary | ICD-10-CM

## 2024-10-28 PROCEDURE — 92025 CPTRIZED CORNEAL TOPOGRAPHY: CPT | Performed by: OPHTHALMOLOGY

## 2024-10-28 PROCEDURE — 99214 OFFICE O/P EST MOD 30 MIN: CPT | Performed by: OPHTHALMOLOGY

## 2024-10-28 PROCEDURE — 99213 OFFICE O/P EST LOW 20 MIN: CPT | Performed by: OPHTHALMOLOGY

## 2024-10-28 ASSESSMENT — EXTERNAL EXAM - RIGHT EYE: OD_EXAM: NORMAL

## 2024-10-28 ASSESSMENT — TONOMETRY
IOP_METHOD: ICARE
OD_IOP_MMHG: 31
IOP_METHOD: ICARE
OD_IOP_MMHG: 30
OS_IOP_MMHG: 15

## 2024-10-28 ASSESSMENT — VISUAL ACUITY
OS_CC: 20/20
CORRECTION_TYPE: GLASSES
OS_CC+: -1
OD_SC: 20/125
OD_PH_SC: 20/50
METHOD: SNELLEN - LINEAR

## 2024-10-28 ASSESSMENT — REFRACTION_WEARINGRX
OS_SPHERE: +1.50
OD_SPHERE: PLANO
OS_CYLINDER: SPHERE
OD_ADD: +2.50
OS_ADD: +2.50

## 2024-10-28 ASSESSMENT — EXTERNAL EXAM - LEFT EYE: OS_EXAM: NORMAL

## 2024-10-28 ASSESSMENT — PACHYMETRY: OD_CT(UM): 574

## 2024-10-28 ASSESSMENT — SLIT LAMP EXAM - LIDS: COMMENTS: MGD, BLEPHARITIS

## 2024-10-28 NOTE — NURSING NOTE
Chief Complaints and History of Present Illnesses   Patient presents with    Post Op (Ophthalmology) Right Eye     4m Post Op, PKP 06/11/2024     Chief Complaint(s) and History of Present Illness(es)       Post Op (Ophthalmology) Right Eye              Comments: 4m Post Op, PKP 06/11/2024              Comments    Pt states vision has been stable, no new changes. Denies any eye pain, flashes, or floaters. Here for suture removal.     Treatment:   Dexamethasone 5x daily OD  Artificial tears Q2h right eye    Sada Bejarano 9:28 AM  October 28, 2024

## 2024-10-28 NOTE — PROGRESS NOTES
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 Rosie Han     HPI:  Derik Hamm is male who presents as follow up for Acanthamoeba Keratitis right eye.     Patient initially presented for evaluation of persistent keratoconjunctivitis, OD, since 10/5/2020. Initially seen by Dr. Ngo at [facility] and felt it was related to severe dryness vs bacterial keratitis, so he was started on PFATs and Vigamox Q2H WA. Patient was seen frequently for follow up; he was started on Valtrex 500 mg TID on 10/9/20 and has been on that since then. Despite this therapy, pt failed to improve so he was started prednisolone. He has had a waxing and waning course, but more recently has gradually declined. Stopped PF on 11/2 after running out. He was seen by Dr. Ngo on 11/2 who noted worsening VA and worsening exam, so the patient was referred to Dr. Han, whom he saw 11/4/20. Dr. Han referred to our clinic due to concerns for acanthamoeba keratitis. Culture positive for acanthamoeba on 11/05/20.    Interval hx 10/28/2024  Chief Complaint(s) and History of Present Illness(es)       Post Op (Ophthalmology) Right Eye     Additional comments: 4m Post Op, PKP 06/11/2024             Comments    Pt states vision has been stable, no new changes. Denies any eye pain, flashes, or floaters. Here for suture removal.     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  Dexamethasone injection right eye for rejection 4/18/24  Right eye repeat PKP 6/11/24     Glasses: Yes  CTL wearer: yes - none x1.5 months; wears while showering or in the sauna; he sleeps in them once every 3-4 weeks; never wears them in a lake or pool     Family hx of eye  disease: negative for glaucoma/macular degeneration     Social Hx: (-) smoking, (-) EtOH, (-) illicit drugs     Meds:  Dexamethasone 5x daily OD  Cosopt BID OD (last used 4 days ago)    Artificial tears OD    Surgical Hx:  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 Sutures intact. IOP 18 off of drops (patient was instructed to stop cosopt 4 days prior to appointment for pressure check)    10/28/24: POM4 - suture removal    PLAN:     - decrease dexamethasone to QID OD  - K jonnie with oblique astigmatism - one suture removed at slit lamp OD 10/28/24  - start ofloxacin QID OD   - Plan for CE/IOL OD after suture removal OD  - artificial tears every 2-3H right eye     RTC:  1 month, K jonnie OD, plan for suture removal, VT, sooner as needed    Attending Physician Attestation:  Complete documentation of historical and exam elements from today's encounter can be found in the full encounter summary report (not reduplicated in this progress note).  I personally obtained the chief complaint(s) and history of present illness.  I confirmed and edited as necessary the review of systems, past medical/surgical history, family history, social history, and examination findings as documented by others; and I examined the patient myself.   I personally reviewed the relevant tests, images, and reports as documented above.  I formulated and edited as necessary the assessment and plan and discussed the findings and management plan with the patient and family. I personally reviewed the ophthalmic test(s) associated with this encounter, agree with the interpretation(s) as documented by the resident/fellow, and have edited the corresponding report(s) as necessary. - Matti Mendes MD

## 2025-01-20 ENCOUNTER — OFFICE VISIT (OUTPATIENT)
Dept: OPHTHALMOLOGY | Facility: CLINIC | Age: 70
End: 2025-01-20
Attending: OPHTHALMOLOGY
Payer: COMMERCIAL

## 2025-01-20 DIAGNOSIS — B60.10 ACANTHAMOEBA INFECTION: ICD-10-CM

## 2025-01-20 DIAGNOSIS — Z94.7 POST CORNEAL TRANSPLANT: Primary | ICD-10-CM

## 2025-01-20 DIAGNOSIS — H25.13 NUCLEAR SCLEROTIC CATARACT OF BOTH EYES: ICD-10-CM

## 2025-01-20 PROCEDURE — 99213 OFFICE O/P EST LOW 20 MIN: CPT | Performed by: OPHTHALMOLOGY

## 2025-01-20 ASSESSMENT — VISUAL ACUITY
OS_CC: 20/30
OD_SC: 20/150
METHOD: SNELLEN - LINEAR
OS_PH_CC: 20/25
OS_PH_CC+: -3
OD_PH_SC: 20/70
OS_CC+: -2
CORRECTION_TYPE: GLASSES

## 2025-01-20 ASSESSMENT — TONOMETRY
IOP_METHOD: TONOPEN
IOP_METHOD: ICARE
OD_IOP_MMHG: 33
OS_IOP_MMHG: 13
OD_IOP_MMHG: 33

## 2025-01-20 ASSESSMENT — REFRACTION_WEARINGRX
OS_CYLINDER: SPHERE
OS_ADD: +2.50
OD_ADD: +2.50
OS_SPHERE: +1.50
OD_SPHERE: PLANO

## 2025-01-20 ASSESSMENT — PACHYMETRY
EXAM_DATE: 1/20/2025
OD_CT(UM): 564

## 2025-01-20 NOTE — PROGRESS NOTES
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 Rosie Han     HPI:  Derik Hamm is male who presents as follow up for Acanthamoeba Keratitis right eye.     Patient initially presented for evaluation of persistent keratoconjunctivitis, OD, since 10/5/2020. Initially seen by Dr. Ngo at [facility] and felt it was related to severe dryness vs bacterial keratitis, so he was started on PFATs and Vigamox Q2H WA. Patient was seen frequently for follow up; he was started on Valtrex 500 mg TID on 10/9/20 and has been on that since then. Despite this therapy, pt failed to improve so he was started prednisolone. He has had a waxing and waning course, but more recently has gradually declined. Stopped PF on 11/2 after running out. He was seen by Dr. Ngo on 11/2 who noted worsening VA and worsening exam, so the patient was referred to Dr. Han, whom he saw 11/4/20. Dr. Han referred to our clinic due to concerns for acanthamoeba keratitis. Culture positive for acanthamoeba on 11/05/20.    Interval hx 01/20/2025. Patient not using cosopt  Chief Complaint(s) and History of Present Illness(es)       Cornea Transplant Follow Up    In right eye.  Associated symptoms include Negative for dryness, eye pain, tearing and photophobia.  Treatments tried include eye drops and artificial tears.  Pain was noted as 0/10. Additional comments: Post corneal transplant             Comments    Pt states is the same.  No pain.  No flashes.  No change to floaters.  Pt is compliant with drops.    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  Dexamethasone injection right eye for rejection 4/18/24  Right eye repeat PKP 6/11/24     Glasses: Yes  CTL wearer: yes - none x1.5 months; wears while showering or in  the sauna; he sleeps in them once every 3-4 weeks; never wears them in a lake or pool     Family hx of eye disease: negative for glaucoma/macular degeneration     Social Hx: (-) smoking, (-) EtOH, (-) illicit drugs     Meds:  Dexamethasone QID OD    Artificial tears OD    Surgical Hx:  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 Sutures intact. IOP 18 off of drops (patient was instructed to stop cosopt 4 days prior to appointment for pressure check)    10/28/24: POM4 - suture removal  1/20/24: s/p PKP - suture removal     PLAN:   - decrease dexamethasone to TID OD  - K jonnie with oblique astigmatism - 6 tight sutures removed at slit lamp OD 1/20/25  - start ofloxacin QID OD x 3 days  - restart cosopt BID OD  - Plan for CE/IOL OD after suture removal OD  - artificial tears every 2-3H right eye     RTC:  1 month, K jonnie OD, plan for suture removal, VT, sooner as needed    Attending Physician Attestation:  Complete documentation of historical and exam elements from today's encounter can be found in the full encounter summary report (not reduplicated in this progress note).  I personally obtained the chief complaint(s) and history of present illness.  I confirmed and edited as necessary the review of systems, past  medical/surgical history, family history, social history, and examination findings as documented by others; and I examined the patient myself.  I personally reviewed the relevant tests, images, and reports as documented above.  I formulated and edited as necessary the assessment and plan and discussed the findings and management plan with the patient and family. I personally reviewed the ophthalmic test(s) associated with this encounter, agree with the interpretation(s) as documented by the resident/fellow, and have edited the corresponding report(s) as necessary. - Matti Mendes MD

## 2025-01-20 NOTE — NURSING NOTE
Chief Complaints and History of Present Illnesses   Patient presents with    Cornea Transplant Follow Up     Post corneal transplant     Chief Complaint(s) and History of Present Illness(es)       Cornea Transplant Follow Up              Laterality: right eye    Associated symptoms: Negative for dryness, eye pain, tearing and photophobia    Treatments tried: eye drops and artificial tears    Pain scale: 0/10    Comments: Post corneal transplant              Comments    Pt states is the same.  No pain.  No flashes.  No change to floaters.  Pt is compliant with drops.    TANNER Corral January 20, 2025 8:21 AM

## 2025-02-17 ENCOUNTER — OFFICE VISIT (OUTPATIENT)
Dept: OPHTHALMOLOGY | Facility: CLINIC | Age: 70
End: 2025-02-17
Attending: OPHTHALMOLOGY
Payer: COMMERCIAL

## 2025-02-17 DIAGNOSIS — Z94.7 POST CORNEAL TRANSPLANT: Primary | ICD-10-CM

## 2025-02-17 DIAGNOSIS — Z98.890 POSTOPERATIVE EYE STATE: ICD-10-CM

## 2025-02-17 DIAGNOSIS — H40.051 BORDERLINE GLAUCOMA OF RIGHT EYE WITH OCULAR HYPERTENSION: ICD-10-CM

## 2025-02-17 PROCEDURE — 99214 OFFICE O/P EST MOD 30 MIN: CPT | Mod: GC | Performed by: OPHTHALMOLOGY

## 2025-02-17 PROCEDURE — 92025 CPTRIZED CORNEAL TOPOGRAPHY: CPT | Performed by: OPHTHALMOLOGY

## 2025-02-17 PROCEDURE — 99213 OFFICE O/P EST LOW 20 MIN: CPT | Performed by: OPHTHALMOLOGY

## 2025-02-17 RX ORDER — DORZOLAMIDE HYDROCHLORIDE AND TIMOLOL MALEATE 20; 5 MG/ML; MG/ML
1 SOLUTION/ DROPS OPHTHALMIC 2 TIMES DAILY
Qty: 10 ML | Refills: 1 | Status: SHIPPED | OUTPATIENT
Start: 2025-02-17

## 2025-02-17 ASSESSMENT — VISUAL ACUITY
OS_CC: 20/20
OD_PH_SC+: -2
CORRECTION_TYPE: GLASSES
OS_CC+: -2
METHOD: SNELLEN - LINEAR
OD_PH_SC: 20/50
OD_SC: 20/100

## 2025-02-17 ASSESSMENT — SLIT LAMP EXAM - LIDS
COMMENTS: MGD, BLEPHARITIS
COMMENTS: MGD, BLEPHARITIS

## 2025-02-17 ASSESSMENT — CONF VISUAL FIELD
OS_NORMAL: 1
OD_SUPERIOR_TEMPORAL_RESTRICTION: 0
OD_NORMAL: 1
OD_INFERIOR_NASAL_RESTRICTION: 0
OD_SUPERIOR_NASAL_RESTRICTION: 0
OS_INFERIOR_TEMPORAL_RESTRICTION: 0
OS_SUPERIOR_NASAL_RESTRICTION: 0
OD_INFERIOR_TEMPORAL_RESTRICTION: 0
OS_INFERIOR_NASAL_RESTRICTION: 0
OS_SUPERIOR_TEMPORAL_RESTRICTION: 0
METHOD: COUNTING FINGERS

## 2025-02-17 ASSESSMENT — TONOMETRY
OS_IOP_MMHG: 12
OD_IOP_MMHG: 11
IOP_METHOD: ICARE

## 2025-02-17 ASSESSMENT — EXTERNAL EXAM - RIGHT EYE: OD_EXAM: NORMAL

## 2025-02-17 ASSESSMENT — REFRACTION_WEARINGRX
OS_SPHERE: +1.50
OD_SPHERE: PLANO
OD_ADD: +2.50
OS_ADD: +2.50
OS_CYLINDER: SPHERE

## 2025-02-17 ASSESSMENT — EXTERNAL EXAM - LEFT EYE: OS_EXAM: NORMAL

## 2025-02-17 NOTE — PROGRESS NOTES
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 Rosie Han     HPI:  Derik Hamm is male who presents as follow up for Acanthamoeba Keratitis right eye.     Patient initially presented for evaluation of persistent keratoconjunctivitis, OD, since 10/5/2020. Initially seen by Dr. Ngo at [facility] and felt it was related to severe dryness vs bacterial keratitis, so he was started on PFATs and Vigamox Q2H WA. Patient was seen frequently for follow up; he was started on Valtrex 500 mg TID on 10/9/20 and has been on that since then. Despite this therapy, pt failed to improve so he was started prednisolone. He has had a waxing and waning course, but more recently has gradually declined. Stopped PF on 11/2 after running out. He was seen by Dr. Ngo on 11/2 who noted worsening VA and worsening exam, so the patient was referred to Dr. Han, whom he saw 11/4/20. Dr. Han referred to our clinic due to concerns for acanthamoeba keratitis. Culture positive for acanthamoeba on 11/05/20.    Interval hx 02/17/2025.     Chief Complaint(s) and History of Present Illness(es)       Acanthamoeba keratitis, right eye-resolved              Comments: 1 month follow up               Comments    Pt states vision is the same as last visit. No eye pain today. No new flashes or floaters.  No redness or dryness. No DM.    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  Dexamethasone injection right eye for rejection 4/18/24  Right eye repeat PKP 6/11/24     Glasses: Yes  CTL wearer: none currently; attempted hard lens wear but felt that this did not work well for him due to it coming out of the eye     Family hx of eye disease: negative for glaucoma/macular degeneration     Social Hx: (-) smoking, (-) EtOH,  (-) illicit drugs     Meds:  Current eye drops:   - s/p Ofloxacin QID OD x 3 days from last visit suture removal  - Cosopt BID OD  - Dexamethasone TID OD    Surgical Hx:  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 Sutures intact. IOP 18 off of drops (patient was instructed to stop cosopt 4 days prior to appointment for pressure check)    10/28/24: POM4 - suture removal  1/20/24: s/p PKP - suture removal   02/17/25: s/p PKP - suture removal    PLAN:   - Continue dexamethasone to TID OD  - K jonnie with oblique astigmatism - 2 tight sutures removed at slit lamp OD 2/17/25  - start ofloxacin QID OD x 3 days  - Continue cosopt BID OD  - Plan for CE/IOL OD after suture removal OD  - artificial tears every 2-3H right eye    RTC:  1 month, K jonnie OD, plan for suture removal, VT, sooner as needed    Jimenez Costa MD  PGY-3, Ophthalmology  [facility]    Attending Physician Attestation:  Complete documentation of historical and exam elements from today's encounter can be found in the full encounter summary report (not reduplicated in this progress note).  I personally obtained the chief complaint(s) and history of present illness.  I confirmed and edited as necessary the review of systems, past medical/surgical  history, family history, social history, and examination findings as documented by others; and I examined the patient myself.  I personally reviewed the relevant tests, images, and reports as documented above.  I formulated and edited as necessary the assessment and plan and discussed the findings and management plan with the patient and family. I personally reviewed the ophthalmic test(s) associated with this encounter, agree with the interpretation(s) as documented by the resident/fellow, and have edited the corresponding report(s) as necessary. - Matti Mendes MD

## 2025-02-17 NOTE — NURSING NOTE
Chief Complaints and History of Present Illnesses   Patient presents with    Acanthamoeba keratitis, right eye-resolved     1 month follow up      Chief Complaint(s) and History of Present Illness(es)       Acanthamoeba keratitis, right eye-resolved              Comments: 1 month follow up               Comments    Pt states vision is the same as last visit. No eye pain today. No new flashes or floaters.  No redness or dryness. No DM.    AUSTYN Olvera February 17, 2025 8:09 AM

## 2025-03-17 ENCOUNTER — OFFICE VISIT (OUTPATIENT)
Dept: OPHTHALMOLOGY | Facility: CLINIC | Age: 70
End: 2025-03-17
Attending: OPHTHALMOLOGY
Payer: COMMERCIAL

## 2025-03-17 DIAGNOSIS — Z94.7 POST CORNEAL TRANSPLANT: Primary | ICD-10-CM

## 2025-03-17 PROCEDURE — 92025 CPTRIZED CORNEAL TOPOGRAPHY: CPT | Performed by: OPHTHALMOLOGY

## 2025-03-17 PROCEDURE — 99213 OFFICE O/P EST LOW 20 MIN: CPT | Performed by: OPHTHALMOLOGY

## 2025-03-17 ASSESSMENT — EXTERNAL EXAM - RIGHT EYE: OD_EXAM: NORMAL

## 2025-03-17 ASSESSMENT — SLIT LAMP EXAM - LIDS
COMMENTS: MGD, BLEPHARITIS
COMMENTS: MGD, BLEPHARITIS

## 2025-03-17 ASSESSMENT — VISUAL ACUITY
OD_PH_SC: 20/60
CORRECTION_TYPE: GLASSES
OS_CC: 20/40
METHOD: SNELLEN - LINEAR
OD_SC: 20/150

## 2025-03-17 ASSESSMENT — TONOMETRY
OD_IOP_MMHG: 14
IOP_METHOD: ICARE
OS_IOP_MMHG: 14

## 2025-03-17 ASSESSMENT — EXTERNAL EXAM - LEFT EYE: OS_EXAM: NORMAL

## 2025-03-17 NOTE — PROGRESS NOTES
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 Rosie Han     HPI:  Derik Hamm is male who presents as follow up for Acanthamoeba Keratitis right eye.     Patient initially presented for evaluation of persistent keratoconjunctivitis, OD, since 10/5/2020. Initially seen by Dr. Ngo at [facility] and felt it was related to severe dryness vs bacterial keratitis, so he was started on PFATs and Vigamox Q2H WA. Patient was seen frequently for follow up; he was started on Valtrex 500 mg TID on 10/9/20 and has been on that since then. Despite this therapy, pt failed to improve so he was started prednisolone. He has had a waxing and waning course, but more recently has gradually declined. Stopped PF on 11/2 after running out. He was seen by Dr. Ngo on 11/2 who noted worsening VA and worsening exam, so the patient was referred to Dr. Han, whom he saw 11/4/20. Dr. Han referred to our clinic due to concerns for acanthamoeba keratitis. Culture positive for acanthamoeba on 11/05/20.    Interval hx 03/17/2025.     Chief Complaint(s) and History of Present Illness(es)       post corneal transplant                Comments    Vision stable ou.     Denies pain.    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  Dexamethasone injection right eye for rejection 4/18/24  Right eye repeat PKP 6/11/24     Glasses: Yes  CTL wearer: none currently; attempted hard lens wear but felt that this did not work well for him due to it coming out of the eye     Family hx of eye disease: negative for glaucoma/macular degeneration     Social Hx: (-) smoking, (-) EtOH, (-) illicit drugs     Meds 3/17/2025:  - Cosopt BID OD  - Dexamethasone TID OD    Surgical Hx:  S/p PKP with  posterior synechiolysis right eye (12/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 Sutures intact. IOP 18 off of drops (patient was instructed to stop cosopt 4 days prior to appointment for pressure check)    10/28/24: POM4 - suture removal  1/20/24: s/p PKP - suture removal   02/17/25: s/p PKP - suture removal  3/17/25: 3 inf sutures removed today      PLAN 3/17/2025:   - Continue cosopt BID right eye  - Dexamethasone TID right eye   - Plan for CE/IOL OD after suture removal OD  - artificial tears every 2-3H right eye  - removed 3 sutures based on K jonnie OD  - [place] QID right eye x 3 days  - will plan for CE/IOL OD once K jonnie OD optimized    RTC:  1 month, K jonnie OD, plan for suture removal, VT, sooner as needed    Marysol Bonilla MD  Cornea and External Disease Fellow  [facility]    Attending Physician Attestation:  Complete documentation of historical and exam elements from today's encounter can be found in the full encounter summary report (not reduplicated in this progress note).  I personally obtained the chief complaint(s) and history of present illness.  I confirmed and edited as necessary the review of systems, past medical/surgical history, family history, social history, and examination findings as documented by others; and I examined the patient  myself.  I personally reviewed the relevant tests, images, and reports as documented above.  I formulated and edited as necessary the assessment and plan and discussed the findings and management plan with the patient and family. I personally reviewed the ophthalmic test(s) associated with this encounter, agree with the interpretation(s) as documented by the resident/fellow, and have edited the corresponding report(s) as necessary. - Matti Mendes MD

## 2025-03-17 NOTE — NURSING NOTE
Chief Complaints and History of Present Illnesses   Patient presents with    post corneal transplant      Chief Complaint(s) and History of Present Illness(es)       post corneal transplant                Comments    Vision stable ou.     Denies pain.    Gtts:   dexamethasone to TID OD   cosopt BID OD  artificial tears every 2-3H right eye
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
## 2025-04-06 ENCOUNTER — MYC REFILL (OUTPATIENT)
Dept: OPHTHALMOLOGY | Facility: CLINIC | Age: 70
End: 2025-04-06
Payer: COMMERCIAL

## 2025-04-06 DIAGNOSIS — Z98.890 POSTOPERATIVE EYE STATE: ICD-10-CM

## 2025-04-06 DIAGNOSIS — H40.051 BORDERLINE GLAUCOMA OF RIGHT EYE WITH OCULAR HYPERTENSION: ICD-10-CM

## 2025-04-07 RX ORDER — DORZOLAMIDE HYDROCHLORIDE AND TIMOLOL MALEATE 20; 5 MG/ML; MG/ML
1 SOLUTION/ DROPS OPHTHALMIC 2 TIMES DAILY
Qty: 10 ML | Refills: 5 | Status: SHIPPED | OUTPATIENT
Start: 2025-04-07 | End: 2025-04-10

## 2025-04-09 ENCOUNTER — TELEPHONE (OUTPATIENT)
Dept: OPHTHALMOLOGY | Facility: CLINIC | Age: 70
End: 2025-04-09
Payer: COMMERCIAL

## 2025-04-09 DIAGNOSIS — Z98.890 POSTOPERATIVE EYE STATE: ICD-10-CM

## 2025-04-09 DIAGNOSIS — H40.051 BORDERLINE GLAUCOMA OF RIGHT EYE WITH OCULAR HYPERTENSION: ICD-10-CM

## 2025-04-09 NOTE — TELEPHONE ENCOUNTER
[facility] Call Center    Phone Message    May a detailed message be left on voicemail: yes     Reason for Call: Other: Nafisa from [facility] was calling to ask if the order for dorzolamide-timolol (COSOPT) 2-0.5 % ophthalmic solution could be changed to a 90 day supply instead of the 75 day supply. Please call [phone] to advise. Thank you      Action Taken: Message routed to:  [facility]    Travel Screening: Not Applicable     Date of Service:

## 2025-04-10 RX ORDER — DORZOLAMIDE HYDROCHLORIDE AND TIMOLOL MALEATE 20; 5 MG/ML; MG/ML
1 SOLUTION/ DROPS OPHTHALMIC 2 TIMES DAILY
Qty: 10 ML | Refills: 5 | Status: SHIPPED | OUTPATIENT
Start: 2025-04-10

## 2025-04-10 NOTE — TELEPHONE ENCOUNTER
Rx previous sent with ok for 90 day supply (long term use box checked off).    I resent Rx per request with comment to pharmacy-- ok for 90 day supply per request.    Angel Bell RN 6:58 AM 04/10/25

## 2025-04-14 ENCOUNTER — PREP FOR PROCEDURE (OUTPATIENT)
Dept: OPHTHALMOLOGY | Facility: CLINIC | Age: 70
End: 2025-04-14
Payer: COMMERCIAL

## 2025-04-14 ENCOUNTER — TELEPHONE (OUTPATIENT)
Dept: OPHTHALMOLOGY | Facility: CLINIC | Age: 70
End: 2025-04-14

## 2025-04-14 ENCOUNTER — OFFICE VISIT (OUTPATIENT)
Dept: OPHTHALMOLOGY | Facility: CLINIC | Age: 70
End: 2025-04-14
Attending: OPHTHALMOLOGY
Payer: COMMERCIAL

## 2025-04-14 DIAGNOSIS — Z94.7 POST CORNEAL TRANSPLANT: Primary | ICD-10-CM

## 2025-04-14 DIAGNOSIS — H25.11 NUCLEAR SCLEROTIC CATARACT OF RIGHT EYE: Primary | ICD-10-CM

## 2025-04-14 DIAGNOSIS — H52.211 IRREGULAR ASTIGMATISM OF RIGHT EYE: ICD-10-CM

## 2025-04-14 PROCEDURE — 99213 OFFICE O/P EST LOW 20 MIN: CPT | Performed by: OPHTHALMOLOGY

## 2025-04-14 PROCEDURE — 92025 CPTRIZED CORNEAL TOPOGRAPHY: CPT | Performed by: OPHTHALMOLOGY

## 2025-04-14 ASSESSMENT — REFRACTION_WEARINGRX
SPECS_TYPE: PAL
OS_SPHERE: +1.50
OS_ADD: +2.50
OS_CYLINDER: SPHERE
OD_ADD: +2.50
OD_SPHERE: PLANO

## 2025-04-14 ASSESSMENT — VISUAL ACUITY
OD_PH_SC: 20/100
METHOD: SNELLEN - LINEAR
OD_SC: 20/150 SEARCHING
CORRECTION_TYPE: GLASSES
OS_CC: 20/20

## 2025-04-14 ASSESSMENT — SLIT LAMP EXAM - LIDS
COMMENTS: MGD, BLEPHARITIS
COMMENTS: MGD, BLEPHARITIS

## 2025-04-14 ASSESSMENT — TONOMETRY
IOP_METHOD: ICARE
OS_IOP_MMHG: 13
OD_IOP_MMHG: 11

## 2025-04-14 ASSESSMENT — EXTERNAL EXAM - LEFT EYE: OS_EXAM: NORMAL

## 2025-04-14 ASSESSMENT — EXTERNAL EXAM - RIGHT EYE: OD_EXAM: NORMAL

## 2025-04-14 NOTE — NURSING NOTE
Chief Complaints and History of Present Illnesses   Patient presents with    Keratitis Follow Up      Acanthamoeba keratitis, right eye-resolved     Chief Complaint(s) and History of Present Illness(es)       Keratitis Follow Up              Comments:  Acanthamoeba keratitis, right eye-resolved              Comments    Patient states seeing about the same since here last. No floaters noticed. No redness front of eye. No extreme photosensitivity. No issues with night time driving with glare. Taking ocular medications as recommended. No DM. No irritation or ocular pain.     Elaina Vaz on 4/14/2025 at 9:35 AM

## 2025-04-14 NOTE — TELEPHONE ENCOUNTER
Called patient to schedule surgery with Dr. Mendes     Spoke with: Derik    Date(s) of Surgery: 6/3/25    Patient aware of approximate arrival time: Yes at pre op nursing      Tissue: Not Applicable Ordered: Not Applicable     Location of surgery: [facility]     Pre-Op H&P: [facility]      Informed patient that they need to call to schedule pre-op H&P within 30 days of surgery date: Yes    Post-Op Appt Dates:   6/4/25 8:30am   6/11/25 8:30am   7/23/25 8:30am      Discussed with patient pre-op RN will call 2-3 days prior to surgery with arrival time and instructions:  Yes       Standard Surgery Packet Sent: Yes 04/14/25  via [employer] Message      Additional Information Sent in Packet:        Informed patient that they will need an adult  to bring patient home from surgery: Yes  : friend          Additional Comments:        All patients questions were answered and was instructed to review surgical packet and call back [phone] with any questions or concerns.       Mio Solorzano on 4/14/2025 at 4:17 PM

## 2025-04-14 NOTE — PROGRESS NOTES
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 Rosie Han     HPI:  Derik Hamm is male who presents as follow up for Acanthamoeba Keratitis right eye.     Patient initially presented for evaluation of persistent keratoconjunctivitis, OD, since 10/5/2020. Initially seen by Dr. Ngo at [facility] and felt it was related to severe dryness vs bacterial keratitis, so he was started on PFATs and Vigamox Q2H WA. Patient was seen frequently for follow up; he was started on Valtrex 500 mg TID on 10/9/20 and has been on that since then. Despite this therapy, pt failed to improve so he was started prednisolone. He has had a waxing and waning course, but more recently has gradually declined. Stopped PF on 11/2 after running out. He was seen by Dr. Ngo on 11/2 who noted worsening VA and worsening exam, so the patient was referred to Dr. Han, whom he saw 11/4/20. Dr. Han referred to our clinic due to concerns for acanthamoeba keratitis. Culture positive for acanthamoeba on 11/05/20.    Interval hx 04/14/2025.     Chief Complaint(s) and History of Present Illness(es)       Keratitis Follow Up              Comments:  Acanthamoeba keratitis, right eye-resolved              Comments    Patient states seeing about the same since here last. No floaters noticed. No redness front of eye. No extreme photosensitivity. No issues with night time driving with glare. Taking ocular medications as recommended. No DM. No irritation or ocular pain.     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  Dexamethasone injection right eye for rejection 4/18/24  Right eye repeat PKP 6/11/24     Glasses: Yes  CTL wearer: none currently; attempted hard lens wear but felt that this did not work well for him due to it coming  out of the eye     Family hx of eye disease: negative for glaucoma/macular degeneration     Social Hx: (-) smoking, (-) EtOH, (-) illicit drugs     Meds 4/14/2025:  - Cosopt BID OD  - Dexamethasone TID OD    Surgical Hx:  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 Sutures intact. IOP 18 off of drops (patient was instructed to stop cosopt 4 days prior to appointment for pressure check)    10/28/24: POM4 - suture removal  1/20/24: s/p PKP - suture removal   02/17/25: s/p PKP - suture removal  3/17/25: 3 inf sutures removed today  4/14/25: s/p PKP right eye 6/2024: no change in Nir right eye after removing 3 sutures in previous visit; no benefit of removing more sutures.; right eye corneal graft clear with no sign of rejection    PLAN 4/14/2025:   - Continue cosopt BID right eye  - Dexamethasone TID right eye   - artificial tears every 2-3H right eye  - 2x sutures removed based on K nir OD 4/14/25  - start ofloxacin QID OD x 3 days OD  - plan for IOL calcs, K nir OD at follow-up  - plan for CE/IOL OD     RTC 1 month    Marysol Bonilla MD  Cornea and External Disease Fellow  [facility]      Attending Physician Attestation:  Complete documentation of historical and exam elements from today's encounter can  be found in the full encounter summary report (not reduplicated in this progress note).  I personally obtained the chief complaint(s) and history of present illness.  I confirmed and edited as necessary the review of systems, past medical/surgical history, family history, social history, and examination findings as documented by others; and I examined the patient myself.  I personally reviewed the relevant tests, images, and reports as documented above.  I formulated and edited as necessary the assessment and plan and discussed the findings and management plan with the patient and family. I personally reviewed the ophthalmic test(s) associated with this encounter, agree with the interpretation(s) as documented by the resident/fellow, and have edited the corresponding report(s) as necessary. - Matti Mendes MD

## 2025-04-24 ENCOUNTER — OFFICE VISIT (OUTPATIENT)
Dept: PEDIATRICS | Facility: CLINIC | Age: 70
End: 2025-04-24
Payer: COMMERCIAL

## 2025-04-24 VITALS
OXYGEN SATURATION: 96 % | BODY MASS INDEX: 28.91 KG/M2 | DIASTOLIC BLOOD PRESSURE: 82 MMHG | WEIGHT: 213.4 LBS | SYSTOLIC BLOOD PRESSURE: 124 MMHG | HEIGHT: 72 IN | TEMPERATURE: 99.4 F | RESPIRATION RATE: 16 BRPM | HEART RATE: 95 BPM

## 2025-04-24 DIAGNOSIS — Z79.899 ON PRE-EXPOSURE PROPHYLAXIS FOR HIV: ICD-10-CM

## 2025-04-24 DIAGNOSIS — J01.90 ACUTE SINUSITIS WITH SYMPTOMS > 10 DAYS: Primary | ICD-10-CM

## 2025-04-24 LAB
ERYTHROCYTE [DISTWIDTH] IN BLOOD BY AUTOMATED COUNT: 12.7 % (ref 10–15)
HCT VFR BLD AUTO: >60 % (ref 40–53)
HGB BLD-MCNC: 20.3 G/DL (ref 13.3–17.7)
MCH RBC QN AUTO: 30.7 PG (ref 26.5–33)
MCHC RBC AUTO-ENTMCNC: 32.5 G/DL (ref 31.5–36.5)
MCV RBC AUTO: 94 FL (ref 78–100)
PLATELET # BLD AUTO: 211 10E3/UL (ref 150–450)
RBC # BLD AUTO: 6.62 10E6/UL (ref 4.4–5.9)
WBC # BLD AUTO: 8.5 10E3/UL (ref 4–11)

## 2025-04-24 NOTE — PATIENT INSTRUCTIONS
"You need to get labs every 3 months while on PrEP medication    You can make the \"lab only\" appointment through [facility] or by calling us at [phone].      Nasal decongestant spray:  -Afrin nasal spray (oxymetazoline) max 3 days      Ibuprofen (Advil or Motrin) 600mg 3-4 times per day for a few days  "

## 2025-04-24 NOTE — PROGRESS NOTES
Assessment & Plan     Acute sinusitis with symptoms > 10 days  Recommend treatment with antibiotics, discussed nasal decongestants, saline rinses.  - CBC with Platelets; Future  - amoxicillin-clavulanate (AUGMENTIN) 875-125 MG tablet; Take 1 tablet by mouth 2 times daily for 10 days.  - CBC with Platelets    On pre-exposure prophylaxis for HIV  Recommend labs every 3 months. Patient can make lab only appointments. Has future appointments for pre op and routine scheduled with me.  - BASIC METABOLIC PANEL; Future  - HIV Antigen Antibody Combo; Future  - BASIC METABOLIC PANEL  - HIV Antigen Antibody Combo          BMI  Estimated body mass index is 28.94 kg/m  as calculated from the following:    Height as of this encounter: 1.829 m (6').    Weight as of this encounter: 96.8 kg (213 lb 6.4 oz).             Dyan More is a 69 year old, presenting for the following health issues:  URI        4/24/2025    12:50 PM   Additional Questions   Roomed by Stacy Gonsalez   Accompanied by N/A     History of Present Illness       Reason for visit:  Stuff up. Colded.  Coughing   He is taking medications regularly.      OTC meds are not helping   Pt is having left side cheek pain into teeth   Coughing at night  Purulent nasal discharge green thick  Has been taking Claritin (loratadine) during day   Throbbing pain in left cheek  Had days of cold symptoms before this started.    Would like to know if he needs to be doing more labs due to his [place]                   Objective    /82 (BP Location: Right arm, Patient Position: Sitting, Cuff Size: Adult Large)   Pulse 95   Temp 99.4  F (37.4  C) (Temporal)   Resp 16   Ht 1.829 m (6')   Wt 96.8 kg (213 lb 6.4 oz)   SpO2 96%   BMI 28.94 kg/m    Body mass index is 28.94 kg/m .  Physical Exam   GENERAL: alert and no distress  EYES: Eyes grossly normal to inspection, and conjunctivae and sclerae normal  HENT: ear canals and TM's normal, nose and mouth without ulcers or  lesions  NECK: no adenopathy, no asymmetry, masses, or scars  RESP: lungs clear to auscultation - no rales, rhonchi or wheezes            Signed Electronically by: Deirdre E. Milligan, MD

## 2025-04-26 LAB — EPO SERPL-ACNC: 15 MU/ML

## 2025-05-08 DIAGNOSIS — T86.91 GRAFT REJECTION: ICD-10-CM

## 2025-05-08 DIAGNOSIS — Z94.7 HISTORY OF PENETRATING KERATOPLASTY: ICD-10-CM

## 2025-05-11 NOTE — TELEPHONE ENCOUNTER
"dexAMETHasone (DECADRON) 0.1 % ophthalmic solution   Start: 04/18/2024   Disp  15 ml  R  5   1-2 drop, Right Eye, EVERY HOUR       Matti Mendes MD  Ophthalmology  Lv 4/14/25  Nv   5/12/25    \" Dexamethasone TID right eye\"     Refill decision: Medication unable to be refilled by RN due to: Other:  provider review. Note rt eye tid. Med list and  requested,     Place 1-2 drops into the right eye every hour,   Please enter correct order. thanks.    Request from pharmacy:  Requested Prescriptions   Pending Prescriptions Disp Refills    dexAMETHasone (DECADRON) 0.1 % ophthalmic solution [Pharmacy Med Name: DEXAMETH SALVADOR SOL 0.1% OP] 15 mL 5     Sig: PLACE 1 TO 2 DROPS INTO THERIGHT EYE EVERY HOUR       There is no refill protocol information for this order                      "

## 2025-05-12 ENCOUNTER — OFFICE VISIT (OUTPATIENT)
Dept: OPHTHALMOLOGY | Facility: CLINIC | Age: 70
End: 2025-05-12
Attending: OPHTHALMOLOGY
Payer: COMMERCIAL

## 2025-05-12 DIAGNOSIS — H25.13 AGE-RELATED NUCLEAR CATARACT OF BOTH EYES: ICD-10-CM

## 2025-05-12 DIAGNOSIS — Z94.7 POST CORNEAL TRANSPLANT: Primary | ICD-10-CM

## 2025-05-12 PROCEDURE — 92025 CPTRIZED CORNEAL TOPOGRAPHY: CPT | Performed by: OPHTHALMOLOGY

## 2025-05-12 PROCEDURE — 99214 OFFICE O/P EST MOD 30 MIN: CPT | Mod: GC | Performed by: OPHTHALMOLOGY

## 2025-05-12 PROCEDURE — 99213 OFFICE O/P EST LOW 20 MIN: CPT | Performed by: OPHTHALMOLOGY

## 2025-05-12 PROCEDURE — 76519 ECHO EXAM OF EYE: CPT | Performed by: OPHTHALMOLOGY

## 2025-05-12 RX ORDER — DEXAMETHASONE SODIUM PHOSPHATE 1 MG/ML
1-2 SOLUTION/ DROPS OPHTHALMIC 3 TIMES DAILY
Qty: 5 ML | Refills: 5 | Status: SHIPPED | OUTPATIENT
Start: 2025-05-12

## 2025-05-12 ASSESSMENT — REFRACTION_WEARINGRX
OS_ADD: +2.50
OD_ADD: +2.50
SPECS_TYPE: PAL
OS_SPHERE: +1.50
OS_CYLINDER: SPHERE
OD_SPHERE: PLANO

## 2025-05-12 ASSESSMENT — TONOMETRY
OS_IOP_MMHG: 15
OD_IOP_MMHG: 13
IOP_METHOD: ICARE

## 2025-05-12 ASSESSMENT — VISUAL ACUITY
OS_CC: 20/25
OS_CC+: +3
CORRECTION_TYPE: GLASSES
OD_PH_SC: 20/80
METHOD: SNELLEN - LINEAR
OD_SC: 20/500

## 2025-05-12 ASSESSMENT — EXTERNAL EXAM - RIGHT EYE: OD_EXAM: NORMAL

## 2025-05-12 ASSESSMENT — EXTERNAL EXAM - LEFT EYE: OS_EXAM: NORMAL

## 2025-05-12 ASSESSMENT — SLIT LAMP EXAM - LIDS
COMMENTS: MGD, BLEPHARITIS
COMMENTS: MGD, BLEPHARITIS

## 2025-05-12 NOTE — TELEPHONE ENCOUNTER
Pt last seen 4/14/2025    Rx for dexamethasone sent per request.    Angel Bell RN 6:51 AM 05/12/25

## 2025-05-12 NOTE — PROGRESS NOTES
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 Rosie Han     HPI:  Derik Hamm is male who presents as follow up for Acanthamoeba Keratitis right eye.     Patient initially presented for evaluation of persistent keratoconjunctivitis, OD, since 10/5/2020. Initially seen by Dr. Ngo at [facility] and felt it was related to severe dryness vs bacterial keratitis, so he was started on PFATs and Vigamox Q2H WA. Patient was seen frequently for follow up; he was started on Valtrex 500 mg TID on 10/9/20 and has been on that since then. Despite this therapy, pt failed to improve so he was started prednisolone. He has had a waxing and waning course, but more recently has gradually declined. Stopped PF on 11/2 after running out. He was seen by Dr. Ngo on 11/2 who noted worsening VA and worsening exam, so the patient was referred to Dr. Han, whom he saw 11/4/20. Dr. Han referred to our clinic due to concerns for acanthamoeba keratitis. Culture positive for acanthamoeba on 11/05/20.    Interval hx 05/12/2025.     Chief Complaint(s) and History of Present Illness(es)       Cataract Follow Up              Laterality: both eyes              Comments    Patient has cataract surgery scheduled for right eye 6/3/2025; here for check up beforehand. No ocular pain. No redness front of eyes noticed. No floaters or flashes of light. Lights at night looking fairly normal. Vision about the same for right eye, blurry, and left eye vision looks okay for patient. Taking two different eye drops, blue cap and white cap, for pressure and steroid. Takes tears for dry eye relief as needed.    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  Dexamethasone injection right eye for rejection 4/18/24  Right eye repeat PKP  6/11/24     Glasses: Yes  CTL wearer: none currently; attempted hard lens wear but felt that this did not work well for him due to it coming out of the eye     Family hx of eye disease: negative for glaucoma/macular degeneration     Social Hx: (-) smoking, (-) EtOH, (-) illicit drugs     Meds 5/12/2025:  - Cosopt BID OD  - Dexamethasone TID OD    Surgical Hx:  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 Sutures intact. IOP 18 off of drops (patient was instructed to stop cosopt 4 days prior to appointment for pressure check)    10/28/24: POM4 - suture removal  1/20/24: s/p PKP - suture removal   02/17/25: s/p PKP - suture removal  3/17/25: 3 inf sutures removed today  4/14/25: s/p PKP right eye 6/2024: no change in Nir right eye after removing 3 sutures in previous visit; no benefit of removing more sutures.; right eye corneal graft clear with no sign of rejection  5/12/25: Pentcam right eye shows WTR astigmatism - remove superior suture      PLAN 5/12/2025:   K nir OD with WTR astigmatism - remove superior suture  Start ofloxacin QID OD x 3 days  Recheck IOL calcs/K nir in 3-4 weeks  Proceed with right eye CEIOL; consider LRI during CEIOL to correct the astig      Marysol Bonilla MD  Cornea and External Disease  Fellow  [facility]    Attending Physician Attestation:  Complete documentation of historical and exam elements from today's encounter can be found in the full encounter summary report (not reduplicated in this progress note).  I personally obtained the chief complaint(s) and history of present illness.  I confirmed and edited as necessary the review of systems, past medical/surgical history, family history, social history, and examination findings as documented by others; and I examined the patient myself.  I personally reviewed the relevant tests, images, and reports as documented above.  I formulated and edited as necessary the assessment and plan and discussed the findings and management plan with the patient and family. I personally reviewed the ophthalmic test(s) associated with this encounter, agree with the interpretation(s) as documented by the resident/fellow, and have edited the corresponding report(s) as necessary. - Matti Mendes MD

## 2025-05-12 NOTE — NURSING NOTE
Chief Complaints and History of Present Illnesses   Patient presents with    Cataract Follow Up     Chief Complaint(s) and History of Present Illness(es)       Cataract Follow Up              Laterality: both eyes              Comments    Patient has cataract surgery scheduled for right eye 6/3/2025; here for check up beforehand. No ocular pain. No redness front of eyes noticed. No floaters or flashes of light. Lights at night looking fairly normal. Vision about the same for right eye, blurry, and left eye vision looks okay for patient. Taking two different eye drops, blue cap and white cap, for pressure and steroid. Takes tears for dry eye relief as needed.    Elaina Vaz on 5/12/2025 at 8:26 AM

## 2025-05-15 ENCOUNTER — OFFICE VISIT (OUTPATIENT)
Dept: PEDIATRICS | Facility: CLINIC | Age: 70
End: 2025-05-15
Payer: COMMERCIAL

## 2025-05-15 VITALS
HEIGHT: 72 IN | SYSTOLIC BLOOD PRESSURE: 123 MMHG | BODY MASS INDEX: 28.89 KG/M2 | OXYGEN SATURATION: 95 % | HEART RATE: 91 BPM | RESPIRATION RATE: 20 BRPM | WEIGHT: 213.3 LBS | TEMPERATURE: 98 F | DIASTOLIC BLOOD PRESSURE: 78 MMHG

## 2025-05-15 DIAGNOSIS — D75.1 ERYTHROCYTOSIS: ICD-10-CM

## 2025-05-15 DIAGNOSIS — H25.11 NUCLEAR SCLEROTIC CATARACT OF RIGHT EYE: ICD-10-CM

## 2025-05-15 DIAGNOSIS — Z01.818 PREOP GENERAL PHYSICAL EXAM: Primary | ICD-10-CM

## 2025-05-15 DIAGNOSIS — B60.10 ACANTHAMOEBA INFECTION: ICD-10-CM

## 2025-05-15 DIAGNOSIS — D75.1 POLYCYTHEMIA: ICD-10-CM

## 2025-05-15 LAB
CYSTATIN C (ROCHE): 1 MG/L (ref 0.6–1)
GFR/BSA.PRED SERPLBLD CYS-BASED-ARV: 76 ML/MIN/1.73M2
HGB BLD-MCNC: 19.7 G/DL (ref 13.3–17.7)
MCV RBC AUTO: 93 FL (ref 78–100)

## 2025-05-15 PROCEDURE — 81270 JAK2 GENE: CPT | Performed by: STUDENT IN AN ORGANIZED HEALTH CARE EDUCATION/TRAINING PROGRAM

## 2025-05-15 PROCEDURE — G0452 MOLECULAR PATHOLOGY INTERPR: HCPCS | Mod: 26 | Performed by: PATHOLOGY

## 2025-05-15 PROCEDURE — 81219 CALR GENE COM VARIANTS: CPT | Performed by: STUDENT IN AN ORGANIZED HEALTH CARE EDUCATION/TRAINING PROGRAM

## 2025-05-15 NOTE — H&P (VIEW-ONLY)
Preoperative Evaluation  [facility] [place]  [address]  [address]  [place] [address]  Phone: [phone]  Fax: [phone]  Primary Provider: Deirdre E. Milligan, MD  Pre-op Performing Provider: Deirdre E. Milligan, MD  May 15, 2025             5/15/2025   Surgical Information   What procedure is being done? Cataract surgery   Facility or Hospital where procedure/surgery will be performed: [facility]   Who is doing the procedure / surgery? Doctor hobrown   Date of surgery / procedure: June 2nd   Time of surgery / procedure: 8a   Where do you plan to recover after surgery? at home with family     Fax number for surgical facility: Note does not need to be faxed, will be available electronically in Epic.    Assessment & Plan     The proposed surgical procedure is considered LOW risk.    Preop general physical exam  Medically optimized for procedure.    Nuclear sclerotic cataract of right eye  Patient will be undergoing right eye complex cataract extraction with intraocular lens implantation.    Acanthamoeba infection  History of.    Polycythemia  Erythrocytosis  Suspect secondary to supplement use that is supposed to boost testosterone. Check labs below to evaluation for myelodyplastic process, excess epo production, elevated testosterone and recommend stopping testo boost supplement.  - JAK2 Reflex to [facility] MPL Myeloproliferative Neoplasms NGS Panel  - Hemoglobin  - Testosterone, total; Future  - Cystatin C with GFR; Future  - Erythropoietin; Future  - Testosterone, total  - Cystatin C with GFR  - Erythropoietin            - No identified additional risk factors other than previously addressed    Preoperative Medication Instructions  Antiplatelet or Anticoagulation Medication Instructions   - We reviewed the medication list and the patient is not on an antiplatelet or anticoagulation medications.    Additional Medication Instructions  We reviewed the  medication list and there are no chronic medications that need to be adjusted for this procedure.    Recommendation  Approval given to proceed with proposed procedure, without further diagnostic evaluation.        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        5/15/2025   Pre-Op Questionnaire   Have you ever had a heart attack or stroke? No   Have you ever had surgery on your heart or blood vessels, such as a stent placement, a coronary artery bypass, or surgery on an artery in your head, neck, heart, or legs? No   Do you have chest pain with activity? No   Do you have a history of heart failure? No   Do you currently have a cold, bronchitis or symptoms of other infection? No   Do you have a cough, shortness of breath, or wheezing? No   Do you or anyone in your family have previous history of blood clots? No   Do you or does anyone in your family have a serious bleeding problem such as prolonged bleeding following surgeries or cuts? No   Have you ever had problems with anemia or been told to take iron pills? No   Have you had any abnormal blood loss such as black, tarry or bloody stools? No   Have you ever had a blood transfusion? No   Are you willing to have a blood transfusion if it is medically needed before, during, or after your surgery? Yes   Have you or any of your relatives ever had problems with anesthesia? No   Do you have sleep apnea, excessive snoring or daytime drowsiness? No   Do you have any artifical heart valves or other implanted medical devices  like a pacemaker, defibrillator, or continuous glucose monitor? No   Do you have artificial joints? No   Are you allergic to latex? No     Advance Care Planning        Preoperative Review of    reviewed - no record of controlled substances prescribed.          Patient Active Problem List    Diagnosis Date Noted    Nuclear sclerotic cataract of right eye 04/14/2025     Priority: Medium    Systolic heart failure, unspecified HF chronicity (H) 09/03/2024     Priority: Medium    Corneal transplant failure, right eye 06/03/2024     Priority: Medium    High prostate specific antigen (PSA) 12/28/2023     Priority: Medium    Acanthamoeba infection 11/08/2021     Priority: Medium     From contact lens use  Had corneal transplant in 2021      Basal cell carcinoma      Priority: Medium    Paroxysmal atrial fibrillation (H)      Priority: Medium     Ablation in Nov 2021      AK (actinic keratosis) 11/15/2018     Priority: Medium      Past Medical History:   Diagnosis Date    Acanthamoeba keratitis     Appendicitis with perforation 11/05/2018    Atrial flutter with rapid ventricular response (H) 09/29/2021    Atrial flutter with rapid ventricular response (H) 09/29/2021    Basal cell carcinoma     New onset atrial flutter (H) 09/29/2021    Paroxysmal atrial fibrillation (H)     Presbyopia     PVD (posterior vitreous detachment), left     Secondary cardiomyopathy (H)     Secondary cardiomyopathy (H)     In Jan 2022: Compared to the CAMI dated 10/1/2021, LVEF has improved from 40-45% to 56%,  left atrial size has normalized, mitral and tricuspid valve regurgitation has  decreased from mild to trace.     On ACE-I and beta blocker     Past Surgical History:   Procedure Laterality Date    ANESTHESIA CARDIOVERSION N/A 10/01/2021    Procedure: ANESTHESIA, FOR CARDIOVERSION;  Surgeon: GENERIC ANESTHESIA PROVIDER;  Location: [facility] OR    EP ABLATION ATRIAL FLUTTER N/A 11/17/2021    Procedure: EP Ablation Atrial Flutter;  Surgeon: Ollie  Nayla Peterson MD;  Location:  [facility]    KERATOPLASTY PENETRATING Right 12/08/2021    Procedure: KERATOPLASTY, PENETRATING, posteror synechiolysis;  Surgeon: Anson Cheng MD;  Location: [facility] OR    KERATOPLASTY PENETRATING Right 6/11/2024    Procedure: RIGHT EYE KERATOPLASTY, PENETRATING;  Surgeon: Matti Mendes MD;  Location: [facility] OR    LAPAROSCOPIC APPENDECTOMY N/A 11/04/2018    Procedure: LAPAROSCOPIC APPENDECTOMY;  Surgeon: Mackenzie Dale MD;  Location:  OR    ORTHOPEDIC SURGERY      TRANSPLANT  12/21    Cornea     Current Outpatient Medications   Medication Sig Dispense Refill    dexAMETHasone (DECADRON) 0.1 % ophthalmic solution Place 1-2 drops into the right eye 3 times daily. 5 mL 5    dorzolamide-timolol (COSOPT) 2-0.5 % ophthalmic solution Place 1 drop into the right eye 2 times daily. 10 mL 5    emtricitabine-tenofovir (TRUVADA) 200-300 MG per tablet Take 1 tablet by mouth daily. 90 tablet 4    lisinopril (ZESTRIL) 2.5 MG tablet Take 1 tablet (2.5 mg) by mouth daily. 90 tablet 4    metoprolol succinate ER (TOPROL XL) 25 MG 24 hr tablet Take 1 tablet (25 mg) by mouth daily. 90 tablet 4    multivitamin, therapeutic (THERA-VIT) TABS tablet Take 1 tablet by mouth daily (with lunch)      naproxen sodium 220 MG capsule Take 220 mg by mouth 2 times daily as needed (pain)      omeprazole (PRILOSEC) 20 MG DR capsule Take 1 capsule (20 mg) by mouth daily. 90 capsule 4    TURMERIC PO Take by mouth daily      imiquimod (ALDARA) 5 % external cream Apply to AA and to surrounding 1/2 inch M-F at HS x 6 weeks (Patient not taking: Reported on 5/15/2025) 20 packet 3    ketoconazole (NIZORAL) 2 % external shampoo Use daily as needed (Patient not taking: Reported on 5/15/2025) 120 mL 11    ofloxacin (OCUFLOX) 0.3 % ophthalmic solution Apply 1 drop to eye 4 times daily Place in operative eye (Patient not taking: Reported on 4/14/2025) 10 mL 1    prednisoLONE acetate (PRED  "FORTE) 1 % ophthalmic suspension Place 1-2 drops into the right eye every hour (Patient not taking: Reported on 2025) 15 mL 11       No Known Allergies     Social History     Tobacco Use    Smoking status: Former     Current packs/day: 0.00     Average packs/day: 1.5 packs/day for 10.0 years (15.0 ttl pk-yrs)     Types: Cigarettes     Start date: 1975     Quit date: 1980     Years since quittin.4    Smokeless tobacco: Never   Substance Use Topics    Alcohol use: No       History   Drug Use No               Objective    /78 (BP Location: Right arm, Patient Position: Sitting, Cuff Size: Adult Large)   Pulse 91   Temp 98  F (36.7  C) (Temporal)   Resp 20   Ht 1.827 m (5' 11.93\")   Wt 96.8 kg (213 lb 4.8 oz)   SpO2 95%   BMI 28.99 kg/m     Estimated body mass index is 28.99 kg/m  as calculated from the following:    Height as of this encounter: 1.827 m (5' 1193\").    Weight as of this encounter: 96.8 kg (213 lb 4.8 oz).  Physical Exam  GENERAL: alert and no distress  EYES: Eyes grossly normal to inspection, and conjunctivae and sclerae normal  HENT: ear canals and TM's normal, nose and mouth without ulcers or lesions  NECK: no adenopathy, no asymmetry, masses, or scars  RESP: lungs clear to auscultation - no rales, rhonchi or wheezes  CV: regular rate and rhythm, normal S1 S2, no S3 or S4, no murmur, click or rub, no peripheral edema  ABDOMEN: soft, nontender, no hepatosplenomegaly, no masses   MS: no gross musculoskeletal defects noted, no edema  SKIN: no suspicious lesions or rashes  NEURO: Normal strength and tone, mentation intact and speech normal  PSYCH: mentation appears normal, affect normal/bright    Recent Labs   Lab Test 25  1319 24  1016   HGB 20.3*  --      --     139   POTASSIUM 4.6 4.8   CR 1.21* 1.17        Diagnostics  Labs ordered which should not impact medical optimization for surgery.   No EKG required for low risk surgery (cataract, skin " procedure, breast biopsy, etc).    Revised Cardiac Risk Index (RCRI)  The patient has the following serious cardiovascular risks for perioperative complications:   - No serious cardiac risks = 0 points     RCRI Interpretation: 0 points: Class I (very low risk - 0.4% complication rate)         Signed Electronically by: Deirdre E. Milligan, MD  A copy of this evaluation report is provided to the requesting physician.

## 2025-05-15 NOTE — PATIENT INSTRUCTIONS
How to Take Your Medication Before Surgery  Preoperative Medication Instructions   Antiplatelet or Anticoagulation Medication Instructions   - We reviewed the medication list and the patient is not on an antiplatelet or anticoagulation medications.    Additional Medication Instructions  We reviewed the medication list and there are no chronic medications that need to be adjusted for this procedure.       Patient Education   Preparing for Your Surgery  For Adults  Getting started  In most cases, a nurse will call to review your health history and instructions. They will give you an arrival time based on your scheduled surgery time. Please be ready to share:  Your doctor's clinic name and phone number  Your medical, surgical, and anesthesia history  A list of allergies and sensitivities  A list of medicines, including herbal treatments and over-the-counter drugs  Whether the patient has a legal guardian (ask how to send us the papers in advance)  Note: You may not receive a call if you were seen at our PAC (Preoperative Assessment Center).  Please tell us if you're pregnant--or if there's any chance you might be pregnant. Some surgeries may injure a fetus (unborn baby), so they require a pregnancy test. Surgeries that are safe for a fetus don't always need a test, and you can choose whether to have one.   Preparing for surgery  Within 10 to 30 days of surgery: Have a pre-op exam (sometimes called an H&P, or History and Physical). This can be done at a clinic or pre-operative center.  If you're having a , you may not need this exam. Talk to your care team.  At your pre-op exam, talk to your care team about all medicines you take. (This includes CBD oil and any drugs, such as THC, marijuana, and other forms of cannabis.) If you need to stop any medicine before surgery, ask when to start taking it again.  This is for your safety. Many medicines and drugs can make you bleed too much during surgery. Some change  how well surgery (anesthesia) drugs work.  Call your insurance company to let them know you're having surgery. (If you don't have insurance, call [phone].)  Call your clinic if there's any change in your health. This includes a scrape or scratch near the surgery site, or any signs of a cold (sore throat, runny nose, cough, rash, fever).  Eating and drinking guidelines  For your safety: Unless your surgeon tells you otherwise, follow the guidelines below.  Eat and drink as normal until 8 hours before you arrive for surgery. After that, no food or milk. You can spit out gum when you arrive.  Drink clear liquids until 2 hours before you arrive. These are liquids you can see through, like water, Gatorade, and Propel Water. They also include plain black coffee and tea (no cream or milk).  No alcohol for 24 hours before you arrive. The night before surgery, stop any drinks that contain THC.  If your care team tells you to take medicine on the morning of surgery, it's okay to take it with a sip of water. No other medicines or drugs are allowed (including CBD oil)--follow your care team's instructions.  If you have questions the day of surgery, call your hospital or surgery center.   Preventing infection  Shower or bathe the night before and the morning of surgery. Follow the instructions your clinic gave you. (If no instructions, use regular soap.)  Don't shave or clip hair near your surgery site. We'll remove the hair if needed.  Don't smoke or vape the morning of surgery. No chewing tobacco for 6 hours before you arrive. A nicotine patch is okay. You may spit out nicotine gum when you arrive.  For some surgeries, the surgeon will tell you to fully quit smoking and nicotine.  We will make every effort to keep you safe from infection. We will:  Clean our hands often with soap and water (or an alcohol-based hand rub).  Clean the skin at your surgery site with a special soap that kills germs.  Give you a special gown to  keep you warm. (Cold raises the risk of infection.)  Wear hair covers, masks, gowns, and gloves during surgery.  Give antibiotic medicine, if prescribed. Not all surgeries need this medicine.  What to bring on the day of surgery  Photo ID and insurance card  Copy of your health care directive, if you have one  Glasses and hearing aids (bring cases)  You can't wear contacts during surgery  Inhaler and eye drops, if you use them (tell us about these when you arrive)  CPAP machine or breathing device, if you use them  A few personal items, if spending the night  If you have . . .  A pacemaker, ICD (cardiac defibrillator), or other implant: Bring the ID card.  An implanted stimulator: Bring the remote control.  A legal guardian: Bring a copy of the certified (court-stamped) guardianship papers.  Please remove any jewelry, including body piercings. Leave jewelry and other valuables at home.  If you're going home the day of surgery  You must have a responsible adult drive you home. They should stay with you overnight as well.  If you don't have someone to stay with you, and you aren't safe to go home alone, we may keep you overnight. Insurance often won't pay for this.  After surgery  If it's hard to control your pain or you need more pain medicine, please call your surgeon's office.  Questions?   If you have any questions for your care team, list them here:   ____________________________________________________________________________________________________________________________________________________________________________________________________________________________________________________________  For informational purposes only. Not to replace the advice of your health care provider. Copyright   2003, 2019 [facility]. All rights reserved. Clinically reviewed by Rom Ferris MD. [employer] 800197 - REV 08/24.

## 2025-05-15 NOTE — PROGRESS NOTES
Preoperative Evaluation  [facility] [place]  [address]  [address]  [place] [address]  Phone: [phone]  Fax: [phone]  Primary Provider: Deirdre E. Milligan, MD  Pre-op Performing Provider: Deirdre E. Milligan, MD  May 15, 2025             5/15/2025   Surgical Information   What procedure is being done? Cataract surgery   Facility or Hospital where procedure/surgery will be performed: [facility]   Who is doing the procedure / surgery? Doctor hobrown   Date of surgery / procedure: June 2nd   Time of surgery / procedure: 8a   Where do you plan to recover after surgery? at home with family     Fax number for surgical facility: Note does not need to be faxed, will be available electronically in Epic.    Assessment & Plan     The proposed surgical procedure is considered LOW risk.    Preop general physical exam  Medically optimized for procedure.    Nuclear sclerotic cataract of right eye  Patient will be undergoing right eye complex cataract extraction with intraocular lens implantation.    Acanthamoeba infection  History of.    Polycythemia  Erythrocytosis  Suspect secondary to supplement use that is supposed to boost testosterone. Check labs below to evaluation for myelodyplastic process, excess epo production, elevated testosterone and recommend stopping testo boost supplement.  - JAK2 Reflex to CALR MPL Myeloproliferative Neoplasms NGS Panel  - Hemoglobin  - Testosterone, total; Future  - Cystatin C with GFR; Future  - Erythropoietin; Future  - Testosterone, total  - Cystatin C with GFR  - Erythropoietin            - No identified additional risk factors other than previously addressed    Preoperative Medication Instructions  Antiplatelet or Anticoagulation Medication Instructions   - We reviewed the medication list and the patient is not on an antiplatelet or anticoagulation medications.    Additional Medication Instructions  We reviewed the  medication list and there are no chronic medications that need to be adjusted for this procedure.    Recommendation  Approval given to proceed with proposed procedure, without further diagnostic evaluation.        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        5/15/2025   Pre-Op Questionnaire   Have you ever had a heart attack or stroke? No   Have you ever had surgery on your heart or blood vessels, such as a stent placement, a coronary artery bypass, or surgery on an artery in your head, neck, heart, or legs? No   Do you have chest pain with activity? No   Do you have a history of heart failure? No   Do you currently have a cold, bronchitis or symptoms of other infection? No   Do you have a cough, shortness of breath, or wheezing? No   Do you or anyone in your family have previous history of blood clots? No   Do you or does anyone in your family have a serious bleeding problem such as prolonged bleeding following surgeries or cuts? No   Have you ever had problems with anemia or been told to take iron pills? No   Have you had any abnormal blood loss such as black, tarry or bloody stools? No   Have you ever had a blood transfusion? No   Are you willing to have a blood transfusion if it is medically needed before, during, or after your surgery? Yes   Have you or any of your relatives ever had problems with anesthesia? No   Do you have sleep apnea, excessive snoring or daytime drowsiness? No   Do you have any artifical heart valves or other implanted medical devices  like a pacemaker, defibrillator, or continuous glucose monitor? No   Do you have artificial joints? No   Are you allergic to latex? No     Advance Care Planning        Preoperative Review of    reviewed - no record of controlled substances prescribed.          Patient Active Problem List    Diagnosis Date Noted    Nuclear sclerotic cataract of right eye 04/14/2025     Priority: Medium    Systolic heart failure, unspecified HF chronicity (H) 09/03/2024     Priority: Medium    Corneal transplant failure, right eye 06/03/2024     Priority: Medium    High prostate specific antigen (PSA) 12/28/2023     Priority: Medium    Acanthamoeba infection 11/08/2021     Priority: Medium     From contact lens use  Had corneal transplant in 2021      Basal cell carcinoma      Priority: Medium    Paroxysmal atrial fibrillation (H)      Priority: Medium     Ablation in Nov 2021      AK (actinic keratosis) 11/15/2018     Priority: Medium      Past Medical History:   Diagnosis Date    Acanthamoeba keratitis     Appendicitis with perforation 11/05/2018    Atrial flutter with rapid ventricular response (H) 09/29/2021    Atrial flutter with rapid ventricular response (H) 09/29/2021    Basal cell carcinoma     New onset atrial flutter (H) 09/29/2021    Paroxysmal atrial fibrillation (H)     Presbyopia     PVD (posterior vitreous detachment), left     Secondary cardiomyopathy (H)     Secondary cardiomyopathy (H)     In Jan 2022: Compared to the CAMI dated 10/1/2021, LVEF has improved from 40-45% to 56%,  left atrial size has normalized, mitral and tricuspid valve regurgitation has  decreased from mild to trace.     On ACE-I and beta blocker     Past Surgical History:   Procedure Laterality Date    ANESTHESIA CARDIOVERSION N/A 10/01/2021    Procedure: ANESTHESIA, FOR CARDIOVERSION;  Surgeon: GENERIC ANESTHESIA PROVIDER;  Location: [facility] OR    EP ABLATION ATRIAL FLUTTER N/A 11/17/2021    Procedure: EP Ablation Atrial Flutter;  Surgeon: Ollie  Nayla Peterson MD;  Location:  [facility]    KERATOPLASTY PENETRATING Right 12/08/2021    Procedure: KERATOPLASTY, PENETRATING, posteror synechiolysis;  Surgeon: Anson Cheng MD;  Location: [facility] OR    KERATOPLASTY PENETRATING Right 6/11/2024    Procedure: RIGHT EYE KERATOPLASTY, PENETRATING;  Surgeon: Matti Mendes MD;  Location: [facility] OR    LAPAROSCOPIC APPENDECTOMY N/A 11/04/2018    Procedure: LAPAROSCOPIC APPENDECTOMY;  Surgeon: Mackenzie Dale MD;  Location:  OR    ORTHOPEDIC SURGERY      TRANSPLANT  12/21    Cornea     Current Outpatient Medications   Medication Sig Dispense Refill    dexAMETHasone (DECADRON) 0.1 % ophthalmic solution Place 1-2 drops into the right eye 3 times daily. 5 mL 5    dorzolamide-timolol (COSOPT) 2-0.5 % ophthalmic solution Place 1 drop into the right eye 2 times daily. 10 mL 5    emtricitabine-tenofovir (TRUVADA) 200-300 MG per tablet Take 1 tablet by mouth daily. 90 tablet 4    lisinopril (ZESTRIL) 2.5 MG tablet Take 1 tablet (2.5 mg) by mouth daily. 90 tablet 4    metoprolol succinate ER (TOPROL XL) 25 MG 24 hr tablet Take 1 tablet (25 mg) by mouth daily. 90 tablet 4    multivitamin, therapeutic (THERA-VIT) TABS tablet Take 1 tablet by mouth daily (with lunch)      naproxen sodium 220 MG capsule Take 220 mg by mouth 2 times daily as needed (pain)      omeprazole (PRILOSEC) 20 MG DR capsule Take 1 capsule (20 mg) by mouth daily. 90 capsule 4    TURMERIC PO Take by mouth daily      imiquimod (ALDARA) 5 % external cream Apply to AA and to surrounding 1/2 inch M-F at HS x 6 weeks (Patient not taking: Reported on 5/15/2025) 20 packet 3    ketoconazole (NIZORAL) 2 % external shampoo Use daily as needed (Patient not taking: Reported on 5/15/2025) 120 mL 11    ofloxacin (OCUFLOX) 0.3 % ophthalmic solution Apply 1 drop to eye 4 times daily Place in operative eye (Patient not taking: Reported on 4/14/2025) 10 mL 1    prednisoLONE acetate (PRED  "FORTE) 1 % ophthalmic suspension Place 1-2 drops into the right eye every hour (Patient not taking: Reported on 2025) 15 mL 11       No Known Allergies     Social History     Tobacco Use    Smoking status: Former     Current packs/day: 0.00     Average packs/day: 1.5 packs/day for 10.0 years (15.0 ttl pk-yrs)     Types: Cigarettes     Start date: 1975     Quit date: 1980     Years since quittin.4    Smokeless tobacco: Never   Substance Use Topics    Alcohol use: No       History   Drug Use No               Objective    /78 (BP Location: Right arm, Patient Position: Sitting, Cuff Size: Adult Large)   Pulse 91   Temp 98  F (36.7  C) (Temporal)   Resp 20   Ht 1.827 m (5' 11.93\")   Wt 96.8 kg (213 lb 4.8 oz)   SpO2 95%   BMI 28.99 kg/m     Estimated body mass index is 28.99 kg/m  as calculated from the following:    Height as of this encounter: 1.827 m (5' 1193\").    Weight as of this encounter: 96.8 kg (213 lb 4.8 oz).  Physical Exam  GENERAL: alert and no distress  EYES: Eyes grossly normal to inspection, and conjunctivae and sclerae normal  HENT: ear canals and TM's normal, nose and mouth without ulcers or lesions  NECK: no adenopathy, no asymmetry, masses, or scars  RESP: lungs clear to auscultation - no rales, rhonchi or wheezes  CV: regular rate and rhythm, normal S1 S2, no S3 or S4, no murmur, click or rub, no peripheral edema  ABDOMEN: soft, nontender, no hepatosplenomegaly, no masses   MS: no gross musculoskeletal defects noted, no edema  SKIN: no suspicious lesions or rashes  NEURO: Normal strength and tone, mentation intact and speech normal  PSYCH: mentation appears normal, affect normal/bright    Recent Labs   Lab Test 25  1319 24  1016   HGB 20.3*  --      --     139   POTASSIUM 4.6 4.8   CR 1.21* 1.17        Diagnostics  Labs ordered which should not impact medical optimization for surgery.   No EKG required for low risk surgery (cataract, skin " procedure, breast biopsy, etc).    Revised Cardiac Risk Index (RCRI)  The patient has the following serious cardiovascular risks for perioperative complications:   - No serious cardiac risks = 0 points     RCRI Interpretation: 0 points: Class I (very low risk - 0.4% complication rate)         Signed Electronically by: Deirdre E. Milligan, MD  A copy of this evaluation report is provided to the requesting physician.

## 2025-05-20 ENCOUNTER — RESULTS FOLLOW-UP (OUTPATIENT)
Dept: PEDIATRICS | Facility: CLINIC | Age: 70
End: 2025-05-20

## 2025-05-20 DIAGNOSIS — D75.1 POLYCYTHEMIA: Primary | ICD-10-CM

## 2025-05-20 LAB — TESTOST SERPL-MCNC: 1081 NG/DL (ref 240–950)

## 2025-05-27 LAB
INTERPRETATION SERPL IEP-IMP: NORMAL
LAB TEST RESULTS REPORTED IN RPTPERIOD: NORMAL
SEQUENCING METHOD PNL BLD/T: NORMAL
SPECIMEN TYPE: NORMAL

## 2025-05-28 ENCOUNTER — OFFICE VISIT (OUTPATIENT)
Dept: OPHTHALMOLOGY | Facility: CLINIC | Age: 70
End: 2025-05-28
Attending: OPHTHALMOLOGY
Payer: COMMERCIAL

## 2025-05-28 DIAGNOSIS — H25.13 AGE-RELATED NUCLEAR CATARACT OF BOTH EYES: ICD-10-CM

## 2025-05-28 DIAGNOSIS — Z94.7 POST CORNEAL TRANSPLANT: Primary | ICD-10-CM

## 2025-05-28 PROCEDURE — 92025 CPTRIZED CORNEAL TOPOGRAPHY: CPT | Performed by: OPHTHALMOLOGY

## 2025-05-28 PROCEDURE — 76519 ECHO EXAM OF EYE: CPT | Performed by: OPHTHALMOLOGY

## 2025-05-28 PROCEDURE — 99214 OFFICE O/P EST MOD 30 MIN: CPT | Performed by: OPHTHALMOLOGY

## 2025-05-28 ASSESSMENT — CONF VISUAL FIELD
OD_INFERIOR_TEMPORAL_RESTRICTION: 0
OS_INFERIOR_NASAL_RESTRICTION: 0
OD_SUPERIOR_TEMPORAL_RESTRICTION: 0
OS_SUPERIOR_NASAL_RESTRICTION: 0
OD_SUPERIOR_NASAL_RESTRICTION: 0
OD_NORMAL: 1
OS_SUPERIOR_TEMPORAL_RESTRICTION: 0
OS_INFERIOR_TEMPORAL_RESTRICTION: 0
OS_NORMAL: 1
OD_INFERIOR_NASAL_RESTRICTION: 0

## 2025-05-28 ASSESSMENT — VISUAL ACUITY
METHOD: SNELLEN - LINEAR
CORRECTION_TYPE: GLASSES
OS_CC+: -1
OD_PH_CC: 20/100
OD_CC: 20/400
OS_CC: 20/25

## 2025-05-28 ASSESSMENT — REFRACTION_WEARINGRX
OS_ADD: +2.50
OS_SPHERE: +1.50
SPECS_TYPE: PAL
OD_ADD: +2.50
OD_SPHERE: PLANO
OS_CYLINDER: SPHERE

## 2025-05-28 ASSESSMENT — EXTERNAL EXAM - LEFT EYE: OS_EXAM: NORMAL

## 2025-05-28 ASSESSMENT — TONOMETRY
OS_IOP_MMHG: 18
OD_IOP_MMHG: 17
IOP_METHOD: ICARE

## 2025-05-28 ASSESSMENT — EXTERNAL EXAM - RIGHT EYE: OD_EXAM: NORMAL

## 2025-05-28 ASSESSMENT — SLIT LAMP EXAM - LIDS
COMMENTS: MGD, BLEPHARITIS
COMMENTS: MGD, BLEPHARITIS

## 2025-05-28 NOTE — PROGRESS NOTES
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 Rosie Han     HPI:  Derik Hamm is male who presents as follow up for Acanthamoeba Keratitis right eye.     Patient initially presented for evaluation of persistent keratoconjunctivitis, OD, since 10/5/2020. Initially seen by Dr. Ngo at [facility] and felt it was related to severe dryness vs bacterial keratitis, so he was started on PFATs and Vigamox Q2H WA. Patient was seen frequently for follow up; he was started on Valtrex 500 mg TID on 10/9/20 and has been on that since then. Despite this therapy, pt failed to improve so he was started prednisolone. He has had a waxing and waning course, but more recently has gradually declined. Stopped PF on 11/2 after running out. He was seen by Dr. Ngo on 11/2 who noted worsening VA and worsening exam, so the patient was referred to Dr. Han, whom he saw 11/4/20. Dr. Han referred to our clinic due to concerns for acanthamoeba keratitis. Culture positive for acanthamoeba on 11/05/20.    Interval hx 05/28/2025  Chief Complaint(s) and History of Present Illness(es)       Follow Up    In both eyes.  Associated symptoms include Negative for eye pain, pain with eye movement, photophobia, flashes and floaters. 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  Dexamethasone injection right eye for rejection 4/18/24  Right eye repeat PKP 6/11/24     Glasses: Yes  CTL wearer: none currently; attempted hard lens wear but felt that this did not work well for him due to it coming out of the eye     Family hx of eye disease: negative for glaucoma/macular degeneration     Social Hx: (-) smoking, (-)  EtOH, (-) illicit drugs     Meds 5/12/2025:  - Cosopt BID OD  - Dexamethasone TID OD    Surgical Hx:  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 Sutures intact. IOP 18 off of drops (patient was instructed to stop cosopt 4 days prior to appointment for pressure check)    10/28/24: POM4 - suture removal  1/20/24: s/p PKP - suture removal   02/17/25: s/p PKP - suture removal  3/17/25: 3 inf sutures removed today  4/14/25: s/p PKP right eye 6/2024: no change in Nir right eye after removing 3 sutures in previous visit; no benefit of removing more sutures.; right eye corneal graft clear with no sign of rejection  5/12/25: Pentcam right eye shows WTR astigmatism - remove superior suture      PLAN 5/28/2025:   K nir OD with oblique astigmatism - remove superonasal suture  Start ofloxacin QID OD x 3 days  Rechecked IOL calcs/K nir   Proceed with right eye CEIOL; would defer LRI at this time given irregularity - consider staged in the future for residual post-keratoplasty astigmatism      Marysol Bonilla MD  Cornea and External Disease Fellow  [facility]    Attending Physician Attestation:  Complete documentation of historical and exam elements from today's encounter can be found in the full encounter summary  report (not reduplicated in this progress note).  I personally obtained the chief complaint(s) and history of present illness.  I confirmed and edited as necessary the review of systems, past medical/surgical history, family history, social history, and examination findings as documented by others; and I examined the patient myself.  I personally reviewed the relevant tests, images, and reports as documented above.  I formulated and edited as necessary the assessment and plan and discussed the findings and management plan with the patient and family. I personally reviewed the ophthalmic test(s) associated with this encounter, agree with the interpretation(s) as documented by the resident/fellow, and have edited the corresponding report(s) as necessary. - Matti Mendes MD

## 2025-05-28 NOTE — NURSING NOTE
Chief Complaints and History of Present Illnesses   Patient presents with    Follow Up     2 week follow up  Pt reports no changes since last visit  Decadron tid RE  Cosopt bid RE  Ember [facility] 8:30 AM May 28, 2025        Chief Complaint(s) and History of Present Illness(es)       Follow Up              Laterality: both eyes    Associated symptoms: Negative for eye pain, pain with eye movement, photophobia, flashes and floaters    Comments: 2 week follow up  Pt reports no changes since last visit  Decadron tid RE  Cosopt bid RE  Ember [facility] 8:30 AM May 28, 2025

## 2025-05-31 DIAGNOSIS — H25.13 AGE-RELATED NUCLEAR CATARACT OF BOTH EYES: Primary | ICD-10-CM

## 2025-05-31 RX ORDER — KETOROLAC TROMETHAMINE 5 MG/ML
1 SOLUTION OPHTHALMIC 4 TIMES DAILY
Qty: 3 ML | Refills: 0 | Status: SHIPPED | OUTPATIENT
Start: 2025-06-03

## 2025-05-31 RX ORDER — PREDNISOLONE ACETATE 10 MG/ML
1-2 SUSPENSION/ DROPS OPHTHALMIC 4 TIMES DAILY
Qty: 5 ML | Refills: 0 | Status: SHIPPED | OUTPATIENT
Start: 2025-06-03 | End: 2025-06-05

## 2025-05-31 RX ORDER — MOXIFLOXACIN 5 MG/ML
1 SOLUTION/ DROPS OPHTHALMIC 4 TIMES DAILY
Qty: 3 ML | Refills: 0 | Status: SHIPPED | OUTPATIENT
Start: 2025-06-03

## 2025-06-02 ENCOUNTER — ANESTHESIA EVENT (OUTPATIENT)
Dept: SURGERY | Facility: AMBULATORY SURGERY CENTER | Age: 70
End: 2025-06-02
Payer: COMMERCIAL

## 2025-06-03 ENCOUNTER — ANESTHESIA (OUTPATIENT)
Dept: SURGERY | Facility: AMBULATORY SURGERY CENTER | Age: 70
End: 2025-06-03
Payer: COMMERCIAL

## 2025-06-03 ENCOUNTER — MYC MEDICAL ADVICE (OUTPATIENT)
Dept: PEDIATRICS | Facility: CLINIC | Age: 70
End: 2025-06-03

## 2025-06-03 ENCOUNTER — HOSPITAL ENCOUNTER (OUTPATIENT)
Facility: AMBULATORY SURGERY CENTER | Age: 70
Discharge: HOME | End: 2025-06-03
Attending: OPHTHALMOLOGY
Payer: COMMERCIAL

## 2025-06-03 VITALS
DIASTOLIC BLOOD PRESSURE: 79 MMHG | HEIGHT: 73 IN | RESPIRATION RATE: 18 BRPM | HEART RATE: 66 BPM | BODY MASS INDEX: 27.83 KG/M2 | OXYGEN SATURATION: 100 % | SYSTOLIC BLOOD PRESSURE: 124 MMHG | TEMPERATURE: 97 F | WEIGHT: 210 LBS

## 2025-06-03 DEVICE — IMPLANTABLE DEVICE: Type: IMPLANTABLE DEVICE | Site: EYE | Status: FUNCTIONAL

## 2025-06-03 RX ORDER — LIDOCAINE HYDROCHLORIDE 10 MG/ML
INJECTION, SOLUTION EPIDURAL; INFILTRATION; INTRACAUDAL; PERINEURAL PRN
Status: DISCONTINUED | OUTPATIENT
Start: 2025-06-03 | End: 2025-06-03 | Stop reason: HOSPADM

## 2025-06-03 RX ORDER — TETRACAINE HYDROCHLORIDE 5 MG/ML
SOLUTION OPHTHALMIC PRN
Status: DISCONTINUED | OUTPATIENT
Start: 2025-06-03 | End: 2025-06-03 | Stop reason: HOSPADM

## 2025-06-03 RX ORDER — DEXAMETHASONE SODIUM PHOSPHATE 10 MG/ML
4 INJECTION, SOLUTION INTRAMUSCULAR; INTRAVENOUS
Status: DISCONTINUED | OUTPATIENT
Start: 2025-06-03 | End: 2025-06-04 | Stop reason: HOSPADM

## 2025-06-03 RX ORDER — OXYCODONE HYDROCHLORIDE 5 MG/1
10 TABLET ORAL
Status: DISCONTINUED | OUTPATIENT
Start: 2025-06-03 | End: 2025-06-04 | Stop reason: HOSPADM

## 2025-06-03 RX ORDER — CYCLOPENTOLAT/TROPIC/PHENYLEPH 1%-1%-2.5%
1 DROPS (EA) OPHTHALMIC (EYE)
Status: COMPLETED | OUTPATIENT
Start: 2025-06-03 | End: 2025-06-03

## 2025-06-03 RX ORDER — PROPARACAINE HYDROCHLORIDE 5 MG/ML
1 SOLUTION/ DROPS OPHTHALMIC ONCE
Status: COMPLETED | OUTPATIENT
Start: 2025-06-03 | End: 2025-06-03

## 2025-06-03 RX ORDER — ONDANSETRON 2 MG/ML
4 INJECTION INTRAMUSCULAR; INTRAVENOUS EVERY 30 MIN PRN
Status: DISCONTINUED | OUTPATIENT
Start: 2025-06-03 | End: 2025-06-04 | Stop reason: HOSPADM

## 2025-06-03 RX ORDER — NALOXONE HYDROCHLORIDE 0.4 MG/ML
0.1 INJECTION, SOLUTION INTRAMUSCULAR; INTRAVENOUS; SUBCUTANEOUS
Status: DISCONTINUED | OUTPATIENT
Start: 2025-06-03 | End: 2025-06-04 | Stop reason: HOSPADM

## 2025-06-03 RX ORDER — SODIUM CHLORIDE, SODIUM LACTATE, POTASSIUM CHLORIDE, CALCIUM CHLORIDE 600; 310; 30; 20 MG/100ML; MG/100ML; MG/100ML; MG/100ML
INJECTION, SOLUTION INTRAVENOUS CONTINUOUS
Status: DISCONTINUED | OUTPATIENT
Start: 2025-06-03 | End: 2025-06-03 | Stop reason: HOSPADM

## 2025-06-03 RX ORDER — PREDNISOLONE ACETATE 1 %
SUSPENSION, DROPS(FINAL DOSAGE FORM)(ML) OPHTHALMIC (EYE) PRN
Status: DISCONTINUED | OUTPATIENT
Start: 2025-06-03 | End: 2025-06-03 | Stop reason: HOSPADM

## 2025-06-03 RX ORDER — ONDANSETRON 4 MG/1
4 TABLET, ORALLY DISINTEGRATING ORAL EVERY 30 MIN PRN
Status: DISCONTINUED | OUTPATIENT
Start: 2025-06-03 | End: 2025-06-04 | Stop reason: HOSPADM

## 2025-06-03 RX ORDER — BALANCED SALT SOLUTION 6.4; .75; .48; .3; 3.9; 1.7 MG/ML; MG/ML; MG/ML; MG/ML; MG/ML; MG/ML
SOLUTION OPHTHALMIC PRN
Status: DISCONTINUED | OUTPATIENT
Start: 2025-06-03 | End: 2025-06-03 | Stop reason: HOSPADM

## 2025-06-03 RX ORDER — MOXIFLOXACIN IN NACL,ISO-OS/PF 0.3MG/0.3
SYRINGE (ML) INTRAOCULAR PRN
Status: DISCONTINUED | OUTPATIENT
Start: 2025-06-03 | End: 2025-06-03 | Stop reason: HOSPADM

## 2025-06-03 RX ORDER — LIDOCAINE 40 MG/G
CREAM TOPICAL
Status: DISCONTINUED | OUTPATIENT
Start: 2025-06-03 | End: 2025-06-03 | Stop reason: HOSPADM

## 2025-06-03 RX ORDER — ACETAMINOPHEN 325 MG/1
975 TABLET ORAL ONCE
Status: COMPLETED | OUTPATIENT
Start: 2025-06-03 | End: 2025-06-03

## 2025-06-03 RX ORDER — OXYCODONE HYDROCHLORIDE 5 MG/1
5 TABLET ORAL
Status: DISCONTINUED | OUTPATIENT
Start: 2025-06-03 | End: 2025-06-04 | Stop reason: HOSPADM

## 2025-06-03 RX ADMIN — Medication 1 DROP: at 08:34

## 2025-06-03 RX ADMIN — SODIUM CHLORIDE, SODIUM LACTATE, POTASSIUM CHLORIDE, CALCIUM CHLORIDE: 600; 310; 30; 20 INJECTION, SOLUTION INTRAVENOUS at 09:15

## 2025-06-03 RX ADMIN — Medication 1 DROP: at 08:39

## 2025-06-03 RX ADMIN — Medication 1 DROP: at 08:44

## 2025-06-03 RX ADMIN — PROPARACAINE HYDROCHLORIDE 1 DROP: 5 SOLUTION/ DROPS OPHTHALMIC at 08:34

## 2025-06-03 RX ADMIN — ACETAMINOPHEN 975 MG: 325 TABLET ORAL at 08:38

## 2025-06-03 NOTE — ANESTHESIA POSTPROCEDURE EVALUATION
Patient: Derik Wilson    Procedure: Procedure(s):  right eye complex cataract extraction with intraocular lens implantation       Anesthesia Type:  No value filed.    Note:  Disposition: Outpatient   Postop Pain Control: Uneventful            Sign Out: Well controlled pain   PONV: No   Neuro/Psych: Uneventful            Sign Out: Acceptable/Baseline neuro status   Airway/Respiratory: Uneventful            Sign Out: Acceptable/Baseline resp. status   CV/Hemodynamics: Uneventful            Sign Out: Acceptable CV status; No obvious hypovolemia; No obvious fluid overload   Other NRE:    DID A NON-ROUTINE EVENT OCCUR?            Last vitals:  Vitals Value Taken Time   /79 06/03/25 10:30   Temp 36.1  C (96.9  F) 06/03/25 10:02   Pulse 66 06/03/25 10:30   Resp 16 06/03/25 10:02   SpO2 100 % 06/03/25 10:31   Vitals shown include unfiled device data.    Electronically Signed By: Torres Mendez MD  [place] 3, 2025  10:32 AM

## 2025-06-03 NOTE — DISCHARGE INSTRUCTIONS
[facility]  Home Care Following Anesthesia  For 24 hours after surgery:  Get plenty of rest.  A responsible adult must stay with you for at least 24 hours after you leave the surgery center.  Do not drive or use heavy equipment.  If you have weakness or tingling, don't drive or use heavy equipment until this feeling goes away.   Do not drink alcohol.   Avoid strenuous or risky activities.  Ask for help when climbing stairs.  You may feel lightheaded.  IF so, sit for a few minutes before standing.  Have someone help you get up.   If you have nausea (feel sick to your stomach): Drink only clear liquids such as apple juice, ginger ale, broth or 7-Up.  Rest may also help.  Be sure to drink enough fluids.  Move to a regular diet as you feel able.   You may have a slight fever.  Call the doctor if your fever is over 100 F (37.7 C) (taken under the tongue) or lasts longer than 24 hours.  You may have a dry mouth, a sore throat, muscle aches or trouble sleeping. These should go away after 24 hours.  Do not make important or legal decisions.   It is recommended to avoid smoking.               Tips for taking pain medications  To get the best pain relief possible, remember these points:  Take pain medications as directed, before pain becomes severe.  Pain medication can upset your stomach: taking it with food may help.  Constipation is a common side effect of pain medication. Drink plenty of  fluids.  Eat foods high in fiber. Take a stool softener if recommended by your doctor or pharmacist.  Do not drink alcohol, drive or operate machinery while taking pain medications.  Ask about other ways to control pain, such as with heat, ice or relaxation.    Tylenol/Acetaminophen Consumption    If you feel your pain relief is insufficient, you may take Tylenol/Acetaminophen in addition to your narcotic pain medication.   Be careful not to exceed 4,000 mg of Tylenol/Acetaminophen in a 24 hour  period from all sources.  If you are taking extra strength Tylenol/acetaminophen (500 mg), the maximum dose is 8 tablets in 24 hours.  If you are taking regular strength acetaminophen (325 mg), the maximum dose is 12 tablets in 24 hours.  Tylenol 975 mg given at 8:30 am.   Ok to take more after 2:30 pm.      Call a doctor for any of the following:  Signs of infection (fever, growing tenderness at the surgery site, a large amount of drainage or bleeding, severe pain, foul-smelling drainage, redness, swelling).  It has been over 8 to 10 hours since surgery and you are still not able to urinate (pass water).  Headache for over 24 hours.  Numbness, tingling or weakness the day after surgery (if you had spinal anesthesia).  Signs of Covid-19 infection (temperature over 100 degrees, shortness of breath, cough, loss of taste/smell, generalized body aches, persistent headache, chills, sore throat, nausea/vomiting/diarrhea)    Your doctor is:  Dr. Matti Mendes, Ophthalmology: [phone]                  After hours and weekends call the hospital @ [phone] and ask for the resident on call for:  Ophthalmology  For emergency care, call the:  [facility]:  [phone] (TTY for hearing impaired: [phone])

## 2025-06-03 NOTE — ANESTHESIA CARE TRANSFER NOTE
Patient: Derik Wilson    Procedure: Procedure(s):  right eye complex cataract extraction with intraocular lens implantation       Diagnosis: Nuclear sclerotic cataract of right eye [H25.11]  Diagnosis Additional Information: No value filed.    Anesthesia Type:   No value filed.     Note:    Oropharynx: oropharynx clear of all foreign objects and spontaneously breathing  Level of Consciousness: awake  Oxygen Supplementation: room air    Independent Airway: airway patency satisfactory and stable  Dentition: dentition unchanged  Vital Signs Stable: post-procedure vital signs reviewed and stable  Report to RN Given: handoff report given  Patient transferred to: Phase II    Handoff Report: Identifed the Patient, Identified the Reponsible Provider, Reviewed the pertinent medical history, Discussed the surgical course, Reviewed Intra-OP anesthesia mangement and issues during anesthesia, Set expectations for post-procedure period and Allowed opportunity for questions and acknowledgement of understanding      Vitals:  Vitals Value Taken Time   /86 06/03/25 10:01   Temp     Pulse 64 06/03/25 10:01   Resp     SpO2 96 % 06/03/25 10:02   Vitals shown include unfiled device data.    Electronically Signed By: DEREK Degroot CRNA  [place]licia 3, 2025  10:03 AM

## 2025-06-03 NOTE — ANESTHESIA PREPROCEDURE EVALUATION
Anesthesia Pre-Procedure Evaluation    Patient: Derik Wilson   MRN: [id number] : 1955          Procedure : Procedure(s):  right eye complex cataract extraction with intraocular lens implantation         Past Medical History:   Diagnosis Date    Acanthamoeba keratitis     Appendicitis with perforation 2018    Atrial flutter with rapid ventricular response (H) 2021    Atrial flutter with rapid ventricular response (H) 2021    Basal cell carcinoma     New onset atrial flutter (H) 2021    Paroxysmal atrial fibrillation (H)     Presbyopia     PVD (posterior vitreous detachment), left     Secondary cardiomyopathy (H)     Secondary cardiomyopathy (H)     In 2022: Compared to the [facility] dated 10/1/2021, LVEF has improved from 40-45% to 56%,  left atrial size has normalized, mitral and tricuspid valve regurgitation has  decreased from mild to trace.     On ACE-I and beta blocker      Past Surgical History:   Procedure Laterality Date    ANESTHESIA CARDIOVERSION N/A 10/01/2021    Procedure: ANESTHESIA, FOR CARDIOVERSION;  Surgeon: GENERIC ANESTHESIA PROVIDER;  Location: [facility] OR    EP ABLATION ATRIAL FLUTTER N/A 2021    Procedure: EP Ablation Atrial Flutter;  Surgeon: Nayla Levin MD;  Location: [facility] CARDIAC CATH LAB    KERATOPLASTY PENETRATING Right 2021    Procedure: KERATOPLASTY, PENETRATING, posteror synechiolysis;  Surgeon: Anson Cheng MD;  [place] [facility] [place]    KERATOPLASTY PENETRATING Right 2024    Procedure: RIGHT EYE KERATOPLASTY, PENETRATING;  Surgeon: Matti Mendes MD;  [place] [facility] [place]    LAPAROSCOPIC APPENDECTOMY N/A 2018    Procedure: LAPAROSCOPIC APPENDECTOMY;  Surgeon: Mackenzie Dale MD;  Location:  OR    ORTHOPEDIC SURGERY      TRANSPLANT      Cornea      Allergies   Allergen Reactions    Acetaminophen-Codeine Nausea    Codeine Nausea      Social History     Tobacco Use    Smoking status:  "Former     Current packs/day: 0.00     Average packs/day: 1.5 packs/day for 10.0 years (15.0 ttl pk-yrs)     Types: Cigarettes     Start date: 1975     Quit date: 1980     Years since quittin.5    Smokeless tobacco: Never   Substance Use Topics    Alcohol use: No      Wt Readings from Last 1 Encounters:   25 95.3 kg (210 lb)             Physical Exam  Airway  Mallampati: I  TM distance: >3 FB  Neck ROM: full  Mouth opening: >= 4 cm    Cardiovascular - normal exam   Dental   (+) Minor Abnormalities - some fillings, tiny chips      Pulmonary - normal exam      Neurological - normal exam  He appears awake, alert and oriented x3.    Other Findings       OUTSIDE LABS:  CBC:   Lab Results   Component Value Date    WBC 8.5 2025    WBC 5.9 2023    HGB 19.7 (H) 05/15/2025    HGB 20.3 (H) 2025    HCT >60.0 (H) 2025    HCT 56.9 (H) 2023     2025     2023     BMP:   Lab Results   Component Value Date     2025     2024    POTASSIUM 4.6 2025    POTASSIUM 4.8 2024    CHLORIDE 101 2025    CHLORIDE 104 2024    CO2 25 2025    CO2 26 2024    BUN 19.9 2025    BUN 21.1 2024    CR 1.21 (H) 2025    CR 1.17 2024    GLC 85 2025     (H) 2024     COAGS:   Lab Results   Component Value Date    INR 1.03 10/01/2021     POC: No results found for: \"BGM\", \"HCG\", \"HCGS\"  HEPATIC:   Lab Results   Component Value Date    ALBUMIN 3.9 2025    PROTTOTAL 6.9 2025    ALT 31 2025    AST 28 2025    ALKPHOS 80 2025    BILITOTAL 0.8 2025     OTHER:   Lab Results   Component Value Date    A1C 5.3 10/03/2022    HENRY 9.3 2025    MAG 2.0 10/01/2021    LIPASE 69 (L) 2018    TSH 1.99 2021       Anesthesia Plan    ASA Status:  3      NPO Status: NPO Appropriate   Anesthesia Type:.  Induction: intravenous.   Techniques and Equipment:     " "  - Monitoring Plan: standard ASA monitoring     Consents    Anesthesia Plan(s) and associated risks, benefits, and realistic alternatives discussed. Questions answered and patient/representative(s) expressed understanding.     - Discussed:     - Discussed with:  Patient          Blood Consent:      - Discussed with: not discussed.     Postoperative Care    Pain management: multimodal analgesia.     Comments:                   Torres Mendez MD    I have reviewed the pertinent notes and labs in the chart from the past 30 days and (re)examined the patient.  Any updates or changes from those notes are reflected in this note.    Clinically Significant Risk Factors Present on Admission                   # Hypertension: Home medication list includes antihypertensive(s)           # Overweight: Estimated body mass index is 27.71 kg/m  as calculated from the following:    Height as of this encounter: 1.854 m (6' 1\").    Weight as of this encounter: 95.3 kg (210 lb).                    "

## 2025-06-03 NOTE — OP NOTE
Operative  Report    Patient Name: Derik Wilson    MRN: [id number]    Date of Surgery: 6/3/2025    Surgeon: Matti Mendes M.D    Assistant surgeon: Marysol Bonilla MD    Preoperative Diagnosis:   Visually significant cataract, right eye  Status post-corneal transplant, right eye    Postoperative Diagnosis: Same    Procedure: Complex Cataract extraction by phacoemulsification with intraocular lens implant, right eye    Anesthesia Type: Mac/Topical    Estimated Blood Loss: Trace    Complications: None    Implant: AIM Distance  Implant Name Type Inv. Item Serial No.  Lot No. LRB No. Used Action   LENS CCA0T0 17.5 [employer] IOL - [id number] Lens/Eye Implant LENS CCA0T0 17.5 [employer] IOL [id number] [facility]  Right 1 Implanted       INDICATIONS FOR PROCEDURE: Patient has a history of visually significant cataract affecting the patient's activity of daily living. The patient also had a prior corneal transplant for acanthamoeba keratitis. After a discussion with the patient, the patient has elected to have the listed procedure(s) to maximize visual potential. All of the appropriate consent forms have been signed and all of the patient's questions have been answered.    DETAILS OF THE PROCEDURE: Patient was identified in the pre operative area and given pre operative eye drops. The patient was then brought into the operating room and intravenous sedation was begun after a time out was performed. The patient was prepped and draped in the usual sterile ophthalmic fashion.     A lid speculum was placed and the operating microscope was brought into position. A paracentesis was made and viscoelastic was injected into the anterior chamber. The viscoelastic cannula was gently swept under the iris to break posterior synechiae. A clear corneal incision was made using a keratome blade. A pupillary membrane from prior regressed iris  neovascularization was removed from the nasal pupil margin using utrata forceps. A cystotome needle and Utrata forceps were used to create an anterior continuous curvilinear capsulorhexis. Then BSS on a blunt tipped cannula was used for hydrodissection and hydrodelineation. Using the phacoemulsification unit, the nucleus was then removed from the eye. Using irrigation and aspiration, the remaining cortical material was removed from the eye. The capsular bag was infused with viscoelastic material. The intraocular lens was then placed into the capsular bag and secured into position. The remaining viscoelastic material was then removed from the eye via irrigation and aspiration.  BSS on a blunt tipped cannula was used to hydrate all of the wounds. Two 10-0 nylon sutures were used to seal the main and the side port incisions. This was placed because the wounds were purposely made shorter to avoid crossing the graft host junction of the penetrating keratoplasty graft. At the end of the case, the wounds were noted to be watertight, the pupil was noted to be round, the lens appeared to be in good position, and the pressure was palpated to be physiologic. Intracameral moxifloxacin and a subconjunctival injection of Kenalog was administered.     Post operative ointment was applied and the eye was patched and shielded. Patient tolerated procedure and was brought to the recovery area in good condition. Patient will follow in the eye clinic in one day.     Matti Mendes MD   of Ophthalmology

## 2025-06-04 ENCOUNTER — OFFICE VISIT (OUTPATIENT)
Dept: OPHTHALMOLOGY | Facility: CLINIC | Age: 70
End: 2025-06-04
Attending: OPHTHALMOLOGY
Payer: COMMERCIAL

## 2025-06-04 ENCOUNTER — TELEPHONE (OUTPATIENT)
Dept: OPHTHALMOLOGY | Facility: CLINIC | Age: 70
End: 2025-06-04

## 2025-06-04 DIAGNOSIS — Z96.1 PSEUDOPHAKIA, RIGHT EYE: ICD-10-CM

## 2025-06-04 DIAGNOSIS — T86.91 GRAFT REJECTION: ICD-10-CM

## 2025-06-04 DIAGNOSIS — H25.13 AGE-RELATED NUCLEAR CATARACT OF BOTH EYES: ICD-10-CM

## 2025-06-04 DIAGNOSIS — Z98.890 POSTOPERATIVE EYE STATE: ICD-10-CM

## 2025-06-04 DIAGNOSIS — Z94.7 POST CORNEAL TRANSPLANT: Primary | ICD-10-CM

## 2025-06-04 DIAGNOSIS — H40.051 BORDERLINE GLAUCOMA OF RIGHT EYE WITH OCULAR HYPERTENSION: ICD-10-CM

## 2025-06-04 PROCEDURE — 99213 OFFICE O/P EST LOW 20 MIN: CPT | Performed by: OPHTHALMOLOGY

## 2025-06-04 ASSESSMENT — TONOMETRY
OD_IOP_MMHG: 15
IOP_METHOD: ICARE
OS_IOP_MMHG: 15

## 2025-06-04 ASSESSMENT — SLIT LAMP EXAM - LIDS
COMMENTS: MGD, BLEPHARITIS
COMMENTS: MGD, BLEPHARITIS

## 2025-06-04 ASSESSMENT — VISUAL ACUITY
METHOD: SNELLEN - LINEAR
OD_SC: 20/125
OD_SC+: -1
OS_SC: 20/25
CORRECTION_TYPE: GLASSES
OD_PH_SC: 20/50
OS_SC+: -3

## 2025-06-04 ASSESSMENT — EXTERNAL EXAM - LEFT EYE: OS_EXAM: NORMAL

## 2025-06-04 ASSESSMENT — EXTERNAL EXAM - RIGHT EYE: OD_EXAM: NORMAL

## 2025-06-04 NOTE — TELEPHONE ENCOUNTER
[facility] Call Center    Phone Message    May a detailed message be left on voicemail: yes     Reason for Call: Other: pt is returning a call from the clinic, Same day call back no notes in pts chart that clinic tried to reach pt. Please contact pt to discuss his new usage of his eye medications from Dr Mendes. Thank you  Pt advised is this information may be added to his mychart from [facility] appt for him.    Action Taken: Message routed to:  [facility]    Travel Screening: Not Applicable     Date of Service:

## 2025-06-04 NOTE — NURSING NOTE
Chief Complaints and History of Present Illnesses   Patient presents with    Post Op (Ophthalmology) Right Eye     right eye complex cataract extraction with intraocular lens implantation     Chief Complaint(s) and History of Present Illness(es)       Post Op (Ophthalmology) Right Eye              Laterality: right eye    Associated symptoms: Negative for dryness, eye pain, tearing, photophobia and foreign body sensation    Treatments tried: eye drops    Pain scale: 0/10    Comments: right eye complex cataract extraction with intraocular lens implantation              Comments    Pt slept well.  No pain.    Shield stayed on all night.  Pt started post op drops last night.    TANNER Corral June 4, 2025 7:45 AM

## 2025-06-04 NOTE — TELEPHONE ENCOUNTER
[facility] Call Center    Phone Message    May a detailed message be left on voicemail: yes     Reason for Call: Other: Patient saw Dr Mendes this morning and we made some changes to his medications. Patient cannot remember the changes of what to stop taking and what to continue.      Please call patient back at [phone] to discuss. Thank you.    Action Taken: Message routed to:  [facility]    Travel Screening: Not Applicable

## 2025-06-04 NOTE — PROGRESS NOTES
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 Rosie Han     HPI:  Derik Hamm is male who presents as follow up for Acanthamoeba Keratitis right eye.     Patient initially presented for evaluation of persistent keratoconjunctivitis, OD, since 10/5/2020. Initially seen by Dr. Ngo at [facility] and felt it was related to severe dryness vs bacterial keratitis, so he was started on PFATs and Vigamox Q2H [place]. Patient was seen frequently for follow up; he was started on Valtrex 500 mg TID on 10/9/20 and has been on that since then. Despite this therapy, pt failed to improve so he was started prednisolone. He has had a waxing and waning course, but more recently has gradually declined. Stopped PF on 11/2 after running out. He was seen by Dr. Ngo on 11/2 who noted worsening VA and worsening exam, so the patient was referred to Dr. Han, whom he saw 11/4/20. Dr. Han referred to our clinic due to concerns for acanthamoeba keratitis. Culture positive for acanthamoeba on 11/05/20.    Interval hx 06/04/2025  Chief Complaint(s) and History of Present Illness(es)       Follow Up    In both eyes.  Associated symptoms include Negative for eye pain, pain with eye movement, photophobia, flashes and floaters. 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  Dexamethasone injection right eye for rejection 4/18/24  Right eye repeat PKP 6/11/24  S/p CE/IOL OD 6/3/25     Glasses: Yes  CTL wearer: none currently; attempted hard lens wear but felt that this did not work well for him due to it coming out of the eye     Family hx of eye disease: negative for glaucoma/macular degeneration     Social Hx:  (-) smoking, (-) EtOH, (-) illicit drugs     Meds 6/4/2025:  - Cosopt BID OD      Surgical Hx:  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 Sutures intact. IOP 18 off of drops (patient was instructed to stop cosopt 4 days prior to appointment for pressure check)    10/28/24: POM4 - suture removal  1/20/24: s/p PKP - suture removal   02/17/25: s/p PKP - suture removal  3/17/25: 3 inf sutures removed today  4/14/25: s/p PKP right eye 6/2024: no change in Nir right eye after removing 3 sutures in previous visit; no benefit of removing more sutures.; right eye corneal graft clear with no sign of rejection  5/12/25: Pentcam right eye shows WTR astigmatism - remove superior suture  6/4/25: POD1 s/p CEIOL right eye; good surgical result, clear cornea; no water in the eye x 1 week, sleep with eyeshield x 1 week    PLAN 6/4/2025:   Prednisolone and Moxi QID right eye  Continue Cosopt BID OD  F/up 1 week for POW1 s/p right eye CEIOL      Marysol Bonilla MD  Cornea and External Disease Fellow  [facility]      Attending Physician Attestation:  Complete documentation of historical and exam elements from today's encounter can be found in the full encounter summary report (not reduplicated in this progress note).  I  personally obtained the chief complaint(s) and history of present illness.  I confirmed and edited as necessary the review of systems, past medical/surgical history, family history, social history, and examination findings as documented by others; and I examined the patient myself.  I personally reviewed the relevant tests, images, and reports as documented above.  I formulated and edited as necessary the assessment and plan and discussed the findings and management plan with the patient and family. - Matti Mendes MD

## 2025-06-04 NOTE — TELEPHONE ENCOUNTER
"See patient MyChart message.     RN replied via [employer] advising patient to schedule sooner lab only appt per PCP recommendation for recheck.     Per chart review- 5/20/25 result note from Dr. Milligan :  \"The testosterone is significantly elevated which is causing your elevated hemoglobin. Stop taking those supplements as we discussed. We should recheck levels in 2-3 months (with your PrEP labs).\"    Elizabeth MOE RN  [facility]   "

## 2025-06-04 NOTE — LETTER
June 4, 2025      Re: Derik Wilson   1955    To Whom It May Concern:    This is to confirm that the above patient was seen on 6/4/2025.  Derik Wilson is unable to lift more than 20 lbs fore one week.    Thank you for your cooperation in this matter.  Please do not hesitate to contact me if you have any further questions.    Sincerely,      IVANNA KEITH

## 2025-06-05 NOTE — TELEPHONE ENCOUNTER
Spoke to pt at 1028    Reviewed prednisolone eye drop 4/day in right eye, moxifloxacin 4/day in right eye and cosopt 2/day in right eye    Reviewed ketorolac (grey top) not needed.    Pt verbally demonstrated understanding.      Angel Bell RN 10:29 AM 06/05/25

## 2025-06-11 ENCOUNTER — OFFICE VISIT (OUTPATIENT)
Dept: OPHTHALMOLOGY | Facility: CLINIC | Age: 70
End: 2025-06-11
Attending: OPHTHALMOLOGY
Payer: COMMERCIAL

## 2025-06-11 DIAGNOSIS — Z98.890 POSTOPERATIVE EYE STATE: Primary | ICD-10-CM

## 2025-06-11 PROCEDURE — 99212 OFFICE O/P EST SF 10 MIN: CPT | Performed by: OPHTHALMOLOGY

## 2025-06-11 ASSESSMENT — EXTERNAL EXAM - LEFT EYE: OS_EXAM: NORMAL

## 2025-06-11 ASSESSMENT — TONOMETRY
OD_IOP_MMHG: 13
OS_IOP_MMHG: 17
IOP_METHOD: ICARE

## 2025-06-11 ASSESSMENT — VISUAL ACUITY
OD_SC+: -1
CORRECTION_TYPE: GLASSES
OD_SC: 20/80
METHOD: SNELLEN - LINEAR
OD_PH_SC: 20/40
OS_CC: 20/25

## 2025-06-11 ASSESSMENT — EXTERNAL EXAM - RIGHT EYE: OD_EXAM: NORMAL

## 2025-06-11 ASSESSMENT — SLIT LAMP EXAM - LIDS
COMMENTS: MGD, BLEPHARITIS
COMMENTS: MGD, BLEPHARITIS

## 2025-06-11 NOTE — PROGRESS NOTES
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 Rosie Han     HPI:  Derik Hamm is male who presents as follow up for Acanthamoeba Keratitis right eye.     Patient initially presented for evaluation of persistent keratoconjunctivitis, OD, since 10/5/2020. Initially seen by Dr. Ngo at [facility] and felt it was related to severe dryness vs bacterial keratitis, so he was started on PFATs and Vigamox Q2H [place]. Patient was seen frequently for follow up; he was started on Valtrex 500 mg TID on 10/9/20 and has been on that since then. Despite this therapy, pt failed to improve so he was started prednisolone. He has had a waxing and waning course, but more recently has gradually declined. Stopped PF on 11/2 after running out. He was seen by Dr. Ngo on 11/2 who noted worsening VA and worsening exam, so the patient was referred to Dr. Han, whom he saw 11/4/20. Dr. Han referred to our clinic due to concerns for acanthamoeba keratitis. Culture positive for acanthamoeba on 11/05/20.    Interval hx 06/11/2025  Chief Complaint(s) and History of Present Illness(es)       Follow Up    In both eyes.  Associated symptoms include Negative for eye pain, pain with eye movement, photophobia, flashes and floaters. 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  Dexamethasone injection right eye for rejection 4/18/24  Right eye repeat PKP 6/11/24  S/p CE/IOL OD 6/3/25     Glasses: Yes  CTL wearer: none currently; attempted hard lens wear but felt that this did not work well for him due to it coming out of the eye     Family hx of eye disease: negative for glaucoma/macular degeneration     Social Hx:  (-) smoking, (-) EtOH, (-) illicit drugs     Meds 6/11/2025:  - Moxi and prednisolone QID OD  - Cosopt BID OD      Surgical Hx:  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 Sutures intact. IOP 18 off of drops (patient was instructed to stop cosopt 4 days prior to appointment for pressure check)    10/28/24: POM4 - suture removal  1/20/24: s/p PKP - suture removal   02/17/25: s/p PKP - suture removal  3/17/25: 3 inf sutures removed today  4/14/25: s/p PKP right eye 6/2024: no change in Nir right eye after removing 3 sutures in previous visit; no benefit of removing more sutures.; right eye corneal graft clear with no sign of rejection  5/12/25: Pentcam right eye shows WTR astigmatism - remove superior suture  6/4/25: POD1 s/p CEIOL right eye; good surgical result, clear cornea; no water in the eye x 1 week, sleep with eyeshield x 1 week  6/11/25: POW1 s/p CEIOL right eye; good surgical result, clear cornea, IOL well centered       PLAN 6/11/2025:   Prednisolone right eye taper 1 drop/week  Moxi right eye BID x 2 weeks then STOP  Continue Cosopt BID OD  F/up 3 weeks for POM1 s/p right eye CEIOL: DFE and Mrx      Marysol Bonilla MD  Cornea and External Disease Fellow  [facility]

## 2025-06-25 ENCOUNTER — OFFICE VISIT (OUTPATIENT)
Dept: DERMATOLOGY | Facility: CLINIC | Age: 70
End: 2025-06-25
Payer: COMMERCIAL

## 2025-06-25 DIAGNOSIS — L57.0 ACTINIC KERATOSIS: ICD-10-CM

## 2025-06-25 DIAGNOSIS — L57.8 ACTINIC SKIN DAMAGE: Primary | ICD-10-CM

## 2025-06-25 DIAGNOSIS — D22.9 MULTIPLE BENIGN NEVI: ICD-10-CM

## 2025-06-25 DIAGNOSIS — Z85.828 HISTORY OF BASAL CELL CARCINOMA: ICD-10-CM

## 2025-06-25 ASSESSMENT — PAIN SCALES - GENERAL: PAINLEVEL_OUTOF10: NO PAIN (0)

## 2025-06-25 NOTE — LETTER
6/25/2025      Derik Wilson  [address] [place] [address]      Dear Colleague,    Thank you for referring your patient, Derik Wilson, to the [facility]. Please see a copy of my visit note below.    [facility] Dermatology Note    Encounter Date: Jun 25, 2025    Dermatology Problem List:  #Hx NMSC  - 01/19/21 nBCC L medial chest and R anterior neck  - 06/27/23 smBCC R upper arm and R lower leg    #Spon derm  - bx 06/2024    Major PMHx  -   ______________________________________    Impression/Plan:  Derik was seen today for skin check.    Diagnoses and all orders for this visit:    Actinic skin damage  History of basal cell carcinoma  Multiple benign nevi  - Reviewed the compounding benefits of incremental changes to sun protective behaviors including increased frequency of sunscreen and sun protective clothing like broad brimmed hats and longsleeved UPF containing clothing    Actinic keratosis  - forehead  - discussed efudex vs LN2  - defers both        Follow-up in 1 year.       Staff Involved:  Staff Only    Kailash Jones MD   of Dermatology  Department of Dermatology  [facility]      CC:   Chief Complaint   Patient presents with     Skin Check       History of Present Illness:  Mr. Derik Wilson is a 69 year old male who presents as a new patient.    Pt presents today for concerns about spots on trunk and extremities       Labs:      Physical exam:  Vitals: There were no vitals taken for this visit.  GEN: well developed, well-nourished, in no acute distress, in a pleasant mood.     SKIN: Flores phototype 1  - Full skin, which includes the head/face, both arms, chest, back, abdomen,both legs, genitalia and/or groin buttocks, digits and/or nails, was examined.  - gritty pink papule forehead  - Flat brown macules and patches in a sun exposed areas on face and extremities  - scattered  brown papules on trunk and extremities   - well healed scar at site of previous excisions  - No other lesions of concern on areas examined.     Past Medical History:   Past Medical History:   Diagnosis Date     Acanthamoeba keratitis      Appendicitis with perforation 11/05/2018     Atrial flutter with rapid ventricular response (H) 09/29/2021     Atrial flutter with rapid ventricular response (H) 09/29/2021     Basal cell carcinoma      New onset atrial flutter (H) 09/29/2021     Paroxysmal atrial fibrillation (H)      Presbyopia      PVD (posterior vitreous detachment), left      Secondary cardiomyopathy (H)      Secondary cardiomyopathy (H)     In Jan 2022: Compared to the CAMI dated 10/1/2021, LVEF has improved from 40-45% to 56%,  left atrial size has normalized, mitral and tricuspid valve regurgitation has  decreased from mild to trace.     On ACE-I and beta blocker     Past Surgical History:   Procedure Laterality Date     ANESTHESIA CARDIOVERSION N/A 10/01/2021    Procedure: ANESTHESIA, FOR CARDIOVERSION;  Surgeon: GENERIC ANESTHESIA PROVIDER;  Location: [facility] OR     EP ABLATION ATRIAL FLUTTER N/A 11/17/2021    Procedure: EP Ablation Atrial Flutter;  Surgeon: Nayla Levin MD;  Location: [facility] CARDIAC CATH LAB     KERATOPLASTY PENETRATING Right 12/08/2021    Procedure: KERATOPLASTY, PENETRATING, posteror synechiolysis;  Surgeon: Anson Cheng MD;  Location: [facility] OR     KERATOPLASTY PENETRATING Right 6/11/2024    Procedure: RIGHT EYE KERATOPLASTY, PENETRATING;  Surgeon: Matti Mendes MD;  Location: [facility] OR     LAPAROSCOPIC APPENDECTOMY N/A 11/04/2018    Procedure: LAPAROSCOPIC APPENDECTOMY;  Surgeon: Mackenzie Dale MD;  Location: [facility] OR     ORTHOPEDIC SURGERY       PHACOEMULSIFICATION WITH STANDARD INTRAOCULAR LENS IMPLANT Right 6/3/2025    Procedure: right eye complex cataract extraction with intraocular lens implantation;  Surgeon: Matti Mendes MD;  Location:  [facility] OR     TRANSPLANT  12/21    Cornea       Social History:   reports that he quit smoking about 44 years ago. His smoking use included cigarettes. He started smoking about 49 years ago. He has a 15 pack-year smoking history. He has never used smokeless tobacco. He reports that he does not drink alcohol and does not use drugs.    Family History:  Family History   Problem Relation Age of Onset     Skin Cancer Mother      Lung Cancer Father 85     Skin Cancer Brother      Glaucoma No family hx of      Macular Degeneration No family hx of      Diabetes No family hx of      Hypertension No family hx of      Anesthesia Reaction No family hx of      Thrombosis No family hx of        Medications:  Current Outpatient Medications   Medication Sig Dispense Refill     dexAMETHasone (DECADRON) 0.1 % ophthalmic solution Place 1-2 drops into the right eye 3 times daily. 5 mL 5     dorzolamide-timolol (COSOPT) 2-0.5 % ophthalmic solution Place 1 drop into the right eye 2 times daily. 10 mL 5     emtricitabine-tenofovir (TRUVADA) 200-300 MG per tablet Take 1 tablet by mouth daily. 90 tablet 4     imiquimod (ALDARA) 5 % external cream Apply to AA and to surrounding 1/2 inch M-F at HS x 6 weeks (Patient not taking: Reported on 6/25/2025) 20 packet 3     ketoconazole (NIZORAL) 2 % external shampoo Use daily as needed (Patient not taking: Reported on 6/25/2025) 120 mL 11     ketorolac (ACULAR) 0.5 % ophthalmic solution Place 1 drop into the right eye 4 times daily. (Patient not taking: Reported on 6/25/2025) 3 mL 0     lisinopril (ZESTRIL) 2.5 MG tablet Take 1 tablet (2.5 mg) by mouth daily. 90 tablet 4     metoprolol succinate ER (TOPROL XL) 25 MG 24 hr tablet Take 1 tablet (25 mg) by mouth daily. 90 tablet 4     moxifloxacin (VIGAMOX) 0.5 % ophthalmic solution Place 1 drop into the right eye 4 times daily. 3 mL 0     multivitamin, therapeutic (THERA-VIT) TABS tablet Take 1 tablet by mouth daily (with lunch)       naproxen sodium  220 MG capsule Take 220 mg by mouth 2 times daily as needed (pain)       omeprazole (PRILOSEC) 20 MG DR capsule Take 1 capsule (20 mg) by mouth daily. 90 capsule 4     prednisoLONE acetate (PRED FORTE) 1 % ophthalmic suspension Place 1-2 drops into the right eye every hour 15 mL 11     TURMERIC PO Take by mouth daily       Allergies   Allergen Reactions     Acetaminophen-Codeine Nausea     Codeine Nausea               Again, thank you for allowing me to participate in the care of your patient.        Sincerely,        Kailash Jones MD    Electronically signed

## 2025-06-25 NOTE — PROGRESS NOTES
[facility] Dermatology Note    Encounter Date: Jun 25, 2025    Dermatology Problem List:  #Hx NMSC  - 01/19/21 nBCC L medial chest and R anterior neck  - 06/27/23 [facility] R upper arm and R lower leg    #Spon derm  - bx 06/2024    Major PMHx  -   ______________________________________    Impression/Plan:  Derik was seen today for skin check.    Diagnoses and all orders for this visit:    Actinic skin damage  History of basal cell carcinoma  Multiple benign nevi  - Reviewed the compounding benefits of incremental changes to sun protective behaviors including increased frequency of sunscreen and sun protective clothing like broad brimmed hats and longsleeved UPF containing clothing    Actinic keratosis  - forehead  - discussed efudex vs LN2  - defers both        Follow-up in 1 year.       Staff Involved:  Staff Only    Kailash Jones MD   of Dermatology  Department of Dermatology  [facility]      CC:   Chief Complaint   Patient presents with    Skin Check       History of Present Illness:  Mr. Derik Wilson is a 69 year old male who presents as a new patient.    Pt presents today for concerns about spots on trunk and extremities       Labs:      Physical exam:  Vitals: There were no vitals taken for this visit.  GEN: well developed, well-nourished, in no acute distress, in a pleasant mood.     SKIN: Flores phototype 1  - Full skin, which includes the head/face, both arms, chest, back, abdomen,both legs, genitalia and/or groin buttocks, digits and/or nails, was examined.  - gritty pink papule forehead  - Flat brown macules and patches in a sun exposed areas on face and extremities  - scattered brown papules on trunk and extremities   - well healed scar at site of previous excisions  - No other lesions of concern on areas examined.     Past Medical History:   Past Medical History:   Diagnosis Date    Acanthamoeba keratitis     Appendicitis  with perforation 11/05/2018    Atrial flutter with rapid ventricular response (H) 09/29/2021    Atrial flutter with rapid ventricular response (H) 09/29/2021    Basal cell carcinoma     New onset atrial flutter (H) 09/29/2021    Paroxysmal atrial fibrillation (H)     Presbyopia     PVD (posterior vitreous detachment), left     Secondary cardiomyopathy (H)     Secondary cardiomyopathy (H)     In Jan 2022: Compared to the CAMI dated 10/1/2021, LVEF has improved from 40-45% to 56%,  left atrial size has normalized, mitral and tricuspid valve regurgitation has  decreased from mild to trace.     On ACE-I and beta blocker     Past Surgical History:   Procedure Laterality Date    ANESTHESIA CARDIOVERSION N/A 10/01/2021    Procedure: ANESTHESIA, FOR CARDIOVERSION;  Surgeon: GENERIC ANESTHESIA PROVIDER;  Location: [facility] OR    EP ABLATION ATRIAL FLUTTER N/A 11/17/2021    Procedure: EP Ablation Atrial Flutter;  Surgeon: Nayla Levin MD;  Location: [facility] CARDIAC CATH LAB    KERATOPLASTY PENETRATING Right 12/08/2021    Procedure: KERATOPLASTY, PENETRATING, posteror synechiolysis;  Surgeon: Anson Cheng MD;  Location: [facility] OR    KERATOPLASTY PENETRATING Right 6/11/2024    Procedure: RIGHT EYE KERATOPLASTY, PENETRATING;  Surgeon: Matti Mendes MD;  Location: [facility] OR    LAPAROSCOPIC APPENDECTOMY N/A 11/04/2018    Procedure: LAPAROSCOPIC APPENDECTOMY;  Surgeon: Mackenzie Dale MD;  Location:  OR    ORTHOPEDIC SURGERY      PHACOEMULSIFICATION WITH STANDARD INTRAOCULAR LENS IMPLANT Right 6/3/2025    Procedure: right eye complex cataract extraction with intraocular lens implantation;  Surgeon: Matti Mendes MD;  Location: [facility] OR    TRANSPLANT  12/21    Cornea       Social History:   reports that he quit smoking about 44 years ago. His smoking use included cigarettes. He started smoking about 49 years ago. He has a 15 pack-year smoking history. He has never used smokeless tobacco. He  reports that he does not drink alcohol and does not use drugs.    Family History:  Family History   Problem Relation Age of Onset    Skin Cancer Mother     Lung Cancer Father 85    Skin Cancer Brother     Glaucoma No family hx of     Macular Degeneration No family hx of     Diabetes No family hx of     Hypertension No family hx of     Anesthesia Reaction No family hx of     Thrombosis No family hx of        Medications:  Current Outpatient Medications   Medication Sig Dispense Refill    dexAMETHasone (DECADRON) 0.1 % ophthalmic solution Place 1-2 drops into the right eye 3 times daily. 5 mL 5    dorzolamide-timolol (COSOPT) 2-0.5 % ophthalmic solution Place 1 drop into the right eye 2 times daily. 10 mL 5    emtricitabine-tenofovir (TRUVADA) 200-300 MG per tablet Take 1 tablet by mouth daily. 90 tablet 4    imiquimod (ALDARA) 5 % external cream Apply to AA and to surrounding 1/2 inch M-F at HS x 6 weeks (Patient not taking: Reported on 6/25/2025) 20 packet 3    ketoconazole (NIZORAL) 2 % external shampoo Use daily as needed (Patient not taking: Reported on 6/25/2025) 120 mL 11    ketorolac (ACULAR) 0.5 % ophthalmic solution Place 1 drop into the right eye 4 times daily. (Patient not taking: Reported on 6/25/2025) 3 mL 0    lisinopril (ZESTRIL) 2.5 MG tablet Take 1 tablet (2.5 mg) by mouth daily. 90 tablet 4    metoprolol succinate ER (TOPROL XL) 25 MG 24 hr tablet Take 1 tablet (25 mg) by mouth daily. 90 tablet 4    moxifloxacin (VIGAMOX) 0.5 % ophthalmic solution Place 1 drop into the right eye 4 times daily. 3 mL 0    multivitamin, therapeutic (THERA-VIT) TABS tablet Take 1 tablet by mouth daily (with lunch)      naproxen sodium 220 MG capsule Take 220 mg by mouth 2 times daily as needed (pain)      omeprazole (PRILOSEC) 20 MG DR capsule Take 1 capsule (20 mg) by mouth daily. 90 capsule 4    prednisoLONE acetate (PRED FORTE) 1 % ophthalmic suspension Place 1-2 drops into the right eye every hour 15 mL 11     TURMERIC PO Take by mouth daily       Allergies   Allergen Reactions    Acetaminophen-Codeine Nausea    Codeine Nausea

## 2025-07-23 ENCOUNTER — OFFICE VISIT (OUTPATIENT)
Dept: OPHTHALMOLOGY | Facility: CLINIC | Age: 70
End: 2025-07-23
Attending: OPHTHALMOLOGY
Payer: COMMERCIAL

## 2025-07-23 DIAGNOSIS — Z98.890 POSTOPERATIVE EYE STATE: Primary | ICD-10-CM

## 2025-07-23 PROCEDURE — 99211 OFF/OP EST MAY X REQ PHY/QHP: CPT | Performed by: OPHTHALMOLOGY

## 2025-07-23 PROCEDURE — 99024 POSTOP FOLLOW-UP VISIT: CPT | Performed by: OPHTHALMOLOGY

## 2025-07-23 ASSESSMENT — EXTERNAL EXAM - LEFT EYE: OS_EXAM: NORMAL

## 2025-07-23 ASSESSMENT — VISUAL ACUITY
OS_CC: 20/20-2
METHOD: SNELLEN - LINEAR
OD_SC: 20/80
OD_PH_SC: 20/30-2/+

## 2025-07-23 ASSESSMENT — REFRACTION_MANIFEST
OS_CYLINDER: +0.50
OS_SPHERE: +1.00
OD_SPHERE: -1.00
OD_AXIS: 090
OD_CYLINDER: +4.50
OS_ADD: +2.50
OS_AXIS: 145
OD_ADD: +2.50

## 2025-07-23 ASSESSMENT — REFRACTION_WEARINGRX
OS_CYLINDER: SPHERE
OS_SPHERE: +1.50
OD_SPHERE: PLANO
OD_ADD: +2.50
OS_ADD: +2.50
SPECS_TYPE: PAL

## 2025-07-23 ASSESSMENT — EXTERNAL EXAM - RIGHT EYE: OD_EXAM: NORMAL

## 2025-07-23 ASSESSMENT — SLIT LAMP EXAM - LIDS: COMMENTS: MGD, BLEPHARITIS

## 2025-07-23 ASSESSMENT — TONOMETRY
IOP_METHOD: TONOPEN
OS_IOP_MMHG: 12
OD_IOP_MMHG: 09

## 2025-07-23 ASSESSMENT — PACHYMETRY
EXAM_DATE: 1/20/2025
OD_CT(UM): 564

## 2025-07-23 NOTE — PROGRESS NOTES
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 Rosie Han     HPI:  Derik Hamm is male who presents as follow up for Acanthamoeba Keratitis right eye.     Patient initially presented for evaluation of persistent keratoconjunctivitis, OD, since 10/5/2020. Initially seen by Dr. Ngo at [facility] and felt it was related to severe dryness vs bacterial keratitis, so he was started on PFATs and Vigamox Q2H WA. Patient was seen frequently for follow up; he was started on Valtrex 500 mg TID on 10/9/20 and has been on that since then. Despite this therapy, pt failed to improve so he was started prednisolone. He has had a waxing and waning course, but more recently has gradually declined. Stopped PF on 11/2 after running out. He was seen by Dr. Ngo on 11/2 who noted worsening VA and worsening exam, so the patient was referred to Dr. Han, whom he saw 11/4/20. Dr. Han referred to our clinic due to concerns for acanthamoeba keratitis. Culture positive for acanthamoeba on 11/05/20.    Interval hx 07/23/2025  Chief Complaint(s) and History of Present Illness(es)       Post Op (Ophthalmology) Right Eye    In right eye.             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  Dexamethasone injection right eye for rejection 4/18/24  Right eye repeat PKP 6/11/24  S/p CE/IOL OD 6/3/25     Glasses: Yes  CTL wearer: none currently; attempted hard lens wear but felt that this did not work well for him due to it coming out of the eye     Family hx of eye disease: negative for glaucoma/macular  degeneration     Social Hx: (-) smoking, (-) EtOH, (-) illicit drugs     Meds 6/11/2025:  - Moxi and prednisolone QID OD  - Cosopt BID OD      Surgical Hx:  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 Sutures intact. IOP 18 off of drops (patient was instructed to stop cosopt 4 days prior to appointment for pressure check)    10/28/24: POM4 - suture removal  1/20/24: s/p PKP - suture removal   02/17/25: s/p PKP - suture removal  3/17/25: 3 inf sutures removed today  4/14/25: s/p PKP right eye 6/2024: no change in Nir right eye after removing 3 sutures in previous visit; no benefit of removing more sutures.; right eye corneal graft clear with no sign of rejection  5/12/25: Pentcam right eye shows WTR astigmatism - remove superior suture  6/4/25: POD1 s/p CEIOL right eye; good surgical result, clear cornea; no water in the eye x 1 week, sleep with eyeshield x 1 week  6/11/25: POW1 s/p CEIOL right eye; good surgical result, clear cornea, IOL well centered   7/23/25  no complications post cataract sx    PLAN 7/23/25:   Defer providing MRx given suture removal  Remove superior suture given WTR astigmatism  Repeat K nir and MRx OD at follow-up  Start ofloxacin QID ODx 3 days  Continue prednisolone    RTC 1  month    Attending Physician Attestation:  Complete documentation of historical and exam elements from today's encounter can be found in the full encounter summary report (not reduplicated in this progress note).  I personally obtained the chief complaint(s) and history of present illness.  I confirmed and edited as necessary the review of systems, past medical/surgical history, family history, social history, and examination findings as documented by others; and I examined the patient myself.  I personally reviewed the relevant tests, images, and reports as documented above.  I formulated and edited as necessary the assessment and plan and discussed the findings and management plan with the patient and family. - Matti Mendes MD

## 2025-08-18 ENCOUNTER — OFFICE VISIT (OUTPATIENT)
Dept: OPHTHALMOLOGY | Facility: CLINIC | Age: 70
End: 2025-08-18
Attending: OPHTHALMOLOGY
Payer: COMMERCIAL

## 2025-08-18 DIAGNOSIS — Z94.7 POST CORNEAL TRANSPLANT: Primary | ICD-10-CM

## 2025-08-18 PROCEDURE — 99213 OFFICE O/P EST LOW 20 MIN: CPT | Performed by: OPHTHALMOLOGY

## 2025-08-18 PROCEDURE — 99024 POSTOP FOLLOW-UP VISIT: CPT | Mod: GC | Performed by: OPHTHALMOLOGY

## 2025-08-18 PROCEDURE — 92025 CPTRIZED CORNEAL TOPOGRAPHY: CPT | Performed by: OPHTHALMOLOGY

## 2025-08-18 PROCEDURE — 92025 CPTRIZED CORNEAL TOPOGRAPHY: CPT | Mod: 26 | Performed by: OPHTHALMOLOGY

## 2025-08-18 ASSESSMENT — VISUAL ACUITY
METHOD: SNELLEN - LINEAR
OS_CC: 20/20
OD_CC: 20/70

## 2025-08-18 ASSESSMENT — REFRACTION_WEARINGRX
SPECS_TYPE: PAL
OS_ADD: +2.50
OD_ADD: +2.50
OD_SPHERE: PLANO
OS_SPHERE: +1.50
OS_CYLINDER: SPHERE

## 2025-08-18 ASSESSMENT — EXTERNAL EXAM - LEFT EYE: OS_EXAM: NORMAL

## 2025-08-18 ASSESSMENT — SLIT LAMP EXAM - LIDS: COMMENTS: MGD, BLEPHARITIS

## 2025-08-18 ASSESSMENT — REFRACTION_MANIFEST
OD_SPHERE: -0.75
OD_ADD: +2.25
OS_SPHERE: +0.75
OS_AXIS: 101
OS_ADD: +2.25
OS_CYLINDER: +0.50
OD_AXIS: 093
OD_CYLINDER: +3.75

## 2025-08-18 ASSESSMENT — TONOMETRY
IOP_METHOD: ICARE
OD_IOP_MMHG: 09
OS_IOP_MMHG: 17

## 2025-08-18 ASSESSMENT — EXTERNAL EXAM - RIGHT EYE: OD_EXAM: NORMAL

## 2025-09-03 DIAGNOSIS — K21.00 GASTROESOPHAGEAL REFLUX DISEASE WITH ESOPHAGITIS, UNSPECIFIED WHETHER HEMORRHAGE: ICD-10-CM

## 2025-09-03 RX ORDER — OMEPRAZOLE 20 MG/1
20 CAPSULE, DELAYED RELEASE ORAL DAILY
Qty: 90 CAPSULE | Refills: 1 | Status: SHIPPED | OUTPATIENT
Start: 2025-09-03

## (undated) DEVICE — SOL WATER 10ML VIAL 6332318510

## (undated) DEVICE — ENDO POUCH UNIV RETRIEVAL SYSTEM INZII 10MM CD001

## (undated) DEVICE — SOL WATER IRRIG 500ML BOTTLE 2F7113

## (undated) DEVICE — SU VICRYL 5-0 S-14DA 18" J571G

## (undated) DEVICE — STPL ENDO RELOAD ECHELON 45X2.0MM GREY ECR45M

## (undated) DEVICE — EYE PACK CUSTOM ANTERIOR 30DEG TIP CENTURION PPK6682-04

## (undated) DEVICE — LINEN DRAPE 54X72" 5467

## (undated) DEVICE — Device

## (undated) DEVICE — LINEN FULL SHEET 5511

## (undated) DEVICE — APPLICATOR COTTON TIP 6"X2 STERILE LF 6012

## (undated) DEVICE — ESU GROUND PAD ADULT W/CORD E7507

## (undated) DEVICE — PACK CATARACT CUSTOM ASC SEY15CPUMC

## (undated) DEVICE — PATCH CARTO 3 EXTERNAL REFERENCE 3D MAPPING CREFP6

## (undated) DEVICE — ENDO TROCAR OPTICAL ACCESS KII Z-THRD 12X100MM C0R85

## (undated) DEVICE — ENDO TROCAR SLEEVE KII Z-THREADED 05X100MM CTS02

## (undated) DEVICE — PACK EP SRG PROC LF DISP SAN32EPFSR

## (undated) DEVICE — TUBE SET SMARKABLATE IRRIGATION

## (undated) DEVICE — NDL 22GA 1.5"

## (undated) DEVICE — LINEN TOWEL PACK X10 5473

## (undated) DEVICE — EYE PREP BETADINE 5% SOLUTION 30ML 0065-0411-30

## (undated) DEVICE — DRSG TEGADERM 4X4 3/4" 1626W

## (undated) DEVICE — SOL NACL 0.9% INJ 1000ML BAG 2B1324X

## (undated) DEVICE — INTRO SHEATH 7FRX10CM PINNACLE RSS702

## (undated) DEVICE — GLOVE PROTEXIS W/NEU-THERA 8.0  2D73TE80

## (undated) DEVICE — CATARACT BLADE PACK 2.4MM 58001897

## (undated) DEVICE — GLOVE PROTEXIS POWDER FREE SMT 6.5  2D72PT65X

## (undated) DEVICE — GLOVE BIOGEL PI MICRO SZ 6.5 48565

## (undated) DEVICE — DRAPE STOCKINETTE IMPERVIOUS 10" 21048

## (undated) DEVICE — EYE SPONGE SPEAR WECK CEL 0008685

## (undated) DEVICE — ESU PENCIL W/HOLSTER E2350H

## (undated) DEVICE — DRAIN JACKSON PRATT RESERVOIR 100ML SU130-1305

## (undated) DEVICE — SU ETHILON 10-0 CS160-6 12" 9000G

## (undated) DEVICE — BLADE KNIFE BEAVER MICROSHARP GREEN 377515

## (undated) DEVICE — PEN MARKING SKIN TYCO DEVON DUAL TIP 31145868

## (undated) DEVICE — EYE SOL BSS PLUS 500ML BAG 65080094

## (undated) DEVICE — TAPE TRANSPORE 1" 1527S-1

## (undated) DEVICE — RAD INTRODUCER KIT MICRO 5FRX10CM .018 NITINOL G/W

## (undated) DEVICE — ESU CORD MONOPOLAR 10'  E0510

## (undated) DEVICE — NDL BLUNT 18GA 1" W/O FILTER 305181

## (undated) DEVICE — LINEN HALF SHEET 5512

## (undated) DEVICE — BAG CLEAR TRASH 1.3M 39X33" P4040C

## (undated) DEVICE — ENDO TROCAR FIRST ENTRY KII FIOS Z-THRD 05X100MM CTF03

## (undated) DEVICE — LINEN TOWEL PACK X5 5464

## (undated) DEVICE — DRAPE EYE SHEET 8441

## (undated) DEVICE — EYE TIP IRRIGATION & ASPIRATION POLYMER 35D BENT 8065751511

## (undated) DEVICE — CATH THERMOCOOL SMARTTOUCH SF FJ CURVE

## (undated) DEVICE — LINEN POUCH DBL 5427

## (undated) DEVICE — BLADE CLIPPER 3M 9670

## (undated) DEVICE — DRAPE LAP W/ARMBOARD 29410

## (undated) DEVICE — EYE SHIELD PLASTIC

## (undated) DEVICE — INTRODUCER SHEATH GREEN 6.5FRX11CM .038IN PSI-6F-11-038ACT

## (undated) DEVICE — DECANTER TRANSFER DEVICE 2008S

## (undated) DEVICE — SU ETHILON 2-0 PS 18" 585H

## (undated) DEVICE — SOL NACL 0.9% IRRIG 1000ML BOTTLE 2F7124

## (undated) DEVICE — INTRODUCER SHEATH FAST-CATH SWARTZ 8.5FRX63CM SL1 CVD 406849

## (undated) DEVICE — SU VICRYL 4-0 PS-2 18" UND J496H

## (undated) DEVICE — EYE PUNCH VACUUM BARRON 8.0MM K20-2108

## (undated) DEVICE — DRAIN JACKSON PRATT 15FR ROUND SIL LF JP-2229

## (undated) DEVICE — SUCTION CANISTER MEDIVAC LINER 3000ML W/LID 65651-530

## (undated) DEVICE — ESU ELEC BLADE 2.75" COATED/INSULATED E1455

## (undated) DEVICE — CATH EP 6FR 2MM TIP 2-8-2 115C

## (undated) DEVICE — DEFIB PRO-PADZ LVP LQD GEL ADULT 8900-2105-01

## (undated) DEVICE — SUCTION IRR STRYKERFLOW II W/TIP 250-070-520

## (undated) DEVICE — SYR 05ML LL W/O NDL

## (undated) DEVICE — SYR 03ML LL W/O NDL 309657

## (undated) DEVICE — GLOVE PROTEXIS BLUE W/NEU-THERA 7.0  2D73EB70

## (undated) DEVICE — STPL POWERED ECHELON 45MM PSEE45A

## (undated) DEVICE — EYE BLADE VACUUM RADIAL TREPHINE BARRON 7.5MM K20-2056

## (undated) DEVICE — CATH BLAZER DX-20 DUO-DECAPOLA

## (undated) DEVICE — SYR 10ML LL W/O NDL 302995

## (undated) DEVICE — EYE PUNCH VACUUM BARRON 8.75MM K20-2111

## (undated) DEVICE — EYE CANN IRR 25GA HYDRODISSECTING 585037

## (undated) DEVICE — EYE BLADE VACUUM RADIAL TREPHINE BARRON 8.5MM K20-2060

## (undated) DEVICE — SYR 05ML SLIP TIP W/O NDL

## (undated) DEVICE — SU VICRYL 0 CT-2 CR 8X18" J727D

## (undated) RX ORDER — ONDANSETRON 2 MG/ML
INJECTION INTRAMUSCULAR; INTRAVENOUS
Status: DISPENSED
Start: 2018-11-04

## (undated) RX ORDER — LIDOCAINE HYDROCHLORIDE 10 MG/ML
INJECTION, SOLUTION EPIDURAL; INFILTRATION; INTRACAUDAL; PERINEURAL
Status: DISPENSED
Start: 2021-11-17

## (undated) RX ORDER — FENTANYL CITRATE 50 UG/ML
INJECTION, SOLUTION INTRAMUSCULAR; INTRAVENOUS
Status: DISPENSED
Start: 2021-11-17

## (undated) RX ORDER — ACETAMINOPHEN 325 MG/1
TABLET ORAL
Status: DISPENSED
Start: 2024-06-11

## (undated) RX ORDER — GLYCOPYRROLATE 0.2 MG/ML
INJECTION INTRAMUSCULAR; INTRAVENOUS
Status: DISPENSED
Start: 2018-11-04

## (undated) RX ORDER — FENTANYL CITRATE 50 UG/ML
INJECTION, SOLUTION INTRAMUSCULAR; INTRAVENOUS
Status: DISPENSED
Start: 2021-12-08

## (undated) RX ORDER — DEXAMETHASONE SODIUM PHOSPHATE 4 MG/ML
INJECTION, SOLUTION INTRA-ARTICULAR; INTRALESIONAL; INTRAMUSCULAR; INTRAVENOUS; SOFT TISSUE
Status: DISPENSED
Start: 2018-11-04

## (undated) RX ORDER — PROPOFOL 10 MG/ML
INJECTION, EMULSION INTRAVENOUS
Status: DISPENSED
Start: 2024-06-11

## (undated) RX ORDER — ESMOLOL HYDROCHLORIDE 10 MG/ML
INJECTION INTRAVENOUS
Status: DISPENSED
Start: 2018-11-05

## (undated) RX ORDER — HEPARIN SODIUM 1000 [USP'U]/ML
INJECTION, SOLUTION INTRAVENOUS; SUBCUTANEOUS
Status: DISPENSED
Start: 2021-11-17

## (undated) RX ORDER — FENTANYL CITRATE-0.9 % NACL/PF 10 MCG/ML
PLASTIC BAG, INJECTION (ML) INTRAVENOUS
Status: DISPENSED
Start: 2024-06-11

## (undated) RX ORDER — BUPIVACAINE HYDROCHLORIDE 2.5 MG/ML
INJECTION, SOLUTION EPIDURAL; INFILTRATION; INTRACAUDAL
Status: DISPENSED
Start: 2018-11-04

## (undated) RX ORDER — PHENYLEPHRINE HCL IN 0.9% NACL 1 MG/10 ML
SYRINGE (ML) INTRAVENOUS
Status: DISPENSED
Start: 2018-11-04

## (undated) RX ORDER — PROPOFOL 10 MG/ML
INJECTION, EMULSION INTRAVENOUS
Status: DISPENSED
Start: 2018-11-04

## (undated) RX ORDER — LIDOCAINE HYDROCHLORIDE 10 MG/ML
INJECTION, SOLUTION EPIDURAL; INFILTRATION; INTRACAUDAL; PERINEURAL
Status: DISPENSED
Start: 2018-11-04

## (undated) RX ORDER — ONDANSETRON 2 MG/ML
INJECTION INTRAMUSCULAR; INTRAVENOUS
Status: DISPENSED
Start: 2024-06-11

## (undated) RX ORDER — FENTANYL CITRATE 50 UG/ML
INJECTION, SOLUTION INTRAMUSCULAR; INTRAVENOUS
Status: DISPENSED
Start: 2024-06-11

## (undated) RX ORDER — ACETAMINOPHEN 325 MG/1
TABLET ORAL
Status: DISPENSED
Start: 2021-12-08

## (undated) RX ORDER — GLYCOPYRROLATE 0.2 MG/ML
INJECTION INTRAMUSCULAR; INTRAVENOUS
Status: DISPENSED
Start: 2024-06-11

## (undated) RX ORDER — HEPARIN SODIUM 200 [USP'U]/100ML
INJECTION, SOLUTION INTRAVENOUS
Status: DISPENSED
Start: 2021-11-17

## (undated) RX ORDER — DEXAMETHASONE SODIUM PHOSPHATE 4 MG/ML
INJECTION, SOLUTION INTRA-ARTICULAR; INTRALESIONAL; INTRAMUSCULAR; INTRAVENOUS; SOFT TISSUE
Status: DISPENSED
Start: 2024-06-11

## (undated) RX ORDER — NEOSTIGMINE METHYLSULFATE 1 MG/ML
VIAL (ML) INJECTION
Status: DISPENSED
Start: 2018-11-04

## (undated) RX ORDER — FENTANYL CITRATE 50 UG/ML
INJECTION, SOLUTION INTRAMUSCULAR; INTRAVENOUS
Status: DISPENSED
Start: 2018-11-04